# Patient Record
Sex: MALE | Race: WHITE | Employment: OTHER | URBAN - METROPOLITAN AREA
[De-identification: names, ages, dates, MRNs, and addresses within clinical notes are randomized per-mention and may not be internally consistent; named-entity substitution may affect disease eponyms.]

---

## 2017-02-16 RX ORDER — LOSARTAN POTASSIUM 50 MG/1
TABLET ORAL
Qty: 30 TABLET | Refills: 1 | Status: SHIPPED | OUTPATIENT
Start: 2017-02-16 | End: 2017-04-24

## 2017-02-16 RX ORDER — LOSARTAN POTASSIUM 50 MG/1
TABLET ORAL
Qty: 30 TABLET | Refills: 5 | Status: SHIPPED | OUTPATIENT
Start: 2017-02-16 | End: 2020-03-05

## 2017-04-24 ENCOUNTER — OFFICE VISIT (OUTPATIENT)
Dept: CARDIOLOGY CLINIC | Age: 77
End: 2017-04-24

## 2017-04-24 VITALS
DIASTOLIC BLOOD PRESSURE: 64 MMHG | RESPIRATION RATE: 28 BRPM | BODY MASS INDEX: 35.17 KG/M2 | HEART RATE: 68 BPM | SYSTOLIC BLOOD PRESSURE: 118 MMHG | HEIGHT: 71 IN | WEIGHT: 251.2 LBS

## 2017-04-24 DIAGNOSIS — I25.10 CORONARY ARTERY DISEASE INVOLVING NATIVE CORONARY ARTERY OF NATIVE HEART WITHOUT ANGINA PECTORIS: Primary | ICD-10-CM

## 2017-04-24 PROCEDURE — G8598 ASA/ANTIPLAT THER USED: HCPCS | Performed by: INTERNAL MEDICINE

## 2017-04-24 PROCEDURE — 3017F COLORECTAL CA SCREEN DOC REV: CPT | Performed by: INTERNAL MEDICINE

## 2017-04-24 PROCEDURE — G8427 DOCREV CUR MEDS BY ELIG CLIN: HCPCS | Performed by: INTERNAL MEDICINE

## 2017-04-24 PROCEDURE — 99213 OFFICE O/P EST LOW 20 MIN: CPT | Performed by: INTERNAL MEDICINE

## 2017-04-24 PROCEDURE — 1123F ACP DISCUSS/DSCN MKR DOCD: CPT | Performed by: INTERNAL MEDICINE

## 2017-04-24 PROCEDURE — 1036F TOBACCO NON-USER: CPT | Performed by: INTERNAL MEDICINE

## 2017-04-24 PROCEDURE — G8419 CALC BMI OUT NRM PARAM NOF/U: HCPCS | Performed by: INTERNAL MEDICINE

## 2017-04-24 PROCEDURE — 4040F PNEUMOC VAC/ADMIN/RCVD: CPT | Performed by: INTERNAL MEDICINE

## 2017-04-24 RX ORDER — FUROSEMIDE 40 MG/1
TABLET ORAL
Qty: 120 TABLET | Refills: 2 | Status: ON HOLD | OUTPATIENT
Start: 2017-04-24 | End: 2017-08-05 | Stop reason: CLARIF

## 2017-04-24 RX ORDER — UBIDECARENONE 100 MG
CAPSULE ORAL
Qty: 30 CAPSULE | Refills: 0 | Status: ON HOLD | OUTPATIENT
Start: 2017-04-24 | End: 2017-07-24 | Stop reason: CLARIF

## 2017-04-25 ENCOUNTER — OFFICE VISIT (OUTPATIENT)
Dept: SLEEP MEDICINE | Age: 77
End: 2017-04-25

## 2017-04-25 VITALS
DIASTOLIC BLOOD PRESSURE: 70 MMHG | OXYGEN SATURATION: 97 % | HEART RATE: 85 BPM | HEIGHT: 69 IN | BODY MASS INDEX: 37.18 KG/M2 | SYSTOLIC BLOOD PRESSURE: 110 MMHG | WEIGHT: 251 LBS

## 2017-04-25 DIAGNOSIS — E11.8 TYPE 2 DIABETES MELLITUS WITH COMPLICATION, WITH LONG-TERM CURRENT USE OF INSULIN (HCC): Chronic | ICD-10-CM

## 2017-04-25 DIAGNOSIS — I25.10 CORONARY ARTERY DISEASE INVOLVING NATIVE CORONARY ARTERY OF NATIVE HEART WITHOUT ANGINA PECTORIS: Chronic | ICD-10-CM

## 2017-04-25 DIAGNOSIS — E03.1 CONGENITAL HYPOTHYROIDISM WITHOUT GOITER: Chronic | ICD-10-CM

## 2017-04-25 DIAGNOSIS — I10 ESSENTIAL HYPERTENSION: Chronic | ICD-10-CM

## 2017-04-25 DIAGNOSIS — G47.33 OBSTRUCTIVE SLEEP APNEA (ADULT) (PEDIATRIC): Primary | Chronic | ICD-10-CM

## 2017-04-25 DIAGNOSIS — Z79.4 TYPE 2 DIABETES MELLITUS WITH COMPLICATION, WITH LONG-TERM CURRENT USE OF INSULIN (HCC): Chronic | ICD-10-CM

## 2017-04-25 PROCEDURE — 3046F HEMOGLOBIN A1C LEVEL >9.0%: CPT | Performed by: NURSE PRACTITIONER

## 2017-04-25 PROCEDURE — 99214 OFFICE O/P EST MOD 30 MIN: CPT | Performed by: NURSE PRACTITIONER

## 2017-04-25 PROCEDURE — G8419 CALC BMI OUT NRM PARAM NOF/U: HCPCS | Performed by: NURSE PRACTITIONER

## 2017-04-25 PROCEDURE — 1123F ACP DISCUSS/DSCN MKR DOCD: CPT | Performed by: NURSE PRACTITIONER

## 2017-04-25 PROCEDURE — 4040F PNEUMOC VAC/ADMIN/RCVD: CPT | Performed by: NURSE PRACTITIONER

## 2017-04-25 PROCEDURE — G8427 DOCREV CUR MEDS BY ELIG CLIN: HCPCS | Performed by: NURSE PRACTITIONER

## 2017-04-25 PROCEDURE — 3017F COLORECTAL CA SCREEN DOC REV: CPT | Performed by: NURSE PRACTITIONER

## 2017-04-25 PROCEDURE — 1036F TOBACCO NON-USER: CPT | Performed by: NURSE PRACTITIONER

## 2017-04-25 PROCEDURE — G8598 ASA/ANTIPLAT THER USED: HCPCS | Performed by: NURSE PRACTITIONER

## 2017-04-25 ASSESSMENT — ENCOUNTER SYMPTOMS
ABDOMINAL DISTENTION: 0
COUGH: 0
ABDOMINAL PAIN: 0
APNEA: 0
SHORTNESS OF BREATH: 0
SINUS PRESSURE: 0
RHINORRHEA: 0

## 2017-04-25 ASSESSMENT — SLEEP AND FATIGUE QUESTIONNAIRES
ESS TOTAL SCORE: 14
HOW LIKELY ARE YOU TO NOD OFF OR FALL ASLEEP WHILE SITTING AND TALKING TO SOMEONE: 0
HOW LIKELY ARE YOU TO NOD OFF OR FALL ASLEEP IN A CAR, WHILE STOPPED FOR A FEW MINUTES IN TRAFFIC: 0
HOW LIKELY ARE YOU TO NOD OFF OR FALL ASLEEP WHILE SITTING AND READING: 2
HOW LIKELY ARE YOU TO NOD OFF OR FALL ASLEEP WHEN YOU ARE A PASSENGER IN A CAR FOR AN HOUR WITHOUT A BREAK: 3
HOW LIKELY ARE YOU TO NOD OFF OR FALL ASLEEP WHILE LYING DOWN TO REST IN THE AFTERNOON WHEN CIRCUMSTANCES PERMIT: 3
HOW LIKELY ARE YOU TO NOD OFF OR FALL ASLEEP WHILE SITTING INACTIVE IN A PUBLIC PLACE: 2
HOW LIKELY ARE YOU TO NOD OFF OR FALL ASLEEP WHILE WATCHING TV: 3
HOW LIKELY ARE YOU TO NOD OFF OR FALL ASLEEP WHILE SITTING QUIETLY AFTER LUNCH WITHOUT ALCOHOL: 1

## 2017-07-25 PROBLEM — L08.9 DIABETIC INFECTION OF RIGHT FOOT (HCC): Status: ACTIVE | Noted: 2017-07-25

## 2017-07-25 PROBLEM — E11.42 DIABETIC POLYNEUROPATHY ASSOCIATED WITH TYPE 2 DIABETES MELLITUS (HCC): Status: ACTIVE | Noted: 2017-07-25

## 2017-07-25 PROBLEM — A48.0 GAS GANGRENE (HCC): Status: ACTIVE | Noted: 2017-07-25

## 2017-07-25 PROBLEM — E11.628 DIABETIC INFECTION OF RIGHT FOOT (HCC): Status: ACTIVE | Noted: 2017-07-25

## 2017-08-07 PROBLEM — G93.40 ENCEPHALOPATHY: Status: ACTIVE | Noted: 2017-08-07

## 2017-08-22 ENCOUNTER — HOSPITAL ENCOUNTER (OUTPATIENT)
Dept: WOUND CARE | Age: 77
Discharge: HOME | End: 2017-08-22
Attending: PODIATRIST | Admitting: PODIATRIST

## 2017-08-22 VITALS
HEART RATE: 70 BPM | SYSTOLIC BLOOD PRESSURE: 96 MMHG | DIASTOLIC BLOOD PRESSURE: 62 MMHG | TEMPERATURE: 97.2 F | RESPIRATION RATE: 18 BRPM

## 2017-08-22 LAB
GLUCOSE BLD-MCNC: 95 MG/DL (ref 70–99)
PERFORMED ON: NORMAL

## 2017-08-22 RX ORDER — LIDOCAINE HYDROCHLORIDE 40 MG/ML
SOLUTION TOPICAL ONCE
Status: COMPLETED | OUTPATIENT
Start: 2017-08-22 | End: 2017-08-22

## 2017-08-22 RX ADMIN — LIDOCAINE HYDROCHLORIDE: 40 SOLUTION TOPICAL at 14:16

## 2017-08-22 ASSESSMENT — PAIN DESCRIPTION - LOCATION: LOCATION: ABDOMEN

## 2017-08-22 ASSESSMENT — PAIN DESCRIPTION - PAIN TYPE: TYPE: ACUTE PAIN

## 2017-08-22 ASSESSMENT — PAIN DESCRIPTION - DESCRIPTORS: DESCRIPTORS: DISCOMFORT

## 2017-08-22 ASSESSMENT — PAIN SCALES - GENERAL: PAINLEVEL_OUTOF10: 3

## 2017-08-29 ENCOUNTER — HOSPITAL ENCOUNTER (OUTPATIENT)
Dept: WOUND CARE | Age: 77
Discharge: HOME | End: 2017-08-29
Attending: PODIATRIST | Admitting: PODIATRIST

## 2017-08-29 VITALS
SYSTOLIC BLOOD PRESSURE: 112 MMHG | TEMPERATURE: 97.7 F | DIASTOLIC BLOOD PRESSURE: 67 MMHG | HEART RATE: 71 BPM | RESPIRATION RATE: 18 BRPM

## 2017-08-29 LAB
GLUCOSE BLD-MCNC: 130 MG/DL (ref 70–99)
PERFORMED ON: ABNORMAL

## 2017-08-29 RX ORDER — LIDOCAINE HYDROCHLORIDE 40 MG/ML
2.5 SOLUTION TOPICAL ONCE
Status: COMPLETED | OUTPATIENT
Start: 2017-08-29 | End: 2017-08-29

## 2017-08-29 RX ADMIN — LIDOCAINE HYDROCHLORIDE 2.5 ML: 40 SOLUTION TOPICAL at 16:01

## 2017-09-01 ENCOUNTER — CARE COORDINATION (OUTPATIENT)
Dept: CASE MANAGEMENT | Age: 77
End: 2017-09-01

## 2017-09-05 ENCOUNTER — HOSPITAL ENCOUNTER (OUTPATIENT)
Dept: WOUND CARE | Age: 77
Discharge: HOME | End: 2017-09-05
Attending: PODIATRIST | Admitting: PODIATRIST

## 2017-09-05 VITALS
TEMPERATURE: 97.6 F | HEART RATE: 70 BPM | SYSTOLIC BLOOD PRESSURE: 134 MMHG | DIASTOLIC BLOOD PRESSURE: 72 MMHG | RESPIRATION RATE: 16 BRPM

## 2017-09-05 LAB
GLUCOSE BLD-MCNC: 128 MG/DL (ref 70–99)
PERFORMED ON: ABNORMAL

## 2017-09-05 RX ORDER — LIDOCAINE HYDROCHLORIDE 40 MG/ML
2.5 SOLUTION TOPICAL ONCE
Status: COMPLETED | OUTPATIENT
Start: 2017-09-05 | End: 2017-09-05

## 2017-09-05 RX ORDER — GUAIFENESIN AND DEXTROMETHORPHAN HYDROBROMIDE 100; 10 MG/5ML; MG/5ML
5 SOLUTION ORAL EVERY 4 HOURS PRN
COMMUNITY
End: 2018-01-06 | Stop reason: CLARIF

## 2017-09-05 RX ORDER — ATORVASTATIN CALCIUM 20 MG/1
20 TABLET, FILM COATED ORAL DAILY
COMMUNITY
End: 2017-09-26

## 2017-09-05 RX ADMIN — LIDOCAINE HYDROCHLORIDE 2.5 ML: 40 SOLUTION TOPICAL at 15:59

## 2017-09-12 ENCOUNTER — HOSPITAL ENCOUNTER (OUTPATIENT)
Dept: WOUND CARE | Age: 77
Discharge: HOME | End: 2017-09-12
Attending: PODIATRIST | Admitting: PODIATRIST

## 2017-09-12 VITALS
RESPIRATION RATE: 18 BRPM | HEART RATE: 65 BPM | TEMPERATURE: 96.6 F | DIASTOLIC BLOOD PRESSURE: 66 MMHG | SYSTOLIC BLOOD PRESSURE: 114 MMHG

## 2017-09-12 LAB
GLUCOSE BLD-MCNC: 123 MG/DL (ref 70–99)
PERFORMED ON: ABNORMAL

## 2017-09-12 RX ORDER — LIDOCAINE HYDROCHLORIDE 40 MG/ML
2.5 SOLUTION TOPICAL ONCE
Status: COMPLETED | OUTPATIENT
Start: 2017-09-12 | End: 2017-09-12

## 2017-09-19 ENCOUNTER — HOSPITAL ENCOUNTER (OUTPATIENT)
Dept: WOUND CARE | Age: 77
Discharge: HOME | End: 2017-09-19
Attending: PODIATRIST | Admitting: PODIATRIST

## 2017-09-19 VITALS
RESPIRATION RATE: 18 BRPM | DIASTOLIC BLOOD PRESSURE: 51 MMHG | TEMPERATURE: 97.2 F | HEART RATE: 69 BPM | SYSTOLIC BLOOD PRESSURE: 91 MMHG

## 2017-09-19 RX ORDER — CIPROFLOXACIN 500 MG/1
500 TABLET, FILM COATED ORAL 2 TIMES DAILY
Qty: 20 TABLET | Refills: 0 | Status: SHIPPED | OUTPATIENT
Start: 2017-09-19 | End: 2017-09-29

## 2017-09-19 RX ORDER — LIDOCAINE HYDROCHLORIDE 40 MG/ML
2.5 SOLUTION TOPICAL ONCE
Status: COMPLETED | OUTPATIENT
Start: 2017-09-19 | End: 2017-09-19

## 2017-09-19 RX ORDER — CLINDAMYCIN HYDROCHLORIDE 300 MG/1
300 CAPSULE ORAL 3 TIMES DAILY
Qty: 30 CAPSULE | Refills: 0 | Status: SHIPPED | OUTPATIENT
Start: 2017-09-19 | End: 2017-09-29

## 2017-09-19 RX ADMIN — LIDOCAINE HYDROCHLORIDE 2.5 ML: 40 SOLUTION TOPICAL at 16:07

## 2017-09-22 ENCOUNTER — CARE COORDINATION (OUTPATIENT)
Dept: CASE MANAGEMENT | Age: 77
End: 2017-09-22

## 2017-09-26 ENCOUNTER — HOSPITAL ENCOUNTER (OUTPATIENT)
Dept: WOUND CARE | Age: 77
Discharge: HOME | End: 2017-09-26
Attending: PODIATRIST | Admitting: PODIATRIST

## 2017-09-26 VITALS
DIASTOLIC BLOOD PRESSURE: 62 MMHG | TEMPERATURE: 97.6 F | RESPIRATION RATE: 18 BRPM | SYSTOLIC BLOOD PRESSURE: 108 MMHG | HEART RATE: 70 BPM

## 2017-09-26 RX ORDER — LIDOCAINE HYDROCHLORIDE 40 MG/ML
2.5 SOLUTION TOPICAL ONCE
Status: COMPLETED | OUTPATIENT
Start: 2017-09-26 | End: 2017-09-26

## 2017-09-26 RX ADMIN — LIDOCAINE HYDROCHLORIDE 2.5 ML: 40 SOLUTION TOPICAL at 17:00

## 2017-10-03 ENCOUNTER — HOSPITAL ENCOUNTER (OUTPATIENT)
Dept: WOUND CARE | Age: 77
Discharge: HOME | End: 2017-10-03
Attending: PODIATRIST | Admitting: PODIATRIST

## 2017-10-03 VITALS
HEART RATE: 73 BPM | TEMPERATURE: 97.5 F | DIASTOLIC BLOOD PRESSURE: 67 MMHG | RESPIRATION RATE: 18 BRPM | SYSTOLIC BLOOD PRESSURE: 106 MMHG

## 2017-10-03 NOTE — IP AVS SNAPSHOT
After Visit Summary  (Discharge Instructions)    Medication List for Home    Based on the information you provided to us as well as any changes during this visit, the following is your updated medication list.  Compare this with your prescription bottles at home. If you have any questions or concerns, contact your primary care physician's office. Daily Medication List (This medication list can be shared with any healthcare provider who is helping you manage your medications)      ASK your doctor about these medications if you have questions        Last Dose    Next Dose Due AM NOON PM NIGHT    aspirin 81 MG EC tablet   Take 81 mg by mouth daily. carbidopa-levodopa  MG per tablet   Commonly known as:  SINEMET   Take 1 tablet by mouth 3 times daily. cetirizine 5 MG tablet   Commonly known as:  ZYRTEC   Take 5 mg by mouth daily Prn                                         clopidogrel 75 MG tablet   Commonly known as:  PLAVIX   TAKE ONE TABLET BY MOUTH EVERY DAY                                         Dextromethorphan-guaiFENesin  MG/5ML Syrp   Take 5 mLs by mouth every 4 hours as needed for Cough                                         ergocalciferol 01179 units capsule   Commonly known as:  ERGOCALCIFEROL   Take 50,000 Units by mouth Twice a Week Every Tuesday and Thursday                                         FARXIGA 5 MG tablet   Generic drug:  dapagliflozin   Take 5 mg by mouth every morning                                         fenofibrate 145 MG tablet   Commonly known as:  TRICOR   TAKE ONE TABLET BY MOUTH DAILY                                         finasteride 5 MG tablet   Commonly known as:  PROSCAR   Take 5 mg by mouth daily                                         folic acid 1 MG tablet   Commonly known as:  FOLVITE   Take 1 mg by mouth daily. furosemide 40 MG tablet   Commonly known as:  LASIX   Take 1 tablet by mouth daily                                         glipiZIDE-metformin 2.5-500 MG per tablet   Commonly known as:  METAGLIP   Take 1 tablet by mouth 2 times daily (before meals)                                         insulin aspart 100 UNIT/ML injection vial   Commonly known as:  NOVOLOG   Inject 7 Units into the skin 3 times daily (before meals)                                         insulin detemir 100 UNIT/ML injection vial   Commonly known as:  LEVEMIR   Inject 26 Units into the skin nightly                                         levothyroxine 25 MCG tablet   Commonly known as:  SYNTHROID   Take 0.5 tablets by mouth Daily                                         losartan 50 MG tablet   Commonly known as:  COZAAR   TAKE ONE TABLET BY MOUTH EVERY DAY                                         metFORMIN 500 MG tablet   Commonly known as:  GLUCOPHAGE   Take 850 mg by mouth 2 times daily (with meals)                                         metoprolol tartrate 50 MG tablet   Commonly known as:  LOPRESSOR   Take 1 tablet by mouth 2 times daily                                         MILK OF MAGNESIA PO   Take 30 mLs by mouth daily as needed                                         nitroGLYCERIN 0.4 MG SL tablet   Commonly known as:  NITROSTAT   Place 0.4 mg under the tongue every 5 minutes as needed. nystatin 460897 UNIT/GM cream   Commonly known as:  MYCOSTATIN   . Apply Nystatin cream to the groin & buttocks twice a day.                                          potassium chloride 10 MEQ extended release tablet   Commonly known as:  KLOR-CON M   Take 10 mEq by mouth 3 times daily                                         QUEtiapine 25 MG tablet   Commonly known as:  SEROQUEL   Take 1 tablet by mouth nightly                                         rosuvastatin 20 MG tablet   Commonly known as:  CRESTOR Care Plan Once You Return Home    This section includes instructions you will need to follow once you leave the hospital.  Your care team will discuss these with you, so you and those caring for you know how to best care for your health needs at home. This section may also include educational information about certain health topics that may be of help to you. Discharge [address] [facility] Physician Orders and Discharge Instructions  Leigh Daigle [address] [facility], [address]  Telephone: [phone]    NAME:  Dick Morrison OF BIRTH:  1940  MEDICAL RECORD NUMBER:  [id number]  DATE:  10/3/2017    Wash hands with soap and water prior to and after every dressing change. Wound Cleansing:   · Do not scrub or use excessive force. · With each dressing change, rinse wounds with 0.9% Saline. (May use wound wash or soft contact solution. Both can be purchased at a local drug store). · If unable to obtain saline, may use a gentle soap and water. · Keep wounds dry in the shower unless otherwise instructed by the physician. Topical Treatments:  Do not apply lotions, creams, or ointments to wound bed unless directed. Apply moisturizing lotion to skin surrounding the wound prior to dressing change. Apply antifungal ointment to skin surrounding the wound prior to dressing change. Apply thin film of moisture barrier ointment to skin immediately around wound.    Other:      Dressings:           Wound Location: Right Dorsal & Lateral Foot      Apply Primary Dressing to wound:        Foam/Foam with Border   Mepitel/Non-adherent/Xeroform    Alginate with Silver     Alginate    Collagen  Collagen with Silver      Hydrocolloid   Hydrafera Blue moistened with saline    MediHoney Paste/Gel  Hydrogel       Santyl with Moisten saline gauze     Bactroban/Mupirocin  Polysporin   Other: moist laura has been given to me, the patient and/or responsible adult.            Patient Signature/Responsible Adult:____________________    Clinician Signature:_____________________    Date:_____________________    Time:_____________________

## 2017-10-03 NOTE — IP AVS SNAPSHOT
Commonly known as:  KLOR-CON M       QUEtiapine 25 MG tablet   Commonly known as:  SEROQUEL   Take 1 tablet by mouth nightly       rosuvastatin 20 MG tablet   Commonly known as:  CRESTOR   Take 1 tablet by mouth daily       SENNA LAXATIVE PO       ZANTAC 150 MG tablet   Generic drug:  ranitidine

## 2017-10-03 NOTE — PROGRESS NOTES
Comments)     arf    Celebrex [Celecoxib]      tremors    Cymbalta [Duloxetine Hcl]      Mental status change    Pcn [Penicillins]      rash    Vicodin [Hydrocodone-Acetaminophen]     Codeine Nausea And Vomiting       MEDICATIONS    Current Outpatient Prescriptions on File Prior to Encounter   Medication Sig Dispense Refill    Sennosides (SENNA LAXATIVE PO) Take 8.6-50 mg by mouth      metFORMIN (GLUCOPHAGE) 500 MG tablet Take 850 mg by mouth 2 times daily (with meals)       Dextromethorphan-guaiFENesin  MG/5ML SYRP Take 5 mLs by mouth every 4 hours as needed for Cough      QUEtiapine (SEROQUEL) 25 MG tablet Take 1 tablet by mouth nightly 60 tablet 0    metoprolol tartrate (LOPRESSOR) 50 MG tablet Take 1 tablet by mouth 2 times daily 60 tablet 1    furosemide (LASIX) 40 MG tablet Take 1 tablet by mouth daily 60 tablet 1    levothyroxine (SYNTHROID) 25 MCG tablet Take 0.5 tablets by mouth Daily 30 tablet 3    potassium chloride (KLOR-CON M) 10 MEQ extended release tablet Take 10 mEq by mouth 3 times daily      insulin aspart (NOVOLOG) 100 UNIT/ML injection vial Inject 7 Units into the skin 3 times daily (before meals) 1 vial 3    nystatin (MYCOSTATIN) 131961 UNIT/GM cream .Apply Nystatin cream to the groin & buttocks twice a day. 1 Tube 1    ranitidine (ZANTAC) 150 MG tablet Take 150 mg by mouth 2 times daily      losartan (COZAAR) 50 MG tablet TAKE ONE TABLET BY MOUTH EVERY DAY 30 tablet 5    fenofibrate (TRICOR) 145 MG tablet TAKE ONE TABLET BY MOUTH DAILY 30 tablet 2    clopidogrel (PLAVIX) 75 MG tablet TAKE ONE TABLET BY MOUTH EVERY DAY 90 tablet 4    rosuvastatin (CRESTOR) 20 MG tablet Take 1 tablet by mouth daily 90 tablet 4    finasteride (PROSCAR) 5 MG tablet Take 5 mg by mouth daily      FARXIGA 5 MG tablet Take 5 mg by mouth every morning       carbidopa-levodopa (SINEMET)  MG per tablet Take 1 tablet by mouth 3 times daily.       cetirizine (ZYRTEC) 5 MG tablet Take 5 mg by mouth daily Prn      aspirin 81 MG EC tablet Take 81 mg by mouth daily.  ergocalciferol (ERGOCALCIFEROL) 37817 UNITS capsule Take 50,000 Units by mouth Twice a Week Every Tuesday and Thursday      folic acid (FOLVITE) 1 MG tablet Take 1 mg by mouth daily.  Magnesium Hydroxide (MILK OF MAGNESIA PO) Take 30 mLs by mouth daily as needed      insulin detemir (LEVEMIR) 100 UNIT/ML injection vial Inject 26 Units into the skin nightly 1 vial 3    glipiZIDE-metformin (METAGLIP) 2.5-500 MG per tablet Take 1 tablet by mouth 2 times daily (before meals)      nitroGLYCERIN (NITROSTAT) 0.4 MG SL tablet Place 0.4 mg under the tongue every 5 minutes as needed. No current facility-administered medications on file prior to encounter. REVIEW OF SYSTEMS    unable to obtain as patient is nonverbal    Objective:      /67  Pulse 73  Temp 97.5 °F (36.4 °C) (Oral)   Resp 18    Wt Readings from Last 3 Encounters:   08/05/17 237 lb (107.5 kg)   07/28/17 274 lb 14.6 oz (124.7 kg)   04/25/17 251 lb (113.9 kg)       PHYSICAL EXAM    Incision along TMA well approximated and coapted. There are linear areas of ecchymosis on the medial and lateral aspect of the foot that is consistent with a dressing that was applied too tight. There is no germán necrosis noted at these areas. The area is persistently erythematous and slightly warm. There is a dorsal wound noted on the right that has fibrotic and nonviable tissue that extends down through and includes the deep fascia/muscular layer. After debridement the wound has a fibrous granular base. There is no surrounding erythema, edema, warmth or malodor noted. The wound does not probe or track to bone.        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) A41.9    Syncope R55    Hypothyroid E03.9    Obstructive sleep apnea G47.33    Gas gangrene ([facility]) A48.0    Diabetic infection of right foot ([facility]) E11.69, L08.9    Diabetic polyneuropathy associated with type 2 diabetes mellitus ([facility]) E11.42    Right foot infection L08.9    LULÚ (acute kidney injury) ([facility] N17.9    Acute diastolic CHF (congestive heart failure), NYHA class 2 ([facility]) I50.31    Encephalopathy G93.40        Procedure Note  Indications:  Based on my examination of this patient's wound(s)/ulcer(s) today, debridement is required to promote healing and evaluate the wound base. Performed by: Morgan Moya DPM    Consent obtained:  Yes    Time out taken:  Yes    Pain Control: Anesthetic  Anesthetic: 4% Topical Xylocaine       Debridement:Excisional Debridement    Using curette, #15 blade scalpel, scissors and forceps the wound(s)/ulcer(s) was/were sharply debrided down through and including the removal of epidermis, dermis, subcutaneous tissue and muscle/fascia.         Devitalized Tissue Debrided:  fibrin, slough and necrotic/eschar    Pre Debridement Measurements:  Are located in the [place]  Documentation Flow Sheet    Wound/Ulcer #: 1    Post Debridement Measurements:  Wound/Ulcer Descriptions are Pre Debridement except measurements:    Wound 08/22/17 Foot Dorsal;Right #1 diabetic (existing 4 weeks) (Active)   Wound Type Wound 10/3/2017  3:06 PM   Wound Diabetic Jo 2 9/26/2017  4:49 PM   Dressing/Treatment Other (Comment) 9/26/2017  4:49 PM   Wound Cleansed Rinsed/Irrigated with saline 10/3/2017  3:06 PM   Wound Length (cm) 2.5 cm 10/3/2017  3:06 PM   Wound Width (cm) 2.6 cm 10/3/2017  3:06 PM   Wound Depth (cm)  0.1 10/3/2017  3:06 PM   Calculated Wound Size (cm^2) (l*w) 5.98 cm^2 10/3/2017  3:06 PM   Change in Wound Size % (l*w) 53.28 10/3/2017  3:06 PM   Tunneling Position ___ O'Clock 0 10/3/2017  3:06 PM   Undermining Ends___ O'Clock 0 10/3/2017  3:06 PM   Wound Assessment Yellow;Red;Pink 10/3/2017  3:06 PM   Margins Defined edges 10/3/2017  3:06 PM   Mary Jane-wound Assessment Pink;Red 10/3/2017  3:06 PM   Non-staged Wound Description Full thickness 10/3/2017  3:06 PM   [place]%Wound Bed 40 10/3/2017  3:06 PM   Red%Wound Bed 20 10/3/2017  3:06 PM   Yellow%Wound Bed 40 10/3/2017  3:06 PM   Black%Wound Bed 5 9/26/2017  4:49 PM   Drainage Amount Moderate 10/3/2017  3:06 PM   Drainage Description Serosanguinous 10/3/2017  3:06 PM   Odor None 10/3/2017  3:06 PM   Op First Treatment Date 08/22/17 8/22/2017  1:39 PM   Number of days:42       Wound 09/19/17 Foot Right;Lateral;Proximal #3 - diabetic (Active)   Wound Type Wound 10/3/2017  3:06 PM   Wound Diabetic Jo 1 10/3/2017  3:06 PM   Dressing/Treatment Other (Comment) 10/3/2017  3:06 PM   Wound Cleansed Rinsed/Irrigated with saline 10/3/2017  3:06 PM   Wound Length (cm) 0.5 cm 10/3/2017  3:06 PM   Wound Width (cm) 0.5 cm 10/3/2017  3:06 PM   Wound Depth (cm)  0.2 10/3/2017  3:06 PM   Calculated Wound Size (cm^2) (l*w) 0.16 cm^2 10/3/2017  3:06 PM   Change in Wound Size % (l*w) 46.67 10/3/2017  3:06 PM   Tunneling Position ___ O'Clock 0 10/3/2017  3:06 PM   Undermining Ends___ O'Clock 0 10/3/2017  3:06 PM   Wound Assessment Slough; Yellow 10/3/2017  3:06 PM   Margins Defined edges 10/3/2017  3:06 PM   Mary Jane-wound Assessment Pink;Dry 10/3/2017  3:06 PM   Non-staged Wound Description Full thickness 10/3/2017  3:06 PM   Yellow%Wound Bed 100 10/3/2017  3:06 PM   Drainage Amount None 10/3/2017  3:06 PM   Drainage Description Yellow 9/26/2017  4:49 PM   Odor Mild 10/3/2017  3:06 PM   Number of days:14       Incision 07/24/17 Foot Right (Active)   Number of days:71     Percent of Wound/Ulcer Debrided: 100%    Total Surface Area Debrided: 6.75 sq cm     Diabetic/Pressure/Non Pressure Ulcers only:  Ulcer: Diabetic ulcer, muscle necrosis    Estimated Blood Loss:  Minimal    Hemostasis Achieved:  by pressure    Procedural Pain:  0  / 10     Post Procedural Pain:  0 / 10     Response to treatment:  Well tolerated by patient. Plan:     Orders written for local wound care daily with santyl. OK to continue weight bearing with PT. Patient will be preauthorized for a apligraf to accelerate healing in this high risk DM foot ulceration. We are still waiting to hear if he is approved by his insurance. Treatment Note please see attached Discharge Instructions    In my professional opinion this patient would benefit from HBO Therapy: No    Written patient dismissal instructions given to patient and signed by patient or POA. RTC 1 week         Discharge [facility] Physician Orders and Discharge Instructions  The Sebastián Daigle [address] [facility], [address]  Telephone: [phone]    NAME:  Saran Norton OF BIRTH:  1940  MEDICAL RECORD NUMBER:  [id number]  DATE:  10/3/2017    Wash hands with soap and water prior to and after every dressing change. Wound Cleansing:   · Do not scrub or use excessive force. · With each dressing change, rinse wounds with 0.9% Saline. (May use wound wash or soft contact solution. Both can be purchased at a local drug store). · If unable to obtain saline, may use a gentle soap and water. · Keep wounds dry in the shower unless otherwise instructed by the physician. Topical Treatments:  Do not apply lotions, creams, or ointments to wound bed unless directed. [] Apply moisturizing lotion to skin surrounding the wound prior to dressing change.  [] Apply antifungal ointment to skin surrounding the wound prior to dressing change.  [] Apply thin film of moisture barrier ointment to skin immediately around wound.   [] Other:      Dressings:           Wound Location: Right Dorsal & Lateral Foot     [x] Apply Primary Dressing to Management [] Diabetes Education [] Vascular Surgery  [] Endocrinology [] Nutrition Counseling  [] Lymphedema Therapy     Your nurse  is:  Isabel     Electronically signed by Shira Gaitan RN on 10/3/2017 at 3:40 PM     [facility] Information: Should you experience any significant changes in your wound(s) or have questions about your wound care, please contact the [address] [place] [address] at [phone] Monday and Wednesday 8:00 am - 2:00 pm and Tuesday, Thursday and Friday from 8:00 am - 4:30 pm.   If you need help with your wound outside these hours and cannot wait until we are again available, contact your PCP or go to the hospital emergency room. PLEASE NOTE: IF YOU ARE UNABLE TO OBTAIN WOUND SUPPLIES, CONTINUE TO USE THE SUPPLIES YOU HAVE AVAILABLE UNTIL YOU ARE ABLE TO REACH US. IT IS MOST IMPORTANT TO KEEP THE WOUND COVERED AT ALL TIMES. Discharge Nurse Signature:_______________________    Date: ___________ Time:  ____________    Physician orders by:     [x] Dr Pau Winston [] Dr Idania Martínez    [] Dr Author Runner     [] Dr Vikas Charlton   [] Dr Ede Ball  [] Dr Ronald Chacon       Physician Signature:__________________________________        The information contained in the After Visit Summary has been reviewed with me, the patient and/or responsible adult, by my health care provider(s). I had the opportunity to ask questions regarding this information. I have elected to receive;       Patient Signature:_______________________    Date:_________Time:________        [] Patient unable to sign Discharge Instructions given to ECF/Transportation/POA      []  After Visit Summary  []  Comprehensive Discharge Instruction          Electronically signed by Pau Winston DPM on 10/3/2017 at 4:52 PM

## 2017-10-03 NOTE — IP AVS SNAPSHOT
Patient Information     Patient Name Rosemary Ignacio 1940      Important Information for Heart Failure       If your condition worsens or if you have any concerns, call your doctor or seek emergency medical services (dial 9-1-1) as needed. If you have any of the following symptoms/conditions, call your doctor. Call your primary care physician to obtain results of outstanding lab tests, cultures, x-rays, or other tests. If you have a current diagnosis or history of any of the following, please review the information carefully. HEART FAILURE PATIENTS:   DISCHARGE WEIGHT:    Record daily weights. Notify your doctor if you have a weight gain 2 pounds in a day, or 3-5 pounds in 1 week. Notify your doctor for increased shortness of breath, or swelling in legs or feet. Follow a low sodium diet. Resume normal activity unless otherwise instructed by your doctor.

## 2017-10-05 ENCOUNTER — CARE COORDINATION (OUTPATIENT)
Dept: CASE MANAGEMENT | Age: 77
End: 2017-10-05

## 2017-10-05 NOTE — CARE COORDINATION
[facility] Update Call    10/5/2017    Patient: Cailin Naranjo Patient : 1940   MRN: <[id number]>  Reason for Admission: There are no discharge diagnoses documented for the most recent discharge. Discharge Date: 8/15/17 RARS: [facility] Risk Score: 24       Care Transitions Post Acute Facility Update    Care Transitions Interventions                  Post Acute Facility Update                    Contacted [facility] for update on patient. S/W DORA De Los Santos. He stated that patient is doing well, however he is currently experiencing episodes of orthostatic hypotension, Magali placed a call to the doctor and this is being addressed. Patient is a 1 person assist, at times only stand by needed with ADL's, ambulation, and transfers. Pt is actively participating in therapy, he uses a walker, grab bars, and w/c as needed, no anticipated discharge date as of yet, he will most likely need home care PT/OT when he goes back home. Post acute care transitions will continue to follow.

## 2017-10-10 ENCOUNTER — HOSPITAL ENCOUNTER (OUTPATIENT)
Dept: WOUND CARE | Age: 77
Discharge: HOME | End: 2017-10-10
Attending: PODIATRIST | Admitting: PODIATRIST

## 2017-10-10 VITALS
DIASTOLIC BLOOD PRESSURE: 62 MMHG | TEMPERATURE: 97.7 F | RESPIRATION RATE: 18 BRPM | SYSTOLIC BLOOD PRESSURE: 121 MMHG | HEART RATE: 70 BPM

## 2017-10-10 RX ORDER — LIDOCAINE HYDROCHLORIDE 40 MG/ML
2.5 SOLUTION TOPICAL ONCE
Status: DISCONTINUED | OUTPATIENT
Start: 2017-10-10 | End: 2017-10-11 | Stop reason: HOSPADM

## 2017-10-10 NOTE — PROGRESS NOTES
[facility] [address]   Progress Note and Procedure Note      Candelaria Lao  MEDICAL RECORD NUMBER:  [id number]  AGE: 69 y.o. GENDER: male  : 1940  EPISODE DATE:  10/10/2017    Subjective:     Chief Complaint   Patient presents with    Wound Check     f/u right foot         HISTORY of PRESENT ILLNESS HPI     Carol Morgan is a 69 y.o. male who presents today for wound/ulcer evaluation. History of Wound Context: right dorsal foot wound and right lateral foot wound s/p TMA. Patient has been NWB at an [facility]. 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     Dr. Bud May CAD (coronary artery disease)     [facility]est[facility]     COPD (chronic obstructive pulmonary disease) [facility]     Diabetes mellitus [facility]     Hypertension     MRSA (methicillin resistant staph aureus) culture positive 2017    foot    Obstructive sleep apnea 10/3/2014    Parkinson's disease ([facility] 1964       PAST SURGICAL HISTORY    Past Surgical History:   Procedure Laterality Date    CARDIAC CATHETERIZATION      CERVICAL FUSION  1981    FOOT SURGERY Right 2017    INCISION AND DRAINAGE RIGHT FOOT WITH REMOVAL NECROTIC BONE    FOOT SURGERY Right 2017    : TRANSMETATARSAL AMPUTATION RIGHT FOOT        FAMILY HISTORY    No family history on file.     SOCIAL HISTORY    Social History   Substance Use Topics    Smoking status: Never Smoker    Smokeless tobacco: Never Used    Alcohol use No       ALLERGIES    Allergies   Allergen Reactions    Aspirin      GI  Bleed possible related to aspirin  (but thought to be H Pylori)    Nsaids     Bactrim [Sulfamethoxazole-Trimethoprim] Other (See Comments)     arf    Celebrex [Celecoxib]      tremors    Cymbalta [Duloxetine Hcl]      Mental status change    Pcn [Penicillins]      rash    Vicodin [Hydrocodone-Acetaminophen]     Codeine Nausea And Vomiting       MEDICATIONS    Current Outpatient Prescriptions on File Prior to Encounter   Medication Sig Dispense Refill    metFORMIN (GLUCOPHAGE) 500 MG tablet Take 850 mg by mouth 2 times daily (with meals)       QUEtiapine (SEROQUEL) 25 MG tablet Take 1 tablet by mouth nightly 60 tablet 0    metoprolol tartrate (LOPRESSOR) 50 MG tablet Take 1 tablet by mouth 2 times daily (Patient taking differently: Take 25 mg by mouth 2 times daily ) 60 tablet 1    furosemide (LASIX) 40 MG tablet Take 1 tablet by mouth daily 60 tablet 1    levothyroxine (SYNTHROID) 25 MCG tablet Take 0.5 tablets by mouth Daily 30 tablet 3    potassium chloride (KLOR-CON M) 10 MEQ extended release tablet Take 10 mEq by mouth 3 times daily      insulin aspart (NOVOLOG) 100 UNIT/ML injection vial Inject 7 Units into the skin 3 times daily (before meals) 1 vial 3    ranitidine (ZANTAC) 150 MG tablet Take 150 mg by mouth 2 times daily      losartan (COZAAR) 50 MG tablet TAKE ONE TABLET BY MOUTH EVERY DAY (Patient taking differently: 25 mg TAKE ONE TABLET BY MOUTH EVERY DAY) 30 tablet 5    clopidogrel (PLAVIX) 75 MG tablet TAKE ONE TABLET BY MOUTH EVERY DAY 90 tablet 4    rosuvastatin (CRESTOR) 20 MG tablet Take 1 tablet by mouth daily 90 tablet 4    finasteride (PROSCAR) 5 MG tablet Take 5 mg by mouth daily      carbidopa-levodopa (SINEMET)  MG per tablet Take 1 tablet by mouth 3 times daily.  aspirin 81 MG EC tablet Take 81 mg by mouth daily.  ergocalciferol (ERGOCALCIFEROL) 41246 UNITS capsule Take 50,000 Units by mouth Twice a Week Every Tuesday and Thursday      folic acid (FOLVITE) 1 MG tablet Take 1 mg by mouth daily.         Sennosides (SENNA LAXATIVE PO) Take 8.6-50 mg by mouth      Magnesium to inhalation of fumes ([facility] J68.3    Pulmonary embolism ([facility] I26.99    Coronary artery disease involving native heart without angina pectoris I25.10    Presence of drug coated stent in anterior descending branch of left coronary artery Z95.5    Sepsis ([facility]) A41.9    Syncope R55    Hypothyroid E03.9    Obstructive sleep apnea G47.33    Gas gangrene ([facility]) A48.0    Diabetic infection of right foot ([facility]) E11.69, L08.9    Diabetic polyneuropathy associated with type 2 diabetes mellitus ([facility]) E11.42    Right foot infection L08.9    LULÚ (acute kidney injury) ([facility] [address] N17.9    Acute diastolic CHF (congestive heart failure), NYHA class 2 ([facility]) I50.31    Encephalopathy G93.40        Procedure Note  Indications:  Based on my examination of this patient's wound(s)/ulcer(s) today, debridement is required to promote healing and evaluate the wound base. Performed by: Tiny Leyva DPM    Consent obtained:  Yes    Time out taken:  Yes    Pain Control: Anesthetic  Anesthetic: 4% Topical Xylocaine       Debridement:Excisional Debridement    Using curette, #15 blade scalpel, scissors and forceps the wound(s)/ulcer(s) was/were sharply debrided down through and including the removal of epidermis, dermis, subcutaneous tissue and muscle/fascia.         Devitalized Tissue Debrided:  fibrin, slough and necrotic/eschar    Pre Debridement Measurements:  Are located in the [facility]  Documentation Flow Sheet    Wound/Ulcer #: 1    Post Debridement Measurements:  Wound/Ulcer Descriptions are Pre Debridement except measurements:    Wound 08/22/17 Foot Dorsal;Right #1 diabetic (existing 4 weeks) (Active)   Wound Type Wound 10/10/2017  3:03 PM   Wound Diabetic Jo 2 10/10/2017  3:03 PM   Dressing/Treatment Other (Comment) 10/10/2017  3:03 PM   Wound Cleansed Rinsed/Irrigated with saline 10/10/2017  3:03 PM   Wound Length (cm) 2.5 cm 10/10/2017  3:03 PM   Wound Width (cm) 2.5 cm 10/10/2017  3:03 PM   Wound Depth (cm)  0.1 10/10/2017 wound.  [] Other:      Dressings:           Wound Location:      [x] Apply Primary Dressing to wound:       [] Foam/Foam with Border  [] Mepitel/Non-adherent/Xeroform   [] Alginate with Silver    [] Alginate   [] Collagen [] Collagen with Silver     [] Hydrocolloid  [] Hydrafera Blue moistened with saline   [] MediHoney Paste/Gel [] Hydrogel      [] Santyl with Moisten saline gauze    [] Bactroban/Mupirocin [] Polysporin  [] Other:      Pack wound loosely with  [] Iodoform   [] Plain Packing  [] Other      [x] Cover and Secure with:     [] Gauze [] ABD [] Stretch bandage roll [] Kerlix   [] Coban [] Ace Wrap [] Cover Roll Tape    [] Other:    Avoid contact of tape with skin. [x] Change dressing: [] Daily    [] Every Other Day [] Three times per week   [] Once a week [] Do Not Change Dressing   [] Other:    Dressings:           Wound Location:     [x] Apply Primary Dressing to wound:       [] Foam/Foam with Border  [] Mepitel/Non-adherent/Xeroform   [] Alginate with Silver    [] Alginate   [] Collagen [] Collagen with Silver     [] Hydrocolloid  [] Hydrafera Blue moistened with saline   [] MediHoney Paste/Gel [] Hydrogel      [] Santyl with Moisten saline gauze   [] Bactroban/Mupirocin [] Polysporin  [] Other:      Pack wound loosely with  [] Iodoform   [] Plain Packing  [] Other      [x] Cover and Secure with:     [] Gauze [] ABD [] Stretch bandage roll [] Kerlix   [] Coban [] Ace Wrap [] Cover Roll Tape    [] Other:    Avoid contact of tape with skin.     [x] Change dressing:  [] Daily    [] Every Other Day [] Three times per week   [] Once a week [] Do Not Change Dressing   [] Other:      ·               Off-Loading:   [] Off-loading when: [] walking  [] in bed [] sitting    [] Total non-weight bearing:  [] Right Leg  [] Left Leg     [] Partial weight bearing:   [] Right Leg  [] Left Leg     [] Assistive Device: [] Walker [] Crutches  [] Wheelchair [] Roll About   [] Surgical shoe/Darco    [] Podus Boot(s)   [] elected to receive;       Patient Signature:_______________________    Date:_________Time:________        [] Patient unable to sign Discharge Instructions given to ECF/Transportation/POA      []  After Visit Summary  []  Comprehensive Discharge Instruction          Electronically signed by Vicenta Marquis DPM on 10/10/2017 at 3:55 PM

## 2017-10-23 ENCOUNTER — OFFICE VISIT (OUTPATIENT)
Dept: CARDIOLOGY CLINIC | Age: 77
End: 2017-10-23

## 2017-10-23 VITALS
SYSTOLIC BLOOD PRESSURE: 110 MMHG | DIASTOLIC BLOOD PRESSURE: 60 MMHG | HEART RATE: 62 BPM | WEIGHT: 248.8 LBS | BODY MASS INDEX: 37.83 KG/M2

## 2017-10-23 DIAGNOSIS — I25.10 CORONARY ARTERY DISEASE INVOLVING NATIVE CORONARY ARTERY OF NATIVE HEART WITHOUT ANGINA PECTORIS: Primary | ICD-10-CM

## 2017-10-23 DIAGNOSIS — I50.32 CHRONIC DIASTOLIC HF (HEART FAILURE), NYHA CLASS 2 (HCC): ICD-10-CM

## 2017-10-23 PROCEDURE — G8417 CALC BMI ABV UP PARAM F/U: HCPCS | Performed by: INTERNAL MEDICINE

## 2017-10-23 PROCEDURE — 1036F TOBACCO NON-USER: CPT | Performed by: INTERNAL MEDICINE

## 2017-10-23 PROCEDURE — G8428 CUR MEDS NOT DOCUMENT: HCPCS | Performed by: INTERNAL MEDICINE

## 2017-10-23 PROCEDURE — 1123F ACP DISCUSS/DSCN MKR DOCD: CPT | Performed by: INTERNAL MEDICINE

## 2017-10-23 PROCEDURE — G8484 FLU IMMUNIZE NO ADMIN: HCPCS | Performed by: INTERNAL MEDICINE

## 2017-10-23 PROCEDURE — 4040F PNEUMOC VAC/ADMIN/RCVD: CPT | Performed by: INTERNAL MEDICINE

## 2017-10-23 PROCEDURE — 99213 OFFICE O/P EST LOW 20 MIN: CPT | Performed by: INTERNAL MEDICINE

## 2017-10-23 PROCEDURE — G8598 ASA/ANTIPLAT THER USED: HCPCS | Performed by: INTERNAL MEDICINE

## 2017-10-23 NOTE — PROGRESS NOTES
QUEtiapine (SEROQUEL) 25 MG tablet Take 1 tablet by mouth nightly 60 tablet 0    metoprolol tartrate (LOPRESSOR) 50 MG tablet Take 1 tablet by mouth 2 times daily (Patient taking differently: Take 25 mg by mouth 2 times daily ) 60 tablet 1    furosemide (LASIX) 40 MG tablet Take 1 tablet by mouth daily 60 tablet 1    levothyroxine (SYNTHROID) 25 MCG tablet Take 0.5 tablets by mouth Daily 30 tablet 3    potassium chloride (KLOR-CON M) 10 MEQ extended release tablet Take 10 mEq by mouth 3 times daily      insulin aspart (NOVOLOG) 100 UNIT/ML injection vial Inject 7 Units into the skin 3 times daily (before meals) 1 vial 3    insulin detemir (LEVEMIR) 100 UNIT/ML injection vial Inject 26 Units into the skin nightly 1 vial 3    nystatin (MYCOSTATIN) 906103 UNIT/GM cream .Apply Nystatin cream to the groin & buttocks twice a day. 1 Tube 1    glipiZIDE-metformin (METAGLIP) 2.5-500 MG per tablet Take 1 tablet by mouth 2 times daily (before meals)      ranitidine (ZANTAC) 150 MG tablet Take 150 mg by mouth 2 times daily      losartan (COZAAR) 50 MG tablet TAKE ONE TABLET BY MOUTH EVERY DAY (Patient taking differently: 25 mg TAKE ONE TABLET BY MOUTH EVERY DAY) 30 tablet 5    fenofibrate (TRICOR) 145 MG tablet TAKE ONE TABLET BY MOUTH DAILY 30 tablet 2    clopidogrel (PLAVIX) 75 MG tablet TAKE ONE TABLET BY MOUTH EVERY DAY 90 tablet 4    rosuvastatin (CRESTOR) 20 MG tablet Take 1 tablet by mouth daily 90 tablet 4    finasteride (PROSCAR) 5 MG tablet Take 5 mg by mouth daily      FARXIGA 5 MG tablet Take 5 mg by mouth every morning       carbidopa-levodopa (SINEMET)  MG per tablet Take 1 tablet by mouth 3 times daily.  cetirizine (ZYRTEC) 5 MG tablet Take 5 mg by mouth daily Prn      aspirin 81 MG EC tablet Take 81 mg by mouth daily.       ergocalciferol (ERGOCALCIFEROL) 93911 UNITS capsule Take 50,000 Units by mouth Twice a Week Every Tuesday and Thursday      folic acid (FOLVITE) 1 MG tablet Take 1 mg by mouth daily.  nitroGLYCERIN (NITROSTAT) 0.4 MG SL tablet Place 0.4 mg under the tongue every 5 minutes as needed. No current facility-administered medications for this visit. Vitals  Weight: 248 lb 12.8 oz (112.9 kg)  Blood Pressure:  118/64   Pulse: 62       Review of Systems    Objective:   Physical Exam   Nursing note and vitals reviewed. Constitutional: He is oriented to person, place, and time. He appears well-developed and well-nourished. No distress. HENT:   Head: Normocephalic and atraumatic. Nose: Nose normal.   Mouth/Throat: Oropharynx is clear and moist. No oropharyngeal exudate. Eyes: Conjunctivae are normal. Pupils are equal, round, and reactive to light. Right eye exhibits no discharge. Left eye exhibits no discharge. No scleral icterus. Neck: Normal range of motion. Neck supple. No JVD present. No tracheal deviation present. No thyromegaly present. Cardiovascular: Normal rate, regular rhythm, normal heart sounds and intact distal pulses. Exam reveals no gallop and no friction rub. No murmur heard. Pulmonary/Chest: Effort normal. No respiratory distress. He has no wheezes. He has no rales. He exhibits no tenderness. Abdominal: Soft. Bowel sounds are normal. He exhibits no distension and no mass. No tenderness. He has no rebound and no guarding. Musculoskeletal: He exhibits trace edema. He exhibits no tenderness. Neurological: He is alert and oriented to person, place, and time. No cranial nerve deficit. Coordination normal.   Skin: Skin is warm and dry. No rash noted. He is not diaphoretic. No erythema.  edema and reddish discoloration of the left leg. Psychiatric: He has a normal mood and affect.  His behavior is normal. Judgment and thought content normal.       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

## 2017-10-24 ENCOUNTER — HOSPITAL ENCOUNTER (OUTPATIENT)
Dept: WOUND CARE | Age: 77
Discharge: HOME | End: 2017-10-24
Attending: PODIATRIST | Admitting: PODIATRIST

## 2017-10-24 ENCOUNTER — CARE COORDINATION (OUTPATIENT)
Dept: CASE MANAGEMENT | Age: 77
End: 2017-10-24

## 2017-10-24 DIAGNOSIS — L08.9 DIABETIC INFECTION OF RIGHT FOOT (HCC): Primary | ICD-10-CM

## 2017-10-24 DIAGNOSIS — I70.235 ATHSCL NATIVE ARTERIES OF RIGHT LEG W ULCER OTH PRT FOOT (HCC): ICD-10-CM

## 2017-10-24 DIAGNOSIS — E11.628 DIABETIC INFECTION OF RIGHT FOOT (HCC): Primary | ICD-10-CM

## 2017-10-24 RX ORDER — LIDOCAINE HYDROCHLORIDE 40 MG/ML
2.5 SOLUTION TOPICAL ONCE
Status: COMPLETED | OUTPATIENT
Start: 2017-10-24 | End: 2017-10-24

## 2017-10-24 RX ORDER — L. ACIDOPHILUS/L.BULGARICUS 100MM CELL
1 GRANULES IN PACKET (EA) ORAL 3 TIMES DAILY
COMMUNITY
End: 2017-12-21 | Stop reason: ALTCHOICE

## 2017-10-24 RX ADMIN — LIDOCAINE HYDROCHLORIDE 2.5 ML: 40 SOLUTION TOPICAL at 16:00

## 2017-10-25 NOTE — PROGRESS NOTES
tablet 1    nystatin (MYCOSTATIN) 057905 UNIT/GM cream .Apply Nystatin cream to the groin & buttocks twice a day. 1 Tube 1    glipiZIDE-metformin (METAGLIP) 2.5-500 MG per tablet Take 1 tablet by mouth 2 times daily (before meals)      ranitidine (ZANTAC) 150 MG tablet Take 150 mg by mouth 2 times daily      losartan (COZAAR) 50 MG tablet TAKE ONE TABLET BY MOUTH EVERY DAY (Patient taking differently: 25 mg TAKE ONE TABLET BY MOUTH EVERY DAY) 30 tablet 5    rosuvastatin (CRESTOR) 20 MG tablet Take 1 tablet by mouth daily 90 tablet 4    carbidopa-levodopa (SINEMET)  MG per tablet Take 1 tablet by mouth 3 times daily.  cetirizine (ZYRTEC) 5 MG tablet Take 5 mg by mouth daily Prn      nitroGLYCERIN (NITROSTAT) 0.4 MG SL tablet Place 0.4 mg under the tongue every 5 minutes as needed. No current facility-administered medications on file prior to encounter. REVIEW OF SYSTEMS    unable to obtain as patient is nonverbal    Objective: There were no vitals taken for this visit. Wt Readings from Last 3 Encounters:   10/23/17 248 lb 12.8 oz (112.9 kg)   08/05/17 237 lb (107.5 kg)   07/28/17 274 lb 14.6 oz (124.7 kg)       PHYSICAL EXAM    Incision along TMA well approximated and coapted. There are linear areas of ecchymosis on the medial and lateral aspect of the foot that is consistent with a dressing that was applied too tight. There is no germán necrosis noted at these areas. The area is persistently erythematous and slightly warm. There is a dorsal wound noted on the right that has fibrotic and nonviable tissue that extends down through and includes the deep fascia/muscular layer. After debridement the wound has a fibrous granular base. There is no surrounding erythema, edema, warmth or malodor noted. The wound does not probe or track to bone.        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 [address] J68.3    Pulmonary embolism ([facility]) I26.99    Coronary artery disease involving native heart without angina pectoris I25.10    Presence of drug coated stent in anterior descending branch of left coronary artery Z95.5    Sepsis ([facility]) A41.9    Syncope R55    Hypothyroid E03.9    Obstructive sleep apnea G47.33    Gas gangrene ([facility]) A48.0    Diabetic infection of right foot ([facility]) E11.69, L08.9    Diabetic polyneuropathy associated with type 2 diabetes mellitus ([facility]) E11.42    Right foot infection L08.9    LULÚ (acute kidney injury) ([facility] [address] N17.9    Acute diastolic CHF (congestive heart failure), NYHA class 2 ([facility]) I50.31    Encephalopathy G93.40        Procedure Note  Indications:  Based on my examination of this patient's wound(s)/ulcer(s) today, debridement is required to promote healing and evaluate the wound base. Performed by: Juan Archuleta DPM    Consent obtained:  Yes    Time out taken:  Yes    Pain Control: Anesthetic  Anesthetic: 4% Topical Xylocaine       Debridement:Excisional Debridement    Using curette, #15 blade scalpel, scissors and forceps the wound(s)/ulcer(s) was/were sharply debrided down through and including the removal of epidermis, dermis, subcutaneous tissue and muscle/fascia.         Devitalized Tissue Debrided:  fibrin, slough and necrotic/eschar    Pre Debridement Measurements:  Are located in the [facility]  Documentation Flow Sheet    Wound/Ulcer #: 1    Post Debridement Measurements:  Wound/Ulcer Descriptions are Pre Debridement except measurements:    Wound 08/22/17 Foot Dorsal;Right #1 diabetic (existing 4 weeks) (Active)   Wound Type Wound 10/24/2017  3:53 PM   Wound Diabetic Jo 2 10/24/2017  3:53 PM   Dressing/Treatment Other (Comment) 10/24/2017  3:53 PM   Wound Cleansed Rinsed/Irrigated with saline 10/24/2017  3:53 PM   Wound Length (cm) 2.5 cm 10/24/2017  3:53 PM   Wound Width (cm) 2.5 cm 10/24/2017  3:53 PM   Wound Depth (cm)  0.1 10/24/2017  3:53 PM   Calculated Wound Size (cm^2) (l*w) 5.04 cm^2 10/24/2017  3:53 PM   Change in Wound Size % (l*w) 60.62 10/24/2017  3:53 PM   Tunneling Position ___ O'Clock 0 10/24/2017  3:53 PM   Undermining Starts ___ O'Clock 0 10/24/2017  3:53 PM   Undermining Ends___ O'Clock 0 10/24/2017  3:53 PM   Wound Assessment Yellow;Pink 10/24/2017  3:53 PM   Margins Defined edges 10/24/2017  3:53 PM   Mary Jane-wound Assessment Pink 10/24/2017  3:53 PM   Non-staged Wound Description Full thickness 10/24/2017  3:53 PM   [place]%Wound Bed 60 10/24/2017  3:53 PM   Red%Wound Bed 30 10/10/2017  3:03 PM   Yellow%Wound Bed 36 10/24/2017  3:53 PM   Black%Wound Bed 5 9/26/2017  4:49 PM   Other%Wound Bed green 4% 10/24/2017  3:53 PM   Drainage Amount Small 10/24/2017  3:53 PM   Drainage Description Green;Serosanguinous 10/24/2017  3:53 PM   Odor None 10/24/2017  3:53 PM   Number of days: 63       Wound 09/19/17 Foot Right;Lateral;Proximal #3 - diabetic (Active)   Wound Type Wound 10/24/2017  3:53 PM   Wound Diabetic Jo 1 10/24/2017  3:53 PM   Dressing/Treatment Other (Comment) 10/24/2017  3:53 PM   Wound Cleansed Rinsed/Irrigated with saline 10/24/2017  3:53 PM   Wound Length (cm) 0.8 cm 10/24/2017  3:53 PM   Wound Width (cm) 0.8 cm 10/24/2017  3:53 PM   Wound Depth (cm)  0.1 10/24/2017  3:53 PM   Calculated Wound Size (cm^2) (l*w) 0.64 cm^2 10/24/2017  3:53 PM   Change in Wound Size % (l*w) -113.33 10/24/2017  3:53 PM   Tunneling Position ___ O'Clock 0 10/24/2017  3:53 PM   Undermining Starts ___ O'Clock 0 10/24/2017  3:53 PM   Undermining Ends___ O'Clock 0 10/24/2017  3:53 PM   Undermining Maxium Distance (cm) 0 10/24/2017  3:53 PM   Wound Assessment Slough; Yellow;Pink 10/24/2017  3:53 PM   Margins Defined edges 10/24/2017  3:53 PM   Mary Jane-wound Assessment Pink; White 10/24/2017  3:53 PM   Non-staged Wound Description Full thickness 10/24/2017  3:53 PM   Disautel%Wound Bed 10 10/24/2017  3:53 PM   Yellow%Wound Bed 90 10/24/2017  3:53 PM   Drainage Amount Scant 10/24/2017 Yellow;Pink 10/24/2017  3:53 PM   Margins Defined edges 10/24/2017  3:53 PM   Mary Jane-wound Assessment Pink 10/24/2017  3:53 PM   Non-staged Wound Description Full thickness 10/24/2017  3:53 PM   [place]%Wound Bed 60 10/24/2017  3:53 PM   Red%Wound Bed 30 10/10/2017  3:03 PM   Yellow%Wound Bed 36 10/24/2017  3:53 PM   Black%Wound Bed 5 9/26/2017  4:49 PM   Other%Wound Bed green 4% 10/24/2017  3:53 PM   Drainage Amount Small 10/24/2017  3:53 PM   Drainage Description Green;Serosanguinous 10/24/2017  3:53 PM   Odor None 10/24/2017  3:53 PM   Number of days: 63       Wound 09/19/17 Foot Right;Lateral;Proximal #3 - diabetic (Active)   Wound Type Wound 10/24/2017  3:53 PM   Wound Diabetic Jo 1 10/24/2017  3:53 PM   Dressing/Treatment Other (Comment) 10/24/2017  3:53 PM   Wound Cleansed Rinsed/Irrigated with saline 10/24/2017  3:53 PM   Wound Length (cm) 1 cm 10/24/2017  3:53 PM   Wound Width (cm) 1 cm 10/24/2017  3:53 PM   Wound Depth (cm)  0.1 10/24/2017  3:53 PM   Calculated Wound Size (cm^2) (l*w) 0.64 cm^2 10/24/2017  3:53 PM   Change in Wound Size % (l*w) -113.33 10/24/2017  3:53 PM   Tunneling Position ___ O'Clock 0 10/24/2017  3:53 PM   Undermining Starts ___ O'Clock 0 10/24/2017  3:53 PM   Undermining Ends___ O'Clock 0 10/24/2017  3:53 PM   Undermining [place] Distance (cm) 0 10/24/2017  3:53 PM   Wound Assessment Slough; Yellow;Pink 10/24/2017  3:53 PM   Margins Defined edges 10/24/2017  3:53 PM   Mary Jane-wound Assessment Pink; White 10/24/2017  3:53 PM   Non-staged Wound Description Full thickness 10/24/2017  3:53 PM   [place]%Wound Bed 10 10/24/2017  3:53 PM   Yellow%Wound Bed 90 10/24/2017  3:53 PM   Drainage Amount Scant 10/24/2017  3:53 PM   Drainage Description Yellow 10/24/2017  3:53 PM   Odor Mild 10/24/2017  3:53 PM   Number of days: 35       Percent of Wound/Ulcer Debrided: 100%    Total Surface Area Debrided:  1 sq cm    Diabetic/Pressure/Non Pressure Ulcers only:  Ulcer: Diabetic ulcer, fat layer Foam/Foam with Border  [] Mepitel/Non-adherent/Xeroform   [] Alginate with Silver    [] Alginate   [] Collagen [] Collagen with Silver     [] Hydrocolloid  [] Hydrafera Blue moistened with saline   [] MediHoney Paste/Gel [] Hydrogel      [] Santyl with Moisten saline gauze    [] Bactroban/Mupirocin [] Polysporin  [] Other:      Pack wound loosely with  [] Iodoform   [] Plain Packing  [] Other      [x] Cover and Secure with:     [] Gauze [] ABD [] Stretch bandage roll [] Kerlix   [] Coban [] Ace Wrap [] Cover Roll Tape    [] Other:    Avoid contact of tape with skin. [x] Change dressing: [] Daily    [] Every Other Day [] Three times per week   [] Once a week [] Do Not Change Dressing   [] Other:    ate                             Off-Loading:   [x] Off-loading when: [x] walking  [] in bed [] sitting    [] Total non-weight bearing:  [] Right Leg  [] Left Leg     [] Partial weight bearing:   [] Right Leg  [] Left Leg     [x] Assistive Device: [] Walker [] Crutches  [] Wheelchair [] Roll About   [x] Surgical shoe/Darco    [] Podus Boot(s)   [] Prevalon Boot(s)  [] [place] Early    [] Cast/CAM Boot [] Other:        Dietary:  Important dietary reminders:  1. Increase Protein intake (i.e. Lean meats, fish, eggs, legumes, and yogurt)  2. No added salt  3. If diabetic, follow a diabetic diet and check glucose prior to meals or as instructed by your physician.         Return Appointment:  [] Wound and dressing supply provider:   [] ECF or Home Healthcare:  [] Nurse visit:    [x] Return Appointment: With Dr Claude Nuñez  in [facility])    [] Ordered tests:       Consults: [] Weight Management [] Diabetes Education [] Vascular Surgery  [] Endocrinology [] Nutrition Counseling  [] Lymphedema Therapy     Your nurse  is:  Abdirahman Pugh [place] [address]     Electronically signed by Pérez Ryan RN on 10/24/2017 at 4:47 PM     [facility] Information: Should you experience any significant changes in your wound(s) or have questions about your wound care, please contact the [address] [place] [address] at [phone] Monday and Wednesday 8:00 am - 2:00 pm and Tuesday, Thursday and Friday from 8:00 am - 4:30 pm.   If you need help with your wound outside these hours and cannot wait until we are again available, contact your PCP or go to the hospital emergency room. PLEASE NOTE: IF YOU ARE UNABLE TO OBTAIN WOUND SUPPLIES, CONTINUE TO USE THE SUPPLIES YOU HAVE AVAILABLE UNTIL YOU ARE ABLE TO REACH US. IT IS MOST IMPORTANT TO KEEP THE WOUND COVERED AT ALL TIMES. Physician orders by:     [x] Dr Anton Maria [] Dr Nakia Irvin    [] Dr Cristo Arce     [] Dr Brenden Payne   [] Dr Lexx Spivey  [] Dr Jose Andrew       Physician Signature:__________________________________        The information contained in the After Visit Summary has been reviewed with me, the patient and/or responsible adult, by my health care provider(s). I had the opportunity to ask questions regarding this information.   I have elected to receive;      [] Patient unable to sign Discharge Instructions given to ECF/Transportation/POA      []  After Visit Summary  []  Comprehensive Discharge Instruction          Electronically signed by Anton Maria DPM on 10/25/2017 at 10:10 AM

## 2017-10-31 ENCOUNTER — HOSPITAL ENCOUNTER (OUTPATIENT)
Dept: WOUND CARE | Age: 77
Discharge: HOME | End: 2017-10-31
Attending: PODIATRIST | Admitting: PODIATRIST

## 2017-11-06 ENCOUNTER — CARE COORDINATION (OUTPATIENT)
Dept: CASE MANAGEMENT | Age: 77
End: 2017-11-06

## 2017-11-06 NOTE — CARE COORDINATION
Called skilled [facility] for a patient update. Nurse De Los Santos reports patient is doing fine. No acute nursing concerns. LVM for therapy to return the call, contact information provided. Post acute care transition coordinator will continue to follow.  JAIRO Muñoz RN  Care Transition Coordinator  [phone]

## 2017-11-07 ENCOUNTER — HOSPITAL ENCOUNTER (OUTPATIENT)
Dept: VASCULAR LAB | Age: 77
Discharge: HOME | End: 2017-11-07
Attending: PODIATRIST | Admitting: PODIATRIST

## 2017-11-07 ENCOUNTER — HOSPITAL ENCOUNTER (OUTPATIENT)
Dept: WOUND CARE | Age: 77
Discharge: HOME | End: 2017-11-07
Attending: PODIATRIST | Admitting: PODIATRIST

## 2017-11-07 VITALS
TEMPERATURE: 97.8 F | HEART RATE: 76 BPM | RESPIRATION RATE: 18 BRPM | SYSTOLIC BLOOD PRESSURE: 132 MMHG | DIASTOLIC BLOOD PRESSURE: 64 MMHG

## 2017-11-07 DIAGNOSIS — I70.235 ATHEROSCLEROSIS OF NATIVE ARTERIES OF RIGHT LEG WITH ULCERATION OF OTHER PART OF FOOT (HCC): ICD-10-CM

## 2017-11-07 DIAGNOSIS — L08.9 DIABETIC INFECTION OF RIGHT FOOT (HCC): ICD-10-CM

## 2017-11-07 DIAGNOSIS — E11.628 DIABETIC INFECTION OF RIGHT FOOT (HCC): ICD-10-CM

## 2017-11-07 DIAGNOSIS — A48.0 GAS GANGRENE (HCC): Primary | ICD-10-CM

## 2017-11-07 RX ORDER — LIDOCAINE HYDROCHLORIDE 40 MG/ML
2.5 SOLUTION TOPICAL ONCE
Status: COMPLETED | OUTPATIENT
Start: 2017-11-07 | End: 2017-11-07

## 2017-11-07 RX ADMIN — LIDOCAINE HYDROCHLORIDE 2.5 ML: 40 SOLUTION TOPICAL at 15:52

## 2017-11-10 NOTE — PROGRESS NOTES
possible related to aspirin  (but thought to be H Pylori)    Nsaids     Bactrim [Sulfamethoxazole-Trimethoprim] Other (See Comments)     arf    Celebrex [Celecoxib]      tremors    Cymbalta [Duloxetine Hcl]      Mental status change    Pcn [Penicillins]      rash    Vicodin [Hydrocodone-Acetaminophen]     Codeine Nausea And Vomiting       MEDICATIONS    Current Outpatient Prescriptions on File Prior to Encounter   Medication Sig Dispense Refill    metFORMIN (GLUCOPHAGE) 500 MG tablet Take 850 mg by mouth 2 times daily (with meals)       QUEtiapine (SEROQUEL) 25 MG tablet Take 1 tablet by mouth nightly 60 tablet 0    metoprolol tartrate (LOPRESSOR) 50 MG tablet Take 1 tablet by mouth 2 times daily (Patient taking differently: Take 25 mg by mouth 2 times daily ) 60 tablet 1    furosemide (LASIX) 40 MG tablet Take 1 tablet by mouth daily 60 tablet 1    levothyroxine (SYNTHROID) 25 MCG tablet Take 0.5 tablets by mouth Daily 30 tablet 3    potassium chloride (KLOR-CON M) 10 MEQ extended release tablet Take 10 mEq by mouth 3 times daily      insulin aspart (NOVOLOG) 100 UNIT/ML injection vial Inject 7 Units into the skin 3 times daily (before meals) 1 vial 3    insulin detemir (LEVEMIR) 100 UNIT/ML injection vial Inject 26 Units into the skin nightly 1 vial 3    ranitidine (ZANTAC) 150 MG tablet Take 150 mg by mouth 2 times daily      losartan (COZAAR) 50 MG tablet TAKE ONE TABLET BY MOUTH EVERY DAY (Patient taking differently: 25 mg TAKE ONE TABLET BY MOUTH EVERY DAY) 30 tablet 5    fenofibrate (TRICOR) 145 MG tablet TAKE ONE TABLET BY MOUTH DAILY 30 tablet 2    clopidogrel (PLAVIX) 75 MG tablet TAKE ONE TABLET BY MOUTH EVERY DAY 90 tablet 4    finasteride (PROSCAR) 5 MG tablet Take 5 mg by mouth daily      carbidopa-levodopa (SINEMET)  MG per tablet Take 1 tablet by mouth 3 times daily.       cetirizine (ZYRTEC) 5 MG tablet Take 5 mg by mouth daily Prn      aspirin 81 MG EC tablet Take 81 mg by mouth daily.  folic acid (FOLVITE) 1 MG tablet Take 1 mg by mouth daily.  acidophilus (LACTINEX/FLORANEX) Take 1 tablet by mouth 3 times daily X 28 days      Sennosides (SENNA LAXATIVE PO) Take 8.6-50 mg by mouth      Magnesium Hydroxide (MILK OF MAGNESIA PO) Take 30 mLs by mouth daily as needed      Dextromethorphan-guaiFENesin  MG/5ML SYRP Take 5 mLs by mouth every 4 hours as needed for Cough      nystatin (MYCOSTATIN) 783279 UNIT/GM cream .Apply Nystatin cream to the groin & buttocks twice a day. 1 Tube 1    glipiZIDE-metformin (METAGLIP) 2.5-500 MG per tablet Take 1 tablet by mouth 2 times daily (before meals)      rosuvastatin (CRESTOR) 20 MG tablet Take 1 tablet by mouth daily 90 tablet 4    FARXIGA 5 MG tablet Take 5 mg by mouth every morning       ergocalciferol (ERGOCALCIFEROL) 32886 UNITS capsule Take 50,000 Units by mouth Twice a Week Every Tuesday and Thursday      nitroGLYCERIN (NITROSTAT) 0.4 MG SL tablet Place 0.4 mg under the tongue every 5 minutes as needed. No current facility-administered medications on file prior to encounter. REVIEW OF SYSTEMS    unable to obtain as patient is nonverbal    Objective:      /64   Pulse 76   Temp 97.8 °F (36.6 °C) (Oral)   Resp 18     Wt Readings from Last 3 Encounters:   10/23/17 248 lb 12.8 oz (112.9 kg)   08/05/17 237 lb (107.5 kg)   07/28/17 274 lb 14.6 oz (124.7 kg)       PHYSICAL EXAM    Incision along TMA well approximated and coapted. There are linear areas of ecchymosis on the medial and lateral aspect of the foot that is consistent with a dressing that was applied too tight. There is no germán necrosis noted at these areas. The area is persistently erythematous and slightly warm. There is a dorsal wound noted on the right that has fibrotic and nonviable tissue that extends down through and includes the deep fascia/muscular layer. After debridement the wound has a fibrous granular base.   There is Sheet    Wound/Ulcer #: 1 and 4    Post Debridement Measurements:  Wound/Ulcer Descriptions are Pre Debridement except measurements:    Wound 08/22/17 Foot Dorsal;Right #1 diabetic (existing 4 weeks) (Active)   Wound Type Wound 11/7/2017  3:40 PM   Wound Diabetic Jo 2 11/7/2017  3:40 PM   Dressing/Treatment Other (Comment) 11/7/2017  3:40 PM   Wound Cleansed Rinsed/Irrigated with saline 11/7/2017  3:40 PM   Wound Length (cm) 2 cm 11/7/2017  3:40 PM   Wound Width (cm) 2 cm 11/7/2017  3:40 PM   Wound Depth (cm)  0.1 11/7/2017  3:40 PM   Calculated Wound Size (cm^2) (l*w) 3.8 cm^2 11/7/2017  3:40 PM   Change in Wound Size % (l*w) 70.31 11/7/2017  3:40 PM   Tunneling Position ___ O'Clock 0 10/24/2017  3:53 PM   Undermining Starts ___ O'Clock 0 10/24/2017  3:53 PM   Undermining Ends___ O'Clock 0 10/24/2017  3:53 PM   Wound Assessment Pink;Yellow 11/7/2017  3:40 PM   Margins Defined edges 11/7/2017  3:40 PM   Mary Jane-wound Assessment Pink 11/7/2017  3:40 PM   Non-staged Wound Description Full thickness 11/7/2017  3:40 PM   [place]%Wound Bed 40 11/7/2017  3:40 PM   Red%Wound Bed 30 10/10/2017  3:03 PM   Yellow%Wound Bed 60 11/7/2017  3:40 PM   Black%Wound Bed 5 9/26/2017  4:49 PM   Other%Wound Bed green 4% 10/24/2017  3:53 PM   Drainage Amount Small 11/7/2017  3:40 PM   Drainage Description Serosanguinous 11/7/2017  3:40 PM   Odor None 11/7/2017  3:40 PM   Op First Treatment Date 08/22/17 8/22/2017  1:39 PM   Number of days: 79       Wound 09/19/17 Foot Right;Lateral;Proximal #3 - diabetic (Active)   Wound Type Wound 11/7/2017  3:40 PM   Wound Diabetic Jo 1 11/7/2017  3:40 PM   Dressing/Treatment Other (Comment) 11/7/2017  3:40 PM   Wound Cleansed Rinsed/Irrigated with saline 11/7/2017  3:40 PM   Wound Length (cm) 0.7 cm 11/7/2017  3:40 PM   Wound Width (cm) 0.5 cm 11/7/2017  3:40 PM   Wound Depth (cm)  0.1 11/7/2017  3:40 PM   Calculated Wound Size (cm^2) (l*w) 0.35 cm^2 11/7/2017  3:40 PM   Change in Wound Size % (l*w) -16.67 11/7/2017  3:40 PM   Tunneling Position ___ O'Clock 0 10/24/2017  3:53 PM   Undermining Starts ___ O'Clock 0 10/24/2017  3:53 PM   Undermining Ends___ O'Clock 0 10/24/2017  3:53 PM   Undermining Maxium Distance (cm) 0 10/24/2017  3:53 PM   Wound Assessment Yellow 11/7/2017  3:40 PM   Margins Defined edges 11/7/2017  3:40 PM   Mary Jane-wound Assessment Pink 11/7/2017  3:40 PM   Non-staged Wound Description Full thickness 11/7/2017  3:40 PM   [place]%Wound Bed 10 11/7/2017  3:40 PM   Yellow%Wound Bed 90 11/7/2017  3:40 PM   Drainage Amount Scant 11/7/2017  3:40 PM   Drainage Description Yellow 11/7/2017  3:40 PM   Odor None 11/7/2017  3:40 PM   Number of days: 51       Wound 11/07/17 Venous ulcer Leg Right; Lower; Anterior #4 (Active)   Wound Type Wound 11/7/2017  3:40 PM   Dressing/Treatment Other (Comment) 11/7/2017  3:40 PM   Wound Cleansed Rinsed/Irrigated with saline 11/7/2017  3:40 PM   Wound Length (cm) 1 cm 11/7/2017  3:40 PM   Wound Width (cm) 1 cm 11/7/2017  3:40 PM   Wound Depth (cm)  0.1 11/7/2017  3:40 PM   Calculated Wound Size (cm^2) (l*w) 0.6 cm^2 11/7/2017  3:40 PM   Wound Assessment Red 11/7/2017  3:40 PM   Margins Defined edges 11/7/2017  3:40 PM   [place]-wound Assessment Pink 11/7/2017  3:40 PM   Non-staged Wound Description Full thickness 11/7/2017  3:40 PM   Red%Wound Bed 100 11/7/2017  3:40 PM   Drainage Amount Small 11/7/2017  3:40 PM   Drainage Description Serosanguinous 11/7/2017  3:40 PM   Odor None 11/7/2017  3:40 PM   Number of days: 2     Percent of Wound/Ulcer Debrided: 100%    Total Surface Area Debrided: 3 sq cm     Diabetic/Pressure/Non Pressure Ulcers only:  Ulcer: Diabetic ulcer, muscle necrosis    Estimated Blood Loss:  Minimal    Hemostasis Achieved:  by pressure    Procedural Pain:  0  / 10     Post Procedural Pain:  0 / 10     Response to treatment:  Well tolerated by patient. Plan:   E/M x 30 minutes of new problem of right shin wound. Orders written for local wound care. Monday and Wednesday 8:00 am - 2:00 pm and Tuesday, Thursday and Friday from 8:00 am - 4:30 pm.   If you need help with your wound outside these hours and cannot wait until we are again available, contact your PCP or go to the hospital emergency room. PLEASE NOTE: IF YOU ARE UNABLE TO OBTAIN WOUND SUPPLIES, CONTINUE TO USE THE SUPPLIES YOU HAVE AVAILABLE UNTIL YOU ARE ABLE TO REACH US. IT IS MOST IMPORTANT TO KEEP THE WOUND COVERED AT ALL TIMES. Physician orders by:     [] Dr Brenda Villalba [x] Dr Kevin Fay    [] Dr Tyson Velazquez     [] Dr Santos Costa   [] Dr Corrinne Pillar  [] Dr Floresita Pisano       Physician Signature:__________________________________        The information contained in the After Visit Summary has been reviewed with me, the patient and/or responsible adult, by my health care provider(s). I had the opportunity to ask questions regarding this information. I have elected to receive;      [] Patient unable to sign Discharge Instructions given to ECF/Transportation/POA      []  After Visit Summary  []  Comprehensive Discharge Instruction          Electronically signed by Brenda Villalba DPM on 11/10/2017 at [address] [place] [address]   Progress Note and Procedure Note      Nely Lao  MEDICAL RECORD NUMBER:  [id number]  AGE: 69 y.o. GENDER: male  : 1940  EPISODE DATE:  2017    Subjective:     Chief Complaint   Patient presents with    Wound Check     Right foot         HISTORY of PRESENT ILLNESS HPI     Thao Hdez is a 69 y.o. male who presents today for wound/ulcer evaluation. History of Wound Context: right dorsal foot wound and right lateral foot wound s/p TMA. Patient has been NWB at an ECF.   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 Gary Canales CAD (coronary artery disease)     [facility]est[facility]     COPD (chronic obstructive pulmonary disease) ([facility]     Diabetes mellitus ([facility]     Hypertension     MRSA (methicillin resistant staph aureus) culture positive 07/24/2017    foot    Obstructive sleep apnea 10/3/2014    Parkinson's disease ([facility] 1965       PAST SURGICAL HISTORY    Past Surgical History:   Procedure Laterality Date    CARDIAC CATHETERIZATION      CERVICAL FUSION  1981    FOOT SURGERY Right 07/24/2017    INCISION AND DRAINAGE RIGHT FOOT WITH REMOVAL NECROTIC BONE    FOOT SURGERY Right 07/27/2017    : TRANSMETATARSAL AMPUTATION RIGHT FOOT        FAMILY HISTORY    No family history on file.     SOCIAL HISTORY    Social History   Substance Use Topics    Smoking status: Never Smoker    Smokeless tobacco: Never Used    Alcohol use No       ALLERGIES    Allergies   Allergen Reactions    Aspirin      GI  Bleed possible related to aspirin  (but thought to be H Pylori)    Nsaids     Bactrim [Sulfamethoxazole-Trimethoprim] Other (See Comments)     arf    Celebrex [Celecoxib]      tremors    Cymbalta [Duloxetine Hcl]      Mental status change    Pcn [Penicillins]      rash    Vicodin [Hydrocodone-Acetaminophen]     Codeine Nausea And Vomiting       MEDICATIONS    Current Outpatient Prescriptions on File Prior to Encounter   Medication Sig Dispense Refill    metFORMIN (GLUCOPHAGE) 500 MG tablet Take 850 mg by mouth 2 times daily (with meals)       QUEtiapine (SEROQUEL) 25 MG tablet Take 1 tablet by mouth nightly 60 tablet 0    metoprolol tartrate (LOPRESSOR) 50 MG tablet Take 1 tablet by mouth 2 times daily (Patient taking differently: Take 25 mg by mouth 2 times daily ) 60 tablet 1    furosemide (LASIX) 40 MG tablet Take 1 tablet by mouth daily 60 tablet 1    levothyroxine (SYNTHROID) 25 MCG tablet Take 0.5 tablets by mouth Daily 30 tablet 3    potassium chloride (KLOR-CON M) 10 MEQ extended release tablet Take 10 mEq by mouth 3 times daily      insulin aspart (NOVOLOG) 100 UNIT/ML injection vial Inject 7 Units into the skin 3 times daily (before meals) 1 vial 3    insulin detemir (LEVEMIR) 100 UNIT/ML injection vial Inject 26 Units into the skin nightly 1 vial 3    ranitidine (ZANTAC) 150 MG tablet Take 150 mg by mouth 2 times daily      losartan (COZAAR) 50 MG tablet TAKE ONE TABLET BY MOUTH EVERY DAY (Patient taking differently: 25 mg TAKE ONE TABLET BY MOUTH EVERY DAY) 30 tablet 5    fenofibrate (TRICOR) 145 MG tablet TAKE ONE TABLET BY MOUTH DAILY 30 tablet 2    clopidogrel (PLAVIX) 75 MG tablet TAKE ONE TABLET BY MOUTH EVERY DAY 90 tablet 4    finasteride (PROSCAR) 5 MG tablet Take 5 mg by mouth daily      carbidopa-levodopa (SINEMET)  MG per tablet Take 1 tablet by mouth 3 times daily.  cetirizine (ZYRTEC) 5 MG tablet Take 5 mg by mouth daily Prn      aspirin 81 MG EC tablet Take 81 mg by mouth daily.  folic acid (FOLVITE) 1 MG tablet Take 1 mg by mouth daily.  acidophilus (LACTINEX/FLORANEX) Take 1 tablet by mouth 3 times daily X 28 days      Sennosides (SENNA LAXATIVE PO) Take 8.6-50 mg by mouth      Magnesium Hydroxide (MILK OF MAGNESIA PO) Take 30 mLs by mouth daily as needed      Dextromethorphan-guaiFENesin  MG/5ML SYRP Take 5 mLs by mouth every 4 hours as needed for Cough      nystatin (MYCOSTATIN) 499534 UNIT/GM cream .Apply Nystatin cream to the groin & buttocks twice a day.  1 Tube 1    glipiZIDE-metformin (METAGLIP) 2.5-500 MG per tablet Take 1 tablet by mouth 2 times daily (before meals)      rosuvastatin (CRESTOR) 20 MG tablet Take 1 tablet by mouth daily 90 tablet 4    FARXIGA 5 MG tablet Take 5 mg by mouth every morning       ergocalciferol (ERGOCALCIFEROL) 22771 UNITS capsule Take 50,000 Units by mouth Twice a Week Every Tuesday and Thursday      Note  Indications:  Based on my examination of this patient's wound(s)/ulcer(s) today, debridement is required to promote healing and evaluate the wound base. Performed by: Mark Zavala DPM    Consent obtained:  Yes    Time out taken:  Yes    Pain Control: Anesthetic  Anesthetic: 4% Topical Xylocaine       Debridement:Excisional Debridement    Using curette, #15 blade scalpel, scissors and forceps the wound(s)/ulcer(s) was/were sharply debrided down through and including the removal of epidermis, dermis, subcutaneous tissue and muscle/fascia.         Devitalized Tissue Debrided:  fibrin, slough and necrotic/eschar    Pre Debridement Measurements:  Are located in the Wound/Ulcer Documentation Flow Sheet    Wound/Ulcer #: 1    Post Debridement Measurements:  Wound/Ulcer Descriptions are Pre Debridement except measurements:    Wound 08/22/17 Foot Dorsal;Right #1 diabetic (existing 4 weeks) (Active)   Wound Type Wound 10/24/2017  3:53 PM   Wound Diabetic Jo 2 10/24/2017  3:53 PM   Dressing/Treatment Other (Comment) 10/24/2017  3:53 PM   Wound Cleansed Rinsed/Irrigated with saline 10/24/2017  3:53 PM   Wound Length (cm) 2.5 cm 10/24/2017  3:53 PM   Wound Width (cm) 2.5 cm 10/24/2017  3:53 PM   Wound Depth (cm)  0.1 10/24/2017  3:53 PM   Calculated Wound Size (cm^2) (l*w) 5.04 cm^2 10/24/2017  3:53 PM   Change in Wound Size % (l*w) 60.62 10/24/2017  3:53 PM   Tunneling Position ___ O'Clock 0 10/24/2017  3:53 PM   Undermining Starts ___ O'Clock 0 10/24/2017  3:53 PM   Undermining Ends___ O'Clock 0 10/24/2017  3:53 PM   Wound Assessment Yellow;Pink 10/24/2017  3:53 PM   Margins Defined edges 10/24/2017  3:53 PM   Mary Jane-wound Assessment Pink 10/24/2017  3:53 PM   Non-staged Wound Description Full thickness 10/24/2017  3:53 PM   [place]%Wound Bed 60 10/24/2017  3:53 PM   Red%Wound Bed 30 10/10/2017  3:03 PM   Yellow%Wound Bed 36 10/24/2017  3:53 PM   Black%Wound Bed 5 9/26/2017  4:49 PM   Other%Wound Bed green 4% 10/24/2017 3:53 PM   Drainage Amount Small 10/24/2017  3:53 PM   Drainage Description Green;Serosanguinous 10/24/2017  3:53 PM   Odor None 10/24/2017  3:53 PM   Number of days: 63       Wound 09/19/17 Foot Right;Lateral;Proximal #3 - diabetic (Active)   Wound Type Wound 10/24/2017  3:53 PM   Wound Diabetic Jo 1 10/24/2017  3:53 PM   Dressing/Treatment Other (Comment) 10/24/2017  3:53 PM   Wound Cleansed Rinsed/Irrigated with saline 10/24/2017  3:53 PM   Wound Length (cm) 0.8 cm 10/24/2017  3:53 PM   Wound Width (cm) 0.8 cm 10/24/2017  3:53 PM   Wound Depth (cm)  0.1 10/24/2017  3:53 PM   Calculated Wound Size (cm^2) (l*w) 0.64 cm^2 10/24/2017  3:53 PM   Change in Wound Size % (l*w) -113.33 10/24/2017  3:53 PM   Tunneling Position ___ O'Clock 0 10/24/2017  3:53 PM   Undermining Starts ___ O'Clock 0 10/24/2017  3:53 PM   Undermining Ends___ O'Clock 0 10/24/2017  3:53 PM   Undermining Maxium Distance (cm) 0 10/24/2017  3:53 PM   Wound Assessment Slough; Yellow;Pink 10/24/2017  3:53 PM   Margins Defined edges 10/24/2017  3:53 PM   Mary Jane-wound Assessment Pink; White 10/24/2017  3:53 PM   Non-staged Wound Description Full thickness 10/24/2017  3:53 PM   [place]%Wound Bed 10 10/24/2017  3:53 PM   Yellow%Wound Bed 90 10/24/2017  3:53 PM   Drainage Amount Scant 10/24/2017  3:53 PM   Drainage Description Yellow 10/24/2017  3:53 PM   Odor Mild 10/24/2017  3:53 PM   Number of days: 35     Percent of Wound/Ulcer Debrided: 100%    Total Surface Area Debrided: 5 sq cm     Diabetic/Pressure/Non Pressure Ulcers only:  Ulcer: Diabetic ulcer, muscle necrosis    Estimated Blood Loss:  Minimal    Hemostasis Achieved:  by pressure    Procedural Pain:  0  / 10     Post Procedural Pain:  0 / 10     Response to treatment:  Well tolerated by patient.          Excisional Debridement Procedure Note  Indications:  Based on my examination of this patient's wound(s)/ulcer(s) today, debridement is required to promote healing and evaluate the wound base.    Performed by: Pau Winston DPM    Consent obtained? Yes    Time out taken: Yes    Pain Control: Anesthetic: 4% Topical Xylocaine     Debridement:Excisional Debridement    Using curette, #15 blade scalpel, scissors and forceps the wound/ulcer was sharply debrided    down through and including the removal of  epidermis, dermis and subcutaneous tissue.         Devitalized Tissue Debrided:  fibrin and slough      Pre Debridement Measurements:  Are located in the Wound/Ulcer Documentation Flow Sheet   Wound/Ulcer #: 3     Post  Debridement Measurements:  Wound 08/22/17 Foot Dorsal;Right #1 diabetic (existing 4 weeks) (Active)   Wound Type Wound 10/24/2017  3:53 PM   Wound Diabetic Jo 2 10/24/2017  3:53 PM   Dressing/Treatment Other (Comment) 10/24/2017  3:53 PM   Wound Cleansed Rinsed/Irrigated with saline 10/24/2017  3:53 PM   Wound Length (cm) 2.1 cm 10/24/2017  3:53 PM   Wound Width (cm) 2.4 cm 10/24/2017  3:53 PM   Wound Depth (cm)  0.1 10/24/2017  3:53 PM   Calculated Wound Size (cm^2) (l*w) 5.04 cm^2 10/24/2017  3:53 PM   Change in Wound Size % (l*w) 60.62 10/24/2017  3:53 PM   Tunneling Position ___ O'Clock 0 10/24/2017  3:53 PM   Undermining Starts ___ O'Clock 0 10/24/2017  3:53 PM   Undermining Ends___ O'Clock 0 10/24/2017  3:53 PM   Wound Assessment Yellow;Pink 10/24/2017  3:53 PM   Margins Defined edges 10/24/2017  3:53 PM   Mary [place]-wound Assessment Pink 10/24/2017  3:53 PM   Non-staged Wound Description Full thickness 10/24/2017  3:53 PM   [place]%Wound Bed 60 10/24/2017  3:53 PM   Red%Wound Bed 30 10/10/2017  3:03 PM   Yellow%Wound Bed 36 10/24/2017  3:53 PM   Black%Wound Bed 5 9/26/2017  4:49 PM   Other%Wound Bed green 4% 10/24/2017  3:53 PM   Drainage Amount Small 10/24/2017  3:53 PM   Drainage Description Green;Serosanguinous 10/24/2017  3:53 PM   Odor None 10/24/2017  3:53 PM   Number of days: 63       Wound 09/19/17 Foot Right;Lateral;Proximal #3 - diabetic (Active)   Wound Type Wound 10/24/2017 3:53 PM   Wound Diabetic Jo 1 10/24/2017  3:53 PM   Dressing/Treatment Other (Comment) 10/24/2017  3:53 PM   Wound Cleansed Rinsed/Irrigated with saline 10/24/2017  3:53 PM   Wound Length (cm) 1 cm 10/24/2017  3:53 PM   Wound Width (cm) 1 cm 10/24/2017  3:53 PM   Wound Depth (cm)  0.1 10/24/2017  3:53 PM   Calculated Wound Size (cm^2) (l*w) 0.64 cm^2 10/24/2017  3:53 PM   Change in Wound Size % (l*w) -113.33 10/24/2017  3:53 PM   Tunneling Position ___ O'Clock 0 10/24/2017  3:53 PM   Undermining Starts ___ O'Clock 0 10/24/2017  3:53 PM   Undermining Ends___ O'Clock 0 10/24/2017  3:53 PM   Undermining Maxium Distance (cm) 0 10/24/2017  3:53 PM   Wound Assessment Slough; Yellow;Pink 10/24/2017  3:53 PM   Margins Defined edges 10/24/2017  3:53 PM   [place]-wound Assessment Pink; White 10/24/2017  3:53 PM   Non-staged Wound Description Full thickness 10/24/2017  3:53 PM   [place]%Wound Bed 10 10/24/2017  3:53 PM   Yellow%Wound Bed 90 10/24/2017  3:53 PM   Drainage Amount Scant 10/24/2017  3:53 PM   Drainage Description Yellow 10/24/2017  3:53 PM   Odor Mild 10/24/2017  3:53 PM   Number of days: 35       Percent of Wound/Ulcer Debrided: 100%    Total Surface Area Debrided:  1 sq cm    Diabetic/Pressure/Non Pressure Ulcers only:  Ulcer: Diabetic ulcer, fat layer exposed    Bleeding: Minimal    Hemostasis Achieved: by pressure    Procedural Pain: 0  / 10     Post Procedural Pain: 0 / 10     Response to treatment:  Well tolerated by patient. Plan:     Orders written for local wound care daily with [email or web address] OK to continue weight bearing with PT. Patient will be preauthorized for a apligraf to accelerate healing in this high risk DM foot ulceration. We are still waiting to hear if he is approved by his insurance.     Treatment Note please see attached Discharge Instructions    In my professional opinion this patient would benefit from HBO Therapy: No    Written patient dismissal instructions given to patient and signed week   [] Once a week [] Do Not Change Dressing   [] Other:                                            Off-Loading:   [] Off-loading when: [] walking  [] in bed [] sitting    [] Total non-weight bearing:  [] Right Leg  [] Left Leg     [] Partial weight bearing:   [] Right Leg  [] Left Leg     [x] Assistive Device: [] Walker [] Crutches  [] Wheelchair [] Roll About   [] Surgical shoe/Darco    [] Podus Boot(s)   [x] Prevalon Boot(s) when in bed[] CROW Boot    [] [employer] Systems [] Other:         Dietary:  Important dietary reminders:  1. Increase Protein intake (i.e. Lean meats, fish, eggs, legumes, and yogurt)  2. No added salt  3. If diabetic, follow a diabetic diet and check glucose prior to meals or as instructed by your physician. Return Appointment:  [] Wound and dressing supply provider:   [] ECF or Home Healthcare:  [] Nurse visit:    [x] Return Appointment: With Dr Mazin Kaplan  in  [address] [place] [address])    [] Ordered tests:       Consults: [] Weight Management [] Diabetes Education [] Vascular Surgery  [] Endocrinology [] Nutrition Counseling  [] Lymphedema Therapy     Your nurse  is:  Isabel     Electronically signed by Joni Martinez RN on 11/7/2017 at 3:50 PM     Lucinda Mohr 281: Should you experience any significant changes in your wound(s) or have questions about your wound care, please contact the [address] [place] [address] at [phone] Monday and Wednesday 8:00 am - 2:00 pm and Tuesday, Thursday and Friday from 8:00 am - 4:30 pm.   If you need help with your wound outside these hours and cannot wait until we are again available, contact your PCP or go to the hospital emergency room. PLEASE NOTE: IF YOU ARE UNABLE TO OBTAIN WOUND SUPPLIES, CONTINUE TO USE THE SUPPLIES YOU HAVE AVAILABLE UNTIL YOU ARE ABLE TO REACH US. IT IS MOST IMPORTANT TO KEEP THE WOUND COVERED AT ALL TIMES. Physician orders by:     [] Dr Virginia Jo [x] Dr Nati Greene    [] Dr Donn Suggs     [] Dr Viky Pabon.

## 2017-11-14 ENCOUNTER — HOSPITAL ENCOUNTER (OUTPATIENT)
Dept: WOUND CARE | Age: 77
Discharge: HOME | End: 2017-11-14
Attending: PODIATRIST | Admitting: PODIATRIST

## 2017-11-14 VITALS
TEMPERATURE: 97.8 F | SYSTOLIC BLOOD PRESSURE: 107 MMHG | DIASTOLIC BLOOD PRESSURE: 64 MMHG | HEART RATE: 72 BPM | RESPIRATION RATE: 18 BRPM

## 2017-11-14 RX ORDER — LIDOCAINE HYDROCHLORIDE 40 MG/ML
SOLUTION TOPICAL ONCE
Status: DISCONTINUED | OUTPATIENT
Start: 2017-11-14 | End: 2017-11-15 | Stop reason: HOSPADM

## 2017-11-14 NOTE — PROGRESS NOTES
 Bactrim [Sulfamethoxazole-Trimethoprim] Other (See Comments)     arf    Celebrex [Celecoxib]      tremors    Cymbalta [Duloxetine Hcl]      Mental status change    Pcn [Penicillins]      rash    Vicodin [Hydrocodone-Acetaminophen]     Codeine Nausea And Vomiting       MEDICATIONS    Current Outpatient Prescriptions on File Prior to Encounter   Medication Sig Dispense Refill    metFORMIN (GLUCOPHAGE) 500 MG tablet Take 850 mg by mouth 2 times daily (with meals)       QUEtiapine (SEROQUEL) 25 MG tablet Take 1 tablet by mouth nightly 60 tablet 0    metoprolol tartrate (LOPRESSOR) 50 MG tablet Take 1 tablet by mouth 2 times daily (Patient taking differently: Take 25 mg by mouth 2 times daily ) 60 tablet 1    furosemide (LASIX) 40 MG tablet Take 1 tablet by mouth daily 60 tablet 1    levothyroxine (SYNTHROID) 25 MCG tablet Take 0.5 tablets by mouth Daily 30 tablet 3    potassium chloride (KLOR-CON M) 10 MEQ extended release tablet Take 10 mEq by mouth 3 times daily      insulin aspart (NOVOLOG) 100 UNIT/ML injection vial Inject 7 Units into the skin 3 times daily (before meals) 1 vial 3    insulin detemir (LEVEMIR) 100 UNIT/ML injection vial Inject 26 Units into the skin nightly 1 vial 3    ranitidine (ZANTAC) 150 MG tablet Take 150 mg by mouth 2 times daily      losartan (COZAAR) 50 MG tablet TAKE ONE TABLET BY MOUTH EVERY DAY (Patient taking differently: 25 mg TAKE ONE TABLET BY MOUTH EVERY DAY) 30 tablet 5    fenofibrate (TRICOR) 145 MG tablet TAKE ONE TABLET BY MOUTH DAILY 30 tablet 2    clopidogrel (PLAVIX) 75 MG tablet TAKE ONE TABLET BY MOUTH EVERY DAY 90 tablet 4    rosuvastatin (CRESTOR) 20 MG tablet Take 1 tablet by mouth daily 90 tablet 4    finasteride (PROSCAR) 5 MG tablet Take 5 mg by mouth daily      carbidopa-levodopa (SINEMET)  MG per tablet Take 1 tablet by mouth 3 times daily.       cetirizine (ZYRTEC) 5 MG tablet Take 5 mg by mouth daily Prn      aspirin 81 MG EC tablet Take 81 mg by mouth daily.  ergocalciferol (ERGOCALCIFEROL) 64255 UNITS capsule Take 50,000 Units by mouth Twice a Week Every Tuesday and Thursday      folic acid (FOLVITE) 1 MG tablet Take 1 mg by mouth daily.  acidophilus (LACTINEX/FLORANEX) Take 1 tablet by mouth 3 times daily X 28 days      Sennosides (SENNA LAXATIVE PO) Take 8.6-50 mg by mouth      Magnesium Hydroxide (MILK OF MAGNESIA PO) Take 30 mLs by mouth daily as needed      Dextromethorphan-guaiFENesin  MG/5ML SYRP Take 5 mLs by mouth every 4 hours as needed for Cough      nystatin (MYCOSTATIN) 144395 UNIT/GM cream .Apply Nystatin cream to the groin & buttocks twice a day. 1 Tube 1    FARXIGA 5 MG tablet Take 5 mg by mouth every morning       nitroGLYCERIN (NITROSTAT) 0.4 MG SL tablet Place 0.4 mg under the tongue every 5 minutes as needed. No current facility-administered medications on file prior to encounter. REVIEW OF SYSTEMS    Pertinent items are noted in HPI.       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 ([address]    Pulmonary embolism ([address]    Coronary artery disease involving native heart without angina pectoris    Presence of drug coated stent in anterior descending branch of left coronary artery    Sepsis ([address]    Syncope    Hypothyroid    Obstructive sleep apnea    Gas gangrene ([address]    Diabetic infection of right foot ([address]    Diabetic polyneuropathy associated with type 2 diabetes mellitus ([address]    Right foot infection    LULÚ (acute kidney injury) ([address]  Acute diastolic CHF (congestive heart failure), NYHA class 2 (HCC)    Encephalopathy         Procedure Note  Indications:  Based on my examination of this patient's wound(s)/ulcer(s) today, debridement is required to promote healing and evaluate the extent healing. Performed by: Raquel Hollis DPM    Consent obtained: Yes    Time out taken:  Yes    Pain Control: Anesthetic  Anesthetic: 4% Topical Xylocaine       Debridement:Excisional Debridement    Using curette and forceps the wound(s)/ulcer(s) was/were sharply debrided down through and including the removal of subcutaneous tissue.     Devitalized Tissue Debrided:  fibrin, biofilm, slough, exudate and callus    Pre Debridement Measurements:  Are located in the Wound/Ulcer Documentation Flow Sheet    Wound/Ulcer #: 1, 3 and 5    Post Debridement Measurements:  Wound/Ulcer Descriptions are Pre Debridement except measurements:    Wound 08/22/17 Foot Dorsal;Right #1 diabetic (existing 4 weeks) (Active)   Wound Type Wound 11/14/2017  2:43 PM   Wound Diabetic Jo 2 11/14/2017  2:43 PM   Dressing/Treatment Other (Comment) 11/7/2017  3:40 PM   Wound Cleansed Rinsed/Irrigated with saline 11/14/2017  2:43 PM   Wound Length (cm) 2 cm 11/14/2017  2:43 PM   Wound Width (cm) 1.8 cm 11/14/2017  2:43 PM   Wound Depth (cm)  0.1 11/14/2017  2:43 PM   Calculated Wound Size (cm^2) (l*w) 3.6 cm^2 11/14/2017  2:43 PM   Change in Wound Size % (l*w) 71.88 11/14/2017  2:43 PM   Tunneling Position ___ O'Clock 0 10/24/2017  3:53 PM   Undermining Starts ___ O'Clock 0 10/24/2017  3:53 PM   Undermining Ends___ O'Clock 0 10/24/2017  3:53 PM   Wound Assessment Pink;Yellow 11/14/2017  2:43 PM   Margins Defined edges 11/14/2017  2:43 PM   Mary Jane-wound Assessment Pink 11/14/2017  2:43 PM   Non-staged Wound Description Full thickness 11/14/2017  2:43 PM   [place]%Wound Bed 50 11/14/2017  2:43 PM   Red%Wound Bed 30 10/10/2017  3:03 PM   Yellow%Wound Bed 50 11/14/2017  2:43 PM   Black%Wound Bed 5 9/26/2017  4:49 PM   Other%Wound Bed green 4% 10/24/2017  3:53 PM   Drainage Amount Scant 11/14/2017  2:43 PM   Drainage Description Serosanguinous 11/14/2017  2:43 PM   Odor None 11/14/2017  2:43 PM   Op First Treatment Date 08/22/17 8/22/2017  1:39 PM   Number of days: 84       Wound 09/19/17 Foot Right;Lateral;Proximal #3 - diabetic (Active)   Wound Type Wound 11/14/2017  2:43 PM   Wound Diabetic Jo 1 11/14/2017  2:43 PM   Dressing/Treatment Other (Comment) 11/7/2017  3:40 PM   Wound Cleansed Rinsed/Irrigated with saline 11/14/2017  2:43 PM   Wound Length (cm) 0.8 cm 11/14/2017  2:43 PM   Wound Width (cm) 0.4 cm 11/14/2017  2:43 PM   Wound Depth (cm)  0.1 11/14/2017  2:43 PM   Calculated Wound Size (cm^2) (l*w) 0.32 cm^2 11/14/2017  2:43 PM   Change in Wound Size % (l*w) -6.67 11/14/2017  2:43 PM   Tunneling Position ___ O'Clock 0 10/24/2017  3:53 PM   Undermining Starts ___ O'Clock 0 10/24/2017  3:53 PM   Undermining Ends___ O'Clock 0 10/24/2017  3:53 PM   Undermining Maxium Distance (cm) 0 10/24/2017  3:53 PM   Wound Assessment Brown;Pink;Yellow 11/14/2017  2:43 PM   Margins Defined edges 11/14/2017  2:43 PM   Mary Jane-wound Assessment Dark edges;Pink; Tan 11/14/2017  2:43 PM   Non-staged Wound Description Full thickness 11/14/2017  2:43 PM   [place]%Wound Bed 10 11/14/2017  2:43 PM   Yellow%Wound Bed 75 11/14/2017  2:43 PM   Other%Wound Bed 15 11/14/2017  2:43 PM   Drainage Amount Scant 11/14/2017  2:43 PM   Drainage Description Yellow 11/14/2017  2:43 PM   Odor None 11/14/2017  2:43 PM   Number of days: 56       Wound 11/07/17 Venous ulcer Leg Right; Lower; Anterior #5 (11/5/17) (Active)   Wound Type Wound 11/14/2017  2:43 PM   Dressing/Treatment Other (Comment) 11/7/2017  3:40 PM   Wound Cleansed Rinsed/Irrigated with saline 11/14/2017  2:43 PM   Wound Length (cm) 1 cm 11/14/2017  2:43 PM   Wound Width (cm) 0.7 cm 11/14/2017  2:43 PM   Wound Depth (cm)  0.1 11/14/2017  2:43 PM   Calculated Wound Size (cm^2) (l*w) 0.7 cm^2 11/14/2017  2:43 PM   Change in Wound Size % (l*w) -16.67 11/14/2017  2:43 PM   Wound Assessment Red; White 11/14/2017  2:43 PM   Margins Defined edges 11/14/2017  2:43 PM   Mary Jane-wound Assessment Brown;Pink 11/14/2017  2:43 PM   Non-staged Wound Description Full thickness 11/14/2017  2:43 PM   Red%Wound Bed 90 11/14/2017  2:43 PM   Other%Wound Bed 10 11/14/2017  2:43 PM   Drainage Amount Small 11/14/2017  2:43 PM   Drainage Description Yellow 11/14/2017  2:43 PM   Odor None 11/14/2017  2:43 PM   Number of days: 7       Percent of Wound Debrided: 100%    Total Surface Area Debrided:  4.62 sq cm     Diabetic/Pressure/Non Pressure Ulcers:  Ulcer: Diabetic ulcer, fat layer exposed        Bleeding:  Minimal    Hemostasis Achieved:  by pressure    Procedural Pain:  0  / 10     Post Procedural Pain:  0 / 10     Response to treatment:  Well tolerated by patient. Plan:   RIGHT lateral and dorsal foot, as well as RIGHT anterior shin wound debridement provided with Delisa and compression    Treatment Note please see attached Discharge Instructions    In my professional opinion this patient would benefit from HBO Therapy: Undecided       Patient is to return to wound care center in:  1 week(s)    Written patient dismissal instructions given to patient and signed by patient or POA. Discharge [facility] Physician Orders and Discharge Instructions  [facility] [address] [facility]  Telephone: [phone]    NAME:  Loren Scott OF BIRTH:  1940  MEDICAL RECORD NUMBER:  [id number]  DATE:  11/14/2017    Wash hands with soap and water prior to and after every dressing change. Wound Cleansing:   · Do not scrub or use excessive force. · With each dressing change, rinse wounds with 0.9% Saline. (May use wound wash or soft contact solution. Both can be purchased at a local drug store).    · If unable to contained in the After Visit Summary has been reviewed with me, the patient and/or responsible adult, by my health care provider(s). I had the opportunity to ask questions regarding this information.   I have elected to receive;      [] Patient unable to sign Discharge Instructions given to ECF/Transportation/POA      []  After Visit Summary  []  Comprehensive Discharge Instruction              Electronically signed by Jannelle Kocher, DPM on 11/14/2017 at 3:50 PM

## 2017-11-20 ENCOUNTER — CARE COORDINATION (OUTPATIENT)
Dept: CASE MANAGEMENT | Age: 77
End: 2017-11-20

## 2017-11-21 ENCOUNTER — CARE COORDINATION (OUTPATIENT)
Dept: CASE MANAGEMENT | Age: 77
End: 2017-11-21

## 2017-11-21 NOTE — CARE COORDINATION
[facility] Discharge Call    2017    Patient: Gagandeep Spears Patient : 1940   MRN: [id number]  Reason for Admission: AMS,Transmetarsal amp. Discharge Date: 8/15/17 RARS: [facility] Risk Score: 24       Discharge Facility: [facility] face-to-face services provided:  Communication with home health agencies or other community services the patient is currently using-[facility]      [facility] [address] [place] [address] Dr Transition    Do you have any ongoing symptoms?:  No   Do you have all of your prescriptions and are they filled?:  Yes   Do you have any questions related to your medications?:  No   Have you scheduled your follow up appointment?:  No (Comment: pt scheduled for f/u appt with [address] today )   Were you discharged with any Home Care or  [facility] or do you currently have any active services?:  Yes   Post Acute Services:  [address] [place] [address] [place] (Comment: [facility]. )         Do you have support at home?:  Partner/Spouse/SO   Do you feel like you have everything you need to keep you well at home?:  Yes   Patient DME:  Straight cane, Walker, Shower chair, Other   Other Patient DME:  tabitha rider   Patient Home Equipment:  Oxygen, CPAP   Oxygen Regimen:  PRN   [facility] Interventions     Care [facility] post SNF f/u call to pt home after discharge from [facility] yesterday. Spoke with spouse. She said pt was there 4 months and she wasn't sure he would ever make it back home, but is home and doing \"great\". Using walker to ambulate. Alert. Good appetite. Asked about home care and she said she thought it was [facility]. She said someone was supposed to call. CTN call to [facility] and spoke with intake. Pt is scheduled to be seen today. Noted pt has appt in [address] today at 1pm nearly at time of calls. Spouse wasn't aware of that and she said that would not be possible for today.  She wanted to call and reschedule and

## 2017-11-27 ENCOUNTER — CARE COORDINATION (OUTPATIENT)
Dept: CASE MANAGEMENT | Age: 77
End: 2017-11-27

## 2017-11-27 NOTE — CARE COORDINATION
[facility] Discharge Call    2017    Patient: Emmet Eisenmenger Patient : 1940   MRN: [id number]  Reason for Admission: There are no discharge diagnoses documented for the most recent discharge. Discharge Date: 8/15/17 RARS: [facility] Risk Score: 24       Discharge Facility: [facility]    Care [facility] Post Acute Facility Transition    Do you have any ongoing symptoms?:  No   Do you have all of your prescriptions and are they filled?:  Yes   Do you have any questions related to your medications?:  No   Have you scheduled your follow up appointment?:  Yes   How are you going to get to your appointment?:  Car - family or friend to transport   Were you discharged with any Home Care or  [facility] or do you currently have any active services?:  Yes   Post Acute Services:  Home Health (Comment: [facility]. )         Do you have support at home?:  Partner/Spouse/SO   Do you feel like you have everything you need to keep you well at home?:  Yes   Patient DME:  Straight cane, Walker, Shower chair, Other   Other Patient DME:  tabitha rider   Patient Home Equipment:  Oxygen, CPAP   Oxygen Regimen:  PRN   Care Trans[facility] Interventions     Care Transition f/u call s/p SNF discharge. Spoke with spouse last week on 1st f/u call and pt was doing well. She said pt is continuing to do well. Pt has f/u in wound center tomorrow for diabetic foot wound. [facility] was able to get in and are seeing pt. Future Appointments  Date Time Provider Skye Cervantes   2017 1:45 PM Jayleen Gonzalez DPM [facility] WND CARE None   2018 2:30 PM Machelle Jenkins  [place] [facility]   2018 3:40 PM Erin Guardado, CNP FF SLEEP  [facility] Transition Coordinator  [phone]  [email or web address]

## 2017-11-28 ENCOUNTER — HOSPITAL ENCOUNTER (OUTPATIENT)
Dept: WOUND CARE | Age: 77
Discharge: HOME | End: 2017-11-28
Attending: PODIATRIST | Admitting: PODIATRIST

## 2017-11-28 VITALS
SYSTOLIC BLOOD PRESSURE: 148 MMHG | HEART RATE: 76 BPM | DIASTOLIC BLOOD PRESSURE: 75 MMHG | TEMPERATURE: 97.5 F | RESPIRATION RATE: 18 BRPM

## 2017-11-28 DIAGNOSIS — R11.2 NAUSEA WITH VOMITING: ICD-10-CM

## 2017-11-28 RX ORDER — TAMSULOSIN HYDROCHLORIDE 0.4 MG/1
0.4 CAPSULE ORAL NIGHTLY
COMMUNITY
End: 2017-12-21 | Stop reason: ALTCHOICE

## 2017-11-28 RX ORDER — DONEPEZIL HYDROCHLORIDE 10 MG/1
10 TABLET, FILM COATED ORAL NIGHTLY
COMMUNITY

## 2017-11-28 RX ORDER — LIDOCAINE HYDROCHLORIDE 40 MG/ML
SOLUTION TOPICAL ONCE
Status: COMPLETED | OUTPATIENT
Start: 2017-11-28 | End: 2017-11-28

## 2017-11-28 RX ADMIN — LIDOCAINE HYDROCHLORIDE 2.5 ML: 40 SOLUTION TOPICAL at 14:13

## 2017-11-28 ASSESSMENT — PAIN SCALES - GENERAL: PAINLEVEL_OUTOF10: 0

## 2017-11-28 NOTE — PROGRESS NOTES
Salena Lacy [address]   Progress Note and Procedure Note      Roselee Leventhal Reckers  MEDICAL RECORD NUMBER:  [id number]  AGE: 69 y.o. GENDER: male  : 1940  EPISODE DATE:  2017    Subjective:     Chief Complaint   Patient presents with    Wound Check     f/u right lower leg/foot         HISTORY of PRESENT ILLNESS GARETH Moreno is a 69 y.o. male who presents today for wound/ulcer evaluation. History of Wound Context: right dorsal foot wound and right lateral foot wound s/p TMA. Patient has been NWB at an [facility]. Wound/Ulcer Pain Timing/Severity: none  Quality of pain: N/A  Severity:  0 / 10   Modifying Factors: None  Associated Signs/Symptoms: none    Ulcer Identification:  Ulcer Type: diabetic  Contributing Factors: diabetes and poor glucose control    Wound: N/A        PAST MEDICAL HISTORY        Diagnosis Date    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 ([facility]     Dr. Agustina Yin CAD (coronary artery disease)     [facility]congestive heart failure) [facility])     COPD (chronic obstructive pulmonary disease) [facility]     Diabetes mellitus ([facility]     Hypertension     MRSA (methicillin resistant staph aureus) culture positive 2017    foot    Obstructive sleep apnea 10/3/2014    Parkinson's disease ([facility] 1965       PAST SURGICAL HISTORY    Past Surgical History:   Procedure Laterality Date    CARDIAC CATHETERIZATION      CERVICAL FUSION  1981    FOOT SURGERY Right 2017    INCISION AND DRAINAGE RIGHT FOOT WITH REMOVAL NECROTIC BONE    FOOT SURGERY Right 2017    : TRANSMETATARSAL AMPUTATION RIGHT FOOT        FAMILY HISTORY    History reviewed. No pertinent family history.     SOCIAL HISTORY    Social History   Substance Use Topics    Smoking status: Never Smoker    Smokeless tobacco: Never Used    Alcohol use No       ALLERGIES    Allergies   Allergen Reactions    Aspirin      GI  Bleed possible related to aspirin  (but thought to be H Pylori)    Nsaids     Bactrim [Sulfamethoxazole-Trimethoprim] Other (See Comments)     arf    Celebrex [Celecoxib]      tremors    Cymbalta [Duloxetine Hcl]      Mental status change    Pcn [Penicillins]      rash    Vicodin [Hydrocodone-Acetaminophen]     Codeine Nausea And Vomiting       MEDICATIONS    Current Outpatient Prescriptions on File Prior to Encounter   Medication Sig Dispense Refill    metFORMIN (GLUCOPHAGE) 500 MG tablet Take 850 mg by mouth 2 times daily (with meals)       QUEtiapine (SEROQUEL) 25 MG tablet Take 1 tablet by mouth nightly 60 tablet 0    metoprolol tartrate (LOPRESSOR) 50 MG tablet Take 1 tablet by mouth 2 times daily (Patient taking differently: Take 25 mg by mouth 2 times daily ) 60 tablet 1    furosemide (LASIX) 40 MG tablet Take 1 tablet by mouth daily 60 tablet 1    levothyroxine (SYNTHROID) 25 MCG tablet Take 0.5 tablets by mouth Daily 30 tablet 3    potassium chloride (KLOR-CON M) 10 MEQ extended release tablet Take 10 mEq by mouth 3 times daily      insulin aspart (NOVOLOG) 100 UNIT/ML injection vial Inject 7 Units into the skin 3 times daily (before meals) 1 vial 3    insulin detemir (LEVEMIR) 100 UNIT/ML injection vial Inject 26 Units into the skin nightly 1 vial 3    ranitidine (ZANTAC) 150 MG tablet Take 150 mg by mouth 2 times daily      losartan (COZAAR) 50 MG tablet TAKE ONE TABLET BY MOUTH EVERY DAY (Patient taking differently: 25 mg TAKE ONE TABLET BY MOUTH EVERY DAY) 30 tablet 5    fenofibrate (TRICOR) 145 MG tablet TAKE ONE TABLET BY MOUTH DAILY 30 tablet 2    clopidogrel (PLAVIX) 75 MG tablet TAKE ONE TABLET BY MOUTH EVERY DAY 90 tablet 4    rosuvastatin (CRESTOR) 20 MG tablet Take 1 tablet by mouth daily 90 tablet 4    finasteride (PROSCAR) 5 MG tablet Take 5 mg by mouth daily      FARXIGA 5 MG tablet Take 5 mg by mouth every morning       carbidopa-levodopa (SINEMET)  MG per tablet Take 1 tablet by mouth 3 times daily.       aspirin 81 MG EC tablet Take 81 mg by mouth tissue. After debridement the wound has a granular base. There is no surrounding erythema, edema, warmth or malodor noted. The wound does not probe or track to bone. 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 ([facility] J68.3    [facility] I26.99    Coronary artery disease involving native heart without angina pectoris I25.10    Presence of drug coated stent in anterior descending branch of left coronary artery Z95.5    Sepsis ([facility] A41.9    Syncope R55    Hypothyroid E03.9    Obstructive sleep apnea G47.33    Gas gangrene [facility] A48.0    Diabetic infection of right foot (HCC) E11.69, L08.9    Diabetic polyneuropathy associated with type 2 diabetes mellitus ([facility] E11.42    Right foot infection L08.9    LULÚ (acute kidney injury) ([facility] N17.9    Acute diastolic CHF (congestive heart failure), NYHA class 2 ([facility]) I50.31    Encephalopathy G93.40        Procedure Note  Indications:  Based on my examination of this patient's wound(s)/ulcer(s) today, debridement is required to promote healing and evaluate the wound base. Performed by: Lisa Sarabia DPM    Consent obtained:  Yes    Time out taken:  Yes    Pain Control: Anesthetic  Anesthetic: 4% Lidocaine Liquid Topical       Debridement:Excisional Debridement    Using curette, #15 blade scalpel, scissors and forceps the wound(s)/ulcer(s) was/were sharply debrided down through and including the removal of epidermis, dermis, subcutaneous tissue and muscle/fascia.         Devitalized Tissue Debrided:  fibrin, slough and necrotic/eschar    Pre Debridement Measurements:  Are located in the [place]  Documentation Flow Sheet    Wound/Ulcer #: 1 and 5    Post Debridement Measurements:  Wound/Ulcer Descriptions are Pre Debridement except measurements:    Wound 08/22/17 Foot Dorsal;Right #1 diabetic (existing 4 weeks) (Active)   Wound Type Wound 11/28/2017  1:51 PM   Wound 11/28/2017  1:51 PM   [place]%Wound Bed 100 11/28/2017  1:51 PM   Red%Wound Bed 90 11/14/2017  2:43 PM   Other%Wound Bed 10 11/14/2017  2:43 PM   Number of days: 21     Percent of Wound/Ulcer Debrided: 100%    Total Surface Area Debrided: 5.25 sq cm     Diabetic/Pressure/Non Pressure Ulcers only:  Ulcer: Diabetic ulcer, muscle necrosis    Estimated Blood Loss:  Minimal    Hemostasis Achieved:  by pressure    Procedural Pain:  0  / 10     Post Procedural Pain:  0 / 10     Response to treatment:  Well tolerated by patient. Plan:   Orders written for local wound care. Vascular studies reviewed. CASPER WNL. Will proceed with apligraf. Orders written for local wound care daily with santyl. OK to continue weight bearing with PT. Patient will be preauthorized for a apligraf to accelerate healing in this high risk DM foot ulceration. We are still waiting to hear if he is approved by his insurance. Treatment Note please see attached Discharge Instructions    In my professional opinion this patient would benefit from HBO Therapy: No    Written patient dismissal instructions given to patient and signed by patient or POA. RTC 1 week         Discharge [address] [facility] Physician Orders and Discharge Instructions  [facility] [address] [facility], [address]  Telephone: [phone]    NAME:  Ama Nguyen OF BIRTH:  1940  MEDICAL RECORD NUMBER:  [id number]  DATE:  11/28/2017    Wash hands with soap and water prior to and after every dressing change. Wound Cleansing:   · Do not scrub or use excessive force. · With each dressing change, rinse wounds with 0.9% Saline. (May use wound wash or soft contact solution. Both can be purchased at a local drug store). · If unable to obtain saline, may use a gentle soap and water.   · Keep wounds dry in the shower unless otherwise instructed by the physician. Topical Treatments:  Do not apply lotions, creams, or ointments to wound bed unless directed. [x] Apply moisturizing lotion to skin surrounding the wound prior to dressing change.  [] Apply antifungal ointment to skin surrounding the wound prior to dressing change.  [] Apply thin film of moisture barrier ointment to skin immediately around wound. [] Other:      Dressings:           Wound Location: Right Lower Leg/Foot   [x] Apply Primary Dressing to wound:       [] Foam/Foam with Border  [] Mepitel/Non-adherent/Xeroform   [] Alginate with Silver    [] Alginate   [] Collagen [] Collagen with Silver     [] Hydrocolloid  [] Hydrafera Blue moistened with saline   [] MediHoney Paste/Gel [] Hydrogel      [] Santyl with Moisten saline gauze    [] Bactroban/Mupirocin [] Polysporin  [x] Other: moist laura   Pack wound loosely with  [] Iodoform   [] Plain Packing  [] Other      [x] Cover and Secure with:     [x] Gauze [] ABD [] Stretch bandage roll [] Kerlix   [] Coban [] Ace Wrap [] Cover Roll Tape    [] Other:    Avoid contact of tape with skin. [x] Change dressing: [] Daily    [] Every Other Day [x] Three times per week   [] Once a week [] Do Not Change Dressing   [] Other:                   Pressure Relief:  · When sitting, shift position or do seat lifts every 15 minutes. · Turn every 2 hours when in bed. Avoid position directing pressure on wound site. · Limit side lying to 30 degree tilt. Limit HOB elevation to 30 degrees. Edema Control:  Apply: [] Compression Stocking [x]Right Leg []Left Leg     [x] SpandaGrip [x]Right Leg  [x]Left Leg      []Low compression 5-10 mm/Hg      []Medium compression 10-20 mm/Hg     [x]High compression  20-30 mm/Hg    Apply every morning immediately when getting up. They should be applied to affected leg(s) from mid foot to knee making sure to cover the heel. Remove every night before going to bed.       [x] Elevate leg(s) above the level of the heart for 30 minutes 4-5 times a day and/or when sitting. [x] Avoid prolonged standing in one place. Off-Loading:   [] Off-loading when: [] walking  [] in bed [] sitting    [] Total non-weight bearing:  [] Right Leg  [] Left Leg     [] Partial weight bearing:   [] Right Leg  [] Left Leg     [x] Assistive Device: [] Walker [] Crutches  [] Wheelchair [] Roll About   [] Surgical shoe/Darco    [] Podus Boot(s)   [] Prevalon Boot(s)  [] Tha Juancarlos    [] Cast/CAM Boot [x] Other:Brace to right leg when walking       Dietary:  Important dietary reminders:  1. Increase Protein intake (i.e. Lean meats, fish, eggs, legumes, and yogurt)  2. No added salt  3. If diabetic, follow a diabetic diet and check glucose prior to meals or as instructed by your physician. Return Appointment:  [] Wound and dressing supply provider:   [] ECF or Home Healthcare:  [] Nurse visit:    [x] Return Appointment: With Dr Dami Robles  in 1Week(s)    [] Ordered tests:       Consults: [] Weight Management [] Diabetes Education [] Vascular Surgery  [] Endocrinology [] Nutrition Counseling  [] Lymphedema Therapy     Your nurse  is:  Isabel     Electronically signed by Jayne Ramirez RN on 11/28/2017 at 10:13 AM     [address] [facility] Information: Should you experience any significant changes in your wound(s) or have questions about your wound care, please contact the [address] [place] [address] at [phone] Monday and Wednesday 8:00 am - 2:00 pm and Tuesday, Thursday and Friday from 8:00 am - 4:30 pm.   If you need help with your wound outside these hours and cannot wait until we are again available, contact your PCP or go to the hospital emergency room. PLEASE NOTE: IF YOU ARE UNABLE TO OBTAIN WOUND SUPPLIES, CONTINUE TO USE THE SUPPLIES YOU HAVE AVAILABLE UNTIL YOU ARE ABLE TO REACH US. IT IS MOST IMPORTANT TO KEEP THE WOUND COVERED AT ALL TIMES.       Physician orders by:     [x] Dr Morgan Moya [] Dr Trae Damon    [] Dr Joi Quiñones Dorita     [] Dr Yeison Caro   [] Dr Keny Hernandez  [] Dr Lizandro Cedeno       Physician Signature:__________________________________        The information contained in the After Visit Summary has been reviewed with me, the patient and/or responsible adult, by my health care provider(s). I had the opportunity to ask questions regarding this information. I have elected to receive;      [] Patient unable to sign Discharge Instructions given to ECF/Transportation/POA      []  After Visit Summary  []  Comprehensive Discharge Instruction          Electronically signed by Ida Salas DPM on 2017 at [address]   Progress Note and Procedure Note      Nohemi Lao  MEDICAL RECORD NUMBER:  [id number]  AGE: 69 y.o. GENDER: male  : 1940  EPISODE DATE:  2017    Subjective:     Chief Complaint   Patient presents with    Wound Check     f/u right lower leg/foot         HISTORY of PRESENT ILLNESS HPI     Chester Gee is a 69 y.o. male who presents today for wound/ulcer evaluation. History of Wound Context: right dorsal foot wound and right lateral foot wound s/p TMA. Patient has been NWB at an [facility].   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 Brandi Nolasco CAD (coronary artery disease)     CHF (congestive heart failure) ([facility]     COPD (chronic obstructive pulmonary disease) ([facility]     Diabetes mellitus ([facility]     Hypertension     MRSA (methicillin resistant staph aureus) culture positive 2017    foot    Obstructive sleep apnea 10/3/2014    Parkinson's disease ([facility] 1965       PAST SURGICAL HISTORY    Past Surgical History:   Procedure Laterality Date EVERY DAY (Patient taking differently: 25 mg TAKE ONE TABLET BY MOUTH EVERY DAY) 30 tablet 5    fenofibrate (TRICOR) 145 MG tablet TAKE ONE TABLET BY MOUTH DAILY 30 tablet 2    clopidogrel (PLAVIX) 75 MG tablet TAKE ONE TABLET BY MOUTH EVERY DAY 90 tablet 4    rosuvastatin (CRESTOR) 20 MG tablet Take 1 tablet by mouth daily 90 tablet 4    finasteride (PROSCAR) 5 MG tablet Take 5 mg by mouth daily      FARXIGA 5 MG tablet Take 5 mg by mouth every morning       carbidopa-levodopa (SINEMET)  MG per tablet Take 1 tablet by mouth 3 times daily.  aspirin 81 MG EC tablet Take 81 mg by mouth daily.  ergocalciferol (ERGOCALCIFEROL) 78165 UNITS capsule Take 50,000 Units by mouth Twice a Week Every Tuesday and Thursday      acidophilus (LACTINEX/FLORANEX) Take 1 tablet by mouth 3 times daily X 28 days      Sennosides (SENNA LAXATIVE PO) Take 8.6-50 mg by mouth      Magnesium Hydroxide (MILK OF MAGNESIA PO) Take 30 mLs by mouth daily as needed      Dextromethorphan-guaiFENesin  MG/5ML SYRP Take 5 mLs by mouth every 4 hours as needed for Cough      nystatin (MYCOSTATIN) 901646 UNIT/GM cream .Apply Nystatin cream to the groin & buttocks twice a day. 1 Tube 1    cetirizine (ZYRTEC) 5 MG tablet Take 5 mg by mouth daily Prn      nitroGLYCERIN (NITROSTAT) 0.4 MG SL tablet Place 0.4 mg under the tongue every 5 minutes as needed. No current facility-administered medications on file prior to encounter. REVIEW OF SYSTEMS    unable to obtain as patient is nonverbal    Objective:      BP (!) 148/75   Pulse 76   Temp 97.5 °F (36.4 °C) (Oral)   Resp 18     Wt Readings from Last 3 Encounters:   10/23/17 248 lb 12.8 oz (112.9 kg)   08/05/17 237 lb (107.5 kg)   07/28/17 274 lb 14.6 oz (124.7 kg)       PHYSICAL EXAM    Incision along TMA well approximated and coapted.    There are linear areas of ecchymosis on the medial and lateral aspect of the foot that is consistent with a dressing that was applied too tight. There is no germán necrosis noted at these areas. The area is persistently erythematous and slightly warm. There is a dorsal wound noted on the right that has fibrotic and nonviable tissue that extends down through and includes the deep fascia/muscular layer. After debridement the wound has a fibrous granular base. There is no surrounding erythema, edema, warmth or malodor noted. The wound does not probe or track to bone. 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 ([facility] J68.3    Pulmonary embolism ([facility] I26.99    Coronary artery disease involving native heart without angina pectoris I25.10    Presence of drug coated stent in anterior descending branch of left coronary artery Z95.5    Sepsis ([facility] A41.9    Syncope R55    Hypothyroid E03.9    Obstructive sleep apnea G47.33    Gas gangrene ([facility] A48.0    Diabetic infection of right foot ([facility]) E11.69, L08.9    Diabetic polyneuropathy associated with type 2 diabetes mellitus ([facility] E11.42    Right foot infection L08.9    LULÚ (acute kidney injury) ([facility] N17.9    Acute diastolic CHF (congestive heart failure), NYHA class 2 ([facility]) I50.31    Encephalopathy G93.40        Procedure Note  Indications:  Based on my examination of this patient's wound(s)/ulcer(s) today, debridement is required to promote healing and evaluate the wound base. Performed by: Serena Bowie DPM    Consent obtained:  Yes    Time out taken:  Yes    Pain Control: Anesthetic  Anesthetic: 4% Lidocaine Liquid Topical       Debridement:Excisional Debridement    Using curette, #15 blade scalpel, scissors and forceps the wound(s)/ulcer(s) was/were sharply debrided down through and including the removal of epidermis, dermis, subcutaneous tissue and muscle/fascia.         Devitalized Tissue Debrided:  fibrin, slough and necrotic/eschar    Pre Debridement Measurements:  Are located in the Wound/Ulcer Documentation Flow Sheet    Wound/Ulcer #: 1    Post Debridement Measurements:  Wound/Ulcer Descriptions are Pre Debridement except measurements:    Wound 08/22/17 Foot Dorsal;Right #1 diabetic (existing 4 weeks) (Active)   Wound Type Wound 10/24/2017  3:53 PM   Wound Diabetic Jo 2 10/24/2017  3:53 PM   Dressing/Treatment Other (Comment) 10/24/2017  3:53 PM   Wound Cleansed Rinsed/Irrigated with saline 10/24/2017  3:53 PM   Wound Length (cm) 2.5 cm 10/24/2017  3:53 PM   Wound Width (cm) 2.5 cm 10/24/2017  3:53 PM   Wound Depth (cm)  0.1 10/24/2017  3:53 PM   Calculated Wound Size (cm^2) (l*w) 5.04 cm^2 10/24/2017  3:53 PM   Change in Wound Size % (l*w) 60.62 10/24/2017  3:53 PM   Tunneling Position ___ O'Clock 0 10/24/2017  3:53 PM   Undermining Starts ___ O'Clock 0 10/24/2017  3:53 PM   Undermining Ends___ O'Clock 0 10/24/2017  3:53 PM   Wound Assessment Yellow;Pink 10/24/2017  3:53 PM   Margins Defined edges 10/24/2017  3:53 PM   [place]-wound Assessment Pink 10/24/2017  3:53 PM   Non-staged Wound Description Full thickness 10/24/2017  3:53 PM   [place]%Wound Bed 60 10/24/2017  3:53 PM   Red%Wound Bed 30 10/10/2017  3:03 PM   Yellow%Wound Bed 36 10/24/2017  3:53 PM   Black%Wound Bed 5 9/26/2017  4:49 PM   Other%Wound Bed green 4% 10/24/2017  3:53 PM   Drainage Amount Small 10/24/2017  3:53 PM   Drainage Description Green;Serosanguinous 10/24/2017  3:53 PM   Odor None 10/24/2017  3:53 PM   Number of days: 63       Wound 09/19/17 Foot Right;Lateral;Proximal #3 - diabetic (Active)   Wound Type Wound 10/24/2017  3:53 PM   Wound Diabetic Jo 1 10/24/2017  3:53 PM   Dressing/Treatment Other (Comment) 10/24/2017  3:53 PM   Wound Cleansed Rinsed/Irrigated with saline 10/24/2017  3:53 PM   Wound Length (cm) 0.8 cm 10/24/2017  3:53 PM   Wound Width (cm) 0.8 cm 10/24/2017  3:53 PM   Wound Depth (cm)  0.1 10/24/2017  3:53 PM   Calculated Wound Size (cm^2) (l*w) 0.64 cm^2 10/24/2017  3:53 PM   Change in Wound Size Diabetic Jo 2 10/24/2017  3:53 PM   Dressing/Treatment Other (Comment) 10/24/2017  3:53 PM   Wound Cleansed Rinsed/Irrigated with saline 10/24/2017  3:53 PM   Wound Length (cm) 2.1 cm 10/24/2017  3:53 PM   Wound Width (cm) 2.4 cm 10/24/2017  3:53 PM   Wound Depth (cm)  0.1 10/24/2017  3:53 PM   Calculated Wound Size (cm^2) (l*w) 5.04 cm^2 10/24/2017  3:53 PM   Change in Wound Size % (l*w) 60.62 10/24/2017  3:53 PM   Tunneling Position ___ O'Clock 0 10/24/2017  3:53 PM   Undermining Starts ___ O'Clock 0 10/24/2017  3:53 PM   Undermining Ends___ O'Clock 0 10/24/2017  3:53 PM   Wound Assessment Yellow;Pink 10/24/2017  3:53 PM   Margins Defined edges 10/24/2017  3:53 PM   [place]-wound Assessment Pink 10/24/2017  3:53 PM   Non-staged Wound Description Full thickness 10/24/2017  3:53 PM   [place]%Wound Bed 60 10/24/2017  3:53 PM   Red%Wound Bed 30 10/10/2017  3:03 PM   Yellow%Wound Bed 36 10/24/2017  3:53 PM   Black%Wound Bed 5 9/26/2017  4:49 PM   Other%Wound Bed green 4% 10/24/2017  3:53 PM   Drainage Amount Small 10/24/2017  3:53 PM   Drainage Description Green;Serosanguinous 10/24/2017  3:53 PM   Odor None 10/24/2017  3:53 PM   Number of days: 63       Wound 09/19/17 Foot Right;Lateral;Proximal #3 - diabetic (Active)   Wound Type Wound 10/24/2017  3:53 PM   Wound Diabetic Jo 1 10/24/2017  3:53 PM   Dressing/Treatment Other (Comment) 10/24/2017  3:53 PM   Wound Cleansed Rinsed/Irrigated with saline 10/24/2017  3:53 PM   Wound Length (cm) 1 cm 10/24/2017  3:53 PM   Wound Width (cm) 1 cm 10/24/2017  3:53 PM   Wound Depth (cm)  0.1 10/24/2017  3:53 PM   Calculated Wound Size (cm^2) (l*w) 0.64 cm^2 10/24/2017  3:53 PM   Change in Wound Size % (l*w) -113.33 10/24/2017  3:53 PM   Tunneling Position ___ O'Clock 0 10/24/2017  3:53 PM   Undermining Starts ___ O'Clock 0 10/24/2017  3:53 PM   Undermining Ends___ O'Clock 0 10/24/2017  3:53 PM   Undermining Maxium Distance (cm) 0 10/24/2017  3:53 PM   Wound Assessment Slough; Yellow;Pink 10/24/2017  3:53 PM   Margins Defined edges 10/24/2017  3:53 PM   [place]-wound Assessment Pink; White 10/24/2017  3:53 PM   Non-staged Wound Description Full thickness 10/24/2017  3:53 PM   [place]%Wound Bed 10 10/24/2017  3:53 PM   Yellow%Wound Bed 90 10/24/2017  3:53 PM   Drainage Amount Scant 10/24/2017  3:53 PM   Drainage Description Yellow 10/24/2017  3:53 PM   Odor Mild 10/24/2017  3:53 PM   Number of days: 35       Percent of Wound/Ulcer Debrided: 100%    Total Surface Area Debrided:  1 sq cm    Diabetic/Pressure/Non Pressure Ulcers only:  Ulcer: Diabetic ulcer, fat layer exposed    Bleeding: Minimal    Hemostasis Achieved: by pressure    Procedural Pain: 0  / 10     Post Procedural Pain: 0 / 10     Response to treatment:  Well tolerated by patient. Plan:     Orders written for local wound care daily with santyl. OK to continue weight bearing with PT. Patient will be preauthorized for a apligraf to accelerate healing in this high risk DM foot ulceration. We are still waiting to hear if he is approved by his insurance. Treatment Note please see attached Discharge Instructions    In my professional opinion this patient would benefit from HBO Therapy: No    Written patient dismissal instructions given to patient and signed by patient or POA. RTC 1 week         Discharge [facility] Physician Orders and Discharge Instructions  [address] [facility] [address] [place] [address] [address]  Telephone: [phone]    NAME:  Perez Liter OF BIRTH:  1940  MEDICAL RECORD NUMBER:  [id number]  DATE:  11/28/2017    Wash hands with soap and water prior to and after every dressing change. Wound Cleansing:   · Do not scrub or use excessive force. · With each dressing change, rinse wounds with 0.9% Saline. (May use wound wash or soft contact solution. Both can be purchased at a local drug store).    · If

## 2017-12-05 ENCOUNTER — HOSPITAL ENCOUNTER (OUTPATIENT)
Dept: WOUND CARE | Age: 77
Discharge: HOME | End: 2017-12-05
Attending: PODIATRIST | Admitting: PODIATRIST

## 2017-12-05 VITALS
TEMPERATURE: 97.6 F | SYSTOLIC BLOOD PRESSURE: 148 MMHG | HEART RATE: 71 BPM | RESPIRATION RATE: 16 BRPM | DIASTOLIC BLOOD PRESSURE: 75 MMHG

## 2017-12-07 NOTE — PROGRESS NOTES
(See Comments)     arf    Celebrex [Celecoxib]      tremors    Cymbalta [Duloxetine Hcl]      Mental status change    Pcn [Penicillins]      rash    Vicodin [Hydrocodone-Acetaminophen]     Codeine Nausea And Vomiting       MEDICATIONS    Current Outpatient Prescriptions on File Prior to Encounter   Medication Sig Dispense Refill    donepezil (ARICEPT) 5 MG tablet Take 5 mg by mouth nightly      tamsulosin (FLOMAX) 0.4 MG capsule Take 0.4 mg by mouth nightly      acidophilus (LACTINEX/FLORANEX) Take 1 tablet by mouth 3 times daily X 28 days      Sennosides (SENNA LAXATIVE PO) Take 8.6-50 mg by mouth      metFORMIN (GLUCOPHAGE) 500 MG tablet Take 850 mg by mouth 2 times daily (with meals)       Magnesium Hydroxide (MILK OF MAGNESIA PO) Take 30 mLs by mouth daily as needed      Dextromethorphan-guaiFENesin  MG/5ML SYRP Take 5 mLs by mouth every 4 hours as needed for Cough      QUEtiapine (SEROQUEL) 25 MG tablet Take 1 tablet by mouth nightly 60 tablet 0    metoprolol tartrate (LOPRESSOR) 50 MG tablet Take 1 tablet by mouth 2 times daily (Patient taking differently: Take 25 mg by mouth 2 times daily ) 60 tablet 1    furosemide (LASIX) 40 MG tablet Take 1 tablet by mouth daily 60 tablet 1    levothyroxine (SYNTHROID) 25 MCG tablet Take 0.5 tablets by mouth Daily 30 tablet 3    potassium chloride (KLOR-CON M) 10 MEQ extended release tablet Take 10 mEq by mouth 3 times daily      insulin aspart (NOVOLOG) 100 UNIT/ML injection vial Inject 7 Units into the skin 3 times daily (before meals) 1 vial 3    insulin detemir (LEVEMIR) 100 UNIT/ML injection vial Inject 26 Units into the skin nightly 1 vial 3    nystatin (MYCOSTATIN) 444537 UNIT/GM cream .Apply Nystatin cream to the groin & buttocks twice a day.  1 Tube 1    ranitidine (ZANTAC) 150 MG tablet Take 150 mg by mouth 2 times daily      losartan (COZAAR) 50 MG tablet TAKE ONE TABLET BY MOUTH EVERY DAY (Patient taking differently: 25 mg TAKE ONE Wound has fibrotic and nonviable tissue that extends down through and includes the subcutaneous tissue. After debridement the wound has a granular base. There is no surrounding erythema, edema, warmth or malodor noted. The wound does not probe or track to bone. 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 ([facility] J68.3    Pulmonary embolism ([facility] I26.99    Coronary artery disease involving native heart without angina pectoris I25.10    Presence of drug coated stent in anterior descending branch of left coronary artery Z95.5    Sepsis ([facility] A41.9    Syncope R55    Hypothyroid E03.9    Obstructive sleep apnea G47.33    Gas gangrene ([facility] A48.0    Diabetic infection of right foot ([facility]) E11.69, L08.9    Diabetic polyneuropathy associated with type 2 diabetes mellitus ([facility] E11.42    Right foot infection L08.9    LULÚ (acute kidney injury) ([facility] N17.9    Acute diastolic CHF (congestive heart failure), NYHA class 2 ([facility]) I50.31    Encephalopathy G93.40        Procedure Note  Indications:  Based on my examination of this patient's wound(s)/ulcer(s) today, debridement is required to promote healing and evaluate the wound base. Performed by: Veronica Gonzalez DPM    Consent obtained:  Yes    Time out taken:  Yes    Pain Control: Anesthetic  Anesthetic: 4% Lidocaine Liquid Topical       Debridement:Excisional Debridement    Using curette, #15 blade scalpel, scissors and forceps the wound(s)/ulcer(s) was/were sharply debrided down through and including the removal of epidermis, dermis, subcutaneous tissue and muscle/fascia.         Devitalized Tissue Debrided:  fibrin, slough and necrotic/eschar    Pre Debridement Measurements:  Are located in the [place]  Documentation Flow Sheet    Wound/Ulcer #: 1 and 5    Post Debridement Measurements:  Wound/Ulcer Descriptions are Pre Debridement except measurements:    Wound 08/22/17 Foot 12/5/2017  2:50 PM   Non-staged Wound Description Full thickness 12/5/2017  2:50 PM   [place]%Wound Bed 50 12/5/2017  2:50 PM   Red%Wound Bed 90 11/14/2017  2:43 PM   Yellow%Wound Bed 50 12/5/2017  2:50 PM   Other%Wound Bed 10 11/14/2017  2:43 PM   Number of days: 29     Percent of Wound/Ulcer Debrided: 100%    Total Surface Area Debrided: 4 sq cm     Diabetic/Pressure/Non Pressure Ulcers only:  Ulcer: Diabetic ulcer, muscle necrosis    Estimated Blood Loss:  Minimal    Hemostasis Achieved:  by pressure    Procedural Pain:  0  / 10     Post Procedural Pain:  0 / 10     Response to treatment:  Well tolerated by patient. Plan:     Orders written for local wound care daily with santyl. OK to continue weight bearing with PT. Patient will be preauthorized for a apligraf to accelerate healing in this high risk DM foot ulceration. We are still waiting to hear if he is approved by his insurance. Treatment Note please see attached Discharge Instructions    In my professional opinion this patient would benefit from HBO Therapy: No    Written patient dismissal instructions given to patient and signed by patient or POA. RTC 1 week         Discharge [facility] Physician Orders and Discharge Instructions  [facility] [address] [facility], [facility] [place] [address]  Telephone: [phone]    NAME:  Ry Boyce OF BIRTH:  1940  MEDICAL RECORD NUMBER:  [id number]  DATE:  12/5/2017    Wash hands with soap and water prior to and after every dressing change. Wound Cleansing:   · Do not scrub or use excessive force. · With each dressing change, rinse wounds with 0.9% Saline. (May use wound wash or soft contact solution. Both can be purchased at a local drug store). · If unable to obtain saline, may use a gentle soap and water. · Keep wounds dry in the shower unless otherwise instructed by the physician.         Topical Treatments:  Do not apply lotions, creams, or ointments to wound bed unless directed. [x] Apply moisturizing lotion to skin surrounding the wound prior to dressing change.  [] Apply antifungal ointment to skin surrounding the wound prior to dressing change.  [] Apply thin film of moisture barrier ointment to skin immediately around wound. [] Other:      Dressings:           Wound Location: Right Lower Leg     [x] Apply Primary Dressing to wound:       [] Foam/Foam with Border  [] Mepitel/Non-adherent/Xeroform   [] Alginate with Silver    [] Alginate   [] Collagen [] Collagen with Silver     [] Hydrocolloid  [] Hydrafera Blue moistened with saline   [] MediHoney Paste/Gel [] Hydrogel      [] Santyl with Moisten saline gauze    [] Bactroban/Mupirocin [] Polysporin  [x] Other: Moist Delisa     Pack wound loosely with  [] Iodoform   [] Plain Packing  [] Other      [x] Cover and Secure with:     [x] Gauze [] ABD [] Stretch bandage roll [x] Kerlix   [] Coban [] Ace Wrap [] Cover Roll Tape    [] Other:    Avoid contact of tape with skin. [x] Change dressing: [] Daily    [] Every Other Day [] Three times per week   [] Once a week [] Do Not Change Dressing   [x] Other:Mon., FYWL., and Fri. Dressings:           Wound Location:     [x] Apply Primary Dressing to wound:       [] Foam/Foam with Border  [] Mepitel/Non-adherent/Xeroform   [] Alginate with Silver    [] Alginate   [] Collagen [] Collagen with Silver     [] Hydrocolloid  [] Hydrafera Blue moistened with saline   [] MediHoney Paste/Gel [] Hydrogel      [] Santyl with Moisten saline gauze   [] Bactroban/Mupirocin [] Polysporin  [] Other:      Pack wound loosely with  [] Iodoform   [] Plain Packing  [] Other      [x] Cover and Secure with:     [] Gauze [] ABD [] Stretch bandage roll [] Kerlix   [] Coban [] Ace Wrap [] Cover Roll Tape    [] Other:    Avoid contact of tape with skin.     [x] Change dressing:  [] Daily    [] Every Other Day [] Three times per week   [] Once a week [] Do Not Change Dressing   [] Other:               Edema Control:  Apply: [] Compression Stocking []Right Leg []Left Leg     [x] SpandaGrip [x]Right Leg  [x]Left Leg      []Low compression 5-10 mm/Hg      []Medium compression 10-20 mm/Hg     [x]High compression  20-30 mm/Hg    Apply every morning immediately when getting up. They should be applied to affected leg(s) from mid foot to knee making sure to cover the heel. Remove every night before going to bed. [x] Elevate leg(s) above the level of the heart for 30 minutes 4-5 times a day and/or when sitting. [x] Avoid prolonged standing in one place. Compression:  Type:  ·  Multilayer Compression Wrap Applied in Clinic []Right Leg []Left Leg  · Do not get leg(s) with wrap wet. ·  If wraps become too tight call the center or completely remove the wrap. · Elevate leg(s) above the level of the heart when sitting. · Avoid prolonged standing in one place. [] [address] [place] [address] remove wrap, cleanse affected leg(s) with soap and water, apply wound dressings as ordered above and reapply compression wraps on:     Off-Loading:   [] Off-loading when: [] walking  [] in bed [] sitting    [] Total non-weight bearing:  [] Right Leg  [] Left Leg     [] Partial weight bearing:   [] Right Leg  [] Left Leg     [] Assistive Device: [] Walker [] Crutches  [] Wheelchair [] Roll About   [] Surgical shoe/Darco    [] Podus Boot(s)   [] Prevalon Boot(s)  [] Daryle Dura    [] Cast/CAM Boot [] Other:         Dietary:  Important dietary reminders:  1. Increase Protein intake (i.e. Lean meats, fish, eggs, legumes, and yogurt)  2. No added salt  3. If diabetic, follow a diabetic diet and check glucose prior to meals or as instructed by your physician.         Return Appointment:  [] Wound and dressing supply provider:   [] ECF or Home Healthcare:  [] Nurse visit:    [x] Return Appointment: [place] [facility] in  [address] [place] [address])    [] Ordered tests: (PROSCAR) 5 MG tablet Take 5 mg by mouth daily      FARXIGA 5 MG tablet Take 5 mg by mouth every morning       carbidopa-levodopa (SINEMET)  MG per tablet Take 1 tablet by mouth 3 times daily.  cetirizine (ZYRTEC) 5 MG tablet Take 5 mg by mouth daily Prn      aspirin 81 MG EC tablet Take 81 mg by mouth daily.  ergocalciferol (ERGOCALCIFEROL) 65967 UNITS capsule Take 50,000 Units by mouth Twice a Week Every Tuesday and Thursday      nitroGLYCERIN (NITROSTAT) 0.4 MG SL tablet Place 0.4 mg under the tongue every 5 minutes as needed. No current facility-administered medications on file prior to encounter. REVIEW OF SYSTEMS    unable to obtain as patient is nonverbal    Objective:      BP (!) 148/75   Pulse 71   Temp 97.6 °F (36.4 °C) (Oral)   Resp 16     Wt Readings from Last 3 Encounters:   10/23/17 248 lb 12.8 oz (112.9 kg)   08/05/17 237 lb (107.5 kg)   07/28/17 274 lb 14.6 oz (124.7 kg)       PHYSICAL EXAM    Incision along TMA well approximated and coapted. There are linear areas of ecchymosis on the medial and lateral aspect of the foot that is consistent with a dressing that was applied too tight. There is no germán necrosis noted at these areas. The area is persistently erythematous and slightly warm. There is a dorsal wound noted on the right that has fibrotic and nonviable tissue that extends down through and includes the deep fascia/muscular layer. After debridement the wound has a fibrous granular base. There is no surrounding erythema, edema, warmth or malodor noted. The wound does not probe or track to bone.        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 ([address] J68.3    Pulmonary embolism ([address] I26.99    Coronary artery disease involving native heart without angina pectoris I25.10    Presence of drug coated stent in anterior descending branch of left coronary artery Z95.5    Undermining Ends___ O'Clock 0 10/24/2017  3:53 PM   Wound Assessment Yellow;Pink 10/24/2017  3:53 PM   Margins Defined edges 10/24/2017  3:53 PM   Mary Jane-wound Assessment Pink 10/24/2017  3:53 PM   Non-staged Wound Description Full thickness 10/24/2017  3:53 PM   [place]%Wound Bed 60 10/24/2017  3:53 PM   Red%Wound Bed 30 10/10/2017  3:03 PM   Yellow%Wound Bed 36 10/24/2017  3:53 PM   Black%Wound Bed 5 9/26/2017  4:49 PM   Other%Wound Bed green 4% 10/24/2017  3:53 PM   Drainage Amount Small 10/24/2017  3:53 PM   Drainage Description Green;Serosanguinous 10/24/2017  3:53 PM   Odor None 10/24/2017  3:53 PM   Number of days: 63       Wound 09/19/17 Foot Right;Lateral;Proximal #3 - diabetic (Active)   Wound Type Wound 10/24/2017  3:53 PM   Wound Diabetic Jo 1 10/24/2017  3:53 PM   Dressing/Treatment Other (Comment) 10/24/2017  3:53 PM   Wound Cleansed Rinsed/Irrigated with saline 10/24/2017  3:53 PM   Wound Length (cm) 0.8 cm 10/24/2017  3:53 PM   Wound Width (cm) 0.8 cm 10/24/2017  3:53 PM   Wound Depth (cm)  0.1 10/24/2017  3:53 PM   Calculated Wound Size (cm^2) (l*w) 0.64 cm^2 10/24/2017  3:53 PM   Change in Wound Size % (l*w) -113.33 10/24/2017  3:53 PM   Tunneling Position ___ O'Clock 0 10/24/2017  3:53 PM   Undermining Starts ___ O'Clock 0 10/24/2017  3:53 PM   Undermining Ends___ O'Clock 0 10/24/2017  3:53 PM   Undermining Maxium Distance (cm) 0 10/24/2017  3:53 PM   Wound Assessment Slough; Yellow;Pink 10/24/2017  3:53 PM   Margins Defined edges 10/24/2017  3:53 PM   Mary Jane-wound Assessment Pink; White 10/24/2017  3:53 PM   Non-staged Wound Description Full thickness 10/24/2017  3:53 PM   [place]%Wound Bed 10 10/24/2017  3:53 PM   Yellow%Wound Bed 90 10/24/2017  3:53 PM   Drainage Amount Scant 10/24/2017  3:53 PM   Drainage Description Yellow 10/24/2017  3:53 PM   Odor Mild 10/24/2017  3:53 PM   Number of days: 35     Percent of Wound/Ulcer Debrided: 100%    Total Surface Area Debrided: 5 sq cm Diabetic/Pressure/Non Pressure Ulcers only:  Ulcer: Diabetic ulcer, muscle necrosis    Estimated Blood Loss:  Minimal    Hemostasis Achieved:  by pressure    Procedural Pain:  0  / 10     Post Procedural Pain:  0 / 10     Response to treatment:  Well tolerated by patient. Excisional Debridement Procedure Note  Indications:  Based on my examination of this patient's wound(s)/ulcer(s) today, debridement is required to promote healing and evaluate the wound base. Performed by: Mery Lentz DPM    Consent obtained? Yes    Time out taken: Yes    Pain Control: Anesthetic: 4% Lidocaine Liquid Topical     Debridement:Excisional Debridement    Using curette, #15 blade scalpel, scissors and forceps the wound/ulcer was sharply debrided    down through and including the removal of  epidermis, dermis and subcutaneous tissue.         Devitalized Tissue Debrided:  fibrin and slough      Pre Debridement Measurements:  Are located in the [place]  Documentation Flow Sheet   Wound/Ulcer #: 3     Post  Debridement Measurements:  Wound 08/22/17 Foot Dorsal;Right #1 diabetic (existing 4 weeks) (Active)   Wound Type Wound 10/24/2017  3:53 PM   Wound Diabetic Jo 2 10/24/2017  3:53 PM   Dressing/Treatment Other (Comment) 10/24/2017  3:53 PM   Wound Cleansed Rinsed/Irrigated with saline 10/24/2017  3:53 PM   Wound Length (cm) 2.1 cm 10/24/2017  3:53 PM   Wound Width (cm) 2.4 cm 10/24/2017  3:53 PM   Wound Depth (cm)  0.1 10/24/2017  3:53 PM   Calculated Wound Size (cm^2) (l*w) 5.04 cm^2 10/24/2017  3:53 PM   Change in Wound Size % (l*w) 60.62 10/24/2017  3:53 PM   Tunneling Position ___ O'Clock 0 10/24/2017  3:53 PM   Undermining Starts ___ O'Clock 0 10/24/2017  3:53 PM   Undermining Ends___ O'Clock 0 10/24/2017  3:53 PM   Wound Assessment Yellow;Pink 10/24/2017  3:53 PM   Margins Defined edges 10/24/2017  3:53 PM   Mary Jane-wound Assessment Pink 10/24/2017  3:53 PM   Non-staged Wound Description Full thickness 10/24/2017 Orders written for local wound care daily with santyl. OK to continue weight bearing with PT. Patient will be preauthorized for a apligraf to accelerate healing in this high risk DM foot ulceration. We are still waiting to hear if he is approved by his insurance. Treatment Note please see attached Discharge Instructions    In my professional opinion this patient would benefit from HBO Therapy: No    Written patient dismissal instructions given to patient and signed by patient or POA. RTC 1 week         Discharge [address] [facility] Physician Orders and Discharge Instructions  The [facility] [address] [facility], [address]  Telephone: [phone]    NAME:  Annah [place] OF BIRTH:  1940  MEDICAL RECORD NUMBER:  [id number]  DATE:  12/5/2017    Wash hands with soap and water prior to and after every dressing change. Wound Cleansing:   · Do not scrub or use excessive force. · With each dressing change, rinse wounds with 0.9% Saline. (May use wound wash or soft contact solution. Both can be purchased at a local drug store). · If unable to obtain saline, may use a gentle soap and water. · Keep wounds dry in the shower unless otherwise instructed by the physician. Topical Treatments:  Do not apply lotions, creams, or ointments to wound bed unless directed. [x] Apply moisturizing lotion to skin surrounding the wound prior to dressing change.  [] Apply antifungal ointment to skin surrounding the wound prior to dressing change.  [] Apply thin film of moisture barrier ointment to skin immediately around wound.   [] Other:      Dressings:           Wound Location: Right Lower Leg     [x] Apply Primary Dressing to wound:       [] Foam/Foam with Border  [] Mepitel/Non-adherent/Xeroform   [] Alginate with Silver    [] Alginate   [] Collagen [] Collagen with Silver     [] Hydrocolloid  [] Hydrafera Blue moistened with saline   [] MediHoney Paste/Gel [] Hydrogel      [] Santyl with Moisten saline gauze    [] Bactroban/Mupirocin [] Polysporin  [x] Other: Moist Delisa     Pack wound loosely with  [] Iodoform   [] Plain Packing  [] Other      [x] Cover and Secure with:     [x] Gauze [] ABD [] Stretch bandage roll [x] Kerlix   [] Coban [] Ace Wrap [] Cover Roll Tape    [] Other:    Avoid contact of tape with skin. [x] Change dressing: [] Daily    [] Every Other Day [] Three times per week   [] Once a week [] Do Not Change Dressing   [x] Other:Mon., YGLH., and Fri. Dressings:           Wound Location:     [x] Apply Primary Dressing to wound:       [] Foam/Foam with Border  [] Mepitel/Non-adherent/Xeroform   [] Alginate with Silver    [] Alginate   [] Collagen [] Collagen with Silver     [] Hydrocolloid  [] Hydrafera Blue moistened with saline   [] MediHoney Paste/Gel [] Hydrogel      [] Santyl with Moisten saline gauze   [] Bactroban/Mupirocin [] Polysporin  [] Other:      Pack wound loosely with  [] Iodoform   [] Plain Packing  [] Other      [x] Cover and Secure with:     [] Gauze [] ABD [] Stretch bandage roll [] Kerlix   [] Coban [] Ace Wrap [] Cover Roll Tape    [] Other:    Avoid contact of tape with skin. [x] Change dressing:  [] Daily    [] Every Other Day [] Three times per week   [] Once a week [] Do Not Change Dressing   [] Other:               Edema Control:  Apply: [] Compression Stocking []Right Leg []Left Leg     [x] SpandaGrip [x]Right Leg  [x]Left Leg      []Low compression 5-10 mm/Hg      []Medium compression 10-20 mm/Hg     [x]High compression  20-30 mm/Hg    Apply every morning immediately when getting up. They should be applied to affected leg(s) from mid foot to knee making sure to cover the heel. Remove every night before going to bed. [x] Elevate leg(s) above the level of the heart for 30 minutes 4-5 times a day and/or when sitting.     [x] Avoid prolonged standing in one

## 2017-12-12 ENCOUNTER — HOSPITAL ENCOUNTER (OUTPATIENT)
Dept: WOUND CARE | Age: 77
Discharge: HOME | End: 2017-12-12
Attending: PODIATRIST | Admitting: PODIATRIST

## 2017-12-12 VITALS
HEART RATE: 71 BPM | RESPIRATION RATE: 16 BRPM | DIASTOLIC BLOOD PRESSURE: 72 MMHG | TEMPERATURE: 97.4 F | SYSTOLIC BLOOD PRESSURE: 129 MMHG

## 2017-12-12 RX ORDER — LIDOCAINE HYDROCHLORIDE 40 MG/ML
2.5 SOLUTION TOPICAL ONCE
Status: COMPLETED | OUTPATIENT
Start: 2017-12-12 | End: 2017-12-12

## 2017-12-12 RX ADMIN — LIDOCAINE HYDROCHLORIDE 2.5 ML: 40 SOLUTION TOPICAL at 14:44

## 2017-12-12 NOTE — PROGRESS NOTES
[facility] [address]   Progress Note and Procedure Note      Roselee Leventhal Reckers  MEDICAL RECORD NUMBER:  [id number]  AGE: 69 y.o. GENDER: male  : 1940  EPISODE DATE:  2017    Subjective:     Chief Complaint   Patient presents with    Wound Check     right leg and foot         HISTORY of PRESENT ILLNESS GARETH Moreno is a 69 y.o. male who presents today for wound/ulcer evaluation. History of Wound Context: right dorsal foot wound and and right lower leg wound. Wound/Ulcer Pain Timing/Severity: none  Quality of pain: N/A  Severity:  0 / 10   Modifying Factors: None  Associated Signs/Symptoms: none    Ulcer Identification:  Ulcer Type: diabetic  Contributing Factors: diabetes and poor glucose control    Wound: N/A        PAST MEDICAL HISTORY        Diagnosis Date    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 ([facility]     Dr. Agustina Yin CAD (coronary artery disease)     CH[facility]congest[facility] heart failure) [facility]     COPD (chronic obstructive pulmonary disease) ([facility]     Diabetes mellitus ([facility]     Hypertension     MRSA (methicillin resistant staph aureus) culture positive 2017    foot    Obstructive sleep apnea 10/3/2014    Parkinson's disease ([facility] 1965       PAST SURGICAL HISTORY    Past Surgical History:   Procedure Laterality Date    CARDIAC CATHETERIZATION      CERVICAL FUSION  1981    FOOT SURGERY Right 2017    INCISION AND DRAINAGE RIGHT FOOT WITH REMOVAL NECROTIC BONE    FOOT SURGERY Right 2017    : TRANSMETATARSAL AMPUTATION RIGHT FOOT        FAMILY HISTORY    History reviewed. No pertinent family history.     SOCIAL HISTORY    Social History   Substance Use Topics    Smoking status: Never Smoker    Smokeless tobacco: Never Used    Alcohol use No       ALLERGIES    Allergies   Allergen Reactions    Aspirin      GI  Bleed possible related to aspirin  (but thought to be H Pylori)    Nsaids     Bactrim [Sulfamethoxazole-Trimethoprim] Other (See Comments) 3 times daily.  aspirin 81 MG EC tablet Take 81 mg by mouth daily.  ergocalciferol (ERGOCALCIFEROL) 76646 UNITS capsule Take 50,000 Units by mouth Twice a Week Every Tuesday and Thursday      acidophilus (LACTINEX/FLORANEX) Take 1 tablet by mouth 3 times daily X 28 days      Magnesium Hydroxide (MILK OF MAGNESIA PO) Take 30 mLs by mouth daily as needed      Dextromethorphan-guaiFENesin  MG/5ML SYRP Take 5 mLs by mouth every 4 hours as needed for Cough      nystatin (MYCOSTATIN) 323910 UNIT/GM cream .Apply Nystatin cream to the groin & buttocks twice a day. 1 Tube 1    ranitidine (ZANTAC) 150 MG tablet Take 150 mg by mouth 2 times daily      cetirizine (ZYRTEC) 5 MG tablet Take 5 mg by mouth daily Prn      nitroGLYCERIN (NITROSTAT) 0.4 MG SL tablet Place 0.4 mg under the tongue every 5 minutes as needed. No current facility-administered medications on file prior to encounter. REVIEW OF SYSTEMS    unable to obtain as patient is nonverbal    Objective:      /72   Pulse 71   Temp 97.4 °F (36.3 °C) (Oral)   Resp 16     Wt Readings from Last 3 Encounters:   10/23/17 248 lb 12.8 oz (112.9 kg)   08/05/17 237 lb (107.5 kg)   07/28/17 274 lb 14.6 oz (124.7 kg)       PHYSICAL EXAM    Incision along TMA well approximated and coapted. There are linear areas of ecchymosis on the medial and lateral aspect of the foot that is consistent with a dressing that was applied too tight. There is no germán necrosis noted at these areas. The area is persistently erythematous and slightly warm. There is a dorsal wound noted on the right that has fibrotic and nonviable tissue that extends down through and includes the deep fascia/muscular layer. After debridement the wound has a fibrous granular base. There is no surrounding erythema, edema, warmth or malodor noted. The wound does not probe or track to bone. Wound noted on the anterior aspect of the right shin.   Wound has fibrotic and nonviable tissue that extends down through and includes the subcutaneous tissue. After debridement the wound has a granular base. There is no surrounding erythema, edema, warmth or malodor noted. The wound does not probe or track to bone. 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 E11.69, L08.9    Diabetic polyneuropathy associated with type 2 diabetes mellitus ([facility] E11.42    Right foot infection L08.9    LULÚ (acute kidney injury) ([facility] N17.9    Acute diastolic CHF (congestive heart failure), NYHA class 2 ([facility]) I50.31    Encephalopathy G93.40        Procedure Note  Indications:  Based on my examination of this patient's wound(s)/ulcer(s) today, debridement is required to promote healing and evaluate the wound base. Performed by: Virginia Jo DPM    Consent obtained:  Yes    Time out taken:  Yes    Pain Control: Anesthetic  Anesthetic: 4% Lidocaine Liquid Topical       Debridement:Excisional Debridement    Using curette, #15 blade scalpel, scissors and forceps the wound(s)/ulcer(s) was/were sharply debrided down through and including the removal of epidermis, dermis, subcutaneous tissue and muscle/fascia.         Devitalized Tissue Debrided:  fibrin, slough and necrotic/eschar    Pre Debridement Measurements:  Are located in the [place]  Documentation Flow Sheet    Wound/Ulcer #: 1 and 5    Post Debridement Measurements:  Wound/Ulcer Descriptions are Pre Debridement except measurements:    Wound 08/22/17 Foot Dorsal;Right #1 diabetic 2:44 PM   Non-staged Wound Description Full thickness 12/12/2017  2:44 PM   West%Wound Bed 90 12/12/2017  2:44 PM   Red%Wound Bed 90 11/14/2017  2:43 PM   Yellow%Wound Bed 10 12/12/2017  2:44 PM   Other%Wound Bed 10 11/14/2017  2:43 PM   Number of days: 35     Percent of Wound/Ulcer Debrided: 100%    Total Surface Area Debrided: 2 sq cm     Diabetic/Pressure/Non Pressure Ulcers only:  Ulcer: Diabetic ulcer, muscle necrosis    Estimated Blood Loss:  Minimal    Hemostasis Achieved:  by pressure    Procedural Pain:  0  / 10     Post Procedural Pain:  0 / 10     Response to treatment:  Well tolerated by patient. Plan:     Orders written for local wound care daily with santyl. OK to continue weight bearing with PT.    AFO rubbed a blister on the shin. Encouraged patient not to pull the strap too tight. Treatment Note please see attached Discharge Instructions    In my professional opinion this patient would benefit from HBO Therapy: No    Written patient dismissal instructions given to patient and signed by patient or POA. RTC 1 week         Discharge 200 [facility] Physician Orders and Discharge Instructions  [facility] [address] [facility], [address] [place] [address]  Telephone: [phone]    NAME:  Ry Boyce OF BIRTH:  1940  MEDICAL RECORD NUMBER:  [id number]  DATE:  12/12/2017    Wash hands with soap and water prior to and after every dressing change. Wound Cleansing:   · Do not scrub or use excessive force. · With each dressing change, rinse wounds with 0.9% Saline. (May use wound wash or soft contact solution. Both can be purchased at a local drug store). · If unable to obtain saline, may use a gentle soap and water. · Keep wounds dry in the shower unless otherwise instructed by the physician. Topical Treatments:  Do not apply lotions, creams, or ointments to wound bed unless directed.      [] Apply moisturizing lotion to skin surrounding the wound prior to dressing change.  [] Apply antifungal ointment to skin surrounding the wound prior to dressing change.  [] Apply thin film of moisture barrier ointment to skin immediately around wound. [] Other:       Dressings:           Wound Location: right lower leg wounds     [x] Apply Primary Dressing to wound:       [] Foam/Foam with Border  [] Mepitel/Non-adherent/Xeroform   [] Alginate with Silver    [] Alginate   [x] Collagen: moisten [] Collagen with Silver     [] Hydrocolloid  [] Hydrafera Blue moistened with saline   [] MediHoney Paste/Gel [] Hydrogel      [] Santyl with Moisten saline gauze   [] Bactroban/Mupirocin [] Polysporin  [] Other:      Pack wound loosely with  [] Iodoform   [] Plain Packing  [] Other      [x] Cover and Secure with:     [x] Gauze [] ABD [] Stretch bandage roll [x] Kerlix   [] Coban [] Ace Wrap [] Cover Roll Tape    [] Other:    Avoid contact of tape with skin. [x] Change dressing:  [] Daily    [] Every Other Day [x] Three times per week: Monday, Wednesday, Friday   [] Once a week [] Do Not Change Dressing   [] Other:         Edema Control:  Apply: [] Compression Stocking []Right Leg []Left Leg     [x] SpandaGrip [x]Right Leg  [x]Left Leg      []Low compression 5-10 mm/Hg      []Medium compression 10-20 mm/Hg     [x]High compression  20-30 mm/Hg    Apply every morning immediately when getting up. They should be applied to affected leg(s) from mid foot to knee making sure to cover the heel. Remove every night before going to bed. [x] Elevate leg(s) above the level of the heart for 30 minutes 4-5 times a day and/or when sitting. [x] Avoid prolonged standing in one place. Brace to right leg and diabetic shoes. Dietary:  Important dietary reminders:  1. Increase Protein intake (i.e. Lean meats, fish, eggs, legumes, and yogurt)  2. No added salt  3.  If diabetic, follow a diabetic diet and check glucose prior to with    Wound Check     right leg and foot         HISTORY of PRESENT ILLNESS HPI     Zuri Bland is a 69 y.o. male who presents today for wound/ulcer evaluation. History of Wound Context: right dorsal foot wound and right lateral foot wound s/p TMA. Patient has been [facility] at an [facility]. 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 Timoteo Lake CAD (coronary artery disease)     [facility]     COPD (chronic obstructive pulmonary disease) [facility]     Diabetes mellitus [facility]     Hypertension     MRSA (methicillin resistant staph aureus) culture positive 07/24/2017    foot    Obstructive sleep apnea 10/3/2014    Parkinson's disease ([facility] 1965       PAST SURGICAL HISTORY    Past Surgical History:   Procedure Laterality Date    CARDIAC CATHETERIZATION      CERVICAL FUSION  1981    FOOT SURGERY Right 07/24/2017    INCISION AND DRAINAGE RIGHT FOOT WITH REMOVAL NECROTIC BONE    FOOT SURGERY Right 07/27/2017    : TRANSMETATARSAL AMPUTATION RIGHT FOOT        FAMILY HISTORY    History reviewed. No pertinent family history.     SOCIAL HISTORY    Social History   Substance Use Topics    Smoking status: Never Smoker    Smokeless tobacco: Never Used    Alcohol use No       ALLERGIES    Allergies   Allergen Reactions    Aspirin      GI  Bleed possible related to aspirin  (but thought to be H Pylori)    Nsaids     Bactrim [Sulfamethoxazole-Trimethoprim] Other (See Comments)     arf    Celebrex [Celecoxib]      tremors    Cymbalta [Duloxetine Hcl]      Mental status change    Pcn [Penicillins]      rash    Vicodin [Hydrocodone-Acetaminophen]     Codeine Nausea And Vomiting acidophilus (LACTINEX/FLORANEX) Take 1 tablet by mouth 3 times daily X 28 days      Magnesium Hydroxide (MILK OF MAGNESIA PO) Take 30 mLs by mouth daily as needed      Dextromethorphan-guaiFENesin  MG/5ML SYRP Take 5 mLs by mouth every 4 hours as needed for Cough      nystatin (MYCOSTATIN) 116853 UNIT/GM cream .Apply Nystatin cream to the groin & buttocks twice a day. 1 Tube 1    ranitidine (ZANTAC) 150 MG tablet Take 150 mg by mouth 2 times daily      cetirizine (ZYRTEC) 5 MG tablet Take 5 mg by mouth daily Prn      nitroGLYCERIN (NITROSTAT) 0.4 MG SL tablet Place 0.4 mg under the tongue every 5 minutes as needed. No current facility-administered medications on file prior to encounter. REVIEW OF SYSTEMS    unable to obtain as patient is nonverbal    Objective:      /72   Pulse 71   Temp 97.4 °F (36.3 °C) (Oral)   Resp 16     Wt Readings from Last 3 Encounters:   10/23/17 248 lb 12.8 oz (112.9 kg)   08/05/17 237 lb (107.5 kg)   07/28/17 274 lb 14.6 oz (124.7 kg)       PHYSICAL EXAM    Incision along TMA well approximated and coapted. There are linear areas of ecchymosis on the medial and lateral aspect of the foot that is consistent with a dressing that was applied too tight. There is no germán necrosis noted at these areas. The area is persistently erythematous and slightly warm. There is a dorsal wound noted on the right that has fibrotic and nonviable tissue that extends down through and includes the deep fascia/muscular layer. After debridement the wound has a fibrous granular base. There is no surrounding erythema, edema, warmth or malodor noted. The wound does not probe or track to bone.        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 Plan:     Orders written for local wound care daily with santyl. OK to continue weight bearing with PT. Patient will be preauthorized for a apligraf to accelerate healing in this high risk DM foot ulceration. We are still waiting to hear if he is approved by his insurance. Treatment Note please see attached Discharge Instructions    In my professional opinion this patient would benefit from HBO Therapy: No    Written patient dismissal instructions given to patient and signed by patient or POA. RTC 1 week         Discharge 200 [facility] Physician Orders and Discharge Instructions  [facility] [address] [facility], [facility] [address] [facility]  Telephone: [phone]    NAME:  Jesse Britton OF BIRTH:  1940  MEDICAL RECORD NUMBER:  [id number]  DATE:  12/12/2017    Wash hands with soap and water prior to and after every dressing change. Wound Cleansing:   · Do not scrub or use excessive force. · With each dressing change, rinse wounds with 0.9% Saline. (May use wound wash or soft contact solution. Both can be purchased at a local drug store). · If unable to obtain saline, may use a gentle soap and water. · Keep wounds dry in the shower unless otherwise instructed by the physician. Topical Treatments:  Do not apply lotions, creams, or ointments to wound bed unless directed. [] Apply moisturizing lotion to skin surrounding the wound prior to dressing change.  [] Apply antifungal ointment to skin surrounding the wound prior to dressing change.  [] Apply thin film of moisture barrier ointment to skin immediately around wound.   [] Other:       Dressings:           Wound Location: right lower leg wounds     [x] Apply Primary Dressing to wound:       [] Foam/Foam with Border  [] Mepitel/Non-adherent/Xeroform   [] Alginate with Silver    [] Alginate   [x] Collagen: contact the [address] [place] [address] at [phone] Monday and Wednesday 8:00 am - 2:00 pm and Tuesday, Thursday and Friday from 8:00 am - 4:30 pm.   If you need help with your wound outside these hours and cannot wait until we are again available, contact your PCP or go to the hospital emergency room. PLEASE NOTE: IF YOU ARE UNABLE TO OBTAIN WOUND SUPPLIES, CONTINUE TO USE THE SUPPLIES YOU HAVE AVAILABLE UNTIL YOU ARE ABLE TO REACH US. IT IS MOST IMPORTANT TO KEEP THE WOUND COVERED AT ALL TIMES. Physician orders by:     [x] Dr Virgniia Jo [] Dr Nati Greene    [] Dr Donn Suggs     [] Dr Rod Diamond   [] Dr Roopa Patricio  [] Dr Francisca Albert       Physician Signature:__________________________________        The information contained in the After Visit Summary has been reviewed with me, the patient and/or responsible adult, by my health care provider(s). I had the opportunity to ask questions regarding this information.   I have elected to receive;      [] Patient unable to sign Discharge Instructions given to ECF/Transportation/POA      []  After Visit Summary  []  Comprehensive Discharge Instruction          Electronically signed by Virginia Jo DPM on 12/12/2017 at 3:54 PM

## 2017-12-21 ENCOUNTER — HOSPITAL ENCOUNTER (OUTPATIENT)
Dept: WOUND CARE | Age: 77
Discharge: HOME | End: 2017-12-21
Attending: PODIATRIST | Admitting: PODIATRIST

## 2017-12-21 VITALS
SYSTOLIC BLOOD PRESSURE: 138 MMHG | HEART RATE: 76 BPM | TEMPERATURE: 97.4 F | RESPIRATION RATE: 18 BRPM | DIASTOLIC BLOOD PRESSURE: 78 MMHG

## 2017-12-21 NOTE — PROGRESS NOTES
[address] L[address]   Progress Note and Procedure Note      Monique Lao  MEDICAL RECORD NUMBER:  [id number]  AGE: 69 y.o. GENDER: male  : 1940  EPISODE DATE:  2017    Subjective:     Chief Complaint   Patient presents with    Wound Check     right lower extremity         HISTORY of PRESENT ILLNESS HPI     Emmet Eisenmenger is a 69 y.o. male who presents today for wound/ulcer evaluation. History of Wound Context: right dorsal foot wound and right lateral foot wound s/p TMA. Patient has been NWB at an [facility]. 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 Liliane Mason CAD (coronary artery disease)     [facility]est[facility]) [facility]     COPD (chronic obstructive pulmonary disease) ([facility]     Diabetes mellitus ([facility]     Hypertension     MRSA (methicillin resistant staph aureus) culture positive 2017    foot    Obstructive sleep apnea 10/3/2014    Parkinson's disease ([facility] 1965       PAST SURGICAL HISTORY    Past Surgical History:   Procedure Laterality Date    CARDIAC CATHETERIZATION      CERVICAL FUSION  1981    FOOT SURGERY Right 2017    INCISION AND DRAINAGE RIGHT FOOT WITH REMOVAL NECROTIC BONE    FOOT SURGERY Right 2017    : TRANSMETATARSAL AMPUTATION RIGHT FOOT        FAMILY HISTORY    History reviewed. No pertinent family history.     SOCIAL HISTORY    Social History   Substance Use Topics    Smoking status: Never Smoker    Smokeless tobacco: Never Used    Alcohol use No       ALLERGIES    Allergies   Allergen Reactions    Aspirin      GI  Bleed possible related to aspirin  (but thought to be H Pylori)    Nsaids     Bactrim nystatin (MYCOSTATIN) 749539 UNIT/GM cream .Apply Nystatin cream to the groin & buttocks twice a day. 1 Tube 1    losartan (COZAAR) 50 MG tablet TAKE ONE TABLET BY MOUTH EVERY DAY (Patient taking differently: 25 mg TAKE ONE TABLET BY MOUTH EVERY DAY) 30 tablet 5    cetirizine (ZYRTEC) 5 MG tablet Take 5 mg by mouth daily Prn      nitroGLYCERIN (NITROSTAT) 0.4 MG SL tablet Place 0.4 mg under the tongue every 5 minutes as needed. No current facility-administered medications on file prior to encounter. REVIEW OF SYSTEMS    unable to obtain as patient is nonverbal    Objective:      /78   Pulse 76   Temp 97.4 °F (36.3 °C) (Oral)   Resp 18     Wt Readings from Last 3 Encounters:   10/23/17 248 lb 12.8 oz (112.9 kg)   08/05/17 237 lb (107.5 kg)   07/28/17 274 lb 14.6 oz (124.7 kg)       PHYSICAL EXAM    Incision along TMA well approximated and coapted. There are linear areas of ecchymosis on the medial and lateral aspect of the foot that is consistent with a dressing that was applied too tight. There is no germán necrosis noted at these areas. The area is persistently erythematous and slightly warm. There is a dorsal wound noted on the right that has fibrotic and nonviable tissue that extends down through and includes the deep fascia/muscular layer. After debridement the wound has a fibrous granular base. There is no surrounding erythema, edema, warmth or malodor noted. The wound does not probe or track to bone.        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 ([address] J68.3    Pulmonary embolism ([facility] I26.99    Coronary artery disease involving native heart without angina pectoris I25.10    Presence of drug coated stent in anterior descending branch of left coronary artery Z95.5    Sepsis ([address] A41.9    Syncope R51    Hypothyroid E03.9    Obstructive sleep apnea G47.33    Gas gangrene ([address] A48.0    Diabetic infection of right foot ([facility]) E11.69, L08.9    Diabetic polyneuropathy associated with type 2 diabetes mellitus ([facility] E11.42    Right foot infection L08.9    [facility]) [facility] N17.9    Acute diastolic CHF (congestive heart failure), NYHA class 2 ([facility]) I50.31    Encephalopathy G93.40        Procedure Note  Indications:  Based on my examination of this patient's wound(s)/ulcer(s) today, debridement is required to promote healing and evaluate the wound base. Performed by: Amy Martinez DPM    Consent obtained:  Yes    Time out taken:  Yes    Pain Control:         Debridement:Excisional Debridement    Using curette, #15 blade scalpel, scissors and forceps the wound(s)/ulcer(s) was/were sharply debrided down through and including the removal of epidermis, dermis, subcutaneous tissue and muscle/fascia.         Devitalized Tissue Debrided:  fibrin, slough and necrotic/eschar    Pre Debridement Measurements:  Are located in the [place]  Documentation Flow Sheet    Wound/Ulcer #: 1,5    Post Debridement Measurements:  Wound/Ulcer Descriptions are Pre Debridement except measurements:    Wound 08/22/17 Foot Dorsal;Right #1 diabetic (existing 4 weeks) (Active)   Wound Type Wound 12/21/2017  2:27 PM   Wound Diabetic Jo 2 11/28/2017  1:51 PM   Dressing/Treatment Other (Comment) 11/28/2017  1:51 PM   Wound Cleansed Rinsed/Irrigated with saline 12/21/2017  2:27 PM   Wound Length (cm) 1.3 cm 12/21/2017  2:27 PM   Wound Width (cm) 1 cm 12/21/2017  2:27 PM   Wound Depth (cm)  0.1 12/21/2017  2:27 PM   Calculated Wound Size (cm^2) (l*w) 1.3 cm^2 12/21/2017  2:27 PM   Change in Wound Size % (l*w) 89.84 12/21/2017  2:27 PM   Distance Tunneling (cm) 0 cm 12/21/2017  2:27 PM   Tunneling Position ___ O'Clock 0 12/12/2017  2:44 PM   Undermining Ends___ O'Clock 0 12/12/2017  2:44 PM   Undermining Maxium Distance (cm) 0 12/21/2017  2:27 PM   Wound Assessment Yellow;Pink 12/21/2017  2:27 PM   Drainage Amount Scant 12/21/2017  2:27 PM   Drainage Description Yellow 12/21/2017  2:27 PM   Odor None 12/21/2017  2:27 PM   Margins Defined edges 12/21/2017  2:27 PM   [place]-wound Assessment Pink 12/21/2017  2:27 PM   Non-staged Wound Description Full thickness 12/21/2017  2:27 PM   [place]%Wound Bed 20 12/21/2017  2:27 PM   Red%Wound Bed 30 10/10/2017  3:03 PM   Yellow%Wound Bed 80 12/21/2017  2:27 PM   Black%Wound Bed 5 9/26/2017  4:49 PM   Op First Treatment Date 08/22/17 8/22/2017  1:39 PM   Number of days: 121       Wound 11/07/17 Venous ulcer Leg Right; Lower; Anterior #5 (11/5/17) (Active)   Wound Type Wound 12/21/2017  2:27 PM   Dressing/Treatment Other (Comment) 11/28/2017  1:51 PM   Wound Cleansed Rinsed/Irrigated with saline 12/21/2017  2:27 PM   Wound Length (cm) 2.5 cm 12/21/2017  2:27 PM   Wound Width (cm) 2.5 cm 12/21/2017  2:27 PM   Wound Depth (cm)  0.1 12/21/2017  2:27 PM   Calculated Wound Size (cm^2) (l*w) 6.25 cm^2 12/21/2017  2:27 PM   Change in Wound Size % (l*w) -941.67 12/21/2017  2:27 PM   Distance Tunneling (cm) 0 cm 12/21/2017  2:27 PM   Undermining [place] Distance (cm) 0 12/21/2017  2:27 PM   Wound Assessment Slough;Red;Pink 12/21/2017  2:27 PM   Drainage Amount Small 12/21/2017  2:27 PM   Drainage Description Purulent;Serosanguinous 12/21/2017  2:27 PM   Odor None 12/21/2017  2:27 PM   Margins Undefined edges 12/21/2017  2:27 PM   [place]-wound Assessment Pink;Dry 12/21/2017  2:27 PM   Non-staged Wound Description Full thickness 12/21/2017  2:27 PM   [place]%Wound Bed 50 12/21/2017  2:27 PM   Red%Wound Bed 50 12/21/2017  2:27 PM   Yellow%Wound Bed 0 12/21/2017  2:27 PM   Other%Wound Bed 10 11/14/2017  2:43 PM   Number of days: 44   Percent of Wound/Ulcer Debrided: 100%    Total Surface Area Debrided:  7.55 sq cm    Diabetic/Pressure/Non Pressure Ulcers only:  Ulcer: Diabetic ulcer, fat layer exposed    Bleeding: Minimal    Hemostasis Achieved: by pressure    Procedural Pain: 0  / 10     Post Procedural Pain: 0 / 10     Response to treatment:  Well tolerated by patient. Plan:     Continue previous orders. FMLA papers completed for wife. OK to continue weight bearing with PT. Treatment Note please see attached Discharge Instructions    In my professional opinion this patient would benefit from HBO Therapy: No    Written patient dismissal instructions given to patient and signed by patient or POA. RTC 1 week         Discharge [facility] Physician Orders and Discharge Instructions  Leigh [address]le [address] [facility], [address]  Telephone: [phone]    NAME:  Nicki Rowley OF BIRTH:  1940  MEDICAL RECORD NUMBER:  [id number]  DATE:  12/21/2017    Wash hands with soap and water prior to and after every dressing change. Wound Cleansing:   · Do not scrub or use excessive force. · With each dressing change, rinse wounds with 0.9% Saline. (May use wound wash or soft contact solution. Both can be purchased at a local drug store). · If unable to obtain saline, may use a gentle soap and water. · Keep wounds dry in the shower unless otherwise instructed by the physician. Topical Treatments:  Do not apply lotions, creams, or ointments to wound bed unless directed. [x] Apply moisturizing lotion to skin surrounding the wound prior to dressing change.  [] Apply antifungal ointment to skin surrounding the wound prior to dressing change.  [] Apply thin film of moisture barrier ointment to skin immediately around wound.   [] Other:      Dressings:           Wound Location: Right lower leg and dorsal foot wound  [x] Apply Primary Dressing to wound:       [] Foam/Foam with Border(i.e Mepilex) [] Non-adherent (i.e.Mepitel)   [] Alginate with Silver (i.e. Silvercel)   [] Alginate   [x] Collagen (i.e. Puracol) [] Collagen with Silver(i.e. Delisa)     [] Hydrocolloid  [] Hydrafera Blue moistened with saline   [] questions about your wound care, please contact the [address] [place] [address] at [phone] Monday and Wednesday 8:00 am - 2:00 pm and Tuesday, Thursday and Friday from 8:00 am - 4:30 pm.   If you need help with your wound outside these hours and cannot wait until we are again available, contact your PCP or go to the hospital emergency room. PLEASE NOTE: IF YOU ARE UNABLE TO OBTAIN WOUND SUPPLIES, CONTINUE TO USE THE SUPPLIES YOU HAVE AVAILABLE UNTIL YOU ARE ABLE TO REACH US. IT IS MOST IMPORTANT TO KEEP THE WOUND COVERED AT ALL TIMES. Physician orders by:     [] Dr Grace Ordonez [] Dr Leopold Mosher    [] Dr Judy Mosley     [] Dr Artur Schmitt   [] Dr Kris Waddell  [x] Dr Javan Valdivia  [] Dr Lacey Gillespie       Physician Signature:__________________________________        The information contained in the After Visit Summary has been reviewed with me, the patient and/or responsible adult, by my health care provider(s). I had the opportunity to ask questions regarding this information.   I have elected to receive;      [] Patient unable to sign Discharge Instructions given to ECF/Transportation/POA            Electronically signed by Kerry Ibarra DPM on 12/21/2017 at 4:45 PM

## 2017-12-28 ENCOUNTER — HOSPITAL ENCOUNTER (OUTPATIENT)
Dept: WOUND CARE | Age: 77
Discharge: HOME | End: 2017-12-28
Attending: PODIATRIST | Admitting: PODIATRIST

## 2017-12-28 RX ORDER — LIDOCAINE HYDROCHLORIDE 40 MG/ML
2.5 SOLUTION TOPICAL ONCE
Status: COMPLETED | OUTPATIENT
Start: 2017-12-28 | End: 2017-12-28

## 2017-12-28 RX ADMIN — LIDOCAINE HYDROCHLORIDE 2.5 ML: 40 SOLUTION TOPICAL at 13:02

## 2017-12-28 NOTE — PROGRESS NOTES
Salena Lacy 38   Progress Note and Procedure Note      [facility]  MEDICAL RECORD NUMBER:  [id number]  AGE: 69 y.o. GENDER: male  : 1940  EPISODE DATE:  2017    Subjective:     Chief Complaint   Patient presents with    Wound Check     right lower leg         HISTORY of PRESENT ILLNESS HPI     Tayler Michelle is a 69 y.o. male who presents today for wound/ulcer evaluation. History of Wound Context: right dorsal foot wound and right lateral foot wound s/p TMA. Patient has been NWB at an [facility]. Wound/Ulcer Pain Timing/Severity: none  Quality of pain: N/A  Severity:  0 / 10   Modifying Factors: None  Associated Signs/Symptoms: none    Ulcer Identification:  Ulcer Type: diabetic  Contributing Factors: diabetes and poor glucose control    Wound: N/A        PAST MEDICAL HISTORY        Diagnosis Date    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 ([facility]     Dr. Stephan Ferguson CAD (coronary artery disease)     [facility]congestive [facility] failure) [facility]     COPD (chronic obstructive pulmonary disease) [facility]     Diabetes mellitus ([facility]     Hypertension     MRSA (methicillin resistant staph aureus) culture positive 2017    foot    Obstructive sleep apnea 10/3/2014    Parkinson's disease ([facility] 1965       PAST SURGICAL HISTORY    Past Surgical History:   Procedure Laterality Date    CARDIAC CATHETERIZATION      CERVICAL FUSION  1981    FOOT SURGERY Right 2017    INCISION AND DRAINAGE RIGHT FOOT WITH REMOVAL NECROTIC BONE    FOOT SURGERY Right 2017    : TRANSMETATARSAL AMPUTATION RIGHT FOOT        FAMILY HISTORY    History reviewed. No pertinent family history.     SOCIAL HISTORY    Social History   Substance Use Topics    Smoking status: Never Smoker    Smokeless tobacco: Never Used    Alcohol use No       ALLERGIES    Allergies   Allergen Reactions    Aspirin      GI  Bleed possible related to aspirin  (but thought to be H Pylori)    Nsaids     Bactrim [Sulfamethoxazole-Trimethoprim] Other (See Comments)     arf    Celebrex [Celecoxib]      tremors    Cymbalta [Duloxetine Hcl]      Mental status change    Pcn [Penicillins]      rash    Vicodin [Hydrocodone-Acetaminophen]     Codeine Nausea And Vomiting       MEDICATIONS    Current Outpatient Prescriptions on File Prior to Encounter   Medication Sig Dispense Refill    metoprolol tartrate (LOPRESSOR) 25 MG tablet Take 1 tablet by mouth 2 times daily 60 tablet 3    donepezil (ARICEPT) 5 MG tablet Take 5 mg by mouth nightly      Sennosides (SENNA LAXATIVE PO) Take 8.6-50 mg by mouth      metFORMIN (GLUCOPHAGE) 500 MG tablet Take 850 mg by mouth 2 times daily (with meals)       furosemide (LASIX) 40 MG tablet Take 1 tablet by mouth daily 60 tablet 1    levothyroxine (SYNTHROID) 25 MCG tablet Take 0.5 tablets by mouth Daily 30 tablet 3    potassium chloride (KLOR-CON M) 10 MEQ extended release tablet Take 10 mEq by mouth 3 times daily      insulin aspart (NOVOLOG) 100 UNIT/ML injection vial Inject 7 Units into the skin 3 times daily (before meals) 1 vial 3    insulin detemir (LEVEMIR) 100 UNIT/ML injection vial Inject 26 Units into the skin nightly 1 vial 3    losartan (COZAAR) 50 MG tablet TAKE ONE TABLET BY MOUTH EVERY DAY (Patient taking differently: 25 mg TAKE ONE TABLET BY MOUTH EVERY DAY) 30 tablet 5    clopidogrel (PLAVIX) 75 MG tablet TAKE ONE TABLET BY MOUTH EVERY DAY 90 tablet 4    rosuvastatin (CRESTOR) 20 MG tablet Take 1 tablet by mouth daily 90 tablet 4    finasteride (PROSCAR) 5 MG tablet Take 5 mg by mouth daily      FARXIGA 5 MG tablet Take 5 mg by mouth every morning       carbidopa-levodopa (SINEMET)  MG per tablet Take 1 tablet by mouth 3 times daily.  aspirin 81 MG EC tablet Take 81 mg by mouth daily.       ergocalciferol (ERGOCALCIFEROL) 61320 UNITS capsule Take 50,000 Units by mouth Twice a Week Every Tuesday and Thursday      Magnesium Hydroxide (MILK OF MAGNESIA PO) Take 30 mLs by mouth daily as needed      Dextromethorphan-guaiFENesin  MG/5ML SYRP Take 5 mLs by mouth every 4 hours as needed for Cough      nystatin (MYCOSTATIN) 604458 UNIT/GM cream .Apply Nystatin cream to the groin & buttocks twice a day. 1 Tube 1    cetirizine (ZYRTEC) 5 MG tablet Take 5 mg by mouth daily Prn      nitroGLYCERIN (NITROSTAT) 0.4 MG SL tablet Place 0.4 mg under the tongue every 5 minutes as needed. No current facility-administered medications on file prior to encounter. REVIEW OF SYSTEMS    unable to obtain as patient is nonverbal    Objective: There were no vitals taken for this visit. Wt Readings from Last 3 Encounters:   10/23/17 248 lb 12.8 oz (112.9 kg)   08/05/17 237 lb (107.5 kg)   07/28/17 274 lb 14.6 oz (124.7 kg)       PHYSICAL EXAM    Incision along TMA well approximated and coapted. There are linear areas of ecchymosis on the medial and lateral aspect of the foot that is consistent with a dressing that was applied too tight. There is no germán necrosis noted at these areas. The area is persistently erythematous and slightly warm. There is a dorsal wound noted on the right that has fibrotic and nonviable tissue that extends down through and includes the deep fascia/muscular layer. After debridement the wound has a fibrous granular base. There is no surrounding erythema, edema, warmth or malodor noted. The wound does not probe or track to bone.        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 ([address] J68.3    Pulmonary embolism ([address] I26.99    Coronary artery disease involving native heart without angina pectoris I25.10    Presence of drug coated stent in anterior descending branch of left coronary artery Z95.5    Sepsis ([address] A41.9    Syncope R51    Hypothyroid E03.9    Obstructive sleep apnea G47.33    Gas gangrene ([address] A48.0    Diabetic infection of right foot ([facility]) E11.69, L08.9    Diabetic polyneuropathy associated with type 2 diabetes mellitus ([facility] E11.42    Right foot infection L08.9    LULÚ (acute kidney injury) ([facility] N17.9    Acute diastolic CHF (congestive heart failure), NYHA class 2 ([facility]) I50.31    Encephalopathy G93.40        Procedure Note  Indications:  Based on my examination of this patient's wound(s)/ulcer(s) today, debridement is required to promote healing and evaluate the wound base. Performed by: Amee Cervantes DPM    Consent obtained:  Yes    Time out taken:  Yes    Pain Control: Anesthetic  Anesthetic: 4% Lidocaine Liquid Topical       Debridement:Excisional Debridement    Using curette, #15 blade scalpel, scissors and forceps the wound(s)/ulcer(s) was/were sharply debrided down through and including the removal of epidermis, dermis, subcutaneous tissue and muscle/fascia.         Devitalized Tissue Debrided:  fibrin, slough and necrotic/eschar    Pre Debridement Measurements:  Are located in the [place]  Documentation Flow Sheet    Wound/Ulcer #: 1,5    Post Debridement Measurements:  Wound/Ulcer Descriptions are Pre Debridement except measurements:    Wound 08/22/17 Foot Dorsal;Right #1 diabetic (existing 4 weeks) (Active)   Wound Type Wound 12/28/2017  1:02 PM   Wound Diabetic Jo 2 11/28/2017  1:51 PM   Dressing/Treatment Other (Comment) 11/28/2017  1:51 PM   Wound Cleansed Rinsed/Irrigated with saline 12/28/2017  1:02 PM   Wound Length (cm) 1 cm 12/28/2017  1:02 PM   Wound Width (cm) 1 cm 12/28/2017  1:02 PM   Wound Depth (cm)  0.1 12/28/2017  1:02 PM   Calculated Wound Size (cm^2) (l*w) 1 cm^2 12/28/2017  1:02 PM   Change in Wound Size % (l*w) 92.19 12/28/2017  1:02 PM   Distance Tunneling (cm) 0 cm 12/28/2017  1:02 PM   Tunneling Position ___ O'Clock 0 12/12/2017  2:44 PM   Undermining Ends___ O'Clock 0 12/12/2017  2:44 PM   Undermining Maxium Distance (cm) 0 12/28/2017  1:02 PM   Wound Assessment Yellow;Pink 12/28/2017 1:02 PM   Drainage Amount Scant 12/28/2017  1:02 PM   Drainage Description Yellow 12/28/2017  1:02 PM   Odor None 12/28/2017  1:02 PM   Margins Defined edges 12/28/2017  1:02 PM   Mary Jane-wound Assessment Pink 12/28/2017  1:02 PM   Non-staged Wound Description Full thickness 12/28/2017  1:02 PM   [place]%Wound Bed 80 12/28/2017  1:02 PM   Red%Wound Bed 30 10/10/2017  3:03 PM   Yellow%Wound Bed 20 12/28/2017  1:02 PM   Black%Wound Bed 5 9/26/2017  4:49 PM   Op First Treatment Date 08/22/17 8/22/2017  1:39 PM   Number of days: 128       Wound 11/07/17 Venous ulcer Leg Right; Lower; Anterior #5 (11/5/17) (Active)   Wound Type Wound 12/28/2017  1:02 PM   Dressing/Treatment Other (Comment) 11/28/2017  1:51 PM   Wound Cleansed Rinsed/Irrigated with saline 12/28/2017  1:02 PM   Wound Length (cm) 2.3 cm 12/28/2017  1:02 PM   Wound Width (cm) 1.2 cm 12/28/2017  1:02 PM   Wound Depth (cm)  0.1 12/28/2017  1:02 PM   Calculated Wound Size (cm^2) (l*w) 2.76 cm^2 12/28/2017  1:02 PM   Change in Wound Size % (l*w) -360 12/28/2017  1:02 PM   Distance Tunneling (cm) 0 cm 12/21/2017  2:27 PM   Undermining Maxium Distance (cm) 0 12/21/2017  2:27 PM   Wound Assessment Red;Pink 12/28/2017  1:02 PM   Drainage Amount Moderate 12/28/2017  1:02 PM   Drainage Description Serosanguinous; Yellow 12/28/2017  1:02 PM   Odor None 12/28/2017  1:02 PM   Margins Undefined edges 12/28/2017  1:02 PM   Mary Jane-wound Assessment Pink;Dry 12/28/2017  1:02 PM   Non-staged Wound Description Full thickness 12/28/2017  1:02 PM   [place]%Wound Bed 30 12/28/2017  1:02 PM   Red%Wound Bed 70 12/28/2017  1:02 PM   Yellow%Wound Bed 0 12/28/2017  1:02 PM   Other%Wound Bed 10 11/14/2017  2:43 PM   Number of days: 50     Percent of Wound/Ulcer Debrided: 100%    Total Surface Area Debrided:  3.76 sq cm    Diabetic/Pressure/Non Pressure Ulcers only:  Ulcer: Diabetic ulcer, fat layer exposed    Bleeding: Minimal    Hemostasis Achieved: by pressure    Procedural Pain: 0  / 10     Post Procedural Pain: 0 / 10     Response to treatment:  Well tolerated by patient. Plan:     Continue previous orders. See nurses orders. OK to continue weight bearing with PT. Treatment Note please see attached Discharge Instructions    In my professional opinion this patient would benefit from HBO Therapy: No    Written patient dismissal instructions given to patient and signed by patient or POA. RTC 1 week         Discharge [address] [facility] Physician Orders and Discharge Instructions  Leigh Daigle [address], [address]  Telephone: [phone]    NAME:  Sheree Code OF BIRTH:  1940  MEDICAL RECORD NUMBER:  [id number]  DATE:  12/28/2017    Wash hands with soap and water prior to and after every dressing change. Wound Cleansing:   · Do not scrub or use excessive force. · With each dressing change, rinse wounds with 0.9% Saline. (May use wound wash or soft contact solution. Both can be purchased at a local drug store). · If unable to obtain saline, may use a gentle soap and water. · Keep wounds dry in the shower unless otherwise instructed by the physician. Topical Treatments:  Do not apply lotions, creams, or ointments to wound bed unless directed. [x] Apply moisturizing lotion to skin surrounding the wound prior to dressing change.  [] Apply antifungal ointment to skin surrounding the wound prior to dressing change.  [] Apply thin film of moisture barrier ointment to skin immediately around wound.   [] Other:      Dressings:           Wound Location: Right lower leg     [x] Apply Primary Dressing to wound:       [] Foam/Foam with Border(i.e Mepilex) [] Non-adherent (i.e.Mepitel)   [] Alginate with Silver (i.e. Silvercel)   [] Alginate   [x] Collagen (i.e. Puracol) [] Collagen with Silver(i.e. Delisa)     [] Hydrocolloid  [] Hydrafera Blue moistened with saline   [] Hector Ozuna Paste/Gel [] Hydrogel      [] Santyl with Moisten saline gauze    [] Bactroban/Mupirocin [] Polysporin  [] Other:      Pack wound loosely with  [] Iodoform   [] Plain Packing  [] Other      [x] Cover and Secure with:     [x] Gauze [] ABD [] Stretch bandage roll [x] Kerlix   [] Coban [] Ace Wrap [] Cover Roll Tape    [] Other:    Avoid contact of tape with skin. [x] Change dressing: [] Daily    [] Every Other Day [x] Three times per week   [] Once a week [] Do Not Change Dressing   [] Other:      Edema Control:  Apply: [] Compression Stocking [x]Right Leg [x]Left Leg     [x] SpandaGrip []Right Leg  []Left Leg      []Low compression 5-10 mm/Hg      []Medium compression 10-20 mm/Hg     [x]High compression  20-30 mm/Hg    Apply every morning immediately when getting up. They should be applied to affected leg(s) from mid foot to knee making sure to cover the heel. Remove every night before going to bed. [x] Elevate leg(s) above the level of the heart for 30 minutes 4-5 times a day and/or when sitting. [x] Avoid prolonged standing in one place. Dietary:  Important dietary reminders:  1. Increase Protein intake (i.e. Lean meats, fish, eggs, legumes, and yogurt)  2. No added salt  3. If diabetic, follow a diabetic diet and check glucose prior to meals or as instructed by your physician.         Return Appointment:  [] Wound and dressing supply provider:   [] ECF or Home Healthcare:  [] Nurse visit:    [x] Return Appointment: With Dr. Reba Corey  On 1/2/2018    [] Ordered tests:       Consults: (see attached referral)    [] Weight Management [] Diabetes Education [] Vascular Surgery  [] Endocrinology  [] Nutrition Counseling  [] Lymphedema Therapy     Your nurse  is:  Clare Champion Dr.     Electronically signed by Yenifer Landin RN on 12/28/2017 at 7:51 AM     [address] [facility] Information: Should you experience any significant changes in your wound(s) or have questions about your wound care, please contact the [address] [place] [address] at [phone] Monday and Wednesday 8:00 am - 2:00 pm and Tuesday, Thursday and Friday from 8:00 am - 4:30 pm.   If you need help with your wound outside these hours and cannot wait until we are again available, contact your PCP or go to the hospital emergency room. PLEASE NOTE: IF YOU ARE UNABLE TO OBTAIN WOUND SUPPLIES, CONTINUE TO USE THE SUPPLIES YOU HAVE AVAILABLE UNTIL YOU ARE ABLE TO REACH US. IT IS MOST IMPORTANT TO KEEP THE WOUND COVERED AT ALL TIMES. Physician orders by:     [] Dr Lisa Sarabia [] Dr Sofya Bose    [] Dr Joo Britt     [] Dr Gerard Alfonso   [] Dr Chirag Cain  [x] Dr Dipti Fournier  [] Dr Grace Rojo       Physician Signature:__________________________________        The information contained in the After Visit Summary has been reviewed with me, the patient and/or responsible adult, by my health care provider(s). I had the opportunity to ask questions regarding this information.   I have elected to receive;      [] Patient unable to sign Discharge Instructions given to ECF/Transportation/POA            Electronically signed by Mahsa Acosta DPM on 12/28/2017 at 2:09 PM

## 2018-01-06 PROBLEM — L03.90 CELLULITIS: Status: ACTIVE | Noted: 2018-01-06

## 2018-01-11 ENCOUNTER — CARE COORDINATION (OUTPATIENT)
Dept: CASE MANAGEMENT | Age: 78
End: 2018-01-11

## 2018-01-16 ENCOUNTER — CARE COORDINATION (OUTPATIENT)
Dept: CASE MANAGEMENT | Age: 78
End: 2018-01-16

## 2018-01-16 NOTE — CARE COORDINATION
[facility] Transitions Follow Up Call    2018    Patient: Libia Rodriguez  Patient : 1940   MRN: [id number]  Reason for Admission: Cellulits R lower leg. Discharge Date: 1/10/18 RARS: Risk Score: 24       Spoke with: [address] Transitions Subsequent and Final Call    Subsequent and Final Calls  Do you have any ongoing symptoms?:  Yes  Patient-reported symptoms:  Weakness  Interventions for patient-reported symptoms:  Other  Have your medications changed?:  No  Do you have any questions related to your medications?:  No  Do you currently have any active services?:  Yes  Are you currently active with any services?:  Home Health  Identified Barriers: Other  Care Transitions Interventions  Other Interventions:        Care Transition f/u call to pt home. Spoke with spouse, Julian Lundy. She said pt seems to be weak and lethargic. She said his R leg and foot looks good. Said the nurse checks his vitals and that is okay. She said he eats and drinks well, except for lunch today, so she is monitoring. Asked if PT is also seeing pt and she said yes. Pt had appt scheduled in Wound center for today but spouse said she canceled it because it was too cold. Suggested she reschedule with wound center and to also schedule with Dr Maria Eugenia Lee since he is not feeling well. She agreed. Follow Up  Future Appointments  Date Time Provider Skye Cervantes   2018 2:30 PM Melania Bronson MD [facility]   2018 3:40 PM Tiffanie Freddi Prader, CNP  SLEEP MED [facility] Transition Coordinator  [phone]  [email or web address]

## 2018-01-23 ENCOUNTER — HOSPITAL ENCOUNTER (OUTPATIENT)
Dept: WOUND CARE | Age: 78
Discharge: HOME | End: 2018-01-23
Attending: PODIATRIST | Admitting: PODIATRIST

## 2018-01-23 VITALS
RESPIRATION RATE: 16 BRPM | TEMPERATURE: 97.7 F | HEART RATE: 74 BPM | DIASTOLIC BLOOD PRESSURE: 62 MMHG | SYSTOLIC BLOOD PRESSURE: 111 MMHG

## 2018-01-23 RX ORDER — LIDOCAINE HYDROCHLORIDE 40 MG/ML
2.5 SOLUTION TOPICAL ONCE
Status: DISCONTINUED | OUTPATIENT
Start: 2018-01-23 | End: 2018-01-24 | Stop reason: HOSPADM

## 2018-01-23 ASSESSMENT — PAIN DESCRIPTION - PAIN TYPE: TYPE: CHRONIC PAIN

## 2018-01-23 ASSESSMENT — PAIN DESCRIPTION - ONSET: ONSET: ON-GOING

## 2018-01-23 ASSESSMENT — PAIN DESCRIPTION - LOCATION: LOCATION: LEG

## 2018-01-23 ASSESSMENT — PAIN DESCRIPTION - ORIENTATION: ORIENTATION: RIGHT

## 2018-01-23 ASSESSMENT — PAIN SCALES - GENERAL: PAINLEVEL_OUTOF10: 3

## 2018-01-23 ASSESSMENT — PAIN DESCRIPTION - DESCRIPTORS: DESCRIPTORS: ACHING

## 2018-01-23 ASSESSMENT — PAIN DESCRIPTION - FREQUENCY: FREQUENCY: INTERMITTENT

## 2018-01-23 NOTE — PROGRESS NOTES
tablet Place 0.4 mg under the tongue every 5 minutes as needed. No current facility-administered medications on file prior to encounter. REVIEW OF SYSTEMS    unable to obtain as patient is nonverbal   Review of Systems: A 12 point review of symptoms is unremarkable with the exception of the chief complaint. Patient specifically denies nausea, fever, vomiting, chills, shortness of breath, chest pain, abdominal pain, constipation or difficulty urinating. Objective:      /62   Pulse 74   Temp 97.7 °F (36.5 °C) (Oral)   Resp 16     Wt Readings from Last 3 Encounters:   01/06/18 248 lb (112.5 kg)   10/23/17 248 lb 12.8 oz (112.9 kg)   08/05/17 237 lb (107.5 kg)       PHYSICAL EXAM    Incision along TMA well approximated and coapted. There is a superficial scab covering the previously noted right dorsal foot wound. After removal of the eschar the wound has epithelialized. There is no surrounding erythema, edema, warmth or malodor noted.       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 ([facility] J68.3    Pulmonary embolism ([facility] I26.99    Coronary artery disease involving native heart without angina pectoris I25.10    Presence of drug coated stent in anterior descending branch of left coronary artery Z95.5    Sepsis ([address] A41.9    Syncope R55    Hypothyroid E03.9    Obstructive sleep apnea G47.33    Gas gangrene ([address] A48.0    Diabetic infection of right foot ([address] E11.69, L08.9    Diabetic polyneuropathy associated with type 2 diabetes mellitus ([address] E11.42    Right foot infection L08.9    LULÚ (acute kidney injury) ([facility] N17.9    Acute diastolic CHF (congestive heart failure), NYHA class 2 ([facility]) I50.31    Encephalopathy G93.40    Cellulitis L03.90    Cellulitis of right lower extremity L03.115    History of MRSA infection Z86.14         Plan:   E/M x 30 minutes    OK to leave area open to air    May resume all normal activities as tolerated. OK to continue weight bearing with PT. Follow up with podiatry for routine DM foot care. Treatment Note please see attached Discharge Instructions    In my professional opinion this patient would benefit from HBO Therapy: No    Written patient dismissal instructions given to patient and signed by patient or POA. RTC prn    Discharge Instructions       [address] [address] [address] Telephone: 21  [phone]    NAME:  Therese Madrid OF BIRTH:  1940  MEDICAL RECORD NUMBER:  [id number]  DATE:  1/23/2018      Congratulations! You have completed your treatment program!    To prevent Venous Wounds from recurring again:  · Elevate your legs at least parallel to the ground while sitting. · Wear your prescription support stockings everyday and remove at night while you sleep. · Replace your stockings every 4-6 months so that your legs continue to get the support they need. · Inspect your legs and feet daily and report any increased swelling, unusual redness or new wounds to your physician. · Wash your legs and feet regularly with mild soap and water and moisturize daily. · Continue to use compression pumps if prescribed by your physician. · Avoid overeating. Extra weight adds pressure on the veins in your legs. · Limit salty foods as they promote swelling or fluid retention in your legs. Congratulations! You have completed the wound care program.  How can I prevent Diabetic wounds from occurring again? Foot Care:   · Never go barefoot, indoors or outdoors. (If you have neuropathy, you may not feel an injury to your feet if it occurs)  · Inspect your feet daily. Use a mirror and check for any wounds, redness, or skin cracks. Make sure to inspect between toes.  (If you have vision problems, ask a family member or friend to look at your feet for you)  · Wash your feet

## 2018-01-26 ENCOUNTER — CARE COORDINATION (OUTPATIENT)
Dept: CASE MANAGEMENT | Age: 78
End: 2018-01-26

## 2018-01-30 ENCOUNTER — CARE COORDINATION (OUTPATIENT)
Dept: CASE MANAGEMENT | Age: 78
End: 2018-01-30

## 2018-01-30 NOTE — CARE COORDINATION
[facility] Transitions Follow Up Call    2018    Patient: Christel Barragan  Patient : 1940   MRN: [id number]  Reason for Admission: Cellulitis R leg  Discharge Date: 18 RARS: Risk Score: 24       Spoke with: Christel Barragan spouse    Care Transitions Subsequent and Final Call    Subsequent and Final Calls  Do you have any ongoing symptoms?:  No  Have your medications changed?:  No  Do you have any questions related to your medications?:  No  Do you currently have any active services?:  Yes  Are you currently active with any services?:  Home Health  Do you have any needs or concerns that I can assist you with?:  No  Identified Barriers: Other  Care Transitions Interventions  Other Interventions:        Care Transition f/u call. Spouse reports pt is \"pretty good\". Pt was dismissed from wound center. She said his leg still looks good. No recurrent nose bleeds since last one day after ED for nose bleed last week. [facility] continues to see pt. Care Transition calls completed. Follow Up  Future Appointments  Date Time Provider Skye Cervantes   2018 2:30 PM MD Kindra Sue [facility]   2018 1:40 PM Darien Hill MD [facility]   2018 3:40 PM Erin Lizama, CNP FF SLEEP MED [facility] Transition Coordinator  [phone]  [email or web address]

## 2018-02-06 ENCOUNTER — OFFICE VISIT (OUTPATIENT)
Dept: ENT CLINIC | Age: 78
End: 2018-02-06

## 2018-02-06 VITALS
WEIGHT: 259 LBS | HEIGHT: 69 IN | BODY MASS INDEX: 38.36 KG/M2 | HEART RATE: 71 BPM | TEMPERATURE: 97.2 F | SYSTOLIC BLOOD PRESSURE: 136 MMHG | DIASTOLIC BLOOD PRESSURE: 65 MMHG

## 2018-02-06 DIAGNOSIS — R04.0 EPISTAXIS: Primary | ICD-10-CM

## 2018-02-06 DIAGNOSIS — Z79.01 LONG-TERM (CURRENT) USE OF ANTICOAGULANTS: ICD-10-CM

## 2018-02-06 PROCEDURE — G8484 FLU IMMUNIZE NO ADMIN: HCPCS | Performed by: OTOLARYNGOLOGY

## 2018-02-06 PROCEDURE — 1036F TOBACCO NON-USER: CPT | Performed by: OTOLARYNGOLOGY

## 2018-02-06 PROCEDURE — 99203 OFFICE O/P NEW LOW 30 MIN: CPT | Performed by: OTOLARYNGOLOGY

## 2018-02-06 PROCEDURE — 4040F PNEUMOC VAC/ADMIN/RCVD: CPT | Performed by: OTOLARYNGOLOGY

## 2018-02-06 PROCEDURE — 1111F DSCHRG MED/CURRENT MED MERGE: CPT | Performed by: OTOLARYNGOLOGY

## 2018-02-06 PROCEDURE — G8598 ASA/ANTIPLAT THER USED: HCPCS | Performed by: OTOLARYNGOLOGY

## 2018-02-06 PROCEDURE — 1123F ACP DISCUSS/DSCN MKR DOCD: CPT | Performed by: OTOLARYNGOLOGY

## 2018-02-06 PROCEDURE — 30901 CONTROL OF NOSEBLEED: CPT | Performed by: OTOLARYNGOLOGY

## 2018-02-06 PROCEDURE — G8417 CALC BMI ABV UP PARAM F/U: HCPCS | Performed by: OTOLARYNGOLOGY

## 2018-02-06 PROCEDURE — G8427 DOCREV CUR MEDS BY ELIG CLIN: HCPCS | Performed by: OTOLARYNGOLOGY

## 2018-02-06 NOTE — PROGRESS NOTES
CHIEF COMPLAINT: Nosebleeds    HISTORY OF PRESENT ILLNESS:  69 y.o. male who presents with several years of intermittent nose bleed. Right sided. Bleeds both anterior and posterior. No nasal obstruction. On plavix for ASCVD with stents.      PAST MEDICAL HISTORY:   History   Smoking Status    Never Smoker   Smokeless Tobacco    Never Used                                                    History   Alcohol Use No                                                    Current Outpatient Prescriptions:     dapagliflozin (FARXIGA) 5 MG tablet, Take 5 mg by mouth every morning, Disp: , Rfl:     furosemide (LASIX) 40 MG tablet, Take 40 mg by mouth 2 times daily, Disp: , Rfl:     levothyroxine (SYNTHROID) 25 MCG tablet, Take 75 mcg by mouth Daily , Disp: , Rfl:     insulin lispro (HUMALOG) 100 UNIT/ML injection vial, Inject into the skin 3 times daily (before meals), Disp: , Rfl:     metFORMIN (GLUCOPHAGE) 850 MG tablet, Take 850 mg by mouth 2 times daily (with meals), Disp: , Rfl:     oxymetazoline (AFRIN) 0.05 % nasal spray, 2 sprays by Nasal route 2 times daily, Disp: , Rfl:     metoprolol tartrate (LOPRESSOR) 25 MG tablet, Take 1 tablet by mouth 2 times daily, Disp: 60 tablet, Rfl: 3    donepezil (ARICEPT) 5 MG tablet, Take 5 mg by mouth nightly, Disp: , Rfl:     potassium chloride (KLOR-CON M) 10 MEQ extended release tablet, Take 10 mEq by mouth 3 times daily, Disp: , Rfl:     insulin detemir (LEVEMIR) 100 UNIT/ML injection vial, Inject 26 Units into the skin nightly, Disp: 1 vial, Rfl: 3    losartan (COZAAR) 50 MG tablet, TAKE ONE TABLET BY MOUTH EVERY DAY, Disp: 30 tablet, Rfl: 5    clopidogrel (PLAVIX) 75 MG tablet, TAKE ONE TABLET BY MOUTH EVERY DAY, Disp: 90 tablet, Rfl: 4    rosuvastatin (CRESTOR) 20 MG tablet, Take 1 tablet by mouth daily, Disp: 90 tablet, Rfl: 4    finasteride (PROSCAR) 5 MG tablet, Take 5 mg by mouth daily, Disp: , Rfl:     carbidopa-levodopa (SINEMET)  MG per tablet, Take 1 tablet by mouth 3 times daily. , Disp: , Rfl:     cetirizine (ZYRTEC) 5 MG tablet, Take 5 mg by mouth daily Prn, Disp: , Rfl:     aspirin 81 MG EC tablet, Take 81 mg by mouth daily. , Disp: , Rfl:     ergocalciferol (ERGOCALCIFEROL) 88488 UNITS capsule, Take 50,000 Units by mouth Twice a Week Every Tuesday and Thursday, Disp: , Rfl:     nitroGLYCERIN (NITROSTAT) 0.4 MG SL tablet, Place 0.4 mg under the tongue every 5 minutes as needed. , Disp: , Rfl:                                                  Past Medical History:   Diagnosis Date    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     Dr. Lizandro Lopez CAD (coronary artery disease)     CHF [facility]congestive heart failure) ([facility]     COPD (chronic obstructive pulmonary disease) ([facility]     Diabetes mellitus ([facility]     Hypertension     MRSA (methicillin resistant staph aureus) culture positive 07/24/2017    foot    Obstructive sleep apnea 10/3/2014    Parkinson's disease ([facility] 1965                                                    Past Surgical History:   Procedure Laterality Date   [address]    FOOT SURGERY Right 07/24/2017    INCISION AND DRAINAGE RIGHT FOOT WITH REMOVAL NECROTIC BONE    FOOT SURGERY Right 07/27/2017    : TRANSMETATARSAL AMPUTATION RIGHT FOOT          REVIEW OF SYSTEMS:  All pertinent positive and negative review of systems included in HPI. Otherwise, all systems are reviewed and negative. PHYSICAL EXAMINATION:   GENERAL: wdwn- no acute distress  COMMUNICATION :  Normal voice  MENTAL STATUS:  Normal  HEAD AND FACE:  Normal  EXTERNAL EARS AND NOSE:  Normal  FACIAL MUSCLES:  Normal  EXTRAOCULAR MUSCLES: Intact  FACE PALPATION:  Negative  OTOSCOPY:  Normal tympanic membranes and middle ear spaces  TUNING FORKS: Rinne ++ Dunham midline at 512 Hz  INTRANASAL:  Septum midline, turbinates normal, meati clear. Bleeding site right anterior inferior septum.   LIPS, TEETH, GINGIVA:  Normal mucosa  PHARYNX:

## 2018-02-19 ENCOUNTER — OFFICE VISIT (OUTPATIENT)
Dept: CARDIOLOGY CLINIC | Age: 78
End: 2018-02-19

## 2018-02-19 VITALS
DIASTOLIC BLOOD PRESSURE: 70 MMHG | HEART RATE: 66 BPM | BODY MASS INDEX: 38.69 KG/M2 | SYSTOLIC BLOOD PRESSURE: 132 MMHG | WEIGHT: 262 LBS

## 2018-02-19 DIAGNOSIS — I50.31 ACUTE DIASTOLIC CHF (CONGESTIVE HEART FAILURE), NYHA CLASS 2 (HCC): Primary | ICD-10-CM

## 2018-02-19 DIAGNOSIS — I25.10 CORONARY ARTERY DISEASE INVOLVING NATIVE HEART WITHOUT ANGINA PECTORIS, UNSPECIFIED VESSEL OR LESION TYPE: Chronic | ICD-10-CM

## 2018-02-19 PROCEDURE — G8598 ASA/ANTIPLAT THER USED: HCPCS | Performed by: INTERNAL MEDICINE

## 2018-02-19 PROCEDURE — 99214 OFFICE O/P EST MOD 30 MIN: CPT | Performed by: INTERNAL MEDICINE

## 2018-02-19 PROCEDURE — G8427 DOCREV CUR MEDS BY ELIG CLIN: HCPCS | Performed by: INTERNAL MEDICINE

## 2018-02-19 PROCEDURE — 93000 ELECTROCARDIOGRAM COMPLETE: CPT | Performed by: INTERNAL MEDICINE

## 2018-02-19 PROCEDURE — 4040F PNEUMOC VAC/ADMIN/RCVD: CPT | Performed by: INTERNAL MEDICINE

## 2018-02-19 PROCEDURE — 1036F TOBACCO NON-USER: CPT | Performed by: INTERNAL MEDICINE

## 2018-02-19 PROCEDURE — G8417 CALC BMI ABV UP PARAM F/U: HCPCS | Performed by: INTERNAL MEDICINE

## 2018-02-19 PROCEDURE — G8484 FLU IMMUNIZE NO ADMIN: HCPCS | Performed by: INTERNAL MEDICINE

## 2018-02-19 PROCEDURE — 1123F ACP DISCUSS/DSCN MKR DOCD: CPT | Performed by: INTERNAL MEDICINE

## 2018-02-19 NOTE — PROGRESS NOTES
Conjunctivae are normal. Pupils are equal, round, and reactive to light. Right eye exhibits no discharge. Left eye exhibits no discharge. No scleral icterus. Neck: Normal range of motion. Neck supple. No JVD present. No tracheal deviation present. No thyromegaly present. Cardiovascular: Normal rate, regular rhythm, normal heart sounds and intact distal pulses. Exam reveals no gallop and no friction rub. No murmur heard. Pulmonary/Chest: Effort normal. No respiratory distress. He has no wheezes. He has no rales. He exhibits no tenderness. Abdominal: Soft. Bowel sounds are normal. He exhibits no distension and no mass. No tenderness. He has no rebound and no guarding. Musculoskeletal: He exhibits trace edema. He exhibits no tenderness. Neurological: He is alert and oriented to person, place, and time. No cranial nerve deficit. Coordination normal.   Skin: Skin is warm and dry. No rash noted. He is not diaphoretic. No erythema.  edema and reddish discoloration of the left leg. Psychiatric: He has a normal mood and affect. His behavior is normal. Judgment and thought content normal.       Assessment:      Patient Active Problem List   Diagnosis    Chest pain    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 ([address]    Pulmonary embolism ([address]    Coronary artery disease involving native heart without angina pectoris    Presence of drug coated stent in anterior descending branch of left coronary artery    Sepsis ([address]    Syncope    Hypothyroid    Obstructive sleep apnea    Gas gangrene ([address]    Diabetic infection of right foot ([address]    Diabetic polyneuropathy associated with type 2 diabetes mellitus ([address]    Right foot infection    LULÚ (acute kidney injury) ([address]    Acute diastolic CHF (congestive heart failure), NYHA class 2 (HCC)    Encephalopathy    Cellulitis    Cellulitis of right lower extremity    History of MRSA infection                         Plan:      Continue current medications.   Stable cardiovascular status. Doing well with 2 xience SAMANTHA in 11- to LAD. Creat went from 2.0 to 1.4  on 8/15/17  labs. Doing well with CPAP treatment for SARIKA mostly but has had some nosebleeds  He continues on fenofibrate daily for high Triglycerides  In SR today on ECG and no ischemic chages. He has chronic nosebleeds so would avoid warfarin.

## 2018-02-21 ENCOUNTER — TELEPHONE (OUTPATIENT)
Dept: CARDIOLOGY CLINIC | Age: 78
End: 2018-02-21

## 2018-02-26 ENCOUNTER — OFFICE VISIT (OUTPATIENT)
Dept: ENT CLINIC | Age: 78
End: 2018-02-26

## 2018-02-26 VITALS
BODY MASS INDEX: 38.8 KG/M2 | HEIGHT: 69 IN | DIASTOLIC BLOOD PRESSURE: 49 MMHG | HEART RATE: 66 BPM | WEIGHT: 262 LBS | TEMPERATURE: 97.1 F | SYSTOLIC BLOOD PRESSURE: 101 MMHG

## 2018-02-26 DIAGNOSIS — Z79.01 LONG-TERM (CURRENT) USE OF ANTICOAGULANTS: ICD-10-CM

## 2018-02-26 DIAGNOSIS — R04.0 EPISTAXIS: Primary | ICD-10-CM

## 2018-02-26 PROCEDURE — G8417 CALC BMI ABV UP PARAM F/U: HCPCS | Performed by: OTOLARYNGOLOGY

## 2018-02-26 PROCEDURE — 4040F PNEUMOC VAC/ADMIN/RCVD: CPT | Performed by: OTOLARYNGOLOGY

## 2018-02-26 PROCEDURE — 30901 CONTROL OF NOSEBLEED: CPT | Performed by: OTOLARYNGOLOGY

## 2018-02-26 PROCEDURE — 1123F ACP DISCUSS/DSCN MKR DOCD: CPT | Performed by: OTOLARYNGOLOGY

## 2018-02-26 PROCEDURE — G8484 FLU IMMUNIZE NO ADMIN: HCPCS | Performed by: OTOLARYNGOLOGY

## 2018-02-26 PROCEDURE — 1036F TOBACCO NON-USER: CPT | Performed by: OTOLARYNGOLOGY

## 2018-02-26 PROCEDURE — G8427 DOCREV CUR MEDS BY ELIG CLIN: HCPCS | Performed by: OTOLARYNGOLOGY

## 2018-02-26 PROCEDURE — G8598 ASA/ANTIPLAT THER USED: HCPCS | Performed by: OTOLARYNGOLOGY

## 2018-02-26 NOTE — PROGRESS NOTES
FOLLOW UP VISIT:      INTERIM HISTORY: Left anterior epistaxis in anticoagulated patient. I cauterized his right septum 3 weeks ago. Did well until 5 days ago when he began to bleed from his right naris again. Anterior and posterior. Started spontaneously. Seen at ED and packed. Pack came out. No more bleeding. Cardiologist took him off plavix 5 days ago  For 1 month.        PAST MEDICAL HISTORY:   History   Smoking Status    Never Smoker   Smokeless Tobacco    Never Used                                                    History   Alcohol Use No                                                    Current Outpatient Prescriptions:     insulin glargine (LANTUS) 100 UNIT/ML injection vial, Inject 26 Units into the skin nightly, Disp: , Rfl:     cephALEXin (KEFLEX) 500 MG capsule, Take 1 capsule by mouth 2 times daily for 7 days, Disp: 14 capsule, Rfl: 0    dapagliflozin (FARXIGA) 5 MG tablet, Take 5 mg by mouth every morning, Disp: , Rfl:     furosemide (LASIX) 40 MG tablet, Take 40 mg by mouth 2 times daily, Disp: , Rfl:     levothyroxine (SYNTHROID) 25 MCG tablet, Take 75 mcg by mouth Daily , Disp: , Rfl:     insulin lispro (HUMALOG) 100 UNIT/ML injection vial, Inject into the skin 3 times daily (before meals), Disp: , Rfl:     metFORMIN (GLUCOPHAGE) 850 MG tablet, Take 850 mg by mouth 2 times daily (with meals), Disp: , Rfl:     oxymetazoline (AFRIN) 0.05 % nasal spray, 2 sprays by Nasal route 2 times daily, Disp: , Rfl:     metoprolol tartrate (LOPRESSOR) 25 MG tablet, Take 1 tablet by mouth 2 times daily, Disp: 60 tablet, Rfl: 3    donepezil (ARICEPT) 5 MG tablet, Take 5 mg by mouth nightly, Disp: , Rfl:     potassium chloride (KLOR-CON M) 10 MEQ extended release tablet, Take 10 mEq by mouth 3 times daily, Disp: , Rfl:     losartan (COZAAR) 50 MG tablet, TAKE ONE TABLET BY MOUTH EVERY DAY, Disp: 30 tablet, Rfl: 5    rosuvastatin (CRESTOR) 20 MG tablet, Take 1 tablet by mouth daily, Disp: 90

## 2018-03-09 PROBLEM — R04.0 EPISTAXIS: Status: ACTIVE | Noted: 2018-03-09

## 2018-03-12 ENCOUNTER — TELEPHONE (OUTPATIENT)
Dept: ENT CLINIC | Age: 78
End: 2018-03-12

## 2018-03-12 NOTE — TELEPHONE ENCOUNTER
Patient is still inpatient hospital and Dr. Tashi Rosenbaum was going to go to the hospital to see the patient and address wifes questions.   elle

## 2018-03-13 ENCOUNTER — CARE COORDINATION (OUTPATIENT)
Dept: CASE MANAGEMENT | Age: 78
End: 2018-03-13

## 2018-03-13 DIAGNOSIS — R04.0 EPISTAXIS: Primary | ICD-10-CM

## 2018-03-13 PROCEDURE — 1111F DSCHRG MED/CURRENT MED MERGE: CPT | Performed by: INTERNAL MEDICINE

## 2018-03-15 ENCOUNTER — TELEPHONE (OUTPATIENT)
Dept: ENT CLINIC | Age: 78
End: 2018-03-15

## 2018-03-15 ENCOUNTER — OFFICE VISIT (OUTPATIENT)
Dept: ENT CLINIC | Age: 78
End: 2018-03-15

## 2018-03-15 VITALS
HEART RATE: 67 BPM | BODY MASS INDEX: 43.1 KG/M2 | DIASTOLIC BLOOD PRESSURE: 60 MMHG | HEIGHT: 65 IN | SYSTOLIC BLOOD PRESSURE: 125 MMHG | TEMPERATURE: 97.5 F | WEIGHT: 258.7 LBS

## 2018-03-15 DIAGNOSIS — R04.0 EPISTAXIS: Primary | ICD-10-CM

## 2018-03-15 DIAGNOSIS — Z79.01 LONG-TERM (CURRENT) USE OF ANTICOAGULANTS: ICD-10-CM

## 2018-03-15 PROCEDURE — 1111F DSCHRG MED/CURRENT MED MERGE: CPT | Performed by: OTOLARYNGOLOGY

## 2018-03-15 PROCEDURE — 31231 NASAL ENDOSCOPY DX: CPT | Performed by: OTOLARYNGOLOGY

## 2018-03-15 PROCEDURE — 1036F TOBACCO NON-USER: CPT | Performed by: OTOLARYNGOLOGY

## 2018-03-15 PROCEDURE — G8484 FLU IMMUNIZE NO ADMIN: HCPCS | Performed by: OTOLARYNGOLOGY

## 2018-03-15 PROCEDURE — 4040F PNEUMOC VAC/ADMIN/RCVD: CPT | Performed by: OTOLARYNGOLOGY

## 2018-03-15 PROCEDURE — G8417 CALC BMI ABV UP PARAM F/U: HCPCS | Performed by: OTOLARYNGOLOGY

## 2018-03-15 PROCEDURE — G8427 DOCREV CUR MEDS BY ELIG CLIN: HCPCS | Performed by: OTOLARYNGOLOGY

## 2018-03-15 PROCEDURE — 1123F ACP DISCUSS/DSCN MKR DOCD: CPT | Performed by: OTOLARYNGOLOGY

## 2018-03-15 PROCEDURE — G8598 ASA/ANTIPLAT THER USED: HCPCS | Performed by: OTOLARYNGOLOGY

## 2018-03-15 NOTE — PROGRESS NOTES
Rfl:     nitroGLYCERIN (NITROSTAT) 0.4 MG SL tablet, Place 0.4 mg under the tongue every 5 minutes as needed. , Disp: , Rfl:                                                  Past Medical History:   Diagnosis Date    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 ([facility]     Dr. Dariel Kauffman CAD (coronary artery disease)     [facility]     Chronic kidney disease     [facility]) [facility]     Diabetes mellitus ([facility]     Hypertension     Hypothyroidism     Mixed hyperlipidemia     MRSA (methicillin resistant staph aureus) culture positive 07/24/2017    foot    Neuropathy ([facility]     Obstructive sleep apnea 10/3/2014    Parkinson's disease ([facility] 1965    Type 2 diabetes mellitus without complication ([facility]                                                     Past Surgical History:   Procedure Laterality Date    CARDIAC CATHETERIZATION      CERVICAL FUSION  1981    FOOT SURGERY Right 07/24/2017    INCISION AND DRAINAGE RIGHT FOOT WITH REMOVAL NECROTIC BONE    FOOT SURGERY Right 07/27/2017    : TRANSMETATARSAL AMPUTATION RIGHT FOOT          REVIEW OF SYSTEMS:  All pertinent positive and negative findings included in HPI. Otherwise, all other systems are reviewed and are negative    PHYSICAL EXAMINATION:   GENERAL: wdwn- no acute distress  COMMUNICATION :  Normal voice  MENTAL STATUS:  Normal  HEAD AND FACE:  Normal  EXTERNAL EARS AND NOSE:  Normal  FACIAL MUSCLES:  Normal  INTRANASAL:  Septum midline, turbinates normal, eschar right naris. As patient has had 3 episodes of epistaxis it is deemed appropriate to examine the right naris endoscopically. FIBEROPTIC NASAL ENDOSCOPY: Right naris decongested and anesthetized topically. 0° rigid endoscope passed into the right naris. Present eschar on the posterior aspect of the right middle turbinate bone abutting the septum. Eschar cleaned endoscopically.   IMPRESSION: Recurrent right epistaxis an anticoagulated

## 2018-03-20 ENCOUNTER — CARE COORDINATION (OUTPATIENT)
Dept: CASE MANAGEMENT | Age: 78
End: 2018-03-20

## 2018-04-24 ENCOUNTER — OFFICE VISIT (OUTPATIENT)
Dept: SLEEP MEDICINE | Age: 78
End: 2018-04-24

## 2018-04-24 VITALS
HEART RATE: 68 BPM | DIASTOLIC BLOOD PRESSURE: 72 MMHG | WEIGHT: 258 LBS | SYSTOLIC BLOOD PRESSURE: 130 MMHG | OXYGEN SATURATION: 97 % | BODY MASS INDEX: 43.36 KG/M2

## 2018-04-24 DIAGNOSIS — E11.8 TYPE 2 DIABETES MELLITUS WITH COMPLICATION, WITH LONG-TERM CURRENT USE OF INSULIN (HCC): Chronic | ICD-10-CM

## 2018-04-24 DIAGNOSIS — G47.33 OBSTRUCTIVE SLEEP APNEA SYNDROME: Primary | Chronic | ICD-10-CM

## 2018-04-24 DIAGNOSIS — I10 ESSENTIAL HYPERTENSION: Chronic | ICD-10-CM

## 2018-04-24 DIAGNOSIS — Z79.4 TYPE 2 DIABETES MELLITUS WITH COMPLICATION, WITH LONG-TERM CURRENT USE OF INSULIN (HCC): Chronic | ICD-10-CM

## 2018-04-24 DIAGNOSIS — I25.10 CORONARY ARTERY DISEASE INVOLVING NATIVE CORONARY ARTERY OF NATIVE HEART WITHOUT ANGINA PECTORIS: Chronic | ICD-10-CM

## 2018-04-24 DIAGNOSIS — E03.1 CONGENITAL HYPOTHYROIDISM WITHOUT GOITER: Chronic | ICD-10-CM

## 2018-04-24 PROCEDURE — 1123F ACP DISCUSS/DSCN MKR DOCD: CPT | Performed by: NURSE PRACTITIONER

## 2018-04-24 PROCEDURE — G8417 CALC BMI ABV UP PARAM F/U: HCPCS | Performed by: NURSE PRACTITIONER

## 2018-04-24 PROCEDURE — 1036F TOBACCO NON-USER: CPT | Performed by: NURSE PRACTITIONER

## 2018-04-24 PROCEDURE — 99214 OFFICE O/P EST MOD 30 MIN: CPT | Performed by: NURSE PRACTITIONER

## 2018-04-24 PROCEDURE — G8427 DOCREV CUR MEDS BY ELIG CLIN: HCPCS | Performed by: NURSE PRACTITIONER

## 2018-04-24 PROCEDURE — G8598 ASA/ANTIPLAT THER USED: HCPCS | Performed by: NURSE PRACTITIONER

## 2018-04-24 PROCEDURE — 4040F PNEUMOC VAC/ADMIN/RCVD: CPT | Performed by: NURSE PRACTITIONER

## 2018-04-24 RX ORDER — GLUCOSAMINE HCL 500 MG
1 TABLET ORAL
COMMUNITY
End: 2021-04-12

## 2018-04-24 ASSESSMENT — SLEEP AND FATIGUE QUESTIONNAIRES
HOW LIKELY ARE YOU TO NOD OFF OR FALL ASLEEP WHILE SITTING AND READING: 3
HOW LIKELY ARE YOU TO NOD OFF OR FALL ASLEEP WHILE LYING DOWN TO REST IN THE AFTERNOON WHEN CIRCUMSTANCES PERMIT: 3
ESS TOTAL SCORE: 21
HOW LIKELY ARE YOU TO NOD OFF OR FALL ASLEEP WHILE SITTING QUIETLY AFTER LUNCH WITHOUT ALCOHOL: 3
HOW LIKELY ARE YOU TO NOD OFF OR FALL ASLEEP WHEN YOU ARE A PASSENGER IN A CAR FOR AN HOUR WITHOUT A BREAK: 3
HOW LIKELY ARE YOU TO NOD OFF OR FALL ASLEEP WHILE WATCHING TV: 3
HOW LIKELY ARE YOU TO NOD OFF OR FALL ASLEEP WHILE SITTING AND TALKING TO SOMEONE: 3
HOW LIKELY ARE YOU TO NOD OFF OR FALL ASLEEP WHILE SITTING INACTIVE IN A PUBLIC PLACE: 3
HOW LIKELY ARE YOU TO NOD OFF OR FALL ASLEEP IN A CAR, WHILE STOPPED FOR A FEW MINUTES IN TRAFFIC: 0

## 2018-04-24 ASSESSMENT — ENCOUNTER SYMPTOMS
ABDOMINAL PAIN: 0
SHORTNESS OF BREATH: 0
COUGH: 0
ABDOMINAL DISTENTION: 0
SINUS PRESSURE: 1
RHINORRHEA: 0
APNEA: 0

## 2018-06-29 ENCOUNTER — TELEPHONE (OUTPATIENT)
Dept: SLEEP MEDICINE | Age: 78
End: 2018-06-29

## 2018-08-15 ENCOUNTER — TELEPHONE (OUTPATIENT)
Dept: CARDIOLOGY CLINIC | Age: 78
End: 2018-08-15

## 2018-08-16 NOTE — TELEPHONE ENCOUNTER
Called and spoke to pt wife. She states that pcp wants to stop metoprolol and start Entresto. She also states pt was having nose bleeds again and pcp stop Plavix. Pt wife states she is ok to get her question answered about the Entresto and metoprolol on 8/24/18 apt. Instructed her will notified ANAND about stopping Plavix and will contact her if he would like to do anything different. She verbalized understanding and satisfied.

## 2018-08-24 ENCOUNTER — OFFICE VISIT (OUTPATIENT)
Dept: CARDIOLOGY CLINIC | Age: 78
End: 2018-08-24

## 2018-08-24 VITALS
HEART RATE: 70 BPM | BODY MASS INDEX: 43.83 KG/M2 | DIASTOLIC BLOOD PRESSURE: 80 MMHG | WEIGHT: 260.8 LBS | SYSTOLIC BLOOD PRESSURE: 130 MMHG

## 2018-08-24 DIAGNOSIS — I50.31 ACUTE DIASTOLIC CHF (CONGESTIVE HEART FAILURE), NYHA CLASS 2 (HCC): Primary | ICD-10-CM

## 2018-08-24 DIAGNOSIS — I10 ESSENTIAL HYPERTENSION: ICD-10-CM

## 2018-08-24 DIAGNOSIS — I25.10 CORONARY ARTERY DISEASE INVOLVING NATIVE HEART WITHOUT ANGINA PECTORIS, UNSPECIFIED VESSEL OR LESION TYPE: ICD-10-CM

## 2018-08-24 PROCEDURE — 1123F ACP DISCUSS/DSCN MKR DOCD: CPT | Performed by: INTERNAL MEDICINE

## 2018-08-24 PROCEDURE — G8427 DOCREV CUR MEDS BY ELIG CLIN: HCPCS | Performed by: INTERNAL MEDICINE

## 2018-08-24 PROCEDURE — 1036F TOBACCO NON-USER: CPT | Performed by: INTERNAL MEDICINE

## 2018-08-24 PROCEDURE — G8417 CALC BMI ABV UP PARAM F/U: HCPCS | Performed by: INTERNAL MEDICINE

## 2018-08-24 PROCEDURE — 4040F PNEUMOC VAC/ADMIN/RCVD: CPT | Performed by: INTERNAL MEDICINE

## 2018-08-24 PROCEDURE — 99214 OFFICE O/P EST MOD 30 MIN: CPT | Performed by: INTERNAL MEDICINE

## 2018-08-24 PROCEDURE — 1101F PT FALLS ASSESS-DOCD LE1/YR: CPT | Performed by: INTERNAL MEDICINE

## 2018-08-24 PROCEDURE — G8598 ASA/ANTIPLAT THER USED: HCPCS | Performed by: INTERNAL MEDICINE

## 2018-09-28 ENCOUNTER — TELEPHONE (OUTPATIENT)
Dept: CARDIOLOGY CLINIC | Age: 78
End: 2018-09-28

## 2018-09-28 NOTE — TELEPHONE ENCOUNTER
According to chart, pt is no longer on Plavix. LVM with Kandis Gutierrez office to call and discuss.

## 2018-09-28 NOTE — TELEPHONE ENCOUNTER
Kolby called from Dr. Zuleyma Hu office. He reports having faxed a request for cardiac clearance on Monday 9/24 for this patient. Dr. Dickey Kawasaki will be extracting several teeth (not yet scheduled). They would like the patient to remain on his Plavix unless Dr. Beryle Hansen feels it is unsafe to do so. They will be using versed and fentanyl sedation. Mr. Rory Mtz recently saw Dr. Beryle Hansen on 8/24/18. Please call Kaleigh Pierce to let him know if the patient may proceed @ [phone]  Or fax a clearance to [phone].

## 2018-12-20 ENCOUNTER — OFFICE VISIT (OUTPATIENT)
Dept: CARDIOLOGY CLINIC | Age: 78
End: 2018-12-20
Payer: MEDICARE

## 2018-12-20 VITALS
SYSTOLIC BLOOD PRESSURE: 132 MMHG | OXYGEN SATURATION: 97 % | BODY MASS INDEX: 43.36 KG/M2 | WEIGHT: 258 LBS | DIASTOLIC BLOOD PRESSURE: 60 MMHG | HEART RATE: 93 BPM

## 2018-12-20 DIAGNOSIS — E78.5 DYSLIPIDEMIA: ICD-10-CM

## 2018-12-20 DIAGNOSIS — R00.2 PALPITATIONS: ICD-10-CM

## 2018-12-20 DIAGNOSIS — I10 ESSENTIAL HYPERTENSION: Primary | ICD-10-CM

## 2018-12-20 DIAGNOSIS — I25.10 CORONARY ARTERY DISEASE INVOLVING NATIVE CORONARY ARTERY OF NATIVE HEART WITHOUT ANGINA PECTORIS: ICD-10-CM

## 2018-12-20 DIAGNOSIS — R06.02 SHORTNESS OF BREATH: ICD-10-CM

## 2018-12-20 DIAGNOSIS — I50.22 CHRONIC SYSTOLIC HEART FAILURE (HCC): ICD-10-CM

## 2018-12-20 PROCEDURE — 1123F ACP DISCUSS/DSCN MKR DOCD: CPT | Performed by: INTERNAL MEDICINE

## 2018-12-20 PROCEDURE — G8484 FLU IMMUNIZE NO ADMIN: HCPCS | Performed by: INTERNAL MEDICINE

## 2018-12-20 PROCEDURE — 1101F PT FALLS ASSESS-DOCD LE1/YR: CPT | Performed by: INTERNAL MEDICINE

## 2018-12-20 PROCEDURE — G8417 CALC BMI ABV UP PARAM F/U: HCPCS | Performed by: INTERNAL MEDICINE

## 2018-12-20 PROCEDURE — G8598 ASA/ANTIPLAT THER USED: HCPCS | Performed by: INTERNAL MEDICINE

## 2018-12-20 PROCEDURE — 1036F TOBACCO NON-USER: CPT | Performed by: INTERNAL MEDICINE

## 2018-12-20 PROCEDURE — G8427 DOCREV CUR MEDS BY ELIG CLIN: HCPCS | Performed by: INTERNAL MEDICINE

## 2018-12-20 PROCEDURE — 99214 OFFICE O/P EST MOD 30 MIN: CPT | Performed by: INTERNAL MEDICINE

## 2018-12-20 PROCEDURE — 4040F PNEUMOC VAC/ADMIN/RCVD: CPT | Performed by: INTERNAL MEDICINE

## 2019-07-29 ENCOUNTER — OFFICE VISIT (OUTPATIENT)
Dept: CARDIOLOGY CLINIC | Age: 79
End: 2019-07-29
Payer: MEDICARE

## 2019-07-29 VITALS
WEIGHT: 262.6 LBS | HEART RATE: 69 BPM | BODY MASS INDEX: 44.13 KG/M2 | DIASTOLIC BLOOD PRESSURE: 72 MMHG | SYSTOLIC BLOOD PRESSURE: 138 MMHG

## 2019-07-29 DIAGNOSIS — I25.10 CORONARY ARTERY DISEASE INVOLVING NATIVE CORONARY ARTERY OF NATIVE HEART WITHOUT ANGINA PECTORIS: ICD-10-CM

## 2019-07-29 DIAGNOSIS — I10 ESSENTIAL HYPERTENSION: Primary | ICD-10-CM

## 2019-07-29 DIAGNOSIS — E78.2 MIXED HYPERLIPIDEMIA: ICD-10-CM

## 2019-07-29 PROCEDURE — 4040F PNEUMOC VAC/ADMIN/RCVD: CPT | Performed by: INTERNAL MEDICINE

## 2019-07-29 PROCEDURE — G8427 DOCREV CUR MEDS BY ELIG CLIN: HCPCS | Performed by: INTERNAL MEDICINE

## 2019-07-29 PROCEDURE — 99213 OFFICE O/P EST LOW 20 MIN: CPT | Performed by: INTERNAL MEDICINE

## 2019-07-29 PROCEDURE — 1036F TOBACCO NON-USER: CPT | Performed by: INTERNAL MEDICINE

## 2019-07-29 PROCEDURE — 93000 ELECTROCARDIOGRAM COMPLETE: CPT | Performed by: INTERNAL MEDICINE

## 2019-07-29 PROCEDURE — G8598 ASA/ANTIPLAT THER USED: HCPCS | Performed by: INTERNAL MEDICINE

## 2019-07-29 PROCEDURE — G8417 CALC BMI ABV UP PARAM F/U: HCPCS | Performed by: INTERNAL MEDICINE

## 2019-07-29 PROCEDURE — 1123F ACP DISCUSS/DSCN MKR DOCD: CPT | Performed by: INTERNAL MEDICINE

## 2019-07-29 NOTE — PROGRESS NOTES
Subjective:      Patient ID: Pierce Chavez is a 69 y.o. male. CC:  Fleeting chest pain. Sob HTN,     HPI:  He is still at home and stable. No excessive SOB. Exercises with home therapy and walks with a walker. Has no CP. ECG today WNL . No orthopnea. Doing well overall.        Allergies   Allergen Reactions    Aspirin      GI  Bleed possible related to aspirin  (but thought to be H Pylori)    Nsaids     Bactrim [Sulfamethoxazole-Trimethoprim] Other (See Comments)     arf    Celebrex [Celecoxib]      tremors    Cymbalta [Duloxetine Hcl]      Mental status change    Pcn [Penicillins]      rash    Vicodin [Hydrocodone-Acetaminophen]     Codeine Nausea And Vomiting        Social History     Socioeconomic History    Marital status:      Spouse name: Not on file    Number of children: Not on file    Years of education: Not on file    Highest education level: Not on file   Occupational History    Not on file   Social Needs    Financial resource strain: Not on file    Food insecurity:     Worry: Not on file     Inability: Not on file    Transportation needs:     Medical: Not on file     Non-medical: Not on file   Tobacco Use    Smoking status: Never Smoker    Smokeless tobacco: Never Used   Substance and Sexual Activity    Alcohol use: No    Drug use: No    Sexual activity: Not on file   Lifestyle    Physical activity:     Days per week: Not on file     Minutes per session: Not on file    Stress: Not on file   Relationships    Social connections:     Talks on phone: Not on file     Gets together: Not on file     Attends Mosque service: Not on file     Active member of club or organization: Not on file     Attends meetings of clubs or organizations: Not on file     Relationship status: Not on file    Intimate partner violence:     Fear of current or ex partner: Not on file     Emotionally abused: Not on file     Physically abused: Not on file     Forced sexual activity: Not on file   Other Topics Concern    Not on file   Social History Narrative    Not on file        Patient has a family history includes Cancer in his paternal aunt and sister; Stroke in his father and mother. Patient  has a past medical history of 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 ([facility] CAD (coronary artery disease), [facility]congestive heart failure) [facility] Chronic kidney disease, COPD (chronic obstructive pulmonary disease) ([facility] Diabetes mellitus ([facility] Hypertension, Hypothyroidism, Mixed hyperlipidemia, MRSA (methicillin resistant staph aureus) culture positive, Neuropathy, Obstructive sleep apnea, Parkinson's disease ([facility] and Type 2 diabetes mellitus without complication ([facility] Current Outpatient Medications   Medication Sig Dispense Refill    metoprolol tartrate (LOPRESSOR) 25 MG tablet TAKE ONE TABLET BY MOUTH TWICE A DAY 60 tablet 5    Cholecalciferol (VITAMIN D3) 3000 units TABS Take 1 tablet by mouth Twice a Week      dapagliflozin (FARXIGA) 5 MG tablet Take 5 mg by mouth every morning      furosemide (LASIX) 40 MG tablet Take 40 mg by mouth 2 times daily      levothyroxine (SYNTHROID) 75 MCG tablet Take 75 mcg by mouth Daily       insulin lispro (HUMALOG) 100 UNIT/ML injection vial Inject into the skin 3 times daily (before meals)      metFORMIN (GLUCOPHAGE) 850 MG tablet Take 850 mg by mouth 2 times daily (with meals)      donepezil (ARICEPT) 5 MG tablet Take 5 mg by mouth nightly      potassium chloride (KLOR-CON M) 10 MEQ extended release tablet Take 10 mEq by mouth 3 times daily      losartan (COZAAR) 50 MG tablet TAKE ONE TABLET BY MOUTH EVERY DAY 30 tablet 5    rosuvastatin (CRESTOR) 20 MG tablet Take 1 tablet by mouth daily 90 tablet 4    carbidopa-levodopa (SINEMET)  MG per tablet Take 1 tablet by mouth 3 times daily.  aspirin 81 MG EC tablet Take 81 mg by mouth daily.       ergocalciferol (ERGOCALCIFEROL) 72702 UNITS capsule Take 50,000 Units by mouth Twice a Week Every Tuesday and Thursday      nitroGLYCERIN (NITROSTAT) 0.4 MG SL tablet Place 0.4 mg under the tongue every 5 minutes as needed. No current facility-administered medications for this visit. Vitals  Weight: 262 lb 9.6 oz (119.1 kg)  Blood Pressure:  118/64   Pulse: 69       Review of Systems    Objective:   Physical Exam   Nursing note and vitals reviewed. Constitutional: He is oriented to person, place, and time. He appears well-developed and well-nourished. No distress. HENT:   Head: Normocephalic and atraumatic. Nose: Nose normal.   Mouth/Throat: Oropharynx is clear and moist. No oropharyngeal exudate. Eyes: Conjunctivae are normal. Pupils are equal, round, and reactive to light. Right eye exhibits no discharge. Left eye exhibits no discharge. No scleral icterus. Neck: Normal range of motion. Neck supple. No JVD present. No tracheal deviation present. No thyromegaly present. Cardiovascular: Normal rate, regular rhythm, normal heart sounds and intact distal pulses. Exam reveals no gallop and no friction rub. No murmur heard. Pulmonary/Chest: Effort normal. No respiratory distress. He has no wheezes. He has no rales. He exhibits no tenderness. Abdominal: Soft. Bowel sounds are normal. He exhibits no distension and no mass. No tenderness. He has no rebound and no guarding. Musculoskeletal: He exhibits trace edema. He exhibits no tenderness. Neurological: He is alert and oriented to person, place, and time. No cranial nerve deficit. Coordination normal.   Skin: Skin is warm and dry. No rash noted. He is not diaphoretic. No erythema.  edema and reddish discoloration of the left leg. Psychiatric: He has a normal mood and affect. His behavior is normal. Judgment and thought content normal.       Assessment:       Diagnosis Orders   1.  Essential hypertension  CBC Auto Differential    Comprehensive Metabolic Panel    Lipid Panel    EKG 12 lead         Plan:      Continue current

## 2019-08-19 NOTE — TELEPHONE ENCOUNTER
Requested Prescriptions     Pending Prescriptions Disp Refills    metoprolol tartrate (LOPRESSOR) 25 MG tablet [Pharmacy Med Name: METOPROLOL TARTRATE 25 MG TAB] 180 tablet 3     Sig: TAKE ONE TABLET BY MOUTH TWICE A DAY       Last Office Visit: 7/29/19    Next Office Visit: 2/3/20    Last Refill: 3/21/19    Last Labs: 3/12/18

## 2020-03-05 ENCOUNTER — TELEPHONE (OUTPATIENT)
Dept: CARDIOLOGY CLINIC | Age: 80
End: 2020-03-05

## 2020-03-05 ENCOUNTER — OFFICE VISIT (OUTPATIENT)
Dept: CARDIOLOGY CLINIC | Age: 80
End: 2020-03-05
Payer: MEDICARE

## 2020-03-05 VITALS
SYSTOLIC BLOOD PRESSURE: 134 MMHG | BODY MASS INDEX: 43.02 KG/M2 | WEIGHT: 256 LBS | HEART RATE: 72 BPM | DIASTOLIC BLOOD PRESSURE: 86 MMHG

## 2020-03-05 PROCEDURE — 1036F TOBACCO NON-USER: CPT | Performed by: INTERNAL MEDICINE

## 2020-03-05 PROCEDURE — 4040F PNEUMOC VAC/ADMIN/RCVD: CPT | Performed by: INTERNAL MEDICINE

## 2020-03-05 PROCEDURE — G8484 FLU IMMUNIZE NO ADMIN: HCPCS | Performed by: INTERNAL MEDICINE

## 2020-03-05 PROCEDURE — 1123F ACP DISCUSS/DSCN MKR DOCD: CPT | Performed by: INTERNAL MEDICINE

## 2020-03-05 PROCEDURE — G8427 DOCREV CUR MEDS BY ELIG CLIN: HCPCS | Performed by: INTERNAL MEDICINE

## 2020-03-05 PROCEDURE — 99214 OFFICE O/P EST MOD 30 MIN: CPT | Performed by: INTERNAL MEDICINE

## 2020-03-05 PROCEDURE — G8417 CALC BMI ABV UP PARAM F/U: HCPCS | Performed by: INTERNAL MEDICINE

## 2020-03-05 NOTE — PROGRESS NOTES
Forced sexual activity: Not on file   Other Topics Concern    Not on file   Social History Narrative    Not on file        Patient has a family history includes Cancer in his paternal aunt and sister; Stroke in his father and mother. Patient  has a past medical history of 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 ([facility] CAD (coronary artery disease), CHF (congestive heart failure) ([facility] Chronic kidney disease, COPD (chronic obstructive pulmonary disease) ([facility] Diabetes mellitus ([facility] Hypertension, Hypothyroidism, Mixed hyperlipidemia, MRSA (methicillin resistant staph aureus) culture positive, Neuropathy, Obstructive sleep apnea, Parkinson's disease ([facility] and Type 2 diabetes mellitus without complication ([facility] Current Outpatient Medications   Medication Sig Dispense Refill    metoprolol tartrate (LOPRESSOR) 25 MG tablet TAKE ONE TABLET BY MOUTH TWICE A  tablet 3    Cholecalciferol (VITAMIN D3) 3000 units TABS Take 1 tablet by mouth Twice a Week      dapagliflozin (FARXIGA) 5 MG tablet Take 5 mg by mouth every morning      furosemide (LASIX) 40 MG tablet Take 40 mg by mouth 2 times daily      levothyroxine (SYNTHROID) 75 MCG tablet Take 75 mcg by mouth Daily       insulin lispro (HUMALOG) 100 UNIT/ML injection vial Inject into the skin 3 times daily (before meals)      metFORMIN (GLUCOPHAGE) 850 MG tablet Take 850 mg by mouth 2 times daily (with meals)      donepezil (ARICEPT) 5 MG tablet Take 5 mg by mouth nightly      potassium chloride (KLOR-CON M) 10 MEQ extended release tablet Take 10 mEq by mouth 3 times daily      rosuvastatin (CRESTOR) 20 MG tablet Take 1 tablet by mouth daily 90 tablet 4    carbidopa-levodopa (SINEMET)  MG per tablet Take 1 tablet by mouth 3 times daily.  aspirin 81 MG EC tablet Take 81 mg by mouth daily.       ergocalciferol (ERGOCALCIFEROL) 20073 UNITS capsule Take 50,000 Units by mouth Twice a Week Every Tuesday and Thursday      nitroGLYCERIN (NITROSTAT) 0.4 MG SL tablet Place 0.4 mg under the tongue every 5 minutes as needed. No current facility-administered medications for this visit. Vitals  Weight: 256 lb (116.1 kg)  Blood Pressure:  118/64   Pulse: 72       Review of Systems    Objective:   Physical Exam   Nursing note and vitals reviewed. Constitutional: He is oriented to person, place, and time. He appears well-developed and well-nourished. No distress. HENT:   Head: Normocephalic and atraumatic. Nose: Nose normal.   Mouth/Throat: Oropharynx is clear and moist. No oropharyngeal exudate. Eyes: Conjunctivae are normal. Pupils are equal, round, and reactive to light. Right eye exhibits no discharge. Left eye exhibits no discharge. No scleral icterus. Neck: Normal range of motion. Neck supple. No JVD present. No tracheal deviation present. No thyromegaly present. Cardiovascular: Normal rate, regular rhythm, normal heart sounds and intact distal pulses. Exam reveals no gallop and no friction rub. No murmur heard. Pulmonary/Chest: Effort normal. No respiratory distress. He has no wheezes. He has no rales. He exhibits no tenderness. Abdominal: Soft. Bowel sounds are normal. He exhibits no distension and no mass. No tenderness. He has no rebound and no guarding. Musculoskeletal: He exhibits trace edema. He exhibits no tenderness. Neurological: He is alert and oriented to person, place, and time. No cranial nerve deficit. Coordination normal.   Skin: Skin is warm and dry. No rash noted. He is not diaphoretic. No erythema.  edema and reddish discoloration of the left leg. Psychiatric: He has a normal mood and affect. His behavior is normal. Judgment and thought content normal.       Assessment:       Diagnosis Orders   1. Mixed hyperlipidemia     2. Essential hypertension     3.  Coronary artery disease involving native coronary artery of native heart without angina pectoris           Plan:      Continue current medications. Stable cardiovascular status. Doing well with 2 xience SAMANTHA in 11- to LAD. Doing well with CPAP treatment for SARIKA mostly but still has had some nosebleeds  HLP:  He continues on fenofibrate daily for high Triglycerides and taking crestor. Will check labs and lipids. Edema:  Stable on lasix. Hypertension:  Seems to have lower BPs at home   Will stop losartan. He has chronic nosebleeds so would avoid warfarin. S/d HF chronic: We discussed entresto at great length and the paradigm HF trial and the patient's clinical scenario as well as LVEF estimates have not changes signficantly in 7 yrs and so we will defer use of entresto at this time.

## 2020-03-05 NOTE — TELEPHONE ENCOUNTER
Faxed Respiratory order faxed to company  Scanned confirmed receipt into [employer]  Will notify patient in office visit today

## 2020-06-19 NOTE — TELEPHONE ENCOUNTER
Requested Prescriptions     Pending Prescriptions Disp Refills    metoprolol tartrate (LOPRESSOR) 25 MG tablet [Pharmacy Med Name: METOPROLOL TARTRATE 25 MG TAB] 180 tablet 3     Sig: TAKE ONE TABLET BY MOUTH TWICE A DAY                  Last Office Visit: 7/29/2019     Next Office Visit: 9/10/20

## 2020-09-04 LAB
A/G RATIO: 1.5 (ref 1.1–2.2)
ALBUMIN SERPL-MCNC: 4.1 G/DL (ref 3.4–5)
ALP BLD-CCNC: 50 U/L (ref 40–129)
ALT SERPL-CCNC: 6 U/L (ref 10–40)
ANION GAP SERPL CALCULATED.3IONS-SCNC: 14 MMOL/L (ref 3–16)
AST SERPL-CCNC: 10 U/L (ref 15–37)
BILIRUB SERPL-MCNC: 0.4 MG/DL (ref 0–1)
BUN BLDV-MCNC: 24 MG/DL (ref 7–20)
CALCIUM SERPL-MCNC: 9.9 MG/DL (ref 8.3–10.6)
CHLORIDE BLD-SCNC: 100 MMOL/L (ref 99–110)
CO2: 25 MMOL/L (ref 21–32)
CREAT SERPL-MCNC: 1 MG/DL (ref 0.8–1.3)
GFR AFRICAN AMERICAN: >60
GFR NON-AFRICAN AMERICAN: >60
GLOBULIN: 2.8 G/DL
GLUCOSE BLD-MCNC: 77 MG/DL (ref 70–99)
HCT VFR BLD CALC: 38.8 % (ref 40.5–52.5)
HEMOGLOBIN: 12.4 G/DL (ref 13.5–17.5)
INR BLD: 1.04 (ref 0.86–1.14)
MCH RBC QN AUTO: 26.6 PG (ref 26–34)
MCHC RBC AUTO-ENTMCNC: 32.1 G/DL (ref 31–36)
MCV RBC AUTO: 82.9 FL (ref 80–100)
PDW BLD-RTO: 16.4 % (ref 12.4–15.4)
PLATELET # BLD: 214 K/UL (ref 135–450)
PMV BLD AUTO: 8.6 FL (ref 5–10.5)
POTASSIUM SERPL-SCNC: 4.9 MMOL/L (ref 3.5–5.1)
PROTHROMBIN TIME: 12.1 SEC (ref 10–13.2)
RBC # BLD: 4.68 M/UL (ref 4.2–5.9)
SODIUM BLD-SCNC: 139 MMOL/L (ref 136–145)
TOTAL PROTEIN: 6.9 G/DL (ref 6.4–8.2)
WBC # BLD: 6.1 K/UL (ref 4–11)

## 2020-10-05 ENCOUNTER — HOSPITAL ENCOUNTER (OUTPATIENT)
Dept: GENERAL RADIOLOGY | Age: 80
Discharge: HOME | End: 2020-10-05
Payer: MEDICARE

## 2020-10-05 PROCEDURE — 74220 X-RAY XM ESOPHAGUS 1CNTRST: CPT

## 2020-10-28 ENCOUNTER — OFFICE VISIT (OUTPATIENT)
Dept: PRIMARY CARE CLINIC | Age: 80
End: 2020-10-28
Payer: MEDICARE

## 2020-10-28 LAB — SARS-COV-2, PCR: NOT DETECTED

## 2020-10-28 PROCEDURE — 99211 OFF/OP EST MAY X REQ PHY/QHP: CPT | Performed by: NURSE PRACTITIONER

## 2020-10-29 NOTE — RESULT ENCOUNTER NOTE

## 2020-11-03 ENCOUNTER — HOSPITAL ENCOUNTER (OUTPATIENT)
Age: 80
Setting detail: OUTPATIENT SURGERY
Discharge: HOME | End: 2020-11-03
Attending: INTERNAL MEDICINE | Admitting: INTERNAL MEDICINE
Payer: MEDICARE

## 2020-11-03 VITALS
SYSTOLIC BLOOD PRESSURE: 178 MMHG | BODY MASS INDEX: 37.77 KG/M2 | RESPIRATION RATE: 14 BRPM | OXYGEN SATURATION: 95 % | TEMPERATURE: 97.9 F | HEIGHT: 69 IN | DIASTOLIC BLOOD PRESSURE: 86 MMHG | HEART RATE: 68 BPM | WEIGHT: 255 LBS

## 2020-11-03 PROCEDURE — 6370000000 HC RX 637 (ALT 250 FOR IP): Performed by: INTERNAL MEDICINE

## 2020-11-03 RX ORDER — LIDOCAINE HYDROCHLORIDE 20 MG/ML
JELLY TOPICAL
Status: COMPLETED | OUTPATIENT
Start: 2020-11-03 | End: 2020-11-03

## 2020-11-03 ASSESSMENT — PAIN - FUNCTIONAL ASSESSMENT: PAIN_FUNCTIONAL_ASSESSMENT: 0-10

## 2020-11-03 NOTE — PROGRESS NOTES
Procedure teaching given to pt. Pt verbalized understanding. Lidocaine inserted into pt. Nares. Vital signs not monitored during procedure due to no sedation given. Manometry probe inserted  Into left nares, pt unable to tolerate probe insertion- pt continuous coughing and gaging, states inability to breath with probe inserted. Procedure aborted after 4 attempts of insertion  By request of patient.  D/C by w/c to wife

## 2020-11-05 ENCOUNTER — OFFICE VISIT (OUTPATIENT)
Dept: CARDIOLOGY CLINIC | Age: 80
End: 2020-11-05
Payer: MEDICARE

## 2020-11-05 VITALS
DIASTOLIC BLOOD PRESSURE: 76 MMHG | WEIGHT: 245.6 LBS | TEMPERATURE: 97.9 F | HEIGHT: 69 IN | SYSTOLIC BLOOD PRESSURE: 136 MMHG | HEART RATE: 73 BPM | BODY MASS INDEX: 36.38 KG/M2

## 2020-11-05 PROCEDURE — G8484 FLU IMMUNIZE NO ADMIN: HCPCS | Performed by: INTERNAL MEDICINE

## 2020-11-05 PROCEDURE — 99214 OFFICE O/P EST MOD 30 MIN: CPT | Performed by: INTERNAL MEDICINE

## 2020-11-05 PROCEDURE — 1123F ACP DISCUSS/DSCN MKR DOCD: CPT | Performed by: INTERNAL MEDICINE

## 2020-11-05 PROCEDURE — G8427 DOCREV CUR MEDS BY ELIG CLIN: HCPCS | Performed by: INTERNAL MEDICINE

## 2020-11-05 PROCEDURE — 1036F TOBACCO NON-USER: CPT | Performed by: INTERNAL MEDICINE

## 2020-11-05 PROCEDURE — 4040F PNEUMOC VAC/ADMIN/RCVD: CPT | Performed by: INTERNAL MEDICINE

## 2020-11-05 PROCEDURE — G8417 CALC BMI ABV UP PARAM F/U: HCPCS | Performed by: INTERNAL MEDICINE

## 2020-11-05 NOTE — PROGRESS NOTES
Subjective:      Patient ID: Poppy Gallardo is a 79 y.o. male. CC:  Fleeting chest pain. Sob HTN,     HPI:  He is still at home and stable. No excessive SOB. Exercises with home therapy and walks with a walker. Has no CP. ECG today WNL . No orthopnea. Doing well overall. Has had some lower bp s lately. Been throwing up with meals but has no chest pain.         Allergies   Allergen Reactions    Aspirin      GI  Bleed possible related to aspirin  (but thought to be H Pylori)    Nsaids     Bactrim [Sulfamethoxazole-Trimethoprim] Other (See Comments)     arf    Celebrex [Celecoxib]      tremors    Cymbalta [Duloxetine Hcl]      Mental status change    Pcn [Penicillins]      rash    Vicodin [Hydrocodone-Acetaminophen]     Codeine Nausea And Vomiting        Social History     Socioeconomic History    Marital status:      Spouse name: Not on file    Number of children: Not on file    Years of education: Not on file    Highest education level: Not on file   Occupational History    Not on file   Social Needs    Financial resource strain: Not on file    Food insecurity     Worry: Not on file     Inability: Not on file    Transportation needs     Medical: Not on file     Non-medical: Not on file   Tobacco Use    Smoking status: Never Smoker    Smokeless tobacco: Never Used   Substance and Sexual Activity    Alcohol use: No    Drug use: No    Sexual activity: Not on file   Lifestyle    Physical activity     Days per week: Not on file     Minutes per session: Not on file    Stress: Not on file   Relationships    Social connections     Talks on phone: Not on file     Gets together: Not on file     Attends [employer] service: Not on file     Active member of club or organization: Not on file     Attends meetings of clubs or organizations: Not on file     Relationship status: Not on file    Intimate partner violence     Fear of current or ex partner: Not on file     Emotionally abused: Not on file     Physically abused: Not on file     Forced sexual activity: Not on file   Other Topics Concern    Not on file   Social History Narrative    Not on file        Patient has a family history includes Cancer in his paternal aunt and sister; Stroke in his father and mother. Patient  has a past medical history of 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 ([facility] CAD (coronary artery disease), CHF (congestive heart failure) ([facility] Chronic kidney disease, COPD (chronic obstructive pulmonary disease) ([facility] Diabetes mellitus ([facility] Hypertension, Hypothyroidism, Mixed hyperlipidemia, MRSA (methicillin resistant staph aureus) culture positive, Neuropathy, Obstructive sleep apnea, Parkinson's disease ([facility] and Type 2 diabetes mellitus without complication ([facility] Current Outpatient Medications   Medication Sig Dispense Refill    metoprolol tartrate (LOPRESSOR) 25 MG tablet TAKE ONE TABLET BY MOUTH TWICE A  tablet 3    Cholecalciferol (VITAMIN D3) 3000 units TABS Take 1 tablet by mouth Twice a Week      furosemide (LASIX) 40 MG tablet Take 40 mg by mouth 2 times daily      levothyroxine (SYNTHROID) 75 MCG tablet Take 75 mcg by mouth Daily       insulin lispro (HUMALOG) 100 UNIT/ML injection vial Inject into the skin 3 times daily (before meals)      metFORMIN (GLUCOPHAGE) 850 MG tablet Take 850 mg by mouth 2 times daily (with meals)      donepezil (ARICEPT) 5 MG tablet Take 5 mg by mouth nightly      potassium chloride (KLOR-CON M) 10 MEQ extended release tablet Take 10 mEq by mouth 3 times daily      rosuvastatin (CRESTOR) 20 MG tablet Take 1 tablet by mouth daily 90 tablet 4    carbidopa-levodopa (SINEMET)  MG per tablet Take 1 tablet by mouth 3 times daily.  ergocalciferol (ERGOCALCIFEROL) 34000 UNITS capsule Take 50,000 Units by mouth Twice a Week Every Tuesday and Thursday      nitroGLYCERIN (NITROSTAT) 0.4 MG SL tablet Place 0.4 mg under the tongue every 5 minutes as needed.         dapagliflozin (FARXIGA) 5 MG tablet Take 5 mg by mouth every morning       No current facility-administered medications for this visit. Vitals  Weight: 245 lb 9.6 oz (111.4 kg)  Blood Pressure:  118/64   Pulse: 73       Exam unchanged from 3/5/20. Objective:   Physical Exam   Nursing note and vitals reviewed. Constitutional: He is oriented to person, place, and time. He appears well-developed and well-nourished. No distress. HENT:   Head: Normocephalic and atraumatic. Nose: Nose normal.   Mouth/Throat: Oropharynx is clear and moist. No oropharyngeal exudate. Eyes: Conjunctivae are normal. Pupils are equal, round, and reactive to light. Right eye exhibits no discharge. Left eye exhibits no discharge. No scleral icterus. Neck: Normal range of motion. Neck supple. No JVD present. No tracheal deviation present. No thyromegaly present. Cardiovascular: Normal rate, regular rhythm, normal heart sounds and intact distal pulses. Exam reveals no gallop and no friction rub. No murmur heard. Pulmonary/Chest: Effort normal. No respiratory distress. He has no wheezes. He has no rales. He exhibits no tenderness. Abdominal: Soft. Bowel sounds are normal. He exhibits no distension and no mass. No tenderness. He has no rebound and no guarding. Musculoskeletal: He exhibits trace edema. He exhibits no tenderness. Neurological: He is alert and oriented to person, place, and time. No cranial nerve deficit. Coordination normal.   Skin: Skin is warm and dry. No rash noted. He is not diaphoretic. No erythema.  edema and reddish discoloration of the left leg. Psychiatric: He has a normal mood and affect. His behavior is normal. Judgment and thought content normal.       Assessment:       Diagnosis Orders   1. Coronary artery disease involving native coronary artery of native heart without angina pectoris     2. Essential hypertension     3.  Mixed hyperlipidemia           Plan:      Continue current medications. Stable cardiovascular status. Doing well with 2 xience SAMANTHA in 11- to LAD. Doing well with CPAP treatment for SARIKA mostly but still has had some nosebleeds  HLP:  He continues on fenofibrate daily for high Triglycerides and taking crestor. Will check labs and lipids. Edema:  Stable on lasix. Hypertension:  Seems to have lower BPs at home   Will stop losartan. He has chronic nosebleeds so would avoid warfarin. S/d HF chronic: stable.

## 2020-11-19 ENCOUNTER — OFFICE VISIT (OUTPATIENT)
Dept: PRIMARY CARE CLINIC | Age: 80
End: 2020-11-19
Payer: MEDICARE

## 2020-11-19 PROCEDURE — 99211 OFF/OP EST MAY X REQ PHY/QHP: CPT | Performed by: NURSE PRACTITIONER

## 2020-11-20 LAB — SARS-COV-2: NOT DETECTED

## 2020-11-23 RX ORDER — INSULIN ASPART 100 [IU]/ML
25-30 INJECTION, SUSPENSION SUBCUTANEOUS EVERY MORNING
COMMUNITY

## 2020-11-23 NOTE — PROGRESS NOTES
Preoperative Screening for Elective Surgery/Invasive Procedures While COVID-19 present in the community     Have you tested positive or have been told to self-isolate for COVID-19 like symptoms within the past 28 days? n   Do you currently have any of the following symptoms? n  o Fever >100.0 F or 99.9 F in immunocompromised patients?n  o New onset cough, shortness of breath or difficulty breathing?n  o New onset sore throat, myalgia (muscle aches and pains), headache, loss of taste/smell or diarrhea?n   Have you had a potential exposure to COVID-19 within the past 14 days by:  o Close contact with a confirmed case?n  o Close contact with a healthcare worker,  or essential infrastructure worker (grocery store, [employer], gas station, public utilities or transportation)? YES  o Do you reside in a congregate setting such as; skilled nursing facility, adult home, correctional facility, homeless shelter or other institutional setting?n  o Have you had recent travel to a known COVID-19 hotspot? Pt resides in [facility] if the patient has a positive screen by answering yes to one or more of the above questions. Patients who test positive or screen positive prior to surgery or on the day of surgery should be evaluated in conjunction with the surgeon/proceduralist/anesthesiologist to determine the urgency of the procedure.

## 2020-11-23 NOTE — PROGRESS NOTES
C-Difficile admission screening and protocol:     * Admitted with diarrhea? n     *Prior history of C-Diff. In last 3 months?n     *Antibiotic use in the past 6-8 weeks?n     *Prior hospitalization or nursing home in the last month? n       [address] time_______900_____        Surgery time____________    Take the following medications with a sip of water:    Do not eat or drink anything after 12:00 midnight prior to your surgery. This includes water chewing gum, mints and ice chips. You may brush your teeth and gargle the morning of your surgery, but do not swallow the water     Please see your family doctor/pediatrician for a history and physical and/or concerning medications. Bring any test results/reports from your physicians office. If you are under the care of a heart doctor or specialist doctor, please be aware that you may be asked to them for clearance    You may be asked to stop blood thinners such as Coumadin, Plavix, Fragmin, Lovenox, etc., or any anti-inflammatories such as:  Aspirin, Ibuprofen, Advil, Naproxen prior to your surgery. We also ask that you stop any OTC medications such as fish oil, vitamin E, glucosamine, garlic, Multivitamins, COQ 10, etc.    We ask that you do not smoke 24 hours prior to surgery  We ask that you do not  drink any alcoholic beverages 24 hours prior to surgery     You must make arrangements for a responsible adult to take you home after your surgery. For your safety you will not be allowed to leave alone or drive yourself home. Your surgery will be cancelled if you do not have a ride home. Also for your safety, it is strongly suggested that someone stay with you the first 24 hours after your surgery. A parent or legal guardian must accompany a child scheduled for surgery and plan to stay at the hospital until the child is discharged. Please do not bring other children with you.     For your comfort, please wear simple loose fitting clothing to the hospital.  Please do not bring valuables. Do not wear any make-up or nail polish on your fingers or toes      For your safety, please do not wear any jewelry or body piercing's on the day of surgery. All jewelry must be removed. If you have dentures, they will be removed before going to operating room. For your convenience, we will provide you with a container. If you wear contact lenses or glasses, they will be removed, please bring a case for them. If you have a living will and a durable power of  for healthcare, please bring in a copy. As part of our patient safety program to minimize surgical site infections, we ask you to do the following:    · Please notify your surgeon if you develop any illness between         now and the  day of your surgery. · This includes a cough, cold, fever, sore throat, nausea,         or vomiting, and diarrhea, etc.  ·  Please notify your surgeon if you experience dizziness, shortness         of breath or blurred vision between now and the time of your surgery. Do not shave your operative site 96 hours prior to surgery. For face and neck surgery, men may use an electric razor 48 hours   prior to surgery. You may shower the night before surgery or the morning of   your surgery with an antibacterial soap. You will need to bring a photo ID and insurance card    [facility] has an onsite pharmacy, would you like to utilize our pharmacy     If you will be staying overnight and use a C-pap machine, please bring   your C-pap to hospital     Our goal is to provide you with excellent care, therefore, visitors will be limited to two(2) in the room at a time so that we may focus on providing this care for you. Please contact pre-admission testing if you have any further questions.                  [facility] phone number:  [phone] PAT fax number:  [phone]  Please note these are generalized instructions for all surgical cases, you may be provided with more specific instructions according to your surgery.

## 2020-11-24 ENCOUNTER — ANESTHESIA EVENT (OUTPATIENT)
Dept: ENDOSCOPY | Age: 80
End: 2020-11-24
Payer: MEDICARE

## 2020-11-25 ENCOUNTER — ANESTHESIA (OUTPATIENT)
Dept: ENDOSCOPY | Age: 80
End: 2020-11-25
Payer: MEDICARE

## 2020-11-25 ENCOUNTER — HOSPITAL ENCOUNTER (OUTPATIENT)
Age: 80
Setting detail: OUTPATIENT SURGERY
Discharge: HOME | End: 2020-11-25
Attending: INTERNAL MEDICINE | Admitting: INTERNAL MEDICINE
Payer: MEDICARE

## 2020-11-25 VITALS
OXYGEN SATURATION: 100 % | DIASTOLIC BLOOD PRESSURE: 61 MMHG | RESPIRATION RATE: 1 BRPM | SYSTOLIC BLOOD PRESSURE: 110 MMHG

## 2020-11-25 VITALS
WEIGHT: 248 LBS | OXYGEN SATURATION: 97 % | TEMPERATURE: 97 F | RESPIRATION RATE: 16 BRPM | BODY MASS INDEX: 36.73 KG/M2 | DIASTOLIC BLOOD PRESSURE: 84 MMHG | HEIGHT: 69 IN | HEART RATE: 74 BPM | SYSTOLIC BLOOD PRESSURE: 160 MMHG

## 2020-11-25 LAB
GLUCOSE BLD-MCNC: 107 MG/DL (ref 70–99)
GLUCOSE BLD-MCNC: 112 MG/DL (ref 70–99)
GLUCOSE BLD-MCNC: 77 MG/DL (ref 70–99)
PERFORMED ON: ABNORMAL
PERFORMED ON: ABNORMAL
PERFORMED ON: NORMAL

## 2020-11-25 PROCEDURE — 7100000001 HC PACU RECOVERY - ADDTL 15 MIN: Performed by: INTERNAL MEDICINE

## 2020-11-25 PROCEDURE — 3700000000 HC ANESTHESIA ATTENDED CARE: Performed by: INTERNAL MEDICINE

## 2020-11-25 PROCEDURE — 2580000003 HC RX 258: Performed by: ANESTHESIOLOGY

## 2020-11-25 PROCEDURE — 6360000002 HC RX W HCPCS: Performed by: INTERNAL MEDICINE

## 2020-11-25 PROCEDURE — 2500000003 HC RX 250 WO HCPCS: Performed by: NURSE ANESTHETIST, CERTIFIED REGISTERED

## 2020-11-25 PROCEDURE — 7100000011 HC PHASE II RECOVERY - ADDTL 15 MIN: Performed by: INTERNAL MEDICINE

## 2020-11-25 PROCEDURE — 7100000010 HC PHASE II RECOVERY - FIRST 15 MIN: Performed by: INTERNAL MEDICINE

## 2020-11-25 PROCEDURE — 3700000001 HC ADD 15 MINUTES (ANESTHESIA): Performed by: INTERNAL MEDICINE

## 2020-11-25 PROCEDURE — 2709999900 HC NON-CHARGEABLE SUPPLY: Performed by: INTERNAL MEDICINE

## 2020-11-25 PROCEDURE — 7100000000 HC PACU RECOVERY - FIRST 15 MIN: Performed by: INTERNAL MEDICINE

## 2020-11-25 PROCEDURE — 6360000002 HC RX W HCPCS: Performed by: NURSE ANESTHETIST, CERTIFIED REGISTERED

## 2020-11-25 PROCEDURE — 3609013800 HC EGD SUBMUCOSAL/BOTOX INJECTION: Performed by: INTERNAL MEDICINE

## 2020-11-25 RX ORDER — DEXTROSE MONOHYDRATE 25 G/50ML
12.5 INJECTION, SOLUTION INTRAVENOUS ONCE
Status: COMPLETED | OUTPATIENT
Start: 2020-11-25 | End: 2020-11-25

## 2020-11-25 RX ORDER — SODIUM CHLORIDE 9 MG/ML
INJECTION, SOLUTION INTRAVENOUS CONTINUOUS
Status: DISCONTINUED | OUTPATIENT
Start: 2020-11-25 | End: 2020-11-25 | Stop reason: HOSPADM

## 2020-11-25 RX ORDER — PROPOFOL 10 MG/ML
INJECTION, EMULSION INTRAVENOUS PRN
Status: DISCONTINUED | OUTPATIENT
Start: 2020-11-25 | End: 2020-11-25 | Stop reason: SDUPTHER

## 2020-11-25 RX ORDER — SODIUM CHLORIDE 0.9 % (FLUSH) 0.9 %
10 SYRINGE (ML) INJECTION EVERY 12 HOURS SCHEDULED
Status: DISCONTINUED | OUTPATIENT
Start: 2020-11-25 | End: 2020-11-25 | Stop reason: HOSPADM

## 2020-11-25 RX ORDER — DEXTROSE MONOHYDRATE 25 G/50ML
INJECTION, SOLUTION INTRAVENOUS
Status: DISCONTINUED
Start: 2020-11-25 | End: 2020-11-25 | Stop reason: HOSPADM

## 2020-11-25 RX ORDER — ONDANSETRON 2 MG/ML
4 INJECTION INTRAMUSCULAR; INTRAVENOUS
Status: DISCONTINUED | OUTPATIENT
Start: 2020-11-25 | End: 2020-11-25 | Stop reason: HOSPADM

## 2020-11-25 RX ORDER — LIDOCAINE HYDROCHLORIDE 20 MG/ML
INJECTION, SOLUTION EPIDURAL; INFILTRATION; INTRACAUDAL; PERINEURAL PRN
Status: DISCONTINUED | OUTPATIENT
Start: 2020-11-25 | End: 2020-11-25 | Stop reason: SDUPTHER

## 2020-11-25 RX ORDER — SODIUM CHLORIDE 0.9 % (FLUSH) 0.9 %
10 SYRINGE (ML) INJECTION PRN
Status: DISCONTINUED | OUTPATIENT
Start: 2020-11-25 | End: 2020-11-25 | Stop reason: HOSPADM

## 2020-11-25 RX ORDER — LABETALOL HYDROCHLORIDE 5 MG/ML
5 INJECTION, SOLUTION INTRAVENOUS EVERY 10 MIN PRN
Status: DISCONTINUED | OUTPATIENT
Start: 2020-11-25 | End: 2020-11-25 | Stop reason: HOSPADM

## 2020-11-25 RX ORDER — PROMETHAZINE HYDROCHLORIDE 25 MG/ML
6.25 INJECTION, SOLUTION INTRAMUSCULAR; INTRAVENOUS
Status: DISCONTINUED | OUTPATIENT
Start: 2020-11-25 | End: 2020-11-25 | Stop reason: HOSPADM

## 2020-11-25 RX ADMIN — LIDOCAINE HYDROCHLORIDE 80 MG: 20 INJECTION, SOLUTION EPIDURAL; INFILTRATION; INTRACAUDAL; PERINEURAL at 10:31

## 2020-11-25 RX ADMIN — DEXTROSE MONOHYDRATE 12.5 G: 25 INJECTION, SOLUTION INTRAVENOUS at 09:46

## 2020-11-25 RX ADMIN — PROPOFOL 30 MG: 10 INJECTION, EMULSION INTRAVENOUS at 10:39

## 2020-11-25 RX ADMIN — PROPOFOL 30 MG: 10 INJECTION, EMULSION INTRAVENOUS at 10:36

## 2020-11-25 RX ADMIN — SODIUM CHLORIDE: 9 INJECTION, SOLUTION INTRAVENOUS at 09:37

## 2020-11-25 RX ADMIN — PROPOFOL 70 MG: 10 INJECTION, EMULSION INTRAVENOUS at 10:31

## 2020-11-25 RX ADMIN — PROPOFOL 30 MG: 10 INJECTION, EMULSION INTRAVENOUS at 10:34

## 2020-11-25 ASSESSMENT — PULMONARY FUNCTION TESTS
PIF_VALUE: 1
PIF_VALUE: 0
PIF_VALUE: 1
PIF_VALUE: 1

## 2020-11-25 ASSESSMENT — PAIN SCALES - GENERAL
PAINLEVEL_OUTOF10: 0

## 2020-11-25 ASSESSMENT — PAIN - FUNCTIONAL ASSESSMENT: PAIN_FUNCTIONAL_ASSESSMENT: 0-10

## 2020-11-25 NOTE — ANESTHESIA PRE PROCEDURE
[facility] Department of Anesthesiology  Pre-Anesthesia Evaluation/Consultation       Name:  Zelalem Cortes  : 1940  Age:  79 y.o.                                            MRN:  [id number]  Date: 2020           Surgeon: Surgeon(s):  Janie Sarabia.,     Procedure: Procedure(s):  ESOPHAGOGASTRODUODENOSCOPY WITH BOTOX INJECTION     Allergies   Allergen Reactions    Aspirin      GI  Bleed possible related to aspirin  (but thought to be H Pylori)    Nsaids     Bactrim [Sulfamethoxazole-Trimethoprim] Other (See Comments)     arf    Celebrex [Celecoxib]      tremors    Cymbalta [Duloxetine Hcl]      Mental status change    Pcn [Penicillins]      rash    Codeine Nausea And Vomiting    Vicodin [Hydrocodone-Acetaminophen] Nausea And Vomiting     Patient Active Problem List   Diagnosis    Chest pain    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 ([address]    Pulmonary embolism ([address]    Coronary artery disease involving native heart without angina pectoris    Presence of drug coated stent in anterior descending branch of left coronary artery    Sepsis ([address]    Syncope    Hypothyroid    Obstructive sleep apnea    Gas gangrene ([address]    Diabetic infection of right foot ([address]    Diabetic polyneuropathy associated with type 2 diabetes mellitus ([address]    Right foot infection    LULÚ (acute kidney injury) ([address]    Acute diastolic CHF (congestive heart failure), NYHA class 2 (HCC)    Encephalopathy    Cellulitis    Cellulitis of right lower extremity    History of MRSA infection    Epistaxis    Community acquired pneumonia of left lower lobe of lung    Posterior epistaxis    Essential hypertension    Mixed hyperlipidemia     Past Medical History:   Diagnosis Date    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 ([address]     Dr. Zaki Mcneill CAD (coronary artery disease)     CHF (congestive heart failure) (HCC)     Chronic kidney disease     upon hospitalization due to bactrim allergy    COPD (chronic obstructive pulmonary disease) ([facility]     Diabetes mellitus ([facility]     Hypertension     Hypothyroidism     Mixed hyperlipidemia     MRSA (methicillin resistant staph aureus) culture positive 07/24/2017    foot    Neuropathy     Obstructive sleep apnea 10/03/2014    does not use cpap machine    Oxygen dependent     has o2 at home if needed    Parkinson's disease ([facility] 1965    Type 2 diabetes mellitus without complication ([facility]      Past Surgical History:   Procedure Laterality Date    CARDIAC CATHETERIZATION      CERVICAL FUSION  1981    COLON SURGERY      1980's    COLONOSCOPY      FOOT SURGERY Right 07/24/2017    INCISION AND DRAINAGE RIGHT FOOT WITH REMOVAL NECROTIC BONE    FOOT SURGERY Right 07/27/2017    : TRANSMETATARSAL AMPUTATION RIGHT FOOT     HERNIA REPAIR      SHOULDER SURGERY      right     Social History     Tobacco Use    Smoking status: Never Smoker    Smokeless tobacco: Never Used   Substance Use Topics    Alcohol use: No    Drug use: No     Medications  No current facility-administered medications on file prior to encounter.       Current Outpatient Medications on File Prior to Encounter   Medication Sig Dispense Refill    insulin regular (HUMULIN R;NOVOLIN R) 100 UNIT/ML injection Inject into the skin 3 times daily (before meals) Per sliding scale      insulin aspart protamine-insulin aspart (NOVOLOG MIX 70/30) (70-30) 100 UNIT/ML injection Inject into the skin daily Per sliding scale      carbidopa-levodopa (SINEMET)  MG per tablet Take 1 tablet by mouth 2 times daily      metoprolol tartrate (LOPRESSOR) 25 MG tablet TAKE ONE TABLET BY MOUTH TWICE A  tablet 3    Cholecalciferol (VITAMIN D3) 3000 units TABS Take 1 tablet by mouth Twice a Week      furosemide (LASIX) 40 MG tablet Take 40 mg by mouth daily       levothyroxine (SYNTHROID) 75 MCG tablet Take 75 mcg by mouth Daily       metFORMIN (GLUCOPHAGE) 850 MG tablet Take 850 mg by mouth 2 times daily (with calculated from the following:    Height as of this encounter: 5' 9\" (1.753 m). Weight as of this encounter: 248 lb (112.5 kg). CBC   Lab Results   Component Value Date    WBC 6.1 09/04/2020    RBC 4.68 09/04/2020    HGB 12.4 09/04/2020    HCT 38.8 09/04/2020    MCV 82.9 09/04/2020    RDW 16.4 09/04/2020     09/04/2020     CMP    Lab Results   Component Value Date     09/04/2020    K 4.9 09/04/2020    K 4.5 03/10/2018     09/04/2020    CO2 25 09/04/2020    BUN 24 09/04/2020    CREATININE 1.0 09/04/2020    GFRAA >60 09/04/2020    GFRAA >60 06/12/2013    AGRATIO 1.5 09/04/2020    LABGLOM >60 09/04/2020    GLUCOSE 77 09/04/2020    PROT 6.9 09/04/2020    PROT 7.7 03/29/2012    CALCIUM 9.9 09/04/2020    BILITOT 0.4 09/04/2020    ALKPHOS 50 09/04/2020    AST 10 09/04/2020    ALT 6 09/04/2020     BMP    Lab Results   Component Value Date     09/04/2020    K 4.9 09/04/2020    K 4.5 03/10/2018     09/04/2020    CO2 25 09/04/2020    BUN 24 09/04/2020    CREATININE 1.0 09/04/2020    CALCIUM 9.9 09/04/2020    GFRAA >60 09/04/2020    GFRAA >60 06/12/2013    LABGLOM >60 09/04/2020    GLUCOSE 77 09/04/2020     POCGlucose  No results for input(s): GLUCOSE in the last 72 hours.    Coags    Lab Results   Component Value Date    PROTIME 12.1 09/04/2020    INR 1.04 09/04/2020    APTT 27.8 47/08/7702     HCG (If Applicable) No results found for: PREGTESTUR, PREGSERUM, HCG, HCGQUANT   ABGs   Lab Results   Component Value Date    PHART 7.44 08/08/2017    PO2ART 77.0 08/08/2017    HWM1JZF 36 08/08/2017    PUO3BXV 24 08/08/2017    BEART 0.1 08/08/2017    E6CECCDJ 95 08/08/2017      Type & Screen (If Applicable)  No results found for: LABABO, LABRH                         BMI: Wt Readings from Last 3 Encounters:       NPO Status:   Date of last liquid consumption: 11/24/20   Time of last liquid consumption: 2300   Date of last solid food consumption: 11/24/20      Time of last solid consumption: 2300 Anesthesia Evaluation  Patient summary reviewed no history of anesthetic complications:   Airway: Mallampati: III  TM distance: >3 FB   Neck ROM: full   Dental:    (+) upper dentures      Pulmonary:normal exam    (+) COPD:  sleep apnea:  asthma:                            Cardiovascular:  Exercise tolerance: poor (<4 METS),   (+) hypertension:, CAD:, CABG/stent (Stent 2011):, CHF (EF 40):,       ECG reviewed  Rhythm: regular  Rate: normal  Echocardiogram reviewed         Beta Blocker:  Dose within 24 Hrs         Neuro/Psych:   (+) neuromuscular disease: Parkinson's disease,             GI/Hepatic/Renal:   (+) GERD:,           Endo/Other:    (+) DiabetesType II DM, using insulin, hypothyroidism::., .                 Abdominal:   (+) obese,         Vascular:   + PE (Hx). Anesthesia Plan      MAC     ASA 3       Induction: intravenous. Anesthetic plan and risks discussed with patient and spouse. Plan discussed with CRNA. This pre-anesthesia assessment may be used as a history and physical.    DOS STAFF ADDENDUM:    Pt seen and examined, chart reviewed (including anesthesia, drug and allergy history). No interval changes to history and physical examination. Anesthetic plan, risks, benefits, alternatives, and personnel involved discussed with patient. Questions and concerns addressed. Patient(family) verbalized an understanding and agrees to proceed.       Abraham Moreau MD  November 25, 2020  9:32 AM

## 2020-11-25 NOTE — H&P
Pre-operative History and Physical    Patient: Dylon Obando  : 1940  Acct#:     Intended Procedure:  EGD with botox    HISTORY OF PRESENT ILLNESS:  The patient is a 79 y.o. male  who presents for/due to suspected achalasia (seen on esophagram but intolerant of manometry). Past Medical History:        Diagnosis Date    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 ([address]     Dr. Bassem May CAD (coronary artery disease)     CH[facility]congestive heart failure) [facility])     Chronic kidney disease     upon hospitalization due to bactrim allergy    COPD (chronic obstructive pulmonary disease) ([address]     Diabetes mellitus ([address]     Hypertension     Hypothyroidism     Mixed hyperlipidemia     MRSA (methicillin resistant staph aureus) culture positive 2017    foot    Neuropathy     Obstructive sleep apnea 10/03/2014    does not use cpap machine    Oxygen dependent     has o2 at home if needed    Parkinson's disease ([address] 1965    Type 2 diabetes mellitus without complication ([facility]      Past Surgical History:        Procedure Laterality Date    CARDIAC CATHETERIZATION      CERVICAL FUSION      COLON SURGERY          COLONOSCOPY      FOOT SURGERY Right 2017    INCISION AND DRAINAGE RIGHT FOOT WITH REMOVAL NECROTIC BONE    FOOT SURGERY Right 2017    : TRANSMETATARSAL AMPUTATION RIGHT FOOT     HERNIA REPAIR      SHOULDER SURGERY      right     Medications Prior to Admission:   No current facility-administered medications on file prior to encounter.       Current Outpatient Medications on File Prior to Encounter   Medication Sig Dispense Refill    Carbetapentane-Guaifenesin (CARBA-XP PO) Take by mouth      insulin regular (HUMULIN R;NOVOLIN R) 100 UNIT/ML injection Inject into the skin 3 times daily (before meals) Per sliding scale      insulin aspart protamine-insulin aspart (NOVOLOG MIX 70/30) (70-30) 100 UNIT/ML injection Inject into the skin daily Per sliding scale      carbidopa-levodopa (SINEMET)  MG per tablet Take 1 tablet by mouth 2 times daily      metoprolol tartrate (LOPRESSOR) 25 MG tablet TAKE ONE TABLET BY MOUTH TWICE A  tablet 3    Cholecalciferol (VITAMIN D3) 3000 units TABS Take 1 tablet by mouth Twice a Week      furosemide (LASIX) 40 MG tablet Take 40 mg by mouth daily       levothyroxine (SYNTHROID) 75 MCG tablet Take 75 mcg by mouth Daily       metFORMIN (GLUCOPHAGE) 850 MG tablet Take 850 mg by mouth 2 times daily (with meals)      donepezil (ARICEPT) 5 MG tablet Take 5 mg by mouth nightly      potassium chloride (KLOR-CON M) 10 MEQ extended release tablet Take 10 mEq by mouth daily       rosuvastatin (CRESTOR) 20 MG tablet Take 1 tablet by mouth daily 90 tablet 4    ergocalciferol (ERGOCALCIFEROL) 74291 UNITS capsule Take 50,000 Units by mouth Twice a Week Every Tuesday and Thursday      nitroGLYCERIN (NITROSTAT) 0.4 MG SL tablet Place 0.4 mg under the tongue every 5 minutes as needed. Allergies:  Aspirin; Nsaids; Bactrim [sulfamethoxazole-trimethoprim]; Celebrex [celecoxib]; Cymbalta [duloxetine hcl]; Pcn [penicillins]; Codeine; and Vicodin [hydrocodone-acetaminophen]    Social History:   TOBACCO:   reports that he has never smoked. He has never used smokeless tobacco.  ETOH:   reports no history of alcohol use. DRUGS:   reports no history of drug use. PHYSICAL EXAM:      Vital Signs: Ht 5' 9\" (1.753 m)   Wt 248 lb (112.5 kg)   BMI 36.62 kg/m²    Airway: No stridor or wheezing noted. Good air movement  Pulmonary: without wheezes.   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 I have explained the risk, benefits, and alternatives to the procedure; the patient understands and agrees to proceed. The patient was counseled at length about the risks of jenny Covid-19 during their perioperative period and any recovery window from their procedure. The patient was made aware that jenny Covid-19  may worsen their prognosis for recovering from their procedure  and lend to a higher morbidity and/or mortality risk. All material risks, benefits, and reasonable alternatives including postponing the procedure were discussed. The patient does wish to proceed with the procedure at this time.       Holly Armijo, DO  11/25/2020

## 2020-11-25 NOTE — PROGRESS NOTES
To pacu from endo. Pt wakes briefly. Denies pain. Abd rounded and soft. IV infusing. Monitor in sinus rhythm.

## 2020-11-25 NOTE — OP NOTE
Endoscopy Note    Patient: Davey Salter  : 1940  Acct#:     Procedure: Esophagogastroduodenoscopy with submucosal injection of Botox                         Date:  2020     Surgeon:   Holly Armijo DO    Referring Physician:  Iram Magana MD    Indications: This is a 79y.o. year old male who presents today with dysphagia and suspected achalasia. Patient could not tolerate esophageal manometry. Postoperative Diagnosis: 1) The patient's proximal and mid esophagus were dilated and there appeared to be a spastic segment at the gastroesophageal junction at 43 cm from the incisors. A submucosal injection of Botox was performed. 25 unit aliquots were injected in 4 quadrants, for a total of 100units injected to the lower esophageal sphincter. 2) There was no mass noted in the gastric cardia. No significant hiatal hernia was noted. 3)  The remainder of the stomach was normal. 4) There was a normal appearing duodenal bulb and second portion of the duodenum. Anesthesia:  The patient was administered TIVA per anesthesiology team.  Please see their operative records for full details of medications administered. Consent:  The patient or their legal guardian has signed an informed consent, and is aware of the potential risks, benefits, alternatives, and potential complications of this procedure. These include, but are not limited to hemorrhage, bleeding, post procedural pain, perforation, phlebitis, aspiration, hypotension, hypoxia, cardiovascular events such as arryhthmia, and possibly death. Description of Procedure: The patient was then taken to the endoscopy suite, placed in the left lateral decubitus position and the above IV sedation was administrered. The Olympus video endoscope was placed through the patient's oropharynx without difficulty to the extent of the 2nd portion of the duodenum.   Both forward and retroflexed views of the stomach were obtained. Findings:    1) The patient's proximal and mid esophagus were dilated and there appeared to be a spastic segment at the gastroesophageal junction at 43 cm from the incisors. A submucosal injection of Botox was performed. 25 unit aliquots were injected in 4 quadrants, for a total of 100units injected to the lower esophageal sphincter. 2) There was no mass noted in the gastric cardia. No significant hiatal hernia was noted. 3)  The remainder of the stomach was normal. 4) There was a normal appearing duodenal bulb and second portion of the duodenum. The scope was then withdrawn back into the stomach, it was decompressed, and the scope was completely withdrawn. The patient tolerated the procedure well and was taken to the post anesthesia care unit in good condition. Estimated blood loss: <5ml    * No specimens in log *        Impression:   1) See post procedure diagnoses    Recommendations:   1) Advance diet as tolerated  2) Clinical follow up with me in 2-3 weeks  3) The patient had a a submucosal injection of botulinum toxin performed today. The patient is instructed to call with any post procedural pain, vomiting of blood, dark black stools, shortness of breath, fever, chills, or other abnormal symptoms.           Candice Shelby DO  [address] [facility]

## 2020-11-25 NOTE — ANESTHESIA POSTPROCEDURE EVALUATION
[facility] Department of Anesthesiology  Post-Anesthesia Note       Name:  Chapin Patterson                                  Age:  79 y.o. MRN:  [id number]     Last Vitals & Oxygen Saturation: BP (!) 160/84   Pulse 74   Temp 97 °F (36.1 °C) (Temporal)   Resp 16   Ht 5' 9\" (1.753 m)   Wt 248 lb (112.5 kg)   SpO2 97%   BMI 36.62 kg/m²   Patient Vitals for the past 4 hrs:   BP Temp Temp src Pulse Resp SpO2 Height Weight   11/25/20 1245 (!) 160/84 -- -- 74 16 97 % -- --   11/25/20 1215 (!) 148/73 97 °F (36.1 °C) Temporal 68 16 99 % -- --   11/25/20 1145 (!) 154/82 -- -- 65 24 98 % -- --   11/25/20 1130 (!) 151/78 -- -- 68 24 98 % -- --   11/25/20 1122 -- 97.8 °F (36.6 °C) Temporal -- -- -- -- --   11/25/20 1115 (!) 145/79 -- -- 69 24 95 % -- --   11/25/20 1100 130/79 -- -- 68 26 96 % -- --   11/25/20 1056 116/68 -- -- 73 30 93 % -- --   11/25/20 1050 117/68 97.7 °F (36.5 °C) Temporal 78 29 96 % -- --   11/25/20 0923 (!) 179/71 97.8 °F (36.6 °C) Temporal 70 17 97 % 5' 9\" (1.753 m) 248 lb (112.5 kg)       Level of consciousness:  Awake, alert    Respiratory: Respirations easy, no distress. Stable. Cardiovascular: Hemodynamically stable. Hydration: Adequate. PONV: Adequately managed. Post-op pain: Adequately controlled. Post-op assessment: Tolerated anesthetic well without complication. Complications:  None.     Tanya Gonzalez MD  November 25, 2020   1:06 PM

## 2021-04-12 ENCOUNTER — APPOINTMENT (OUTPATIENT)
Dept: GENERAL RADIOLOGY | Age: 81
DRG: 291 | End: 2021-04-12
Payer: MEDICARE

## 2021-04-12 ENCOUNTER — HOSPITAL ENCOUNTER (INPATIENT)
Age: 81
LOS: 5 days | Discharge: HOME | DRG: 291 | End: 2021-04-17
Attending: EMERGENCY MEDICINE | Admitting: INTERNAL MEDICINE
Payer: MEDICARE

## 2021-04-12 DIAGNOSIS — R06.00 DYSPNEA, UNSPECIFIED TYPE: Primary | ICD-10-CM

## 2021-04-12 PROBLEM — I50.43 ACUTE ON CHRONIC COMBINED SYSTOLIC AND DIASTOLIC CONGESTIVE HEART FAILURE (HCC): Status: ACTIVE | Noted: 2021-04-12

## 2021-04-12 LAB
A/G RATIO: 0.9 (ref 1.1–2.2)
ALBUMIN SERPL-MCNC: 3.7 G/DL (ref 3.4–5)
ALP BLD-CCNC: 71 U/L (ref 40–129)
ALT SERPL-CCNC: 7 U/L (ref 10–40)
ANION GAP SERPL CALCULATED.3IONS-SCNC: 13 MMOL/L (ref 3–16)
AST SERPL-CCNC: 8 U/L (ref 15–37)
BASE EXCESS VENOUS: 3.2 MMOL/L (ref -2–3)
BASOPHILS ABSOLUTE: 0 K/UL (ref 0–0.2)
BASOPHILS RELATIVE PERCENT: 0.1 %
BILIRUB SERPL-MCNC: 0.6 MG/DL (ref 0–1)
BUN BLDV-MCNC: 26 MG/DL (ref 7–20)
CALCIUM SERPL-MCNC: 9.3 MG/DL (ref 8.3–10.6)
CARBOXYHEMOGLOBIN: 1.1 % (ref 0–1.5)
CHLORIDE BLD-SCNC: 98 MMOL/L (ref 99–110)
CO2: 23 MMOL/L (ref 21–32)
CREAT SERPL-MCNC: 1.3 MG/DL (ref 0.8–1.3)
EKG ATRIAL RATE: 93 BPM
EKG DIAGNOSIS: NORMAL
EKG Q-T INTERVAL: 364 MS
EKG QRS DURATION: 94 MS
EKG QTC CALCULATION (BAZETT): 462 MS
EKG R AXIS: -19 DEGREES
EKG T AXIS: 124 DEGREES
EKG VENTRICULAR RATE: 97 BPM
EOSINOPHILS ABSOLUTE: 0 K/UL (ref 0–0.6)
EOSINOPHILS RELATIVE PERCENT: 0.1 %
GFR AFRICAN AMERICAN: >60
GFR NON-AFRICAN AMERICAN: 53
GLOBULIN: 4 G/DL
GLUCOSE BLD-MCNC: 143 MG/DL (ref 70–99)
HCO3 VENOUS: 28.6 MMOL/L (ref 24–28)
HCT VFR BLD CALC: 36.2 % (ref 40.5–52.5)
HEMOGLOBIN, VEN, REDUCED: 84.3 %
HEMOGLOBIN: 11.5 G/DL (ref 13.5–17.5)
LYMPHOCYTES ABSOLUTE: 0.3 K/UL (ref 1–5.1)
LYMPHOCYTES RELATIVE PERCENT: 3.9 %
MCH RBC QN AUTO: 24.6 PG (ref 26–34)
MCHC RBC AUTO-ENTMCNC: 31.7 G/DL (ref 31–36)
MCV RBC AUTO: 77.6 FL (ref 80–100)
METHEMOGLOBIN VENOUS: 0.3 % (ref 0–1.5)
MONOCYTES ABSOLUTE: 0.6 K/UL (ref 0–1.3)
MONOCYTES RELATIVE PERCENT: 7.5 %
NEUTROPHILS ABSOLUTE: 7.6 K/UL (ref 1.7–7.7)
NEUTROPHILS RELATIVE PERCENT: 88.4 %
O2 SAT, VEN: 15 %
PCO2, VEN: 46 MMHG (ref 41–51)
PDW BLD-RTO: 19.1 % (ref 12.4–15.4)
PH VENOUS: 7.4 (ref 7.35–7.45)
PLATELET # BLD: 223 K/UL (ref 135–450)
PMV BLD AUTO: 7.9 FL (ref 5–10.5)
PO2, VEN: <30 MMHG (ref 25–40)
POTASSIUM REFLEX MAGNESIUM: 4.2 MMOL/L (ref 3.5–5.1)
PRO-BNP: 7454 PG/ML (ref 0–449)
RBC # BLD: 4.66 M/UL (ref 4.2–5.9)
SODIUM BLD-SCNC: 134 MMOL/L (ref 136–145)
TCO2 CALC VENOUS: 30 MMOL/L
TOTAL PROTEIN: 7.7 G/DL (ref 6.4–8.2)
TROPONIN: <0.01 NG/ML
WBC # BLD: 8.6 K/UL (ref 4–11)

## 2021-04-12 PROCEDURE — U0005 INFEC AGEN DETEC AMPLI PROBE: HCPCS

## 2021-04-12 PROCEDURE — 93005 ELECTROCARDIOGRAM TRACING: CPT | Performed by: STUDENT IN AN ORGANIZED HEALTH CARE EDUCATION/TRAINING PROGRAM

## 2021-04-12 PROCEDURE — 71046 X-RAY EXAM CHEST 2 VIEWS: CPT

## 2021-04-12 PROCEDURE — 83880 ASSAY OF NATRIURETIC PEPTIDE: CPT

## 2021-04-12 PROCEDURE — 80053 COMPREHEN METABOLIC PANEL: CPT

## 2021-04-12 PROCEDURE — U0003 INFECTIOUS AGENT DETECTION BY NUCLEIC ACID (DNA OR RNA); SEVERE ACUTE RESPIRATORY SYNDROME CORONAVIRUS 2 (SARS-COV-2) (CORONAVIRUS DISEASE [COVID-19]), AMPLIFIED PROBE TECHNIQUE, MAKING USE OF HIGH THROUGHPUT TECHNOLOGIES AS DESCRIBED BY CMS-2020-01-R: HCPCS

## 2021-04-12 PROCEDURE — 93005 ELECTROCARDIOGRAM TRACING: CPT | Performed by: EMERGENCY MEDICINE

## 2021-04-12 PROCEDURE — 84484 ASSAY OF TROPONIN QUANT: CPT

## 2021-04-12 PROCEDURE — 85025 COMPLETE CBC W/AUTO DIFF WBC: CPT

## 2021-04-12 PROCEDURE — 99284 EMERGENCY DEPT VISIT MOD MDM: CPT

## 2021-04-12 PROCEDURE — 82803 BLOOD GASES ANY COMBINATION: CPT

## 2021-04-12 PROCEDURE — 1200000000 HC SEMI PRIVATE

## 2021-04-12 PROCEDURE — 6360000002 HC RX W HCPCS: Performed by: EMERGENCY MEDICINE

## 2021-04-12 RX ORDER — FUROSEMIDE 10 MG/ML
60 INJECTION INTRAMUSCULAR; INTRAVENOUS ONCE
Status: COMPLETED | OUTPATIENT
Start: 2021-04-12 | End: 2021-04-12

## 2021-04-12 RX ORDER — LOSARTAN POTASSIUM 50 MG/1
50 TABLET ORAL DAILY
COMMUNITY

## 2021-04-12 RX ORDER — FINASTERIDE 5 MG/1
5 TABLET, FILM COATED ORAL NIGHTLY
COMMUNITY

## 2021-04-12 RX ADMIN — FUROSEMIDE 60 MG: 10 INJECTION, SOLUTION INTRAMUSCULAR; INTRAVENOUS at 20:08

## 2021-04-12 ASSESSMENT — ENCOUNTER SYMPTOMS
CONSTIPATION: 0
ABDOMINAL DISTENTION: 0
ABDOMINAL PAIN: 0
APNEA: 0
SHORTNESS OF BREATH: 1
WHEEZING: 0
COLOR CHANGE: 0
CHEST TIGHTNESS: 0
BLOOD IN STOOL: 0
ANAL BLEEDING: 0
BACK PAIN: 0
SORE THROAT: 0

## 2021-04-12 NOTE — ED NOTES
Labs drawn at triage. IV access, unsuccessful. However, blood obtained.       Tessa Perry RN  04/12/21 4724

## 2021-04-12 NOTE — ED NOTES
PT'S WIFE UPDATED. WOULD LIKE A CALL BACK WHEN PT GET ADMITTING ROOM ASSIGNED.       Christel Celis RN  04/12/21 5315

## 2021-04-12 NOTE — PROGRESS NOTES
List of Home Medications the patient is currently taking is complete. Home Medication list in EPIC updated to reflect changes noted below. Source of medications in list is conversation with patient's wife. Please note:   Per patient's wife he has not had any medications or food today.  Patient takes Novolog 70/30 - 30 units QD and Novolin R 16-20 units with meals per sliding scale. The following medications were added to admission medication list:  Finasteride 5 mg QHS  Losartan 50 mg QD    The following medications were removed from admission medication list:  Carbetapentane-guaifenesin  Cholecalciferol 3000 units  Ergocalciferol 50,000 units    The following medication instructions/doses were adjusted to reflect how patient is taking:  Donepezil 10 mg QHS - changed from 5 mg  Novolog 70/30, 30 units QD - added dose, removed sliding scale instructions. Novolin R 16-20 units with meals per sliding scale - added dose    Please call with questions.   Neris Ibanez - PharmD Candidate [id number] [address]  4/12/2021 7:19 PM      Updated medication list 4/12/2021  Medication Sig    finasteride (PROSCAR) 5 MG tablet Take 5 mg by mouth nightly    losartan (COZAAR) 50 MG tablet Take 50 mg by mouth daily    insulin regular (HUMULIN R;NOVOLIN R) 100 UNIT/ML injection Inject into the skin 3 times daily (before meals) Per sliding scale    insulin aspart protamine-insulin aspart (NOVOLOG MIX 70/30) (70-30) 100 UNIT/ML injection Inject 30 Units into the skin daily     carbidopa-levodopa (SINEMET)  MG per tablet Take 1 tablet by mouth 2 times daily    metoprolol tartrate (LOPRESSOR) 25 MG tablet TAKE ONE TABLET BY MOUTH TWICE A DAY    furosemide (LASIX) 40 MG tablet Take 40 mg by mouth daily     levothyroxine (SYNTHROID) 75 MCG tablet Take 75 mcg by mouth Daily     metFORMIN (GLUCOPHAGE) 850 MG tablet Take 850 mg by mouth 2 times daily (with meals)    donepezil (ARICEPT) 10 MG tablet Take 10 mg by mouth nightly     potassium chloride (KLOR-CON M) 10 MEQ extended release tablet Take 10 mEq by mouth daily     rosuvastatin (CRESTOR) 20 MG tablet Take 1 tablet by mouth daily    nitroGLYCERIN (NITROSTAT) 0.4 MG SL tablet Place 0.4 mg under the tongue every 5 minutes as needed.

## 2021-04-12 NOTE — ED PROVIDER NOTES
[facility] RESIDENT NOTE       Date of evaluation: 4/12/2021    Chief Complaint     Shortness of Breath      History of Present Illness     Tabatha Tellez is a 79 y.o. male past medical history of heart failure with reduced ejection fraction 40 to 84%, grade 2 diastolic dysfunction, sleep apnea, MRSA cellulitis who presented to the ED with dyspnea. The patient reports that yesterday evening he started getting short of breath at rest.  The shortness of breath was not associated with any chest pain or lightheadedness or dizziness. He also reports that he feels short of breath when lying flat. He states that he has not been vaccinated yet and he lives with for 5 people at his home including his wife and his son who have not got the vaccine either. He does not report any loss of smell or loss of taste, no abdominal pain, no constipation, no diarrhea. Review of Systems     Review of Systems   Constitutional: Negative for activity change, appetite change and chills. HENT: Negative for sneezing and sore throat. Respiratory: Positive for shortness of breath. Negative for apnea, chest tightness and wheezing. Cardiovascular: Negative for chest pain, palpitations and leg swelling. Gastrointestinal: Negative for abdominal distention, abdominal pain, anal bleeding, blood in stool and constipation. Genitourinary: Negative for difficulty urinating, dysuria and flank pain. Musculoskeletal: Negative for arthralgias, back pain and gait problem. Skin: Negative for color change. Neurological: Negative for dizziness, seizures, facial asymmetry, light-headedness and headaches. Psychiatric/Behavioral: Negative for agitation and decreased concentration.           Past Medical, Surgical, Family, and Social History     He has a past medical history of 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 ([facility] CAD (coronary artery disease), CHF (congestive heart failure) ([facility] Chronic kidney disease, COPD (chronic obstructive pulmonary disease) ([facility] Diabetes mellitus ([facility] Hypertension, Hypothyroidism, Mixed hyperlipidemia, MRSA (methicillin resistant staph aureus) culture positive, Neuropathy, Obstructive sleep apnea, Oxygen dependent, Parkinson's disease ([facility] and Type 2 diabetes mellitus without complication ([facility] He has a past surgical history that includes cervical fusion (1981); Foot surgery (Right, 07/24/2017); Foot surgery (Right, 07/27/2017); Cardiac catheterization; Colonoscopy; Colon surgery; shoulder surgery; hernia repair; and Upper gastrointestinal endoscopy (N/A, 11/25/2020). His family history includes Cancer in his paternal aunt and sister; Stroke in his father and mother. He reports that he has never smoked. He has never used smokeless tobacco. He reports that he does not drink alcohol or use drugs. Medications     Previous Medications    CARBIDOPA-LEVODOPA (SINEMET)  MG PER TABLET    Take 1 tablet by mouth 2 times daily    DONEPEZIL (ARICEPT) 10 MG TABLET    Take 10 mg by mouth nightly     FINASTERIDE (PROSCAR) 5 MG TABLET    Take 5 mg by mouth nightly    FUROSEMIDE (LASIX) 40 MG TABLET    Take 40 mg by mouth daily     INSULIN ASPART PROTAMINE-INSULIN ASPART (NOVOLOG MIX 70/30) (70-30) 100 UNIT/ML INJECTION    Inject 30 Units into the skin daily     INSULIN REGULAR (HUMULIN R;NOVOLIN R) 100 UNIT/ML INJECTION    Inject into the skin 3 times daily (before meals) Per sliding scale    LEVOTHYROXINE (SYNTHROID) 75 MCG TABLET    Take 75 mcg by mouth Daily     LOSARTAN (COZAAR) 50 MG TABLET    Take 50 mg by mouth daily    METFORMIN (GLUCOPHAGE) 850 MG TABLET    Take 850 mg by mouth 2 times daily (with meals)    METOPROLOL TARTRATE (LOPRESSOR) 25 MG TABLET    TAKE ONE TABLET BY MOUTH TWICE A DAY    NITROGLYCERIN (NITROSTAT) 0.4 MG SL TABLET    Place 0.4 mg under the tongue every 5 minutes as needed.       POTASSIUM CHLORIDE (KLOR-CON M) 10 MEQ EXTENDED RELEASE TABLET    Take 10 mEq by mouth daily     ROSUVASTATIN (CRESTOR) 20 MG TABLET    Take 1 tablet by mouth daily       Allergies     He is allergic to aspirin; nsaids; bactrim [sulfamethoxazole-trimethoprim]; celebrex [celecoxib]; cymbalta [duloxetine hcl]; pcn [penicillins]; codeine; and vicodin [hydrocodone-acetaminophen]. Physical Exam     INITIAL VITALS: BP: (!) 151/65, Temp: 98.7 °F (37.1 °C), Pulse: 109, Resp: (!) 31, SpO2: 94 %   Physical Exam  Constitutional:       General: He is not in acute distress. Appearance: He is obese. He is not toxic-appearing or diaphoretic. HENT:      Head: Normocephalic and atraumatic. Cardiovascular:      Rate and Rhythm: Normal rate and regular rhythm. Heart sounds: No murmur. No gallop. Pulmonary:      Effort: Pulmonary effort is normal. Tachypnea present. No respiratory distress. Breath sounds: No stridor. No decreased breath sounds. Chest:      Chest wall: No deformity. Abdominal:      General: Bowel sounds are normal.      Palpations: Abdomen is soft. There is no hepatomegaly. Musculoskeletal:      Left lower leg: He exhibits no tenderness. Skin:     General: Skin is warm. Capillary Refill: Capillary refill takes less than 2 seconds. Neurological:      Mental Status: He is alert and oriented to person, place, and time. Cranial Nerves: No cranial nerve deficit. Psychiatric:         Behavior: Behavior normal.            Diagnostic Results     EKG   Irregular rhythm, no definable P waves, left axis deviation, T wave inversions, PVCs    RADIOLOGY:  XR CHEST (2 VW)   Final Result      Patchy basal predominant opacities which are nonspecific. Stable mild cardiomegaly.                 LABS:   Results for orders placed or performed during the hospital encounter of 04/12/21   CBC auto differential   Result Value Ref Range    WBC 8.6 4.0 - 11.0 K/uL    RBC 4.66 4.20 - 5.90 M/uL    Hemoglobin 11.5 (L) 13.5 - 17.5 g/dL    Hematocrit 36.2 462 ms    R Axis -19 degrees    T Axis 124 degrees    Diagnosis       EKG performed in ER and to be interpreted by ER physician. Confirmed by MD, ER ([id number]),  Olu Alejo ([id number]) on 4/12/2021 6:48:49 PM       ED BEDSIDE ULTRASOUND:  Exam limited by obesity however left atrium appeared irregular in its contractions, left ventricle showed reduced ejection fraction, right ventricle did not appear dilated. RECENT VITALS:  BP: 126/81, Temp: 98.7 °F (37.1 °C),Pulse: 122, Resp: 25, SpO2: 100 %     Procedures     None    ED Course     Nursing Notes, Past Medical Hx, Past Surgical Hx, Social Hx, Allergies, and FamilyHx were reviewed. The patient was giventhe following medications:  Orders Placed This Encounter   Medications    furosemide (LASIX) injection 60 mg       CONSULTS:  KIMBERLY Mccormack 89 / ASSESSMENT / [place] Hatchet is a 79 y.o. male who presented with dyspnea. In the ED his vitals were stable. However he continued to complain of shortness of breath and orthopnea. His EKG was also concerning as it was very different from prior EKGs. At this point my concern is for abnormal heart rhythm leading to shortness of breath. I will admit the patient for dyspnea without hypoxia and cardiac work-up. We will also the possibility of patient having Covid since he did not get the vaccine and he lives with several individuals at his home. This patient was also evaluated by the attending physician. All care plans were discussed and agreed upon. Clinical Impression     1. Dyspnea, unspecified type        Disposition     PATIENT REFERRED TO:  No follow-up provider specified.     DISCHARGE MEDICATIONS:  New Prescriptions    No medications on file       DISPOSITION Admitted 04/12/2021 07:48:16 PM      Richi Blood MD  Resident  04/12/21 9029

## 2021-04-12 NOTE — ED PROVIDER NOTES
ED Attending Attestation Note     Date of evaluation: 4/12/2021    This patient was seen by the resident. I have seen and examined the patient, agree with the workup, evaluation, management and diagnosis. The care plan has been discussed. I have reviewed the ECG and concur with the resident's interpretation. My assessment reveals a chronically ill-appearing 50-year-old male patient who presents with shortness of breath that has been getting worse for past few days. EKG is very odd and I believe his underlying A. fib with frequent PVCs. Clinically appears volume up with lower extremity edema. Is nonspecific however he does have coarse breath sounds bilaterally. Does have a history of heart failure on Lasix at baseline and follows with cards.      Joyce Padilla MD  04/12/21 1900

## 2021-04-13 ENCOUNTER — APPOINTMENT (OUTPATIENT)
Dept: GENERAL RADIOLOGY | Age: 81
DRG: 291 | End: 2021-04-13
Payer: MEDICARE

## 2021-04-13 LAB
ANION GAP SERPL CALCULATED.3IONS-SCNC: 12 MMOL/L (ref 3–16)
BASOPHILS ABSOLUTE: 0 K/UL (ref 0–0.2)
BASOPHILS RELATIVE PERCENT: 0.4 %
BUN BLDV-MCNC: 25 MG/DL (ref 7–20)
C-REACTIVE PROTEIN: 83.3 MG/L (ref 0–5.1)
CALCIUM SERPL-MCNC: 9 MG/DL (ref 8.3–10.6)
CHLORIDE BLD-SCNC: 99 MMOL/L (ref 99–110)
CO2: 24 MMOL/L (ref 21–32)
CREAT SERPL-MCNC: 1.3 MG/DL (ref 0.8–1.3)
EOSINOPHILS ABSOLUTE: 0 K/UL (ref 0–0.6)
EOSINOPHILS RELATIVE PERCENT: 0.2 %
GFR AFRICAN AMERICAN: >60
GFR NON-AFRICAN AMERICAN: 53
GLUCOSE BLD-MCNC: 132 MG/DL (ref 70–99)
GLUCOSE BLD-MCNC: 167 MG/DL (ref 70–99)
GLUCOSE BLD-MCNC: 172 MG/DL (ref 70–99)
GLUCOSE BLD-MCNC: 202 MG/DL (ref 70–99)
GLUCOSE BLD-MCNC: 203 MG/DL (ref 70–99)
HCT VFR BLD CALC: 34.2 % (ref 40.5–52.5)
HEMOGLOBIN: 10.9 G/DL (ref 13.5–17.5)
LACTIC ACID: 1.1 MMOL/L (ref 0.4–2)
LYMPHOCYTES ABSOLUTE: 0.5 K/UL (ref 1–5.1)
LYMPHOCYTES RELATIVE PERCENT: 6.8 %
MAGNESIUM: 1.8 MG/DL (ref 1.8–2.4)
MCH RBC QN AUTO: 24.3 PG (ref 26–34)
MCHC RBC AUTO-ENTMCNC: 32 G/DL (ref 31–36)
MCV RBC AUTO: 76.2 FL (ref 80–100)
MONOCYTES ABSOLUTE: 0.7 K/UL (ref 0–1.3)
MONOCYTES RELATIVE PERCENT: 8.7 %
NEUTROPHILS ABSOLUTE: 6.7 K/UL (ref 1.7–7.7)
NEUTROPHILS RELATIVE PERCENT: 83.9 %
PDW BLD-RTO: 18.9 % (ref 12.4–15.4)
PERFORMED ON: ABNORMAL
PLATELET # BLD: 225 K/UL (ref 135–450)
PMV BLD AUTO: 8.1 FL (ref 5–10.5)
POTASSIUM SERPL-SCNC: 3.8 MMOL/L (ref 3.5–5.1)
PREALBUMIN: 12.7 MG/DL (ref 20–40)
PRO-BNP: 9921 PG/ML (ref 0–449)
PROCALCITONIN: 0.15 NG/ML (ref 0–0.15)
RBC # BLD: 4.49 M/UL (ref 4.2–5.9)
SARS-COV-2, PCR: NOT DETECTED
SEDIMENTATION RATE, ERYTHROCYTE: 53 MM/HR (ref 0–20)
SODIUM BLD-SCNC: 135 MMOL/L (ref 136–145)
WBC # BLD: 8 K/UL (ref 4–11)

## 2021-04-13 PROCEDURE — 85025 COMPLETE CBC W/AUTO DIFF WBC: CPT

## 2021-04-13 PROCEDURE — 83605 ASSAY OF LACTIC ACID: CPT

## 2021-04-13 PROCEDURE — 85652 RBC SED RATE AUTOMATED: CPT

## 2021-04-13 PROCEDURE — 6360000002 HC RX W HCPCS: Performed by: INTERNAL MEDICINE

## 2021-04-13 PROCEDURE — 2580000003 HC RX 258: Performed by: INTERNAL MEDICINE

## 2021-04-13 PROCEDURE — 84134 ASSAY OF PREALBUMIN: CPT

## 2021-04-13 PROCEDURE — 80048 BASIC METABOLIC PNL TOTAL CA: CPT

## 2021-04-13 PROCEDURE — 1200000000 HC SEMI PRIVATE

## 2021-04-13 PROCEDURE — 6370000000 HC RX 637 (ALT 250 FOR IP): Performed by: INTERNAL MEDICINE

## 2021-04-13 PROCEDURE — 86140 C-REACTIVE PROTEIN: CPT

## 2021-04-13 PROCEDURE — 99223 1ST HOSP IP/OBS HIGH 75: CPT | Performed by: INTERNAL MEDICINE

## 2021-04-13 PROCEDURE — 87040 BLOOD CULTURE FOR BACTERIA: CPT

## 2021-04-13 PROCEDURE — 73590 X-RAY EXAM OF LOWER LEG: CPT

## 2021-04-13 PROCEDURE — 2500000003 HC RX 250 WO HCPCS: Performed by: INTERNAL MEDICINE

## 2021-04-13 PROCEDURE — 84145 PROCALCITONIN (PCT): CPT

## 2021-04-13 PROCEDURE — 83880 ASSAY OF NATRIURETIC PEPTIDE: CPT

## 2021-04-13 PROCEDURE — 83735 ASSAY OF MAGNESIUM: CPT

## 2021-04-13 RX ORDER — ACETAMINOPHEN 325 MG/1
650 TABLET ORAL EVERY 6 HOURS PRN
Status: DISCONTINUED | OUTPATIENT
Start: 2021-04-13 | End: 2021-04-17 | Stop reason: HOSPADM

## 2021-04-13 RX ORDER — LEVOTHYROXINE SODIUM 0.07 MG/1
75 TABLET ORAL DAILY
Status: DISCONTINUED | OUTPATIENT
Start: 2021-04-13 | End: 2021-04-17 | Stop reason: HOSPADM

## 2021-04-13 RX ORDER — INSULIN LISPRO 100 [IU]/ML
0-6 INJECTION, SOLUTION INTRAVENOUS; SUBCUTANEOUS NIGHTLY
Status: DISCONTINUED | OUTPATIENT
Start: 2021-04-13 | End: 2021-04-17 | Stop reason: HOSPADM

## 2021-04-13 RX ORDER — POTASSIUM CHLORIDE 750 MG/1
10 TABLET, EXTENDED RELEASE ORAL DAILY
Status: DISCONTINUED | OUTPATIENT
Start: 2021-04-13 | End: 2021-04-17 | Stop reason: HOSPADM

## 2021-04-13 RX ORDER — NICOTINE POLACRILEX 4 MG
15 LOZENGE BUCCAL PRN
Status: DISCONTINUED | OUTPATIENT
Start: 2021-04-13 | End: 2021-04-17 | Stop reason: HOSPADM

## 2021-04-13 RX ORDER — FUROSEMIDE 10 MG/ML
40 INJECTION INTRAMUSCULAR; INTRAVENOUS 2 TIMES DAILY
Status: DISCONTINUED | OUTPATIENT
Start: 2021-04-13 | End: 2021-04-15

## 2021-04-13 RX ORDER — PROMETHAZINE HYDROCHLORIDE 25 MG/1
12.5 TABLET ORAL EVERY 6 HOURS PRN
Status: DISCONTINUED | OUTPATIENT
Start: 2021-04-13 | End: 2021-04-17 | Stop reason: HOSPADM

## 2021-04-13 RX ORDER — DONEPEZIL HYDROCHLORIDE 10 MG/1
10 TABLET, FILM COATED ORAL NIGHTLY
Status: DISCONTINUED | OUTPATIENT
Start: 2021-04-13 | End: 2021-04-17 | Stop reason: HOSPADM

## 2021-04-13 RX ORDER — FUROSEMIDE 40 MG/1
40 TABLET ORAL DAILY
Status: DISCONTINUED | OUTPATIENT
Start: 2021-04-13 | End: 2021-04-13

## 2021-04-13 RX ORDER — ACETAMINOPHEN 650 MG/1
650 SUPPOSITORY RECTAL EVERY 6 HOURS PRN
Status: DISCONTINUED | OUTPATIENT
Start: 2021-04-13 | End: 2021-04-17 | Stop reason: HOSPADM

## 2021-04-13 RX ORDER — LOSARTAN POTASSIUM 50 MG/1
50 TABLET ORAL DAILY
Status: DISCONTINUED | OUTPATIENT
Start: 2021-04-13 | End: 2021-04-17 | Stop reason: HOSPADM

## 2021-04-13 RX ORDER — DEXTROSE MONOHYDRATE 50 MG/ML
100 INJECTION, SOLUTION INTRAVENOUS PRN
Status: DISCONTINUED | OUTPATIENT
Start: 2021-04-13 | End: 2021-04-17 | Stop reason: HOSPADM

## 2021-04-13 RX ORDER — ROSUVASTATIN CALCIUM 20 MG/1
20 TABLET, COATED ORAL NIGHTLY
Status: DISCONTINUED | OUTPATIENT
Start: 2021-04-13 | End: 2021-04-17 | Stop reason: HOSPADM

## 2021-04-13 RX ORDER — SODIUM CHLORIDE 9 MG/ML
25 INJECTION, SOLUTION INTRAVENOUS PRN
Status: DISCONTINUED | OUTPATIENT
Start: 2021-04-13 | End: 2021-04-17 | Stop reason: HOSPADM

## 2021-04-13 RX ORDER — SODIUM CHLORIDE 0.9 % (FLUSH) 0.9 %
5-40 SYRINGE (ML) INJECTION PRN
Status: DISCONTINUED | OUTPATIENT
Start: 2021-04-13 | End: 2021-04-17 | Stop reason: HOSPADM

## 2021-04-13 RX ORDER — INSULIN LISPRO 100 [IU]/ML
0-12 INJECTION, SOLUTION INTRAVENOUS; SUBCUTANEOUS
Status: DISCONTINUED | OUTPATIENT
Start: 2021-04-13 | End: 2021-04-17 | Stop reason: HOSPADM

## 2021-04-13 RX ORDER — FINASTERIDE 5 MG/1
5 TABLET, FILM COATED ORAL NIGHTLY
Status: DISCONTINUED | OUTPATIENT
Start: 2021-04-13 | End: 2021-04-17 | Stop reason: HOSPADM

## 2021-04-13 RX ORDER — DEXTROSE MONOHYDRATE 25 G/50ML
12.5 INJECTION, SOLUTION INTRAVENOUS PRN
Status: DISCONTINUED | OUTPATIENT
Start: 2021-04-13 | End: 2021-04-17 | Stop reason: HOSPADM

## 2021-04-13 RX ORDER — ONDANSETRON 2 MG/ML
4 INJECTION INTRAMUSCULAR; INTRAVENOUS EVERY 6 HOURS PRN
Status: DISCONTINUED | OUTPATIENT
Start: 2021-04-13 | End: 2021-04-17 | Stop reason: HOSPADM

## 2021-04-13 RX ORDER — SODIUM CHLORIDE 0.9 % (FLUSH) 0.9 %
5-40 SYRINGE (ML) INJECTION EVERY 12 HOURS SCHEDULED
Status: DISCONTINUED | OUTPATIENT
Start: 2021-04-13 | End: 2021-04-17 | Stop reason: HOSPADM

## 2021-04-13 RX ORDER — POLYETHYLENE GLYCOL 3350 17 G/17G
17 POWDER, FOR SOLUTION ORAL DAILY PRN
Status: DISCONTINUED | OUTPATIENT
Start: 2021-04-13 | End: 2021-04-17 | Stop reason: HOSPADM

## 2021-04-13 RX ADMIN — LEVOTHYROXINE SODIUM 75 MCG: 0.07 TABLET ORAL at 05:21

## 2021-04-13 RX ADMIN — ENOXAPARIN SODIUM 40 MG: 40 INJECTION SUBCUTANEOUS at 09:03

## 2021-04-13 RX ADMIN — INSULIN LISPRO 2 UNITS: 100 INJECTION, SOLUTION INTRAVENOUS; SUBCUTANEOUS at 20:53

## 2021-04-13 RX ADMIN — CARBIDOPA AND LEVODOPA 1 TABLET: 25; 100 TABLET ORAL at 09:03

## 2021-04-13 RX ADMIN — LOSARTAN POTASSIUM 50 MG: 50 TABLET, FILM COATED ORAL at 09:03

## 2021-04-13 RX ADMIN — ROSUVASTATIN CALCIUM 20 MG: 20 TABLET, COATED ORAL at 20:54

## 2021-04-13 RX ADMIN — POTASSIUM CHLORIDE 10 MEQ: 750 TABLET, EXTENDED RELEASE ORAL at 09:03

## 2021-04-13 RX ADMIN — FUROSEMIDE 40 MG: 10 INJECTION, SOLUTION INTRAMUSCULAR; INTRAVENOUS at 13:16

## 2021-04-13 RX ADMIN — MICONAZOLE NITRATE: 20 POWDER TOPICAL at 20:59

## 2021-04-13 RX ADMIN — Medication 10 ML: at 13:16

## 2021-04-13 RX ADMIN — CARBIDOPA AND LEVODOPA 1 TABLET: 25; 100 TABLET ORAL at 20:54

## 2021-04-13 RX ADMIN — INSULIN GLARGINE 17 UNITS: 100 INJECTION, SOLUTION SUBCUTANEOUS at 20:53

## 2021-04-13 RX ADMIN — INSULIN LISPRO 2 UNITS: 100 INJECTION, SOLUTION INTRAVENOUS; SUBCUTANEOUS at 12:57

## 2021-04-13 RX ADMIN — INSULIN LISPRO 4 UNITS: 100 INJECTION, SOLUTION INTRAVENOUS; SUBCUTANEOUS at 18:24

## 2021-04-13 RX ADMIN — FINASTERIDE 5 MG: 5 TABLET, FILM COATED ORAL at 20:54

## 2021-04-13 RX ADMIN — METOPROLOL TARTRATE 25 MG: 25 TABLET, FILM COATED ORAL at 20:54

## 2021-04-13 RX ADMIN — FUROSEMIDE 40 MG: 10 INJECTION, SOLUTION INTRAMUSCULAR; INTRAVENOUS at 18:24

## 2021-04-13 RX ADMIN — DONEPEZIL HYDROCHLORIDE 10 MG: 10 TABLET, FILM COATED ORAL at 20:54

## 2021-04-13 RX ADMIN — FUROSEMIDE 40 MG: 40 TABLET ORAL at 09:03

## 2021-04-13 RX ADMIN — Medication 10 ML: at 20:59

## 2021-04-13 RX ADMIN — ACETAMINOPHEN 650 MG: 325 TABLET ORAL at 09:14

## 2021-04-13 ASSESSMENT — PAIN SCALES - GENERAL
PAINLEVEL_OUTOF10: 0

## 2021-04-13 NOTE — PROGRESS NOTES
4 Eyes Admission Assessment     I agree as the admission nurse that 2 RN's have performed a thorough Head to Toe Skin Assessment on the patient. ALL assessment sites listed below have been assessed on admission. Areas assessed by both nurses:   [x]   Head, Face, and Ears   [x]   Shoulders, Back, and Chest  Redness pannus/groin  [x]   Arms, Elbows, and Hands   [x]   Coccyx, Sacrum, and Ischum  Blanchable redness bilat buttocks  [x]   Legs, Feet, and Heels  Venous ulcers, wrapped, order to woound care placed, missing all toes on r foot        Does the Patient have Skin Breakdown?   Yes a wound was noted on the Admission Assessment and an LDA was Initiated documentation include the Mary Jane-wound, Wound Assessment, Measurements, Dressing Treatment, Drainage, and Color\",         Jean Claude Prevention initiated:  Yes   Wound Care Orders initiated:  Yes      [id number]  nurse consulted for Pressure Injury (Stage 3,4, Unstageable, DTI, NWPT, and Complex wounds):  No      Nurse 1 eSignature: Electronically signed by Andrea Kimble RN on 4/13/21 at 1:32 AM EDT    **SHARE this note so that the co-signing nurse is able to place an eSignature**    Nurse 2 eSignature: Electronically signed by Carroll Neumann RN on 4/13/21 at 2:31 AM EDT

## 2021-04-13 NOTE — PLAN OF CARE
Problem: Breathing Pattern - Ineffective:  Goal: Ability to achieve and maintain a regular respiratory rate will improve  Description: Ability to achieve and maintain a regular respiratory rate will improve  Outcome: Ongoing  Note: Pt denies SOB since arriving to floor     Problem: Cardiac:  Goal: Cardiovascular alteration will improve  Description: Cardiovascular alteration will improve  Outcome: Ongoing  Note: EKG changes since last admission     Problem: Isolation Precautions - Risk of Spread of Infection  Goal: Prevent transmission of infection  Outcome: Ongoing   Contact and droplet plus isolation in place

## 2021-04-13 NOTE — ED NOTES
While pt was on the phone speaking with his wife and having a conversation pts SpO2 dropped to 75% on RA, after RN discontinued conversation pt recovered to 96% on RA with in 1 minute.       Horacio Christian RN  04/12/21 2045

## 2021-04-13 NOTE — CARE COORDINATION
Case Management Note    Patient unavailable at this time for assessment      CM attempted to meet with patient for completion of initial face to face assessment at this time. Consult noted for discharge planning. Patient currently in isolation to rule out COVID. CM has reviewed chart and noted patient from home with spouse. CM tried reaching pt by phone multiple times, with busy signal.  CM will reach out again tomorrow. CM will follow up when patient available and will complete initial assessment and assist with needs for discharge.     Maritza Renae RN  [facility]  Case Management Department  Ph: [phone]  Fax: [phone]

## 2021-04-13 NOTE — H&P
Hospital Medicine History & Physical      PCP: Loulou Campa MD    Date of Admission: 4/12/2021    Date of Service: Pt seen/examined on 04/12/21 and Admitted to Inpatient with expected LOS greater than two midnights due to medical therapy. Chief Complaint:  Shortness of breath       History Of Present Illness:  Sarah Beth Mccauley is 79 y.o. male who presented with complaint of SOB. Symptom onset was acute for a time period of 2 days. The severity is described as severe. The course of his symptoms over time is worsening. The symptoms improved with rest and worsened with exertion. The patient's symptom is associated with fatiue. Sarah Beth Mccauley is 79 y.o. male with history of HTN, DM II, HLP, SARIKA and CHF - EF 40%   He presents to the ER yesterday with complaint of SOB for the past 2 days.    He also reports that he feels short of breath when lying flat and with exertion   In the ED BNP is elevated but CXR in inconclusive  He is admitted for evaluation of CHF exacerbation       Past Medical History:          Diagnosis Date    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 ([address]     Dr. Dyllan Vallecillo CAD (coronary artery disease)     CHF (congestive heart failure) ([address]     Chronic kidney disease     upon hospitalization due to bactrim allergy    COPD (chronic obstructive pulmonary disease) ([address]     Diabetes mellitus ([address]     Hypertension     Hypothyroidism     Mixed hyperlipidemia     MRSA (methicillin resistant staph aureus) culture positive 07/24/2017    foot    Neuropathy     Obstructive sleep apnea 10/03/2014    does not use cpap machine    Oxygen dependent     has o2 at home if needed    Parkinson's disease ([address] 1965    Type 2 diabetes mellitus without complication ([address]        Past Surgical History:          Procedure Laterality Date    CARDIAC CATHETERIZATION      CERVICAL FUSION  1981    COLON SURGERY      1980's    COLONOSCOPY      FOOT SURGERY Right 07/24/2017    INCISION AND DRAINAGE RIGHT FOOT WITH REMOVAL NECROTIC BONE    FOOT SURGERY Right 07/27/2017    : TRANSMETATARSAL AMPUTATION RIGHT FOOT     HERNIA REPAIR      SHOULDER SURGERY      right    UPPER GASTROINTESTINAL ENDOSCOPY N/A 11/25/2020    ESOPHAGOGASTRODUODENOSCOPY WITH BOTOX INJECTION performed by Eber Jenkins DO at [facility] ENDOSCOPY       Medications Prior to Admission:      Prior to Admission medications    Medication Sig Start Date End Date Taking? Authorizing Provider   finasteride (PROSCAR) 5 MG tablet Take 5 mg by mouth nightly   Yes Historical Provider, MD   losartan (COZAAR) 50 MG tablet Take 50 mg by mouth daily   Yes Historical Provider, MD   insulin regular (HUMULIN R;NOVOLIN R) 100 UNIT/ML injection Inject into the skin 3 times daily (before meals) Per sliding scale   Yes Historical Provider, MD   insulin aspart protamine-insulin aspart (NOVOLOG MIX 70/30) (70-30) 100 UNIT/ML injection Inject 30 Units into the skin daily    Yes Historical Provider, MD   carbidopa-levodopa (SINEMET)  MG per tablet Take 1 tablet by mouth 2 times daily   Yes Historical Provider, MD   metoprolol tartrate (LOPRESSOR) 25 MG tablet TAKE ONE TABLET BY MOUTH TWICE A DAY 6/19/20  Yes Renny Fajardo MD   furosemide (LASIX) 40 MG tablet Take 40 mg by mouth daily    Yes Historical Provider, MD   levothyroxine (SYNTHROID) 75 MCG tablet Take 75 mcg by mouth Daily    Yes Historical Provider, MD   metFORMIN (GLUCOPHAGE) 850 MG tablet Take 850 mg by mouth 2 times daily (with meals)   Yes Historical Provider, MD   donepezil (ARICEPT) 10 MG tablet Take 10 mg by mouth nightly    Yes Historical Provider, MD   potassium chloride (KLOR-CON M) 10 MEQ extended release tablet Take 10 mEq by mouth daily    Yes Historical Provider, MD   rosuvastatin (CRESTOR) 20 MG tablet Take 1 tablet by mouth daily 10/25/16  Yes Renny Fajardo MD   nitroGLYCERIN (NITROSTAT) 0.4 MG SL tablet Place 0.4 mg under the tongue every 5 minutes as needed. Historical Provider, MD       Allergies:  Aspirin, Nsaids, Bactrim [sulfamethoxazole-trimethoprim], Celebrex [celecoxib], Cymbalta [duloxetine hcl], Pcn [penicillins], Codeine, and Vicodin [hydrocodone-acetaminophen]    Social History:      The patient currently lives at home    TOBACCO:   reports that he has never smoked. He has never used smokeless tobacco.  ETOH:   reports no history of alcohol use. E-Cigarettes/Vaping Use     Questions Responses    E-Cigarette/Vaping Use Never User    Start Date     Passive Exposure     Quit Date     Counseling Given     Comments             Family History:      Reviewed in detail and negative for DM, CAD, Cancer, CVA. Positive as follows:        Problem Relation Age of Onset    Stroke Mother     Stroke Father     Cancer Sister     Cancer Paternal Aunt        REVIEW OF SYSTEMS:   Pertinent positives as noted in the HPI. All other systems reviewed and negative. PHYSICAL EXAM PERFORMED:    BP (!) 111/55   Pulse 101   Temp 98.7 °F (37.1 °C) (Oral)   Resp 20   SpO2 95%     General appearance:  No apparent distress, appears stated age and cooperative. HEENT:  Normal cephalic, atraumatic without obvious deformity. Pupils equal, round, and reactive to light. Extra ocular muscles intact. Conjunctivae/corneas clear. Neck: Supple, with full range of motion. No jugular venous distention. Trachea midline. Respiratory:  Normal respiratory effort. Clear to auscultation, bilaterally without Rales/Wheezes/Rhonchi. Cardiovascular:  Regular rate and rhythm with normal S1/S2 without murmurs, rubs or gallops. Abdomen: Soft, non-tender, non-distended with normal bowel sounds. Musculoskeletal:  No clubbing, cyanosis or edema bilaterally. Full range of motion without deformity. Skin: Skin color, texture, turgor normal.  No rashes or lesions. Neurologic:  Neurovascularly intact without any focal sensory/motor deficits.  Cranial nerves: II-XII intact, grossly non-focal.  Psychiatric:  Alert and oriented, thought content appropriate, normal insight  Capillary Refill: Brisk,< 3 seconds   Peripheral Pulses: +2 palpable, equal bilaterally       Labs:     Recent Labs     04/12/21  1652   WBC 8.6   HGB 11.5*   HCT 36.2*        Recent Labs     04/12/21  1652   *   K 4.2   CL 98*   CO2 23   BUN 26*   CREATININE 1.3   CALCIUM 9.3     Recent Labs     04/12/21  1652   AST 8*   ALT 7*   BILITOT 0.6   ALKPHOS 71     No results for input(s): INR in the last 72 hours. Recent Labs     04/12/21  1652   TROPONINI <0.01       Urinalysis:      Lab Results   Component Value Date    NITRU Negative 01/06/2018    WBCUA 3-5 01/06/2018    BACTERIA 1+ 01/06/2018    RBCUA 3-5 01/06/2018    BLOODU TRACE-INTACT 01/06/2018    SPECGRAV 1.020 01/06/2018    GLUCOSEU 500 01/06/2018    GLUCOSEU 500 01/27/2012       Radiology:     CXR: I have reviewed the CXR with the following interpretation: pulmonary infiltrates, cardiomegaly   EKG:  I have reviewed the EKG with the following interpretation: PVC     XR CHEST (2 VW)   Final Result      Patchy basal predominant opacities which are nonspecific. Stable mild cardiomegaly. ASSESSMENT:    1. Acute on chronic combined systolic and diastolic congestive heart failure   2. Essential hypertension  3. Hypothyroid  4. Obstructive sleep apnea  5. Diabetes mellitus type 2  6. Parkinson's disease         PLAN:    1. Admit to telemetry unit   2. On oral lasix for now pending cardiology evaluation   3. Daily weight, in/out and daily BMP- ordered   4. Continue home meds   5. Titrate insulin to keep glucose < 180     DVT Prophylaxis: Lovenox   Diet: Regular diet   Code Status: Full code    PT/OT Eval Status: ordered     [facility]        Guerrero Reilly MD    Thank you Tonya Ramirez MD for the opportunity to be involved in this patient's care.  If you have any questions or concerns please feel free to contact me at [phone].

## 2021-04-13 NOTE — PLAN OF CARE
Problem: Falls - Risk of:  Goal: Will remain free from falls  Description: Will remain free from falls  Outcome: Ongoing     No falls this shift, bed in lowest position, bed alarm on, non skid socks on, call light within reach, hourly checks, safety maintained, will continue to monitor. Problem: Breathing Pattern - Ineffective:  Goal: Ability to achieve and maintain a regular respiratory rate will improve  Description: Ability to achieve and maintain a regular respiratory rate will improve  4/13/2021 0225 by Jolynn Denton RN  Outcome: Ongoing  Pt denies SOB. Spo2 >90% on Room Air. Fine crackles in bases. Will continue to monitor.

## 2021-04-13 NOTE — ED NOTES
Pt offered a boxed lunch at this time, states he's too tired to eat. Will continue to monitor.      Breezy Longo RN  04/12/21 [id number]

## 2021-04-13 NOTE — CONSULTS
Cardiology Consultation                                                                    Pt Name: Sherra Mohs  Age: 79 y.o. Sex: male  : 1940  Location: [facility]/4320-    Referring Physician: Federico Correia MD  Primary cardiologist: Dr Gary Sheriff      Reason for Consult:     Reason for Consultation/Chief Complaint: fatigue, dyspnea    HPI:      Sherra Mohs is a 79 y.o. male with a past medical history of HTN, HLP, DM2, CAD s/p stents, HFrEF due to HOSP DEL MAESTRO, SARIKA. [facility] 2011 (Dr Gary Sheriff): PCI to prox-mid LAD with 2 x SAMANTHA, OM3 75% not intervened. LVEF 40%. Nuc  normal.     Echo 2017: mild LVH, EF 31%, diastolic II, LAE, mild valve disease, normal RV. Patient presented to the emergency room on  with progressively worse exertional dyspnea and fatigue over the last couple of days. He also admits to some subjective fevers. He was admitted for possible acute heart failure, rule out Covid infection. On admission he was afebrile, however spiked a temperature of 100.6 earlier today. He remains hemodynamically stable with normal oxygen saturation on room air. Creatinine 1.3, proBNP 7400, troponin negative, Covid pending, hemoglobin 11, chest x-ray with patchy infiltrates. ECG 2021: Sinus rhythm 97 bpm, with frequent PVCs versus aberrant PACs, nonspecific changes. Histories     Past Medical History:   has a past medical history of 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 ([address] CAD (coronary artery disease), CHF (congestive heart failure) ([address] Chronic kidney disease, COPD (chronic obstructive pulmonary disease) ([address] Diabetes mellitus ([address] Hypertension, Hypothyroidism, Mixed hyperlipidemia, MRSA (methicillin resistant staph aureus) culture positive, Neuropathy, Obstructive sleep apnea, Oxygen dependent, Parkinson's disease ([address] and Type 2 diabetes mellitus without complication ([address] Surgical History:   has a past surgical history that includes cervical fusion ();  Foot surgery (Right, 07/24/2017); Foot surgery (Right, 07/27/2017); Cardiac catheterization; Colonoscopy; Colon surgery; shoulder surgery; hernia repair; and Upper gastrointestinal endoscopy (N/A, 11/25/2020). Social History:   reports that he has never smoked. He has never used smokeless tobacco. He reports that he does not drink alcohol or use drugs. Family History:  No evidence for sudden cardiac death or premature CAD      Medications:       Home Medications  Were reviewed and are listed in nursing record. and/or listed below  Prior to Admission medications    Medication Sig Start Date End Date Taking?  Authorizing Provider   finasteride (PROSCAR) 5 MG tablet Take 5 mg by mouth nightly   Yes Historical Provider, MD   losartan (COZAAR) 50 MG tablet Take 50 mg by mouth daily   Yes Historical Provider, MD   insulin regular (HUMULIN R;NOVOLIN R) 100 UNIT/ML injection Inject into the skin 3 times daily (before meals) Per sliding scale   Yes Historical Provider, MD   insulin aspart protamine-insulin aspart (NOVOLOG MIX 70/30) (70-30) 100 UNIT/ML injection Inject 30 Units into the skin daily    Yes Historical Provider, MD   carbidopa-levodopa (SINEMET)  MG per tablet Take 1 tablet by mouth 2 times daily   Yes Historical Provider, MD   metoprolol tartrate (LOPRESSOR) 25 MG tablet TAKE ONE TABLET BY MOUTH TWICE A DAY 6/19/20  Yes Noris Sellers MD   furosemide (LASIX) 40 MG tablet Take 40 mg by mouth daily    Yes Historical Provider, MD   levothyroxine (SYNTHROID) 75 MCG tablet Take 75 mcg by mouth Daily    Yes Historical Provider, MD   metFORMIN (GLUCOPHAGE) 850 MG tablet Take 850 mg by mouth 2 times daily (with meals)   Yes Historical Provider, MD   donepezil (ARICEPT) 10 MG tablet Take 10 mg by mouth nightly    Yes Historical Provider, MD   potassium chloride (KLOR-CON M) 10 MEQ extended release tablet Take 10 mEq by mouth daily    Yes Historical Provider, MD   rosuvastatin (CRESTOR) 20 MG tablet Take 1 tablet by mouth daily 10/25/16  Yes Praveen Guerrero MD   nitroGLYCERIN (NITROSTAT) 0.4 MG SL tablet Place 0.4 mg under the tongue every 5 minutes as needed. Historical Provider, MD          Inpatient Medications:   sodium chloride flush  5-40 mL Intravenous 2 times per day    enoxaparin  40 mg Subcutaneous Daily    rosuvastatin  20 mg Oral Nightly    losartan  50 mg Oral Daily    carbidopa-levodopa  1 tablet Oral BID    metoprolol tartrate  25 mg Oral BID    potassium chloride  10 mEq Oral Daily    finasteride  5 mg Oral Nightly    donepezil  10 mg Oral Nightly    levothyroxine  75 mcg Oral Daily    insulin lispro  0-12 Units Subcutaneous TID WC    insulin lispro  0-6 Units Subcutaneous Nightly    insulin glargine  0.15 Units/kg Subcutaneous Nightly    furosemide  40 mg Intravenous BID       IV drips:   sodium chloride      dextrose         PRN:  sodium chloride flush, sodium chloride, promethazine **OR** ondansetron, polyethylene glycol, acetaminophen **OR** acetaminophen, glucose, dextrose, glucagon (rDNA), dextrose    Allergy:     Aspirin, Nsaids, Bactrim [sulfamethoxazole-trimethoprim], Celebrex [celecoxib], Cymbalta [duloxetine hcl], Pcn [penicillins], Codeine, and Vicodin [hydrocodone-acetaminophen]       Review of Systems:     All 12 point review of symptoms completed. Pertinent positives identified in the HPI, all other review of symptoms negative as below. CONSTITUTIONAL: + fatigue  SKIN: No rash or pruritis. EYES: No visual changes or diplopia. No scleral icterus. ENT: No Headaches, hearing loss or vertigo. No mouth sores or sore throat. CARDIOVASCULAR: No chest pain/chest pressure/chest discomfort. No palpitations. No edema. RESPIRATORY: + dyspnea. No cough or wheezing, no sputum production. GASTROINTESTINAL: No N/V/D. No abdominal pain, appetite loss, blood in stools. GENITOURINARY: No dysuria, trouble voiding, or hematuria.   MUSCULOSKELETAL:  No gait disturbance, weakness or joint complaints. NEUROLOGICAL: No headache, diplopia, change in muscle strength, numbness or tingling. No change in gait, balance, coordination, mood, affect, memory, mentation, behavior. ENDOCRINE: No excessive thirst, fluid intake, or urination. No tremor. HEMATOLOGIC: No abnormal bruising or bleeding. ALLERGY: No nasal congestion or hives. Physical Examination:     Vitals:    04/13/21 0030 04/13/21 0119 04/13/21 0506 04/13/21 0904   BP: (!) 131/56 135/63 (!) 153/75 120/71   Pulse: 100 58 67 75   Resp: 18 16 16 16   Temp:  99.5 °F (37.5 °C) 99.9 °F (37.7 °C) 100.6 °F (38.1 °C)   TempSrc:  Oral Oral Oral   SpO2: 96% 97% 94% 93%   Weight:  244 lb 11.4 oz (111 kg)     Height:  6' (1.829 m)         Wt Readings from Last 3 Encounters:   04/13/21 244 lb 11.4 oz (111 kg)   11/25/20 248 lb (112.5 kg)   11/05/20 245 lb 9.6 oz (111.4 kg)         General Appearance:  Alert, cooperative, no distress, appears stated age Appropriate weight   Head:  Normocephalic, without obvious abnormality, atraumatic   Eyes:  PERRL, conjunctiva/corneas clear EOM intact  Ears normal   Throat no lesions       Nose: Nares normal, no drainage or sinus tenderness   Throat: Lips, mucosa, and tongue normal   Neck: Supple, symmetrical, trachea midline, no adenopathy, thyroid: not enlarged, symmetric, no tenderness/mass/nodules, no carotid bruit. Lungs:   Respirations unlabored, mild inspiratory ronchi. Chest Wall:  No tenderness or deformity   Heart:  Regular rhythm, rate is controlled, S1, S2 normal, there is no murmur, there is no rub or gallop, cannot assess jvd, 1+ bilateral lower extremity edema   Abdomen:   Soft, non-tender, bowel sounds active all four quadrants,  no masses, no organomegaly       Extremities: Extremities normal, atraumatic, no cyanosis.     Pulses: 2+ and symmetric   Skin: Skin color, texture, turgor normal, no rashes or lesions   Pysch: Normal mood and affect   Neurologic: Normal gross motor and sensory exam.  Cranial nerves intact        Labs:     Recent Labs     04/12/21  1652   *   K 4.2   BUN 26*   CREATININE 1.3   CL 98*   CO2 23   GLUCOSE 143*   CALCIUM 9.3     Recent Labs     04/12/21  1652 04/13/21  0524   WBC 8.6 8.0   HGB 11.5* 10.9*   HCT 36.2* 34.2*    225   MCV 77.6* 76.2*     No results for input(s): CHOLTOT, TRIG, HDL in the last 72 hours. Invalid input(s): LIPIDCOMM, CHOLHDL, VLDCHOL, LDL  No results for input(s): PTT, INR in the last 72 hours. Invalid input(s): PT  Recent Labs     04/12/21  1652   TROPONINI <0.01     No results for input(s): BNP in the last 72 hours. No results for input(s): TSH in the last 72 hours. No results for input(s): CHOL, HDL, LDLCALC, TRIG in the last 72 hours.]    Lab Results   Component Value Date    CKTOTAL 66 08/05/2017    CKMB 0.6 01/27/2012    TROPONINI <0.01 04/12/2021         Imaging:     Telemetry:  NSR    Assessment / Plan:     1. Dyspnea:  Patient symptoms could be for acute on chronic HFrEF, cannot exclude pulmonary infection including Covid. -Patient received Lasix 60 mg IV x1 at the emergency room and was placed on his home dose of Lasix 40 mg p.o. daily. We will change it to Lasix 40 mg IV twice daily.  -Awaiting results of Covid testing  -Check a procalcitonin and CRP  - Strict I's and O's every shift and standing weights if possible, low-salt diet and daily BMP with reflex to Mg, wean supplemental oxygen to off for sats greater than 94%. 2.  HFrEF:  It is due to ischemic cardiomyopathy. He may be volume overloaded at this time.    -Per #1, IV Lasix  -Continue with Lopressor 25 twice daily, losartan 50 daily    3. CAD status post stents: There is no evidence of ACS with this admission.    -Continue with metoprolol, Crestor  -Consider baby aspirin if not contraindicated (GIB). 4.  Essential hypertension:  Patient's blood pressure is well controlled with current medications.           I have personally reviewed the

## 2021-04-13 NOTE — PROGRESS NOTES
breathing, clear to auscultation bilaterally  Cardiovascular: Regular rate and rhythm, no murmurs, gallops, or rubs  Abdomen: soft, non-tender, non-distended  Musculoskeletal: Warm, well perfused, no cyanosis or edema  Skin: BL LE erythema/warmth, R TMA present  Psychiatric: A&Ox4, good insight and judgment    Labs:   Recent Labs     04/12/21  1652 04/13/21  0524   WBC 8.6 8.0   HGB 11.5* 10.9*   HCT 36.2* 34.2*    225     Recent Labs     04/12/21  1652   *   K 4.2   CL 98*   CO2 23   BUN 26*   CREATININE 1.3   CALCIUM 9.3     Recent Labs     04/12/21  1652   AST 8*   ALT 7*   BILITOT 0.6   ALKPHOS 71     No results for input(s): INR in the last 72 hours. Recent Labs     04/12/21  1652   TROPONINI <0.01       Urinalysis:      Lab Results   Component Value Date    NITRU Negative 01/06/2018    WBCUA 3-5 01/06/2018    BACTERIA 1+ 01/06/2018    RBCUA 3-5 01/06/2018    BLOODU TRACE-INTACT 01/06/2018    SPECGRAV 1.020 01/06/2018    GLUCOSEU 500 01/06/2018    GLUCOSEU 500 01/27/2012       Radiology:  XR CHEST (2 VW)   Final Result      Patchy basal predominant opacities which are nonspecific. Stable mild cardiomegaly.                 Assessment/Plan:    Active Hospital Problems    Diagnosis Date Noted    Essential hypertension [I10]      Priority: High    Acute on chronic combined systolic and diastolic congestive heart failure ([facility] [I50.43] 04/12/2021    Diabetic polyneuropathy associated with type 2 diabetes mellitus ([facility] [address] [E11.42] 07/25/2017    Obstructive sleep apnea [G47.33] 10/03/2014    Hypothyroid [E03.9] 07/24/2014       Plan:    # Acute on chronic combined heart failure  -tele  -lasix  -cardiology eval pending  -strict I&O, daily weight  -follow electrolytes  -continue asa, statin, ARB, BB    # T2DM  -carb control diet  -lantus w/ mealtime and correctional insulin    # SARIKA  # Hypothyroid  -continue synthroid    DVT Prophylaxis: Lovenox  Diet: DIET GENERAL;  Code Status: Full Code    PT/OT

## 2021-04-13 NOTE — CONSULTS
 FOOT SURGERY Right 07/24/2017    INCISION AND DRAINAGE RIGHT FOOT WITH REMOVAL NECROTIC BONE    FOOT SURGERY Right 07/27/2017    : TRANSMETATARSAL AMPUTATION RIGHT FOOT     HERNIA REPAIR      SHOULDER SURGERY      right    UPPER GASTROINTESTINAL ENDOSCOPY N/A 11/25/2020    ESOPHAGOGASTRODUODENOSCOPY WITH BOTOX INJECTION performed by Rani Austin DO at [facility] ENDOSCOPY     Current Medications:    Current Facility-Administered Medications: sodium chloride flush 0.9 % injection 5-40 mL, 5-40 mL, Intravenous, 2 times per day  sodium chloride flush 0.9 % injection 5-40 mL, 5-40 mL, Intravenous, PRN  0.9 % sodium chloride infusion, 25 mL, Intravenous, PRN  enoxaparin (LOVENOX) injection 40 mg, 40 mg, Subcutaneous, Daily  promethazine (PHENERGAN) tablet 12.5 mg, 12.5 mg, Oral, Q6H PRN **OR** ondansetron (ZOFRAN) injection 4 mg, 4 mg, Intravenous, Q6H PRN  polyethylene glycol (GLYCOLAX) packet 17 g, 17 g, Oral, Daily PRN  acetaminophen (TYLENOL) tablet 650 mg, 650 mg, Oral, Q6H PRN **OR** acetaminophen (TYLENOL) suppository 650 mg, 650 mg, Rectal, Q6H PRN  rosuvastatin (CRESTOR) tablet 20 mg, 20 mg, Oral, Nightly  losartan (COZAAR) tablet 50 mg, 50 mg, Oral, Daily  carbidopa-levodopa (SINEMET)  MG per tablet 1 tablet, 1 tablet, Oral, BID  metoprolol tartrate (LOPRESSOR) tablet 25 mg, 25 mg, Oral, BID  potassium chloride (KLOR-CON M) extended release tablet 10 mEq, 10 mEq, Oral, Daily  finasteride (PROSCAR) tablet 5 mg, 5 mg, Oral, Nightly  donepezil (ARICEPT) tablet 10 mg, 10 mg, Oral, Nightly  levothyroxine (SYNTHROID) tablet 75 mcg, 75 mcg, Oral, Daily  glucose (GLUTOSE) 40 % oral gel 15 g, 15 g, Oral, PRN  dextrose 50 % IV solution, 12.5 g, Intravenous, PRN  glucagon (rDNA) injection 1 mg, 1 mg, Intramuscular, PRN  dextrose 5 % solution, 100 mL/hr, Intravenous, PRN  insulin lispro (1 Unit Dial) 0-12 Units, 0-12 Units, Subcutaneous, TID WC  insulin lispro (1 Unit Dial) 0-6 Units, 0-6 Units, hemosiderin deposits noted to b/l LE, consistent with chronic venous stasis dermatitis. Erythema mildly resolves with elevation of b/l LE. Multiple superficial, partial-thickness ulcerations noted to b/l LE without acute signs of infection. No purulent drainage, malodor, fluctuance, crepitus noted to b/l wounds. Webspaces 1-4 b/l are clean, dry, and intact. Images from 4/13. RLE:          LLE:            MUSCULOSKELETAL: Muscle strength is 5/5 for all pedal groups tested. No pain with palpation of the foot or ankle b/l. Ankle joint ROM is decreased in dorsiflexion with the knee extended. History of TMA of RLE. IMPRESSION/RECOMMENDATIONS:    1.  Multiple superficial, partial-thickness venous stasis ulcerations; B/L LE  2. Chronic venous stasis dermatitis; B/L LE  3. Venous stasis insufficiency; B/L LE  4. Diabetes mellitus with peripheral neuropathy  5. History of TMA; RLE      -Patient examined and evaluated at bedside   -Febrile at 100.6 °F, otherwise VSS, no leukocytosis noted (WBC 8.0)  -ESR, CRP ordered  -No wound culture obtained at this time 2/2 no acute drainage  -Blood culture pending  -Dressing applied to bilateral lower extremity consisting of Opticell AG, Adaptic, gauze, Kerlix, Ace bandage with gentle compression  -Prevalon boots ordered to room  -Instructed patient to elevate bilateral lower extremities when lying/sitting for extended periods of time to aid in edema control.  -Patient be weightbearing as tolerated to bilateral lower extremities. -Bilateral lower extremities without acute signs of infection, likely chronic venous stasis dermatitis. No indication for antibiotics or podiatry standpoint this time. Dispo: Multiple partial-thickness venous stasis ulcerations; b/l LE; no acute signs of infection at this time, likely chronic venous stasis dermatitis, venous ulcerations. We will continue to provide local wound care while patient is in house.     - The patient will be staffed with KWAME Verdugo DPM  Podiatric Resident, PGY-1  Pager #: [phone] or Perfect Serve     Patient was seen and evaluated at bedside. Agree with residents assessment and treatment plan.   Michael Clinton DPM

## 2021-04-13 NOTE — PROGRESS NOTES
Spoke with wife, updated her on patient status, and plan. Also, called wife from in pt's room so she could speak with her .

## 2021-04-14 ENCOUNTER — APPOINTMENT (OUTPATIENT)
Dept: VASCULAR LAB | Age: 81
DRG: 291 | End: 2021-04-14
Payer: MEDICARE

## 2021-04-14 LAB
EKG ATRIAL RATE: 107 BPM
EKG DIAGNOSIS: NORMAL
EKG Q-T INTERVAL: 340 MS
EKG QRS DURATION: 90 MS
EKG QTC CALCULATION (BAZETT): 457 MS
EKG R AXIS: -23 DEGREES
EKG T AXIS: 102 DEGREES
EKG VENTRICULAR RATE: 109 BPM
GLUCOSE BLD-MCNC: 148 MG/DL (ref 70–99)
GLUCOSE BLD-MCNC: 186 MG/DL (ref 70–99)
GLUCOSE BLD-MCNC: 211 MG/DL (ref 70–99)
GLUCOSE BLD-MCNC: 217 MG/DL (ref 70–99)
IRON SATURATION: 8 % (ref 20–50)
IRON: 23 UG/DL (ref 59–158)
PERFORMED ON: ABNORMAL
TOTAL IRON BINDING CAPACITY: 285 UG/DL (ref 260–445)

## 2021-04-14 PROCEDURE — 99233 SBSQ HOSP IP/OBS HIGH 50: CPT | Performed by: INTERNAL MEDICINE

## 2021-04-14 PROCEDURE — 83540 ASSAY OF IRON: CPT

## 2021-04-14 PROCEDURE — 36415 COLL VENOUS BLD VENIPUNCTURE: CPT

## 2021-04-14 PROCEDURE — 6360000002 HC RX W HCPCS: Performed by: INTERNAL MEDICINE

## 2021-04-14 PROCEDURE — 2580000003 HC RX 258: Performed by: INTERNAL MEDICINE

## 2021-04-14 PROCEDURE — 93970 EXTREMITY STUDY: CPT

## 2021-04-14 PROCEDURE — 83550 IRON BINDING TEST: CPT

## 2021-04-14 PROCEDURE — 1200000000 HC SEMI PRIVATE

## 2021-04-14 PROCEDURE — 2500000003 HC RX 250 WO HCPCS: Performed by: INTERNAL MEDICINE

## 2021-04-14 PROCEDURE — 6370000000 HC RX 637 (ALT 250 FOR IP): Performed by: INTERNAL MEDICINE

## 2021-04-14 RX ADMIN — MICONAZOLE NITRATE: 20 POWDER TOPICAL at 08:36

## 2021-04-14 RX ADMIN — METOPROLOL TARTRATE 25 MG: 25 TABLET, FILM COATED ORAL at 20:05

## 2021-04-14 RX ADMIN — ENOXAPARIN SODIUM 40 MG: 40 INJECTION SUBCUTANEOUS at 08:36

## 2021-04-14 RX ADMIN — MICONAZOLE NITRATE: 20 POWDER TOPICAL at 20:06

## 2021-04-14 RX ADMIN — FUROSEMIDE 40 MG: 10 INJECTION, SOLUTION INTRAMUSCULAR; INTRAVENOUS at 08:36

## 2021-04-14 RX ADMIN — FINASTERIDE 5 MG: 5 TABLET, FILM COATED ORAL at 20:05

## 2021-04-14 RX ADMIN — ROSUVASTATIN CALCIUM 20 MG: 20 TABLET, COATED ORAL at 20:05

## 2021-04-14 RX ADMIN — INSULIN LISPRO 2 UNITS: 100 INJECTION, SOLUTION INTRAVENOUS; SUBCUTANEOUS at 20:10

## 2021-04-14 RX ADMIN — POTASSIUM CHLORIDE 10 MEQ: 750 TABLET, EXTENDED RELEASE ORAL at 08:36

## 2021-04-14 RX ADMIN — INSULIN LISPRO 1 UNITS: 100 INJECTION, SOLUTION INTRAVENOUS; SUBCUTANEOUS at 08:37

## 2021-04-14 RX ADMIN — INSULIN GLARGINE 17 UNITS: 100 INJECTION, SOLUTION SUBCUTANEOUS at 20:10

## 2021-04-14 RX ADMIN — DONEPEZIL HYDROCHLORIDE 10 MG: 10 TABLET, FILM COATED ORAL at 20:05

## 2021-04-14 RX ADMIN — INSULIN LISPRO 2 UNITS: 100 INJECTION, SOLUTION INTRAVENOUS; SUBCUTANEOUS at 16:18

## 2021-04-14 RX ADMIN — FUROSEMIDE 40 MG: 10 INJECTION, SOLUTION INTRAMUSCULAR; INTRAVENOUS at 17:44

## 2021-04-14 RX ADMIN — CARBIDOPA AND LEVODOPA 1 TABLET: 25; 100 TABLET ORAL at 08:36

## 2021-04-14 RX ADMIN — METOPROLOL TARTRATE 25 MG: 25 TABLET, FILM COATED ORAL at 08:36

## 2021-04-14 RX ADMIN — Medication 10 ML: at 08:36

## 2021-04-14 RX ADMIN — Medication 10 ML: at 20:05

## 2021-04-14 RX ADMIN — LEVOTHYROXINE SODIUM 75 MCG: 0.07 TABLET ORAL at 06:13

## 2021-04-14 RX ADMIN — CARBIDOPA AND LEVODOPA 1 TABLET: 25; 100 TABLET ORAL at 20:05

## 2021-04-14 RX ADMIN — LOSARTAN POTASSIUM 50 MG: 50 TABLET, FILM COATED ORAL at 08:35

## 2021-04-14 RX ADMIN — INSULIN LISPRO 4 UNITS: 100 INJECTION, SOLUTION INTRAVENOUS; SUBCUTANEOUS at 11:26

## 2021-04-14 ASSESSMENT — PAIN SCALES - GENERAL
PAINLEVEL_OUTOF10: 0

## 2021-04-14 NOTE — PROGRESS NOTES
Physician Progress Note      PATIENT:               Boubacar Wilson  CSN #:                  [id number]  :                       1940  ADMIT DATE:       2021 4:54 PM  [address] [place] DATE:  RESPONDING  PROVIDER #:        Zoltan Santana MD        QUERY TEXT:    Stage of Chronic Kidney Disease: Please provide further specificity, if known. Clinical indicators include: bnp, chronic kidney disease, bun, creatinine,   probnp  Options provided:  -- Chronic kidney disease stage 1  -- Chronic kidney disease stage 2  -- Chronic kidney disease stage 3  -- Chronic kidney disease stage 3a  -- Chronic kidney disease stage 3b  -- Chronic kidney disease stage 4  -- Chronic kidney disease stage 5  -- Chronic kidney disease stage 5, requiring dialysis  -- End stage renal disease  -- Other - I will add my own diagnosis  -- Disagree - Not applicable / Not valid  -- Disagree - Clinically Unable to determine / Unknown        PROVIDER RESPONSE TEXT:    The patient has chronic kidney disease stage 2.       Electronically signed by:  Zoltan Santana MD 2021 11:01 AM

## 2021-04-14 NOTE — PROGRESS NOTES
Hospitalist Progress Note      PCP: Aleja Nolan MD    Date of Admission: 4/12/2021    Chief Complaint:     Chief Complaint   Patient presents with    Shortness of Breath       Subjective:  Patient seen and examined at the bedside.   No acute events overnight  Appreciate podiatry eval of LE wounds, no signs of acute infection  BL LE doppler limited due to wounds/thick skin, but no evidence of DVT    PFHS: reviewed as documented 4/12/2021, no changes    Medications:  Reviewed    Infusion Medications    sodium chloride      dextrose       Scheduled Medications    sodium chloride flush  5-40 mL Intravenous 2 times per day    enoxaparin  40 mg Subcutaneous Daily    rosuvastatin  20 mg Oral Nightly    losartan  50 mg Oral Daily    carbidopa-levodopa  1 tablet Oral BID    metoprolol tartrate  25 mg Oral BID    potassium chloride  10 mEq Oral Daily    finasteride  5 mg Oral Nightly    donepezil  10 mg Oral Nightly    levothyroxine  75 mcg Oral Daily    insulin lispro  0-12 Units Subcutaneous TID WC    insulin lispro  0-6 Units Subcutaneous Nightly    insulin glargine  0.15 Units/kg Subcutaneous Nightly    furosemide  40 mg Intravenous BID    miconazole   Topical BID     PRN Meds: sodium chloride flush, sodium chloride, promethazine **OR** ondansetron, polyethylene glycol, acetaminophen **OR** acetaminophen, glucose, dextrose, glucagon (rDNA), dextrose      Intake/Output Summary (Last 24 hours) at 4/14/2021 1254  Last data filed at 4/14/2021 0527  Gross per 24 hour   Intake 360 ml   Output 200 ml   Net 160 ml       Physical Exam    /67   Pulse 68   Temp 97.9 °F (36.6 °C) (Oral)   Resp 16   Ht 6' (1.829 m)   Wt 247 lb 9.2 oz (112.3 kg)   SpO2 95%   BMI 33.58 kg/m²     General appearance:  No acute distress, appears stated age  Eyes: Pupils equal, round, reactive to light, conjunctiva/corneas clear  Ears/Nose/Mouth/Throat: No external lesions or scars, hearing intact to voice  Neck: indicated. XR TIBIA FIBULA RIGHT (2 VIEWS)   Final Result      No osseous abnormality. If osteomyelitis is of clinical concern, recommend MRI                  Right tibia and fibula 4 views      HISTORY: Same      Extensive vascular calcification. Scattered phleboliths also present. Advanced degenerative osteoarthritis of the knee with bone-on-bone configuration at the medial femorotibial joint space compartment. Tibia and fibula intact. No fracture or dislocation. No bone erosion or periosteal elevation. No soft tissue gas. Transmetatarsal amputation with diffuse soft tissue swelling of the amputation stump. IMPRESSION:      Transmetatarsal amputation. No signs of osteomyelitis of the tibiotalar fibula. Recommend MRI for further evaluation as clinically indicated. XR CHEST (2 VW)   Final Result      Patchy basal predominant opacities which are nonspecific. Stable mild cardiomegaly.                 Assessment/Plan:    Active Hospital Problems    Diagnosis Date Noted    Essential hypertension [I10]      Priority: High    Acute on chronic combined systolic and diastolic congestive heart failure ([facility] [I50.43] 04/12/2021    Diabetic polyneuropathy associated with type 2 diabetes mellitus ([facility] [E11.42] 07/25/2017    Obstructive sleep apnea [G47.33] 10/03/2014    Hypothyroid [E03.9] 07/24/2014       Plan:    # Acute on chronic combined heart failure  -tele  -lasix  -cardiology eval pending  -strict I&O, daily weight  -follow electrolytes  -continue asa, statin, ARB, BB    # Lower extremity wounds  -appreciate podiatry eval, no signs of acute infection  -BL LE doppler negative for DVT  -podiatry will follow while in-house    # T2DM  -carb control diet  -lantus w/ mealtime and correctional insulin     # SARIKA  # Hypothyroid  -continue synthroid    DVT Prophylaxis: Lovenox  Diet: DIET GENERAL; Low Sodium (2 GM)  Dietary Nutrition Supplements: Low Calorie High Protein Supplement  Code Status: Full Code    PT/OT Eval Status: Ongoing    Dispo: [place] pending clinical improvement    Laura Metcalf MD

## 2021-04-14 NOTE — PROGRESS NOTES
Cardiology Consult Service  Daily Progress Note        Admit Date:  4/12/2021  Primary cardiologist: Dr Alysha Hernandez    Reason for Consultation/Chief Complaint: fatigue, dyspnea    Subjective:      Nereyda Isaac is a 79 y.o. male with a past medical history of HTN, HLP, DM2, CAD s/p stents, HFrEF due to HOSP [facility], SARIKA.      C 11/2011 (Dr Alysha Hernandez): PCI to prox-mid LAD with 2 x SAMANTHA, OM3 75% not intervened. LVEF 40%.      Nuc 2014 normal.      Echo 08/2017: mild LVH, EF 40%, diastolic II, LAE, mild valve disease, normal RV.      Patient presented to the emergency room on 4/12 with progressively worse exertional dyspnea and fatigue over the last couple of days. He also admits to some subjective fevers. He was admitted for possible acute heart failure, rule out Covid infection. On admission he was afebrile, however spiked a temperature of 100.6 earlier today. He remains hemodynamically stable with normal oxygen saturation on room air. Creatinine 1.3, proBNP 7400, troponin negative, Covid pending, hemoglobin 11, chest x-ray with patchy infiltrates.     ECG 4/12/2021: Sinus rhythm 97 bpm, with frequent PVCs versus aberrant PACs, nonspecific changes. COVID negative. Interval history:  I's and O's are inaccurate. There were no overnight events.     Objective:     Medications:   sodium chloride flush  5-40 mL Intravenous 2 times per day    enoxaparin  40 mg Subcutaneous Daily    rosuvastatin  20 mg Oral Nightly    losartan  50 mg Oral Daily    carbidopa-levodopa  1 tablet Oral BID    metoprolol tartrate  25 mg Oral BID    potassium chloride  10 mEq Oral Daily    finasteride  5 mg Oral Nightly    donepezil  10 mg Oral Nightly    levothyroxine  75 mcg Oral Daily    insulin lispro  0-12 Units Subcutaneous TID WC    insulin lispro  0-6 Units Subcutaneous Nightly    insulin glargine  0.15 Units/kg Subcutaneous Nightly    furosemide  40 mg Intravenous BID    miconazole   Topical BID       IV drips:   sodium chloride      dextrose         PRN:  sodium chloride flush, sodium chloride, promethazine **OR** ondansetron, polyethylene glycol, acetaminophen **OR** acetaminophen, glucose, dextrose, glucagon (rDNA), dextrose    Vitals:    04/14/21 0515 04/14/21 0745 04/14/21 1152 04/14/21 1612   BP: 120/76 123/69 123/67 134/80   Pulse: 59 96 68 74   Resp: 16 17 16 17   Temp: 97.7 °F (36.5 °C) 97.6 °F (36.4 °C) 97.9 °F (36.6 °C) 98 °F (36.7 °C)   TempSrc: Oral Oral Oral Oral   SpO2: 94% 95% 95% 96%   Weight: 247 lb 9.2 oz (112.3 kg)      Height:           Intake/Output Summary (Last 24 hours) at 4/14/2021 1628  Last data filed at 4/14/2021 1423  Gross per 24 hour   Intake 597 ml   Output 350 ml   Net 247 ml     I/O last 3 completed shifts: In: 597 [P.O.:597]  Out: 350 [Urine:350]  Wt Readings from Last 3 Encounters:   04/14/21 247 lb 9.2 oz (112.3 kg)   11/25/20 248 lb (112.5 kg)   11/05/20 245 lb 9.6 oz (111.4 kg)       Admit Wt: Weight: 244 lb 11.4 oz (111 kg)   Todays Wt: Weight: 247 lb 9.2 oz (112.3 kg)    TELEMETRY: Sinus     Physical Exam:         General Appearance:  Alert, cooperative, no distress, appears stated age Appropriate weight   Head:  Normocephalic, without obvious abnormality, atraumatic   Eyes:  PERRL, conjunctiva/corneas clear EOM intact  Ears normal   Throat no lesions       Nose: Nares normal, no drainage or sinus tenderness   Throat: Lips, mucosa, and tongue normal   Neck: Supple, symmetrical, trachea midline, no adenopathy, thyroid: not enlarged, symmetric, no tenderness/mass/nodules, no carotid bruit. Lungs:   Normal respiratory rate, lungs with mild ronchi bilaterally.     Chest Wall:  No tenderness or deformity   Heart:  Regular rhythm, rate is controlled, S1, S2 normal, there is no murmur, there is no rub or gallop, cannot assess jvd, 1+ bilateral lower extremity edema   Abdomen:   Soft, non-tender, bowel sounds active all four quadrants,  no masses, no organomegaly       Extremities: Extremities normal, atraumatic, no cyanosis. Pulses: 2+ and symmetric   Skin: Skin color, texture, turgor normal, no rashes or lesions   Pysch: Normal mood and affect   Neurologic: Normal gross motor and sensory exam.  Cranial nerves intact       Labs:   Recent Labs     04/12/21 1652 04/13/21  1215   * 135*   K 4.2 3.8   BUN 26* 25*   CREATININE 1.3 1.3   CL 98* 99   CO2 23 24   GLUCOSE 143* 172*   CALCIUM 9.3 9.0   MG  --  1.80     Recent Labs     04/12/21  1652 04/13/21  0524   WBC 8.6 8.0   HGB 11.5* 10.9*   HCT 36.2* 34.2*    225   MCV 77.6* 76.2*     No results for input(s): CHOLTOT, TRIG, HDL in the last 72 hours. Invalid input(s): LIPIDCOMM, CHOLHDL, VLDCHOL, LDL  No results for input(s): PTT, INR in the last 72 hours. Invalid input(s): PT  Recent Labs     04/12/21 1652   TROPONINI <0.01     No results for input(s): BNP in the last 72 hours. No results for input(s): NTPROBNP in the last 72 hours. No results for input(s): TSH in the last 72 hours. Imaging:       Assessment & Plan:     1. Acute on chronic HFrEF:  Patient presented volume overloaded and hemodynamically stable.     -Cw Lasix 40 mg IV twice daily.  - Strict I's and O's every shift and standing weights if possible, low-salt diet and daily BMP with reflex to Mg, wean supplemental oxygen to off for sats greater than 94%. -Continue with Lopressor 25 twice daily, losartan 50 daily     2. CAD status post stents: There is no evidence of ACS with this admission.     -Continue with metoprolol, Crestor  -Consider baby aspirin if not contraindicated (GIB).     3.  Essential hypertension:  Patient's blood pressure is well controlled with current medications. I have spent 30 minutes of face to face time with the patient with more than 50% spent counseling and coordinating care.      I have personally reviewed the reports and images of labs, radiological studies, cardiac studies including ECG's and telemetry, current and old medical records. The note was completed using EMR and Dragon dictation system. Every effort was made to ensure accuracy; however, inadvertent computerized transcription errors may be present. All questions and concerns were addressed to the patient/family. Alternatives to my treatment were discussed. Thank you for allowing to us to participate in the [facility] or [address] [place] [address]. Please call our service with questions.     Marie Marcano MD, [facility], [address] [facility]  The [address] [address]  Ph: [phone]  Fax: [phone]

## 2021-04-14 NOTE — CONSULTS
NUTRITION ASSESSMENT  Admission Date: 4/12/2021     Type and Reason for Visit: Initial, Consult    NUTRITION RECOMMENDATIONS:   1. Continue low sodium diet  2. Continue low calorie/high protein supplement TID    NUTRITION ASSESSMENT:  RD consulted for HF education. Noted that pt also has wounds and nutrition screen indicated pt has been eating poorly d/t decreased appetite. Last two intakes noted per EMR are %. Pt with wife at bedside. Pt reports that the last few days he has had decreased appetite/po intakes d/t feeling unwell. RD reviewed HF education and increased protein needs for wound healing. Pts wife reports she does all of the cooking at home and already cooks with no added salt. RD provided verbal education to pt and wife and provided an handout on guidelines for heart failure. MALNUTRITION ASSESSMENT  Context of Malnutrition: Acute Illness(on chronic)   Malnutrition Status: At risk for malnutrition (Comment)(low appetite, increased protein needs for wound healing)  Findings of the 6 clinical characteristics of malnutrition (Minimum of 2 out of 6 clinical characteristics is required to make the diagnosis of moderate or severe Protein Calorie Malnutrition based on AND/ASPEN Guidelines):  Energy Intake: Less than/equal to 75% of estimated energy requirements    Energy Intake Time: Greater than or equal to 5 days    Weight Loss %: No significant loss   Weight loss Time: No significant      Due to current CDC guidelines recommending 6-ft distancing for social isolation for COVID19 prevention, physical aspects of the malnutrition assessment were withheld at this time.      NUTRITION DIAGNOSIS   Problem: Problem #1: Increased nutrient needs   Etiology: Acute or chronic injury or trauma  Signs & Symptoms: Presence of wounds     NUTRITION INTERVENTION  Food and/or Nutrient Delivery: Continue Current Diet, Continue Oral Nutrition Supplement   Nutrition Education/Counseling: Education completed Coordination of Nutrition Care: Continue to monitor while inpatient       NUTRITION MONITORING & EVALUATION:  Evaluation:Goals set   Goals:Goals: Pt will consume >75% of all meals and supplements  Monitoring: Meal Intake , Skin Integrity  or Wound Healing      OBJECTIVE DATA: Significant to nutrition assessment  · Nutrition-Focused Physical Findings: +1 non-pitting edema  · Wounds Pressure Ulcer      Past Medical History:   Diagnosis Date    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 Opal Hobbs CAD (coronary artery disease)     CH[facility]congestive heart failure) ([facility])     systolic    Chronic kidney disease     upon hospitalization due to bactrim allergy    COPD [facility]ronic obstructive pulmonary disease) [facility]     Hypertension     Hypothyroidism     Mixed hyperlipidemia     MRSA (methicillin resistant staph aureus) culture positive 07/24/2017    foot    Neuropathy     Obstructive sleep apnea 10/03/2014    does not use cpap machine    Parkinson's disease ([facility] 1964    Type 2 diabetes mellitus without complication ([facility])         ANTHROPOMETRICS  Current Height: 6' (182.9 cm)  Current Weight: 247 lb 9.2 oz (112.3 kg)    Admission weight: 244 lb 11.4 oz (111 kg)  Ideal Bodyweight 178 lbs    Usual Bodyweight 245 per EMR   Weight Changes none/not significant      BMI BMI (Calculated): 33.6    Wt Readings from Last 50 Encounters:   04/14/21 247 lb 9.2 oz (112.3 kg)   11/25/20 248 lb (112.5 kg)   11/05/20 245 lb 9.6 oz (111.4 kg)   11/03/20 255 lb (115.7 kg)   03/05/20 256 lb (116.1 kg)   07/29/19 262 lb 9.6 oz (119.1 kg)       COMPARATIVE STANDARDS  Estimated Total Kcals/Day : 15-18 Current Bodyweight (112.3 kg) 3123-3356 kcal/day    Estimated Total Protein (g/day) : 1.2-1.5 Ideal Bodyweight  (81 kg)  g/day  Estimated Daily Total Fluid (ml/day): 1 mL/kcal per day     Food / Nutrition-Related History  Pre-Admission / Home Diet:  Pre-Admission/Home Diet: Carb Control   Home Supplements / Herbals:  Equate nutrition shakes (1 every couple days)  Food Restrictions / Cultural Requests:    none noted    Current Nutrition Therapies   DIET GENERAL; Low Sodium (2 GM)  Dietary Nutrition Supplements: Low Calorie High Protein Supplement     PO Intake: 26-50% and %  PO Supplement: Low Calorie/High Protein    PO Supplement Intake: %  IVF: none     NUTRITION RISK LEVEL: Risk Level:  Moderate     Dominic Cook RD, IZZY  [place]:  [phone]  Office:  [phone]

## 2021-04-14 NOTE — PROGRESS NOTES
VSS throughout the night. NSR on tele with occasional PVCs. Pt states that his breathing feels better this morning. SpO2 has remained > 90% on RA. Dressings to BLE remain clean, dry, and intact. No drainage noted. No acute events overnight.

## 2021-04-14 NOTE — PROGRESS NOTES
Podiatric Surgery Daily Progress Note  Vaibhav Lao      Subjective :   Patient seen and examined this am at the bedside. Patient denies any acute overnight events. Patient denies N/V/F/C/SOB. Patient denies calf pain, thigh pain, chest pain. Review of Systems: A 12 point review of symptoms is unremarkable with the exception of the chief complaint. Patient specifically denies nausea, fever, vomiting, chills, shortness of breath, chest pain, abdominal pain, constipation or difficulty urinating. Objective     /69   Pulse 96   Temp 97.6 °F (36.4 °C) (Oral)   Resp 17   Ht 6' (1.829 m)   Wt 247 lb 9.2 oz (112.3 kg)   SpO2 95%   BMI 33.58 kg/m²     I/O:    Intake/Output Summary (Last 24 hours) at 4/14/2021 0749  Last data filed at 4/14/2021 0527  Gross per 24 hour   Intake 660 ml   Output 350 ml   Net 310 ml              Wt Readings from Last 3 Encounters:   04/14/21 247 lb 9.2 oz (112.3 kg)   11/25/20 248 lb (112.5 kg)   11/05/20 245 lb 9.6 oz (111.4 kg)       LABS:    Recent Labs     04/12/21  1652 04/13/21  0524   WBC 8.6 8.0   HGB 11.5* 10.9*   HCT 36.2* 34.2*    225        Recent Labs     04/13/21  1215   *   K 3.8   CL 99   CO2 24   BUN 25*   CREATININE 1.3        Recent Labs     04/12/21  1652   PROT 7.7           LOWER EXTREMITY EXAMINATION    Dressing to b/l LE intact. No strikethrough noted to the external dressing. Mild serosanguinous drainage noted to the internal layers of the dressing. VASCULAR: DP and PT pulses nonpalpable 0/42/2 edema. Upon hand-held Doppler examination DP and PT pulses were noted to have biphasic signals b/l. CFT is brisk to the digits of the foot b/l. Skin temperature is warm to warm from proximal to distal with no focal calor noted. +1 pitting edema noted to b/l LE. No pain with calf compression b/l.     NEUROLOGIC: Gross and epicritic sensation is diminished b/l.  Protective sensation is diminished at all pedal sites b/l.     DERMATOLOGIC: Chronic dermatological changes noted to b/l LE. Circumferential erythema with hemosiderin deposits noted to b/l LE, consistent with chronic venous stasis dermatitis. Erythema mildly resolves with elevation of b/l LE. Relaxed skin tension lines noted 2/2 Ace compressive therapy. Multiple superficial, partial-thickness ulcerations noted to b/l LE without acute signs of infection. No purulent drainage, malodor, fluctuance, crepitus noted to b/l wounds. Webspaces 1-4 b/l are clean, dry, and intact. MUSCULOSKELETAL: Muscle strength is 5/5 for all pedal groups tested. No pain with palpation of the foot or ankle b/l. Ankle joint ROM is decreased in dorsiflexion with the knee extended. History of TMA of RLE. IMAGING:  Narrative   Left tibia and fibula 4 views       HISTORY: Soft tissue wounds       Extensive vascular calcification posteriorly. Osseous structures intact. No fracture or dislocation. No bone erosion or periosteal reaction. No soft tissue gas.           Impression       No osseous abnormality. If osteomyelitis is of clinical concern, recommend MRI                       Right tibia and fibula 4 views       HISTORY: Same       Extensive vascular calcification. Scattered phleboliths also present. Advanced degenerative osteoarthritis of the knee with bone-on-bone configuration at the medial femorotibial joint space compartment.       Tibia and fibula intact. No fracture or dislocation. No bone erosion or periosteal elevation. No soft tissue gas. Transmetatarsal amputation with diffuse soft tissue swelling of the amputation stump.       IMPRESSION:       Transmetatarsal amputation. No signs of osteomyelitis of the tibiotalar fibula. Recommend MRI for further evaluation as clinically indicated. ASSESSMENT/PLAN  1. Multiple superficial, partial-thickness venous stasis ulcerations; B/L LE  2. Chronic venous stasis dermatitis; B/L LE  3. Venous stasis insufficiency; B/L LE  4.   Diabetes mellitus

## 2021-04-14 NOTE — ACP (ADVANCE CARE PLANNING)
Advance Care Planning     Advance Care Planning Activator (Inpatient)  Conversation Note      Date of ACP Conversation: 4/13/21  Conversation Conducted with: {Discussion Participants:28596::\"Patient with Decision Making Capacity\"}    ACP Activator: A [facility] Decision Maker:     Current Designated Health Care Decision Maker:      Primary: Mahogany Friedman, spouse  Phone: [phone]    Armando Mar, grandchild  [phone]    Jayne Calixto, son  Phone: [phone]      Care Preferences    Ventilation: \"If you were in your present state of health and suddenly became very ill and were unable to breathe on your own, what would your preference be about the use of a ventilator (breathing machine) if it were available to you? \"      Would the patient desire the use of ventilator (breathing machine)?: {Yes/No-Ex:134180}   No    \"If your health worsens and it becomes clear that your chance of recovery is unlikely, what would your preference be about the use of a ventilator (breathing machine) if it were available to you? \"     Would the patient desire the use of ventilator (breathing machine)?: {YES/NO:49621}   No      Resuscitation  \"CPR works best to restart the heart when there is a sudden event, like a heart attack, in someone who is otherwise healthy. Unfortunately, CPR does not typically restart the heart for people who have serious health conditions or who are very sick. \"    \"In the event your heart stopped as a result of an underlying serious health condition, would you want attempts to be made to restart your heart (answer \"yes\" for attempt to resuscitate) or would you prefer a natural death (answer \"no\" for do not attempt to resuscitate)? \" {Yes/No-Ex:669307}   No       [x] Yes   [] No   Educated Patient / Decision Maker regarding differences between Advance Directives and portable DNR orders.     Length of ACP Conversation in minutes:      Conversation Outcomes:  [x] ACP discussion completed  [] Existing advance directive reviewed with patient; no changes to patient's previously recorded wishes  [] New Advance Directive completed  [] Portable Do Not Rescitate prepared for Provider review and signature  [] POLST/POST/MOLST/MOST prepared for Provider review and signature      Follow-up plan:    [] Schedule follow-up conversation to continue planning  [] Referred individual to Provider for additional questions/concerns   [] Advised patient/agent/surrogate to review completed ACP document and update if needed with changes in condition, patient preferences or care setting    [x] This note routed to one or more involved healthcare providers

## 2021-04-14 NOTE — PROGRESS NOTES
Pt received to room 6301 from the PCU. Pt A&O x4 upon arrival with intermittent confusion/forgetfullness. Tele monitor applied, rate and rhythm verified with monitor reader. VSS. Pt oriented to room, staff, and call system. Educated on fall protocol and hourly rounding. Assessment as documented. Bed locked in low position. Pt informed to utilize call light with any needs. Pt verbalized understanding. Call light within reach. Hourly rounding in place. No further needs at this time. Will continue to monitor.

## 2021-04-14 NOTE — CARE COORDINATION
Case Management Assessment           Initial Evaluation                Date / Time of Evaluation: 4/14/2021 5:03 PM                 Assessment Completed by: Nataliia Paez    Patient Name: Yusef Tong     YOB: 1940  Diagnosis: Acute on chronic combined systolic and diastolic congestive heart failure [facility]) [I50.43]     Date / Time: 4/12/2021  4:54 PM    Patient Admission Status: Inpatient    If patient is discharged prior to next notation, then this note serves as note for discharge by case management. Current PCP: Vasiliy Bhat MD  Clinic Patient: No    Chart Reviewed: Yes  Patient/ Family Interviewed: Yes    Initial assessment completed at bedside with: wife and patient    Hospitalization in the last 30 days: No    Emergency Contacts:  Extended Emergency Contact Information  Primary Emergency Contact: Tashia Lao  [facility] [address] [place] [address] Phone: [phone]  Mobile Phone: [phone]  Relation: Spouse  Secondary Emergency Contact: Shilo [address] [place] [address], [facility] Phone: [phone]  Mobile Phone: [phone]  Relation: Õie 16:   Code Status: [address]: No  Financial  Payor: Viola Lee / Plan: MEDICARE PART A AND B / Product Type: *No Product type* /     Pre-cert required for SNF: No    Pharmacy    [address] [place] [address] Drive [address] [address] [phone] Claudia Coughlin [phone]  [phone]  Phone: [phone] Fax: [phone]      Potential assistance Purchasing Medications: Potential Assistance Purchasing Medications: No  Does Patient want to participate in local refill/ meds to beds program?: Yes    Meds To Beds General Rules:  1. Can ONLY be done Monday- Friday between 8:30am-5pm  2. Prescription(s) must be in pharmacy by 3pm to be filled same day  3. Copy of patient's insurance/ prescription drug card and patient face sheet Zoe Maloney and his family were provided with a choice of provider and agrees with the discharge plan Yes    Freedom of choice list was provided with basic dialogue that supports the patient's individualized plan of care/goals and shares the quality data associated with the providers.  Yes    Care Transition patient: No    Dread Harvey RN  [facility]  Case Management Department  Ph: [phone]

## 2021-04-14 NOTE — DISCHARGE INSTR - COC
Directives:   [address] [facility] Documentation       Date/Time Healthcare Directive Type of Healthcare Directive Copy in [address] Agent's Name Healthcare Agent's Phone Number    04/13/21 1104  Unknown, patient unable to respond due to medical condition -- -- -- -- --            Admitting Physician:  Charbel Sanchez MD  PCP: Vasiliy Bhat MD    Discharging Nurse: [facility] Unit/Room#: 6747/2329-83  Discharging Unit Phone Number: ***    Emergency Contact:   Extended Emergency Contact Information  Primary Emergency Contact: Tashia Lao  [facility], [address] [place] [address] Phone: [phone]  Mobile Phone: [phone]  Relation: Spouse  Secondary Emergency Contact: Harshaluma Stefani1 [facility] Phone: [phone]  Mobile Phone: [phone]  Relation: Grandchild    Past Surgical History:  Past Surgical History:   Procedure Laterality Date    [facility]      1980's    COLONOSCOPY      FOOT SURGERY Right 07/24/2017    INCISION AND DRAINAGE RIGHT FOOT WITH REMOVAL NECROTIC BONE    FOOT SURGERY Right 07/27/2017    : TRANSMETATARSAL AMPUTATION RIGHT FOOT     HERNIA REPAIR      SHOULDER SURGERY      right    UPPER GASTROINTESTINAL ENDOSCOPY N/A 11/25/2020    ESOPHAGOGASTRODUODENOSCOPY WITH BOTOX INJECTION performed by Willie Milton DO at [facility] ENDOSCOPY       Immunization History:   Immunization History   Administered Date(s) Administered    Influenza Vaccine, unspecified formulation 11/09/2017       Active Problems:  Patient Active Problem List   Diagnosis Code    Chest pain R07.9    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 ([address] J68.3    Pulmonary embolism ([address] I26.99    Coronary artery disease involving native heart without angina pectoris I25.10    Presence of drug coated stent in anterior descending branch of left coronary artery Z95.5    Sepsis ([address] A41.9    Syncope R55    Hypothyroid E03.9    Obstructive sleep apnea G47.33    Gas gangrene (HCC) A48.0    Diabetic infection of right foot (HCC) E11.628, L08.9    Diabetic polyneuropathy associated with type 2 diabetes mellitus (HCC) E11.42    Right foot infection L08.9    LULÚ (acute kidney injury) ([facility] H35.0    Acute diastolic CHF (congestive heart failure), NYHA class 2 ([facility]) I50.31    Encephalopathy G93.40    Cellulitis L03.90    Cellulitis of right lower extremity L03.115    History of MRSA infection Z86.14    Epistaxis R04.0    Community acquired pneumonia of left lower lobe of lung J18.9    Posterior epistaxis R04.0    Essential hypertension I10    Mixed hyperlipidemia E78.2    Acute on chronic combined systolic and diastolic congestive heart failure (HCC) I50.43       Isolation/Infection:   Isolation            No Isolation          Patient Infection Status       Infection Onset Added Last Indicated Last Indicated By Review Planned Expiration Resolved Resolved By    None active    Resolved    COVID-19 Rule Out 04/12/21 04/12/21 04/12/21 COVID-19 (Ordered)   04/13/21 Rule-Out Test Resulted    MRSA  07/28/17 07/28/17 Fabian Mohr, RN   04/14/21 Kamar Stevenson, RN    Over 1 year ago            Nurse Assessment:  Last Vital Signs: /69   Pulse 96   Temp 97.6 °F (36.4 °C) (Oral)   Resp 17   Ht 6' (1.829 m)   Wt 247 lb 9.2 oz (112.3 kg)   SpO2 95%   BMI 33.58 kg/m²     Last documented pain score (0-10 scale): Pain Level: 0  Last Weight:   Wt Readings from Last 1 Encounters:   04/14/21 247 lb 9.2 oz (112.3 kg)     Mental Status:  {IP PT MENTAL STATUS:20030:::0}    IV Access:  { JULIET IV ACCESS:886573655:::0}    Nursing Mobility/ADLs:  Walking   {CHP DME ADLs:207340534:::0}  Transfer  {CHP DME ADLs:563525791:::0}  Bathing  {CHP DME ADLs:184181391:::0}  Dressing  {CHP DME ADLs:882654242:::0}  Toileting  {CHP DME ADLs:092072415:::0}  Feeding  {CHP DME ADLs:122497043:::0}  Med Admin  {CHP DME Bearing Status/Restrictions: [address] Weight Bearin:::0}  Other Medical Equipment (for information only, NOT a DME order):  {EQUIPMENT:686293082}  Other Treatments: ***    Patient's personal belongings (please select all that are sent with patient):  {CHP DME Belongings:477620586:::0}    RN SIGNATURE:  {Esignature:572894057:::0}    CASE MANAGEMENT/SOCIAL WORK SECTION    Inpatient Status Date: 21    Readmission Risk Assessment Score:  Readmission Risk              Risk of Unplanned Readmission:        14           Discharging to [facility]  [address], [address] [facility]   Phone: [phone]  Fax: [phone]    / signature: Electronically signed by Lissy Agarwal RN on 21 at 10:06 AM EDT    PHYSICIAN SECTION    Prognosis: Good    Condition at Discharge: Stable    Rehab Potential (if transferring to Rehab): Good    Recommended Labs or Other Treatments After Discharge:     Home care    Podiatry Wound Care Discharge Instructions  Please perform every day dressing changes to bilateral lower extremity as follows  -Apply adaptic to the wounds on the bilateral lower leg  -Next apply gauze to the top of the bilateral foot, ankle, and leg  -Next loosely wrap the bilateral lower extremity with Kerlix starting from just in front of the toes and ending just below the knee  -Next gently wrap the bilateral foot with 4 inch Ace bandage starting from just in front of the toes and ending just above the ankle  -Next gently wrap the bilateral leg with 6 inch Ace bandage starting from just above the ankle and ending just below the right knee    Patient is full weightbearing to Bilateral lower extremity  Please follow-up with Dr. Verónica Chang DPM in [facility] within 1 week of discharge.      Physician Certification: I certify the above information and transfer of Maksim Peters  is necessary for the continuing treatment of the diagnosis listed and that he requires Home Care for greater 30 days.      Update Admission H&P: No change in H&P    PHYSICIAN SIGNATURE:  Electronically signed by Kim Fay MD on 4/16/21 at 5:01 PM EDT

## 2021-04-14 NOTE — PLAN OF CARE
Nutrition Problem #1: Increased nutrient needs  Intervention: Food and/or Nutrient Delivery: Continue Current Diet, Continue Oral Nutrition Supplement  Nutritional Goals: Pt will consume >75% of all meals and supplements

## 2021-04-15 LAB
ANION GAP SERPL CALCULATED.3IONS-SCNC: 11 MMOL/L (ref 3–16)
BUN BLDV-MCNC: 34 MG/DL (ref 7–20)
CALCIUM SERPL-MCNC: 9.3 MG/DL (ref 8.3–10.6)
CHLORIDE BLD-SCNC: 100 MMOL/L (ref 99–110)
CO2: 27 MMOL/L (ref 21–32)
CREAT SERPL-MCNC: 1.1 MG/DL (ref 0.8–1.3)
GFR AFRICAN AMERICAN: >60
GFR NON-AFRICAN AMERICAN: >60
GLUCOSE BLD-MCNC: 156 MG/DL (ref 70–99)
GLUCOSE BLD-MCNC: 166 MG/DL (ref 70–99)
GLUCOSE BLD-MCNC: 210 MG/DL (ref 70–99)
GLUCOSE BLD-MCNC: 280 MG/DL (ref 70–99)
GLUCOSE BLD-MCNC: 322 MG/DL (ref 70–99)
MAGNESIUM: 1.9 MG/DL (ref 1.8–2.4)
PERFORMED ON: ABNORMAL
POTASSIUM SERPL-SCNC: 3.9 MMOL/L (ref 3.5–5.1)
PRO-BNP: 3807 PG/ML (ref 0–449)
SODIUM BLD-SCNC: 138 MMOL/L (ref 136–145)

## 2021-04-15 PROCEDURE — 80048 BASIC METABOLIC PNL TOTAL CA: CPT

## 2021-04-15 PROCEDURE — 6360000002 HC RX W HCPCS: Performed by: INTERNAL MEDICINE

## 2021-04-15 PROCEDURE — 1200000000 HC SEMI PRIVATE

## 2021-04-15 PROCEDURE — 6370000000 HC RX 637 (ALT 250 FOR IP): Performed by: INTERNAL MEDICINE

## 2021-04-15 PROCEDURE — 2580000003 HC RX 258: Performed by: INTERNAL MEDICINE

## 2021-04-15 PROCEDURE — 83880 ASSAY OF NATRIURETIC PEPTIDE: CPT

## 2021-04-15 PROCEDURE — 36415 COLL VENOUS BLD VENIPUNCTURE: CPT

## 2021-04-15 PROCEDURE — 83735 ASSAY OF MAGNESIUM: CPT

## 2021-04-15 RX ORDER — FUROSEMIDE 10 MG/ML
40 INJECTION INTRAMUSCULAR; INTRAVENOUS 3 TIMES DAILY
Status: DISCONTINUED | OUTPATIENT
Start: 2021-04-15 | End: 2021-04-16

## 2021-04-15 RX ORDER — SPIRONOLACTONE 25 MG/1
25 TABLET ORAL DAILY
Status: DISCONTINUED | OUTPATIENT
Start: 2021-04-15 | End: 2021-04-16

## 2021-04-15 RX ADMIN — FINASTERIDE 5 MG: 5 TABLET, FILM COATED ORAL at 21:31

## 2021-04-15 RX ADMIN — INSULIN LISPRO 4 UNITS: 100 INJECTION, SOLUTION INTRAVENOUS; SUBCUTANEOUS at 21:32

## 2021-04-15 RX ADMIN — CARBIDOPA AND LEVODOPA 1 TABLET: 25; 100 TABLET ORAL at 21:31

## 2021-04-15 RX ADMIN — LOSARTAN POTASSIUM 50 MG: 50 TABLET, FILM COATED ORAL at 07:45

## 2021-04-15 RX ADMIN — METOPROLOL TARTRATE 25 MG: 25 TABLET, FILM COATED ORAL at 21:31

## 2021-04-15 RX ADMIN — CARBIDOPA AND LEVODOPA 1 TABLET: 25; 100 TABLET ORAL at 07:45

## 2021-04-15 RX ADMIN — INSULIN LISPRO 6 UNITS: 100 INJECTION, SOLUTION INTRAVENOUS; SUBCUTANEOUS at 18:09

## 2021-04-15 RX ADMIN — ENOXAPARIN SODIUM 40 MG: 40 INJECTION SUBCUTANEOUS at 07:45

## 2021-04-15 RX ADMIN — POTASSIUM CHLORIDE 10 MEQ: 750 TABLET, EXTENDED RELEASE ORAL at 07:45

## 2021-04-15 RX ADMIN — Medication 10 ML: at 07:46

## 2021-04-15 RX ADMIN — Medication 10 ML: at 21:32

## 2021-04-15 RX ADMIN — INSULIN LISPRO 4 UNITS: 100 INJECTION, SOLUTION INTRAVENOUS; SUBCUTANEOUS at 12:30

## 2021-04-15 RX ADMIN — MICONAZOLE NITRATE: 20 POWDER TOPICAL at 21:31

## 2021-04-15 RX ADMIN — FUROSEMIDE 40 MG: 10 INJECTION, SOLUTION INTRAMUSCULAR; INTRAVENOUS at 21:31

## 2021-04-15 RX ADMIN — ROSUVASTATIN CALCIUM 20 MG: 20 TABLET, COATED ORAL at 21:31

## 2021-04-15 RX ADMIN — FUROSEMIDE 40 MG: 10 INJECTION, SOLUTION INTRAMUSCULAR; INTRAVENOUS at 07:45

## 2021-04-15 RX ADMIN — MICONAZOLE NITRATE: 20 POWDER TOPICAL at 07:46

## 2021-04-15 RX ADMIN — DONEPEZIL HYDROCHLORIDE 10 MG: 10 TABLET, FILM COATED ORAL at 21:31

## 2021-04-15 RX ADMIN — INSULIN LISPRO 2 UNITS: 100 INJECTION, SOLUTION INTRAVENOUS; SUBCUTANEOUS at 08:34

## 2021-04-15 RX ADMIN — FUROSEMIDE 40 MG: 10 INJECTION, SOLUTION INTRAMUSCULAR; INTRAVENOUS at 14:46

## 2021-04-15 RX ADMIN — SPIRONOLACTONE 25 MG: 25 TABLET, FILM COATED ORAL at 14:46

## 2021-04-15 RX ADMIN — INSULIN GLARGINE 17 UNITS: 100 INJECTION, SOLUTION SUBCUTANEOUS at 21:32

## 2021-04-15 RX ADMIN — LEVOTHYROXINE SODIUM 75 MCG: 0.07 TABLET ORAL at 06:38

## 2021-04-15 ASSESSMENT — PAIN SCALES - GENERAL
PAINLEVEL_OUTOF10: 0
PAINLEVEL_OUTOF10: 0

## 2021-04-15 NOTE — PLAN OF CARE
Problem: Breathing Pattern - Ineffective:  Goal: Ability to achieve and maintain a regular respiratory rate will improve  Description: Ability to achieve and maintain a regular respiratory rate will improve  4/15/2021 0200 by Kenya Linder RN  Outcome: Ongoing  Note: Respirations easy at rest. SpO2 94-96% on room air. No signs of respiratory distress. Pt states that SOB is improving. Will continue to monitor. Problem: Skin Integrity:  Goal: Will show no infection signs and symptoms  Description: Will show no infection signs and symptoms  Outcome: Ongoing  Note: Pt afebrile. WBC within normal range. COVID negative. No new s/s of infection noted. Will continue to monitor. Problem: Skin Integrity:  Goal: Absence of new skin breakdown  Description: Absence of new skin breakdown  4/15/2021 0200 by Kenya Linder RN  Outcome: Ongoing  Note: Pt has redness to abdominal folds and groin. Interdry and zinc used as needed. Condom cath placed for moisture prevention. Pt assisted with turning/repositioning every 2 hours and as needed. Pt also has redness to bottom - Mepilex in place for protective barrier. No new signs of skin breakdown noted. Will continue to monitor. Problem: Falls - Risk of:  Goal: Will remain free from falls  Description: Will remain free from falls  Outcome: Ongoing  Note: Fall precautions in place. Bed locked in lowest position with side rails up x3. Bed exit alarm in use. Nonskid footwear in use. Pt educated on call light system and instructed to use call light prior to getting up. Pt calls out appropriately for needs. Call light and personal belongings within reach. Will continue to monitor.

## 2021-04-15 NOTE — CARE COORDINATION
Patient plans to go home with wife who is present 24/7. Patient lives in one floor ranch. Has used [employer] [facility] and wants to continue with them. CM will continue to follow patient until discharge.   Electronically signed by Hardik Alcantara RN on 4/15/2021 at 11:33 AM

## 2021-04-15 NOTE — PROGRESS NOTES
thyromegaly  Respiratory:  Non-labored breathing, clear to auscultation bilaterally  Cardiovascular: Regular rate and rhythm, no murmurs, gallops, or rubs  Abdomen: soft, non-tender, non-distended  Musculoskeletal: BL LE w/ ACE wrap, some edema present  Skin: normal color, no wounds noted  Psychiatric: A&Ox4, good insight and judgment    Labs:   Recent Labs     04/12/21  1652 04/13/21  0524   WBC 8.6 8.0   HGB 11.5* 10.9*   HCT 36.2* 34.2*    225     Recent Labs     04/12/21  1652 04/13/21  1215 04/15/21  0600   * 135* 138   K 4.2 3.8 3.9   CL 98* 99 100   CO2 23 24 27   BUN 26* 25* 34*   CREATININE 1.3 1.3 1.1   CALCIUM 9.3 9.0 9.3     Recent Labs     04/12/21  1652   AST 8*   ALT 7*   BILITOT 0.6   ALKPHOS 71     No results for input(s): INR in the last 72 hours. Recent Labs     04/12/21  1652   TROPONINI <0.01       Urinalysis:      Lab Results   Component Value Date    NITRU Negative 01/06/2018    WBCUA 3-5 01/06/2018    BACTERIA 1+ 01/06/2018    RBCUA 3-5 01/06/2018    BLOODU TRACE-INTACT 01/06/2018    SPECGRAV 1.020 01/06/2018    GLUCOSEU 500 01/06/2018    GLUCOSEU 500 01/27/2012       Radiology:  VL Extremity Venous Bilateral   Final Result      XR TIBIA FIBULA LEFT (2 VIEWS)   Final Result      No osseous abnormality. If osteomyelitis is of clinical concern, recommend MRI                  Right tibia and fibula 4 views      HISTORY: Same      Extensive vascular calcification. Scattered phleboliths also present. Advanced degenerative osteoarthritis of the knee with bone-on-bone configuration at the medial femorotibial joint space compartment. Tibia and fibula intact. No fracture or dislocation. No bone erosion or periosteal elevation. No soft tissue gas. Transmetatarsal amputation with diffuse soft tissue swelling of the amputation stump. IMPRESSION:      Transmetatarsal amputation. No signs of osteomyelitis of the tibiotalar fibula.  Recommend MRI for further evaluation as clinically indicated. XR TIBIA FIBULA RIGHT (2 VIEWS)   Final Result      No osseous abnormality. If osteomyelitis is of clinical concern, recommend MRI                  Right tibia and fibula 4 views      HISTORY: Same      Extensive vascular calcification. Scattered phleboliths also present. Advanced degenerative osteoarthritis of the knee with bone-on-bone configuration at the medial femorotibial joint space compartment. Tibia and fibula intact. No fracture or dislocation. No bone erosion or periosteal elevation. No soft tissue gas. Transmetatarsal amputation with diffuse soft tissue swelling of the amputation stump. IMPRESSION:      Transmetatarsal amputation. No signs of osteomyelitis of the tibiotalar fibula. Recommend MRI for further evaluation as clinically indicated. XR CHEST (2 VW)   Final Result      Patchy basal predominant opacities which are nonspecific. Stable mild cardiomegaly.                 Assessment/Plan:    Active Hospital Problems    Diagnosis Date Noted    Essential hypertension [I10]      Priority: High    Acute on chronic combined systolic and diastolic congestive heart failure ([facility] [I50.43] 04/12/2021    Diabetic polyneuropathy associated with type 2 diabetes mellitus ([facility] [E11.42] 07/25/2017    Obstructive sleep apnea [G47.33] 10/03/2014    Hypothyroid [E03.9] 07/24/2014       Plan:    # Acute on chronic combined heart failure  -tele  -lasix  -cardiology eval pending  -strict I&O, daily weight  -follow electrolytes  -continue asa, statin, ARB, BB     # Lower extremity wounds  -appreciate podiatry eval, no signs of acute infection  -BL LE doppler negative for DVT  -podiatry will follow while in-house     # T2DM  -carb control diet  -lantus w/ mealtime and correctional insulin     # SARIKA  # Hypothyroid  -continue synthroid    DVT Prophylaxis: Lovenox  Diet: Dietary Nutrition Supplements: Low Calorie High Protein Supplement  DIET GENERAL; Carb Control: 3 carb choices (45 gms)/meal; Low Sodium (2 GM)  Code Status: Full Code    PT/OT Eval Status: Ongoing    Dispo: Darrian Minus pending clinical improvement    Nabil Smith MD

## 2021-04-15 NOTE — PROGRESS NOTES
Physician Progress Note      PATIENT:               Lawson Cardenas  CSN #:                  [id number]  :                       1940  ADMIT DATE:       2021 4:54 PM  [address] [place] DATE:  RESPONDING  PROVIDER #:        Flora Wyatt MD          QUERY TEXT:    Pt admitted with AoC systolic CHF. Pt noted to also have ischemic   cardiomyopathy and hypertension. If possible, please document in progress   notes and discharge summary the etiology of CHF, if able to be determined. The medical record reflects the following:  Risk Factors: 78 yo w/ history if [address], hypertension, CAD  Clinical Indicators: Per Cards PN :  HFrEF due to [address]. Acute on chronic   HFrEF: Patient presented volume overloaded. CAD status post stents. Essential   hypertension. Treatment: IV Lasix 40mg BID, strict I&O's, Lopressor 25mg BID, Losartan 50mg   Daily  Options provided:  -- CHF due to Hypertensive Heart Disease  -- CHF due to Hypertensive Heart Disease and CAD  -- CHF not due to Hypertension but due to CAD  -- CHF due to Hypertensive Heart Disease and ICMP  -- CHF not due to Hypertension but due to ICMP  -- Other - I will add my own diagnosis  -- Disagree - Not applicable / Not valid  -- Disagree - Clinically unable to determine / Unknown  -- Refer to Clinical Documentation Reviewer    PROVIDER RESPONSE TEXT:    This patient has CHF due to hypertensive heart disease.     Query created by: Johnnie Soriano on 4/15/2021 7:09 AM      Electronically signed by:  Flora Wyatt MD 4/15/2021 8:14 AM

## 2021-04-15 NOTE — PROGRESS NOTES
Patient alert and oriented x 4, VSS as charted, NSR on telemetry. Patient has not ambulated this shift, was unable to work with PT due to leg swelling. Patient denied pain throughout shift. Patient currently in bed with bed alarm on, wheels locked and in lowest position. Plan for discharge pending fluid removal and clinical improvement.

## 2021-04-15 NOTE — PROGRESS NOTES
Podiatric Surgery Daily Progress Note  Hollie Archer Recluma      Subjective :   Patient seen and examined this am at the bedside. Patient denies any acute overnight events. Patient denies N/V/F/C/SOB. Patient denies calf pain, thigh pain, chest pain. Review of Systems: A 12 point review of symptoms is unremarkable with the exception of the chief complaint. Patient specifically denies nausea, fever, vomiting, chills, shortness of breath, chest pain, abdominal pain, constipation or difficulty urinating. Objective     /78   Pulse 58   Temp 97.5 °F (36.4 °C) (Oral)   Resp 18   Ht 6' (1.829 m)   Wt 231 lb 14.8 oz (105.2 kg)   SpO2 95%   BMI 31.45 kg/m²     I/O:    Intake/Output Summary (Last 24 hours) at 4/15/2021 1013  Last data filed at 4/15/2021 1007  Gross per 24 hour   Intake 867 ml   Output 2000 ml   Net -1133 ml              Wt Readings from Last 3 Encounters:   04/15/21 231 lb 14.8 oz (105.2 kg)   11/25/20 248 lb (112.5 kg)   11/05/20 245 lb 9.6 oz (111.4 kg)       LABS:    Recent Labs     04/12/21  1652 04/13/21  0524   WBC 8.6 8.0   HGB 11.5* 10.9*   HCT 36.2* 34.2*    225        Recent Labs     04/15/21  0600      K 3.9      CO2 27   BUN 34*   CREATININE 1.1        Recent Labs     04/12/21  1652   PROT 7.7           LOWER EXTREMITY EXAMINATION    Dressing to b/l LE intact. No strikethrough noted to the external dressing. Mild serosanguinous drainage noted to the internal layers of the dressing. VASCULAR: DP and PT pulses nonpalpable 0/4 2/2 edema. Upon hand-held Doppler examination DP and PT pulses were noted to have biphasic signals b/l. CFT is brisk to the digits of the foot b/l. Skin temperature is warm to warm from proximal to distal with no focal calor noted. +1 pitting edema noted to b/l LE, improvement noted since admission. No pain with calf compression b/l.     NEUROLOGIC: Gross and epicritic sensation is diminished b/l.  Protective sensation is diminished at all pedal sites b/l.     DERMATOLOGIC: Chronic dermatological changes noted to b/l LE. Circumferential erythema with hemosiderin deposits noted to b/l LE, consistent with chronic venous stasis dermatitis. Erythema mildly resolves with elevation of b/l LE. Relaxed skin tension lines noted 2/2 Ace compressive therapy. Multiple superficial, partial-thickness ulcerations noted to b/l LE without acute signs of infection. No purulent drainage, malodor, fluctuance, crepitus noted to b/l wounds. Webspaces 1-4 b/l are clean, dry, and intact. Images from 4/15. MUSCULOSKELETAL: Muscle strength is 5/5 for all pedal groups tested. No pain with palpation of the foot or ankle b/l. Ankle joint ROM is decreased in dorsiflexion with the knee extended. History of TMA of RLE. IMAGING:  Narrative   Left tibia and fibula 4 views       HISTORY: Soft tissue wounds       Extensive vascular calcification posteriorly. Osseous structures intact. No fracture or dislocation. No bone erosion or periosteal reaction. No soft tissue gas.           Impression       No osseous abnormality. If osteomyelitis is of clinical concern, recommend MRI                       Right tibia and fibula 4 views       HISTORY: Same       Extensive vascular calcification. Scattered phleboliths also present. Advanced degenerative osteoarthritis of the knee with bone-on-bone configuration at the medial femorotibial joint space compartment.       Tibia and fibula intact. No fracture or dislocation. No bone erosion or periosteal elevation. No soft tissue gas. Transmetatarsal amputation with diffuse soft tissue swelling of the amputation stump.       IMPRESSION:       Transmetatarsal amputation. No signs of osteomyelitis of the tibiotalar fibula. Recommend MRI for further evaluation as clinically indicated.         Type of Study:        Veins:Lower Extremities DVT Study, VASC EXTREMITY VENOUS DUPLEX BILATERAL.        Vascular Sonographer Report       Indications for Study:Venous ulcer.       Additional Indications:Inpatient presents with 2 days shortness of breath and   bilateral foot to calf wounds that had blisters 2 days ago.       Venous Duplex Scan: B-mode imaging of the deep and superficial veins, with   compression maneuvers, including color and Doppler spectral waveform analysis.       Impressions   Right Impression   Open wounds on the lower calf and thick skin not penetrated by ultrasound   limits visibility bilaterally. Bandages stayed on feet due to open wounds. Within the limits of this study no evidence of deep or superficial venous   thrombosis in the right lower extremity. A cystic structure is noted in the popliteal fossa consistent with a Baker's   cyst measuring 3.6x0.7cm. Left Impression   Within the limits of this study no evidence of deep or superficial venous   thrombosis in the left lower extremity.       Conclusions        Summary        Within the limits of this study no evidence of deep or superficial venous    thrombosis in the bilateral lower extremity.    Right Byrne's cyst.        Signature        ------------------------------------------------------------------    Electronically signed by Ivette Zepeda MD (Interpreting    [id number]) on 04/14/2021 at 03:47 PM    ------------------------------------------------------------------           ASSESSMENT/PLAN  1. Multiple superficial, partial-thickness venous stasis ulcerations; B/L LE  2. Chronic venous stasis dermatitis; B/L LE  3. Venous stasis insufficiency; B/L LE  4. Diabetes mellitus with peripheral neuropathy  5.   History of TMA; RLE    -Patient examined and evaluated at bedside   -VSS, no new AM CBC  -ESR 53, CRP 83.3  -No wound culture obtained at this time 2/2 no acute drainage  -Blood culture NGTD  -Prealbumin 12.7, nutritional dietary supplements ordered to optimize wound healing potential  -Imaging reviewed, impression noted above  -Venous

## 2021-04-15 NOTE — PROGRESS NOTES
EMR was reviewed. I's and O's inaccurate, weight decreasing, patient remains off supplemental oxygen. Creatinine is improving at 1.1.    -Will increase Lasix to 40 mg IV every 8 hours  -We will start spironolactone 25 daily  -Continue with Lopressor 25 twice daily and losartan 50 daily. I would like to thank you for providing me the opportunity to participate in the care of your patient. If you have any questions, please do not hesitate to contact me.      Lary Choi MD, [facility] [place], [address] [address] [address] [address]  Ph: [phone]  Fax: [phone]

## 2021-04-16 LAB
ANION GAP SERPL CALCULATED.3IONS-SCNC: 10 MMOL/L (ref 3–16)
BUN BLDV-MCNC: 41 MG/DL (ref 7–20)
CALCIUM SERPL-MCNC: 9.5 MG/DL (ref 8.3–10.6)
CHLORIDE BLD-SCNC: 97 MMOL/L (ref 99–110)
CO2: 29 MMOL/L (ref 21–32)
CREAT SERPL-MCNC: 1.2 MG/DL (ref 0.8–1.3)
GFR AFRICAN AMERICAN: >60
GFR NON-AFRICAN AMERICAN: 58
GLUCOSE BLD-MCNC: 170 MG/DL (ref 70–99)
GLUCOSE BLD-MCNC: 192 MG/DL (ref 70–99)
GLUCOSE BLD-MCNC: 200 MG/DL (ref 70–99)
GLUCOSE BLD-MCNC: 245 MG/DL (ref 70–99)
GLUCOSE BLD-MCNC: 279 MG/DL (ref 70–99)
MAGNESIUM: 2 MG/DL (ref 1.8–2.4)
PERFORMED ON: ABNORMAL
POTASSIUM SERPL-SCNC: 4.1 MMOL/L (ref 3.5–5.1)
SODIUM BLD-SCNC: 136 MMOL/L (ref 136–145)

## 2021-04-16 PROCEDURE — 97530 THERAPEUTIC ACTIVITIES: CPT

## 2021-04-16 PROCEDURE — 36415 COLL VENOUS BLD VENIPUNCTURE: CPT

## 2021-04-16 PROCEDURE — 97535 SELF CARE MNGMENT TRAINING: CPT

## 2021-04-16 PROCEDURE — 6360000002 HC RX W HCPCS: Performed by: INTERNAL MEDICINE

## 2021-04-16 PROCEDURE — 6370000000 HC RX 637 (ALT 250 FOR IP): Performed by: INTERNAL MEDICINE

## 2021-04-16 PROCEDURE — 97161 PT EVAL LOW COMPLEX 20 MIN: CPT

## 2021-04-16 PROCEDURE — 97116 GAIT TRAINING THERAPY: CPT

## 2021-04-16 PROCEDURE — 1200000000 HC SEMI PRIVATE

## 2021-04-16 PROCEDURE — 97166 OT EVAL MOD COMPLEX 45 MIN: CPT

## 2021-04-16 PROCEDURE — 80048 BASIC METABOLIC PNL TOTAL CA: CPT

## 2021-04-16 PROCEDURE — 99233 SBSQ HOSP IP/OBS HIGH 50: CPT | Performed by: INTERNAL MEDICINE

## 2021-04-16 PROCEDURE — 2580000003 HC RX 258: Performed by: INTERNAL MEDICINE

## 2021-04-16 PROCEDURE — 83735 ASSAY OF MAGNESIUM: CPT

## 2021-04-16 RX ORDER — SPIRONOLACTONE 25 MG/1
12.5 TABLET ORAL DAILY
Status: DISCONTINUED | OUTPATIENT
Start: 2021-04-17 | End: 2021-04-17 | Stop reason: HOSPADM

## 2021-04-16 RX ORDER — FUROSEMIDE 40 MG/1
40 TABLET ORAL 2 TIMES DAILY
Qty: 60 TABLET | Refills: 3 | Status: ON HOLD | OUTPATIENT
Start: 2021-04-16 | End: 2022-10-25 | Stop reason: HOSPADM

## 2021-04-16 RX ORDER — SPIRONOLACTONE 25 MG/1
12.5 TABLET ORAL DAILY
Qty: 30 TABLET | Refills: 3 | Status: SHIPPED | OUTPATIENT
Start: 2021-04-17

## 2021-04-16 RX ORDER — FUROSEMIDE 40 MG/1
40 TABLET ORAL 2 TIMES DAILY
Status: DISCONTINUED | OUTPATIENT
Start: 2021-04-16 | End: 2021-04-17 | Stop reason: HOSPADM

## 2021-04-16 RX ADMIN — INSULIN LISPRO 3 UNITS: 100 INJECTION, SOLUTION INTRAVENOUS; SUBCUTANEOUS at 20:31

## 2021-04-16 RX ADMIN — FINASTERIDE 5 MG: 5 TABLET, FILM COATED ORAL at 20:29

## 2021-04-16 RX ADMIN — METOPROLOL TARTRATE 25 MG: 25 TABLET, FILM COATED ORAL at 20:29

## 2021-04-16 RX ADMIN — Medication 10 ML: at 09:00

## 2021-04-16 RX ADMIN — SPIRONOLACTONE 25 MG: 25 TABLET, FILM COATED ORAL at 08:59

## 2021-04-16 RX ADMIN — METOPROLOL TARTRATE 25 MG: 25 TABLET, FILM COATED ORAL at 08:59

## 2021-04-16 RX ADMIN — CARBIDOPA AND LEVODOPA 1 TABLET: 25; 100 TABLET ORAL at 20:29

## 2021-04-16 RX ADMIN — INSULIN LISPRO 4 UNITS: 100 INJECTION, SOLUTION INTRAVENOUS; SUBCUTANEOUS at 11:31

## 2021-04-16 RX ADMIN — LOSARTAN POTASSIUM 50 MG: 50 TABLET, FILM COATED ORAL at 08:59

## 2021-04-16 RX ADMIN — ENOXAPARIN SODIUM 40 MG: 40 INJECTION SUBCUTANEOUS at 08:59

## 2021-04-16 RX ADMIN — DONEPEZIL HYDROCHLORIDE 10 MG: 10 TABLET, FILM COATED ORAL at 20:29

## 2021-04-16 RX ADMIN — INSULIN LISPRO 2 UNITS: 100 INJECTION, SOLUTION INTRAVENOUS; SUBCUTANEOUS at 17:49

## 2021-04-16 RX ADMIN — MICONAZOLE NITRATE: 20 POWDER TOPICAL at 20:29

## 2021-04-16 RX ADMIN — INSULIN LISPRO 4 UNITS: 100 INJECTION, SOLUTION INTRAVENOUS; SUBCUTANEOUS at 09:01

## 2021-04-16 RX ADMIN — FUROSEMIDE 40 MG: 10 INJECTION, SOLUTION INTRAMUSCULAR; INTRAVENOUS at 09:00

## 2021-04-16 RX ADMIN — ROSUVASTATIN CALCIUM 20 MG: 20 TABLET, COATED ORAL at 20:29

## 2021-04-16 RX ADMIN — Medication 10 ML: at 20:29

## 2021-04-16 RX ADMIN — FUROSEMIDE 40 MG: 40 TABLET ORAL at 17:49

## 2021-04-16 RX ADMIN — POTASSIUM CHLORIDE 10 MEQ: 750 TABLET, EXTENDED RELEASE ORAL at 08:59

## 2021-04-16 RX ADMIN — MICONAZOLE NITRATE: 20 POWDER TOPICAL at 09:00

## 2021-04-16 RX ADMIN — CARBIDOPA AND LEVODOPA 1 TABLET: 25; 100 TABLET ORAL at 08:59

## 2021-04-16 RX ADMIN — LEVOTHYROXINE SODIUM 75 MCG: 0.07 TABLET ORAL at 05:20

## 2021-04-16 ASSESSMENT — PAIN SCALES - GENERAL
PAINLEVEL_OUTOF10: 0

## 2021-04-16 NOTE — PLAN OF CARE
Problem: Falls - Risk of:  Goal: Will remain free from falls  Description: Will remain free from falls  Outcome: Ongoing     Problem: Falls - Risk of:  Goal: Absence of physical injury  Description: Absence of physical injury  Outcome: Ongoing     Problem: Skin Integrity:  Goal: Will show no infection signs and symptoms  Description: Will show no infection signs and symptoms  Outcome: Ongoing     Problem: Skin Integrity:  Goal: Absence of new skin breakdown  Description: Absence of new skin breakdown  Outcome: Ongoing     Problem: Cardiac:  Goal: Cardiovascular alteration will improve  Description: Cardiovascular alteration will improve  Outcome: Ongoing     Problem: Nutrition  Goal: Optimal nutrition therapy  Description: Nutrition Problem #1: Increased nutrient needs  Intervention: Food and/or Nutrient Delivery: Continue Current Diet, Continue Oral Nutrition Supplement  Nutritional Goals: Pt will consume >75% of all meals and supplements      Outcome: Ongoing     Problem: Breathing Pattern - Ineffective:  Goal: Ability to achieve and maintain a regular respiratory rate will improve  Description: Ability to achieve and maintain a regular respiratory rate will improve  Outcome: Completed

## 2021-04-16 NOTE — PROGRESS NOTES
Hospitalist Progress Note      PCP: Earnstine Dakins, MD    Date of Admission: 4/12/2021    Chief Complaint:     Chief Complaint   Patient presents with    Shortness of Breath       Subjective:  Patient seen and examined at the bedside.   No acute events overnight  Continues to diurese well, cardiology adjusting medications  Appreciate ongoing podiatry care  Sugars high, increase lantus to 20u  Will have PT/OT eval, possible dc today if he does well w/ therapy    PFHS: reviewed as documented 4/12/2021, no changes    Medications:  Reviewed    Infusion Medications    sodium chloride      dextrose       Scheduled Medications    furosemide  40 mg Intravenous TID    spironolactone  25 mg Oral Daily    sodium chloride flush  5-40 mL Intravenous 2 times per day    enoxaparin  40 mg Subcutaneous Daily    rosuvastatin  20 mg Oral Nightly    losartan  50 mg Oral Daily    carbidopa-levodopa  1 tablet Oral BID    metoprolol tartrate  25 mg Oral BID    potassium chloride  10 mEq Oral Daily    finasteride  5 mg Oral Nightly    donepezil  10 mg Oral Nightly    levothyroxine  75 mcg Oral Daily    insulin lispro  0-12 Units Subcutaneous TID WC    insulin lispro  0-6 Units Subcutaneous Nightly    insulin glargine  0.15 Units/kg Subcutaneous Nightly    miconazole   Topical BID     PRN Meds: sodium chloride flush, sodium chloride, promethazine **OR** ondansetron, polyethylene glycol, acetaminophen **OR** acetaminophen, glucose, dextrose, glucagon (rDNA), dextrose      Intake/Output Summary (Last 24 hours) at 4/16/2021 0942  Last data filed at 4/16/2021 0843  Gross per 24 hour   Intake 800 ml   Output 1850 ml   Net -1050 ml       Physical Exam    BP (!) 146/80   Pulse 64   Temp 97.8 °F (36.6 °C) (Oral)   Resp 16   Ht 6' (1.829 m)   Wt 249 lb 1.9 oz (113 kg)   SpO2 94%   BMI 33.79 kg/m²     General appearance:  No acute distress, appears stated age  Eyes: Pupils equal, round, reactive to light, conjunctiva/corneas clear  Ears/Nose/Mouth/Throat: No external lesions or scars, hearing intact to voice  Neck: Trachea midline, no masses noted, no thyromegaly  Respiratory:  Non-labored breathing, clear to auscultation bilaterally  Cardiovascular: Regular rate and rhythm, no murmurs, gallops, or rubs  Abdomen: soft, non-tender, non-distended  Musculoskeletal: 1+ BL LOULOU, R TMA present, BL LE wrapped in ACE wrap  Skin: normal color, no wounds noted  Psychiatric: A&Ox4, good insight and judgment    Labs:   No results for input(s): WBC, HGB, HCT, PLT in the last 72 hours. Recent Labs     04/13/21  1215 04/15/21  0600 04/16/21  0606   * 138 136   K 3.8 3.9 4.1   CL 99 100 97*   CO2 24 27 29   BUN 25* 34* 41*   CREATININE 1.3 1.1 1.2   CALCIUM 9.0 9.3 9.5     No results for input(s): AST, ALT, BILIDIR, BILITOT, ALKPHOS in the last 72 hours. No results for input(s): INR in the last 72 hours. No results for input(s): Zoie Ends in the last 72 hours. Urinalysis:      Lab Results   Component Value Date    NITRU Negative 01/06/2018    WBCUA 3-5 01/06/2018    BACTERIA 1+ 01/06/2018    RBCUA 3-5 01/06/2018    BLOODU TRACE-INTACT 01/06/2018    SPECGRAV 1.020 01/06/2018    GLUCOSEU 500 01/06/2018    GLUCOSEU 500 01/27/2012       Radiology:  VL Extremity Venous Bilateral   Final Result      XR TIBIA FIBULA LEFT (2 VIEWS)   Final Result      No osseous abnormality. If osteomyelitis is of clinical concern, recommend MRI                  Right tibia and fibula 4 views      HISTORY: Same      Extensive vascular calcification. Scattered phleboliths also present. Advanced degenerative osteoarthritis of the knee with bone-on-bone configuration at the medial femorotibial joint space compartment. Tibia and fibula intact. No fracture or dislocation. No bone erosion or periosteal elevation. No soft tissue gas. Transmetatarsal amputation with diffuse soft tissue swelling of the amputation stump.       IMPRESSION: Prophylaxis: Lovenox  Diet: Dietary Nutrition Supplements: Low Calorie High Protein Supplement  DIET GENERAL; Carb Control: 3 carb choices (45 gms)/meal; Low Sodium (2 GM)  Code Status: Full Code    PT/OT Eval Status: Ongoing    Dispo: Inpt, dispo pending clinical improvement, possibly today pending therapy eval    Nestor Cosby MD

## 2021-04-16 NOTE — PROGRESS NOTES
Units Subcutaneous Nightly    miconazole   Topical BID       IV drips:   sodium chloride      dextrose         PRN:  sodium chloride flush, sodium chloride, promethazine **OR** ondansetron, polyethylene glycol, acetaminophen **OR** acetaminophen, glucose, dextrose, glucagon (rDNA), dextrose    Vitals:    04/16/21 0358 04/16/21 0409 04/16/21 0843 04/16/21 1055   BP: 123/71  (!) 146/80 117/79   Pulse: 57  64 62   Resp: 18  16 18   Temp: 97.6 °F (36.4 °C)  97.8 °F (36.6 °C) 97.4 °F (36.3 °C)   TempSrc: Oral  Oral Oral   SpO2: 93%  94% 91%   Weight:  249 lb 1.9 oz (113 kg)     Height:           Intake/Output Summary (Last 24 hours) at 4/16/2021 1249  Last data filed at 4/16/2021 0843  Gross per 24 hour   Intake 560 ml   Output 1850 ml   Net -1290 ml     I/O last 3 completed shifts: In: 480 [P.O.:480]  Out: 1850 [Urine:1850]  Wt Readings from Last 3 Encounters:   04/16/21 249 lb 1.9 oz (113 kg)   11/25/20 248 lb (112.5 kg)   11/05/20 245 lb 9.6 oz (111.4 kg)       Admit Wt: Weight: 244 lb 11.4 oz (111 kg)   Todays Wt: Weight: 249 lb 1.9 oz (113 kg)    TELEMETRY: Sinus     Physical Exam:         General Appearance:  Alert, cooperative, no distress, appears stated age Appropriate weight   Head:  Normocephalic, without obvious abnormality, atraumatic   Eyes:  PERRL, conjunctiva/corneas clear EOM intact  Ears normal   Throat no lesions       Nose: Nares normal, no drainage or sinus tenderness   Throat: Lips, mucosa, and tongue normal   Neck: Supple, symmetrical, trachea midline, no adenopathy, thyroid: not enlarged, symmetric, no tenderness/mass/nodules, no carotid bruit. Lungs:   Normal respiratory rate, lungs with mild ronchi bilaterally.     Chest Wall:  No tenderness or deformity   Heart:  Regular rhythm, rate is controlled, S1, S2 normal, there is no murmur, there is no rub or gallop, cannot assess jvd, 1+ bilateral lower extremity edema   Abdomen:   Soft, non-tender, bowel sounds active all four quadrants, no masses, no organomegaly       Extremities: Extremities normal, atraumatic, no cyanosis. Pulses: 2+ and symmetric   Skin: Skin color, texture, turgor normal, no rashes or lesions   Pysch: Normal mood and affect   Neurologic: Normal gross motor and sensory exam.  Cranial nerves intact       Labs:   Recent Labs     04/15/21  0600 04/16/21  0606    136   K 3.9 4.1   BUN 34* 41*   CREATININE 1.1 1.2    97*   CO2 27 29   GLUCOSE 156* 170*   CALCIUM 9.3 9.5   MG 1.90 2.00     No results for input(s): WBC, HGB, HCT, PLT, MCV in the last 72 hours. No results for input(s): CHOLTOT, TRIG, HDL in the last 72 hours. Invalid input(s): LIPIDCOMM, CHOLHDL, VLDCHOL, LDL  No results for input(s): PTT, INR in the last 72 hours. Invalid input(s): PT  No results for input(s): CKTOTAL, CKMB, CKMBINDEX, TROPONINI in the last 72 hours. No results for input(s): BNP in the last 72 hours. No results for input(s): NTPROBNP in the last 72 hours. No results for input(s): TSH in the last 72 hours. Imaging:       Assessment & Plan:     1. Acute on chronic HFrEF:  Patient presented volume overloaded and hemodynamically stable.     -Change lasix iv to 40 po bid (home dose was 40 daily). -Change spironolactone 12.5 daily  - Strict I's and O's every shift and standing weights if possible, low-salt diet and daily BMP with reflex to Mg, wean supplemental oxygen to off for sats greater than 94%. -Continue with Lopressor 25 twice daily, losartan 50 daily     2. CAD status post stents: There is no evidence of ACS with this admission.     -Continue with metoprolol, Crestor  -Consider baby aspirin if not contraindicated (GIB).     3.  Essential hypertension:  Patient's blood pressure is well controlled with current medications. Patient may be discharged home today on the above meds and follow up with me or Dr Fady Fernandez in 1 week with a BMP prior to visit. Please call me with any questions.          I have spent 30 minutes of face to face time with the patient with more than 50% spent counseling and coordinating care. I have personally reviewed the reports and images of labs, radiological studies, cardiac studies including ECG's and telemetry, current and old medical records. The note was completed using EMR and Dragon dictation system. Every effort was made to ensure accuracy; however, inadvertent computerized transcription errors may be present. All questions and concerns were addressed to the patient/family. Alternatives to my treatment were discussed. Thank you for allowing to us to participate in the [address]. Please call our service with questions.     Marilee Rodgers MD, [facility] [place], [address]  The [address]  [address] [place] [id number]  Ph: [phone]  Fax: [phone]

## 2021-04-16 NOTE — PROGRESS NOTES
Diagnosis(es): The encounter diagnosis was Dyspnea, unspecified type. has a past medical history of 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 ([facility] CAD (coronary artery disease), CHF (congestive heart failure) ([facility] Chronic kidney disease, COPD (chronic obstructive pulmonary disease) ([facility] Hypertension, Hypothyroidism, Mixed hyperlipidemia, MRSA (methicillin resistant staph aureus) culture positive, Neuropathy, Obstructive sleep apnea, Parkinson's disease ([facility] and Type 2 diabetes mellitus without complication ([facility] has a past surgical history that includes cervical fusion (1981); Foot surgery (Right, 07/24/2017); Foot surgery (Right, 07/27/2017); Cardiac catheterization; Colonoscopy; Colon surgery; shoulder surgery; hernia repair; and Upper gastrointestinal endoscopy (N/A, 11/25/2020). Restrictions  Position Activity Restriction  Other position/activity restrictions: up as tolerated  Vision/Hearing    Vision: [place]  Hearing: [facility]  Subjective  General  Chart Reviewed: Yes  Additional Pertinent Hx: Admit 4/12 with heart failure; PMHx: asthma, CAD, CHF, COPD, HTN, DM, CKD, parkinson's, R shoulder surgery, c-spine surgery  Referring Practitioner: Bob Flores MD  Diagnosis: heart failure  Subjective  Subjective: Pt found supine in bed. Agreeable to PT. Hopes to return home at d/c.   Pain Screening  Patient Currently in Pain: Denies       Orientation   WFL  Social/Functional History  Social/Functional History  Lives With: Spouse(grandson lives in the basement)  Type of Home: House  Home Layout: One level  Home Access: Stairs to enter with rails  Entrance Stairs - Number of Steps: 3-4 with B rails + small threshold step into house  Bathroom Shower/Tub: Tub/Shower unit  Bathroom Toilet: Handicap height  Bathroom Equipment: Grab bars in shower, Shower chair, Grab bars around toilet  Home Equipment: Rolling walker, Cane, Lift chair(R AFO)  ADL Assistance: Independent  Homemaking Assistance: (wife and grandson)  Ambulation Assistance: Independent(with walker, uses R AFO when outside the house)  Transfer Assistance: Independent  Active : No  Cognition        Objective                      Bed mobility  Supine to Sit: Contact guard assistance(HOB elevated, effortful)  Scooting: Contact guard assistance  Transfers  Sit to Stand: Moderate Assistance(from chair; min A x 1 from EOB; CGA from toilet with grab bar)  Stand to sit: Contact guard assistance  Comment: pt uses lift chair at home  Ambulation  Ambulation?: Yes  Ambulation 1  Device: Rolling Walker  Other Apparatus: Right;AFO  Assistance: Contact guard assistance  Quality of Gait: very slow, flexed posture, no overt LOB, decreased step length and height, effortful  Distance: 2 ft; 10 ft; 15 ft     Balance  Sitting - Static: Good  Sitting - Dynamic: Good  Standing - Static: Fair  Standing - Dynamic: Fair  Comments: CGA for balance while standing at sink and washing hands - leans forearms onto sink for balance; CGA with RW for amb      Treatment included gait and transfer training, pt education.   Plan   Plan  Times per week: 2-5  Current Treatment Recommendations: Balance Training, Endurance Training, Functional Mobility Training, Transfer Training, Gait Training, Stair training, Patient/Caregiver Education & Training, Home Exercise Program  Safety Devices  Type of devices: Call light within reach, Chair alarm in place, Nurse notified, Gait belt, Left in chair    G-Code       OutComes Score                                                  AM-PAC Score  -PAC Inpatient Mobility Raw Score : 16 (04/16/21 1602)  AM-PAC Inpatient T-Scale Score : 40.78 (04/16/21 1602)  Mobility Inpatient CMS 0-100% Score: 54.16 (04/16/21 1602)  Mobility Inpatient CMS G-Code Modifier : CK (04/16/21 1602)          Goals  Short term goals  Time Frame for Short term goals: discharge  Short term goal 1: Pt will transfer sit <--> stand with SBA from EOB, chair  Short term goal 2: Pt will ambulate 40 ft with

## 2021-04-16 NOTE — CARE COORDINATION
Patient expected to go home with [facility] to monitor patient's venous stasis ulcers-lower extremities. [address] [facility], [address] [address]  Phone: [phone]  Fax: [phone]  CM will continue to follow patient until discharge. Electronically signed by Marian Murillo RN on 4/16/2021 at 10:08 AM     Addendum:  Spoke to wife. She is aware that patient may go home on Saturday, 4/17/21 and she has the nursing station phone number for [facility] to call in morning to see if patient will be discharged and to coordinate her transporting patient home. Called and faxed home care orders to [facility].  Electronically signed by Marian Murillo RN on 4/16/2021 at 4:51 PM

## 2021-04-16 NOTE — PROGRESS NOTES
Podiatric Surgery Daily Progress Note  Ale Lao      Subjective :   Patient seen and examined this am at the bedside. Patient denies any acute overnight events. Patient denies N/V/F/C/SOB. Patient denies calf pain, thigh pain, chest pain. Review of Systems: A 12 point review of symptoms is unremarkable with the exception of the chief complaint. Patient specifically denies nausea, fever, vomiting, chills, shortness of breath, chest pain, abdominal pain, constipation or difficulty urinating. Objective     /71   Pulse 57   Temp 97.6 °F (36.4 °C) (Oral)   Resp 18   Ht 6' (1.829 m)   Wt 249 lb 1.9 oz (113 kg)   SpO2 93%   BMI 33.79 kg/m²     I/O:    Intake/Output Summary (Last 24 hours) at 4/16/2021 1043  Last data filed at 4/16/2021 0529  Gross per 24 hour   Intake 620 ml   Output 2050 ml   Net -1430 ml              Wt Readings from Last 3 Encounters:   04/16/21 249 lb 1.9 oz (113 kg)   11/25/20 248 lb (112.5 kg)   11/05/20 245 lb 9.6 oz (111.4 kg)       LABS:    No results for input(s): WBC, HGB, HCT, PLT in the last 72 hours. Recent Labs     04/15/21  0600      K 3.9      CO2 27   BUN 34*   CREATININE 1.1        No results for input(s): PROT, INR, APTT in the last 72 hours. LOWER EXTREMITY EXAMINATION    Dressing to b/l LE intact. No strikethrough noted to the external dressing. Mild serosanguinous drainage noted to the internal layers of the dressing. VASCULAR: DP and PT pulses nonpalpable 0/4 2/2 edema. Upon hand-held Doppler examination DP and PT pulses were noted to have biphasic signals b/l. CFT is brisk to the digits of the foot b/l. Skin temperature is warm to warm from proximal to distal with no focal calor noted. +1 pitting edema noted to b/l LE, improvement noted since admission. No pain with calf compression b/l.     NEUROLOGIC: Gross and epicritic sensation is diminished b/l.  Protective sensation is diminished at all pedal sites b/l.     DERMATOLOGIC: Chronic dermatological changes noted to b/l LE. Circumferential erythema with hemosiderin deposits noted to b/l LE, consistent with chronic venous stasis dermatitis. Relaxed skin tension lines noted 2/2 Ace compressive therapy. Multiple superficial, partial-thickness ulcerations noted to b/l LE without acute signs of infection. No purulent drainage, malodor, fluctuance, crepitus noted to b/l wounds. Webspaces 1-4 b/l are clean, dry, and intact. MUSCULOSKELETAL: Muscle strength is 5/5 for all pedal groups tested. No pain with palpation of the foot or ankle b/l. Ankle joint ROM is decreased in dorsiflexion with the knee extended. History of TMA of RLE. IMAGING:  Narrative   Left tibia and fibula 4 views       HISTORY: Soft tissue wounds       Extensive vascular calcification posteriorly. Osseous structures intact. No fracture or dislocation. No bone erosion or periosteal reaction. No soft tissue gas.           Impression       No osseous abnormality. If osteomyelitis is of clinical concern, recommend MRI                       Right tibia and fibula 4 views       HISTORY: Same       Extensive vascular calcification. Scattered phleboliths also present. Advanced degenerative osteoarthritis of the knee with bone-on-bone configuration at the medial femorotibial joint space compartment.       Tibia and fibula intact. No fracture or dislocation. No bone erosion or periosteal elevation. No soft tissue gas. Transmetatarsal amputation with diffuse soft tissue swelling of the amputation stump.       IMPRESSION:       Transmetatarsal amputation. No signs of osteomyelitis of the tibiotalar fibula. Recommend MRI for further evaluation as clinically indicated.         Type of Study:        Veins:Lower Extremities DVT Study, VASC EXTREMITY VENOUS DUPLEX BILATERAL.        Vascular Sonographer Report       Indications for Study:Venous ulcer.       Additional Indications:Inpatient presents with 2 days shortness of breath and   bilateral foot to calf wounds that had blisters 2 days ago.       Venous Duplex Scan: B-mode imaging of the deep and superficial veins, with   compression maneuvers, including color and Doppler spectral waveform analysis.       Impressions   Right Impression   Open wounds on the lower calf and thick skin not penetrated by ultrasound   limits visibility bilaterally. Bandages stayed on feet due to open wounds. Within the limits of this study no evidence of deep or superficial venous   thrombosis in the right lower extremity. A cystic structure is noted in the popliteal fossa consistent with a Baker's   cyst measuring 3.6x0.7cm. Left Impression   Within the limits of this study no evidence of deep or superficial venous   thrombosis in the left lower extremity.       Conclusions        Summary        Within the limits of this study no evidence of deep or superficial venous    thrombosis in the bilateral lower extremity.    Right Byrne's cyst.        Signature        ------------------------------------------------------------------    Electronically signed by Ban Fine MD (Interpreting    [email or web address]) on 04/14/2021 at 03:47 PM    ------------------------------------------------------------------           ASSESSMENT/PLAN  1. Multiple superficial, partial-thickness venous stasis ulcerations; B/L LE  2. Chronic venous stasis dermatitis; B/L LE  3. Venous stasis insufficiency; B/L LE  4. Diabetes mellitus with peripheral neuropathy  5.   History of TMA; RLE    -Patient examined and evaluated at bedside   -VSS, no new AM CBC  -ESR 53, CRP 83.3  -No wound culture obtained at this time 2/2 no acute drainage  -Blood culture NGTD  -Prealbumin 12.7, continue nutritional dietary supplements to optimize wound healing potential  -Imaging reviewed, impression noted above  -Venous duplex doppler b/l reviewed, impression noted above  -Dressing applied to bilateral lower extremity consisting of adaptic, gauze, Kerlix, Ace bandage with gentle compression  -Prevalon boots reapplied  -Instructed patient to elevate bilateral lower extremities when lying/sitting for extended periods of time to aid in edema control.  -Patient be weightbearing as tolerated to bilateral lower extremities. -Bilateral lower extremities without acute signs of infection, likely chronic venous stasis dermatitis. No indication for antibiotics or podiatry standpoint this time.  -Patient to follow-up with Dr. Scout Dorantse in the [address] [place] [address] wound care clinic within 1 week of discharge. DISPO: Multiple partial-thickness venous stasis ulcerations; b/l LE; no acute signs of infection at this time, likely chronic venous stasis dermatitis, venous ulcerations. Bilateral venous duplex Doppler negative. We will continue to provide local wound care while patient is in house. Patient examined and evaluated at bedside with Dr. Scout Dorantes DPM.    Kami Pichardo DPM  Podiatric Resident, PGY-1  Pager #: [phone] or Perfect Serve     Patient was seen and evaluated at bedside. Agree with residents assessment and treatment plan.   Scout Dorantes DPM

## 2021-04-16 NOTE — PROGRESS NOTES
tolerated session well with rest breaks. spO2 >90% after activity. Educated on appropriate activity levels during admit. Pt needs encouragement to mobilize as he does not advocate for himself, worries that staff is too busy. RN aware. Safety Devices  Safety Devices in place: Yes  Type of devices: Nurse notified; Left in chair;Call light within reach; Chair alarm in place         Treatment Diagnosis: Decreased activity tolerance, impaired ADLs and mobility      Restrictions  Position Activity Restriction  Other position/activity restrictions: up as tolerated    Subjective   General  Chart Reviewed: Yes  Additional Pertinent Hx: 79 y.o. M admitted 4/12 with acute on chronic CHF and LE wounds. PMHx includes asthma, CHD, CAD, COPD, DMII, SARIKA, Parkinson's Disease, cardiac cath  Family / Caregiver Present: No(spoke to wife on phone after session)  Referring Practitioner: Storm Rangel  Subjective  Subjective: Pt in bed on entry. Happy to get out of bed as he hasn't been since admission. Regarding d/c home, states \"this is gonna be dicey. \"  Patient Currently in Pain: Denies    Social/Functional History  Social/Functional History  Lives With: Spouse(grandson lives in the basement)  Type of Home: House  Home Layout: One level  Home Access: Stairs to enter with rails  Entrance Stairs - Number of Steps: 3-4 with B rails + small threshold step into house  Bathroom Shower/Tub: Tub/Shower unit  Bathroom Toilet: Handicap height  Bathroom Equipment: Grab bars in shower, Shower chair, Grab bars around toilet  Home Equipment: Rolling walker, Cane, Lift chair(R AFO)  ADL Assistance: Independent(wife sometimes helps with socks and shoes)  Homemaking Assistance: (wife and grandson)  Ambulation Assistance: Independent(with walker, uses R AFO when outside the house)  Transfer Assistance: Independent  Active : No  Additional Comments: hx of falls but none recently.  Admits to not wearing his AFO inside the house and not always remembering to use the walker, furniture walking instead. Objective   Vision: Within Functional Limits  Hearing: Within functional limits    Orientation  Overall Orientation Status: Within Functional Limits     Balance  Sitting Balance: Independent  Standing Balance: Contact guard assistance  Standing Balance  Time: 30 sec + 2 min + 5 min  Activity: transfer bed to chair, ambulation to toilet, toilet transfer, stance for hand hygiene, ambulation back to chair (with rolling walker, AFO on)  Comment: Min assist initial sit to stand and transfer bed to chair. Mod assist x2 sit to stand from recliner (limited by poor body mechanics). CGA to min assist to ambulate to/from bathroom. Min assist stand to sit on low toilet, CGA sit to stand with BUE supports. CGA stance at sink for grooming. All mobility slow, effortful, fatigueing but relatively steady. Toilet Transfers  Toilet - Technique: Ambulating  Equipment Used: Standard toilet  Toilet Transfer: Minimal assistance  Toilet Transfers Comments: with BUE supports  ADL  Feeding: Independent  Grooming: Independent  LE Dressing: Moderate assistance(assist to don socks and shoes, reports wife helps sometimes; JUDSON pants/brief)  Toileting: Minimal assistance(pt performed some pericare after BM but assist was provided for thoroughness)  Additional Comments: Pt ambulated to bathroom, voided on toilet, and stood at sink for hand hygiene. Pt using purwick and brief for frequent urination. Educated on using handheld urinals for urgency - verb understanding, uses depend brief at baseline. Coordination  Coordination and Movement description: Tremors(baseline - Parkinson's)        Transfers  Sit to stand: Minimal assistance(varying levels of assist CGA to mod assist x2; has lift change and comfort height commode at home)  Stand to sit: Minimal assistance     Cognition  Overall Cognitive Status: [facility]          Plan  If pt discharges prior to next tx, this note will serve as d/c summary.  Continue per POC if pt does not d/c.     Plan  Times per week: 2-5x  Times per day: Daily  Current Treatment Recommendations: Strengthening, Balance Training, Functional Mobility Training, Endurance Training, Self-Care / ADL, Safety Education & Training, Equipment Evaluation, Education, & procurement, Patient/Caregiver Education & Training    AM-PAC Score  AM-PAC Inpatient Daily Activity Raw Score: 18 (04/16/21 1618)  AM-PAC Inpatient ADL T-Scale Score : 38.66 (04/16/21 1618)  ADL Inpatient CMS 0-100% Score: 46.65 (04/16/21 1618)  ADL Inpatient CMS G-Code Modifier : CK (04/16/21 1618)    Goals  Short term goals  Time Frame for Short term goals: by D/C  Short term goal 1: Complete toileting with SBA - Not met  Short term goal 2: [place] shoes w/ AFO SBA - Not met  Short term goal 3: Stand from all surfaces with SBA - Not met  Short term goal 4: Stand at sink for grooming with spvn - Not met       Therapy Time   Individual Concurrent Group Co-treatment   Time In 1515         Time Out 1615         Minutes 60          Timed Code Tx Min: 45  Total Tx Time: [address], OT

## 2021-04-16 NOTE — PROGRESS NOTES
Patient alert and oriented times four. Patient vss this shift. Patient on Telemetry nsr with pacs. Patient iv is in right ac receiving iv lasix. Patient currently on bedrest awaiting pt ot evaluation. Patient requested a purewicc and is incontinent of bowel. patient has no complaints at this time. Patient adbominal sounds active. Patient skin clean dry and intact. Patient breath sounds has fine crackles. Patient bed locked in lowest position with bed alarm on. Call light bedside table and belongings in reach. Patient encouraged to call with any and all needs. Patient verbalizes understanding and call appropriately. Will continue to monitor.

## 2021-04-16 NOTE — PROGRESS NOTES
Sitting up to chair eating evening meal. Family at bedside. Denies pain at this time. Discussed discharge with wife. She states that she will have to wait for grandson to assist with transport and will not be able to pick patient up until 1800 tomorrow 4/17/21 evening. He will also need home therapy and CM has left for the evening. Updated charge nurse.

## 2021-04-17 VITALS
DIASTOLIC BLOOD PRESSURE: 62 MMHG | HEART RATE: 70 BPM | SYSTOLIC BLOOD PRESSURE: 116 MMHG | RESPIRATION RATE: 16 BRPM | OXYGEN SATURATION: 92 % | TEMPERATURE: 97.5 F | WEIGHT: 245.81 LBS | BODY MASS INDEX: 33.29 KG/M2 | HEIGHT: 72 IN

## 2021-04-17 LAB
ANION GAP SERPL CALCULATED.3IONS-SCNC: 10 MMOL/L (ref 3–16)
BLOOD CULTURE, ROUTINE: NORMAL
BUN BLDV-MCNC: 45 MG/DL (ref 7–20)
CALCIUM SERPL-MCNC: 9.5 MG/DL (ref 8.3–10.6)
CHLORIDE BLD-SCNC: 96 MMOL/L (ref 99–110)
CO2: 28 MMOL/L (ref 21–32)
CREAT SERPL-MCNC: 1.1 MG/DL (ref 0.8–1.3)
GFR AFRICAN AMERICAN: >60
GFR NON-AFRICAN AMERICAN: >60
GLUCOSE BLD-MCNC: 184 MG/DL (ref 70–99)
GLUCOSE BLD-MCNC: 193 MG/DL (ref 70–99)
GLUCOSE BLD-MCNC: 248 MG/DL (ref 70–99)
MAGNESIUM: 2.2 MG/DL (ref 1.8–2.4)
PERFORMED ON: ABNORMAL
PERFORMED ON: ABNORMAL
POTASSIUM SERPL-SCNC: 4.3 MMOL/L (ref 3.5–5.1)
SODIUM BLD-SCNC: 134 MMOL/L (ref 136–145)

## 2021-04-17 PROCEDURE — 6370000000 HC RX 637 (ALT 250 FOR IP): Performed by: INTERNAL MEDICINE

## 2021-04-17 PROCEDURE — 6360000002 HC RX W HCPCS: Performed by: INTERNAL MEDICINE

## 2021-04-17 PROCEDURE — 80048 BASIC METABOLIC PNL TOTAL CA: CPT

## 2021-04-17 PROCEDURE — 83735 ASSAY OF MAGNESIUM: CPT

## 2021-04-17 PROCEDURE — 36415 COLL VENOUS BLD VENIPUNCTURE: CPT

## 2021-04-17 PROCEDURE — 2580000003 HC RX 258: Performed by: INTERNAL MEDICINE

## 2021-04-17 RX ADMIN — Medication 10 ML: at 08:08

## 2021-04-17 RX ADMIN — SPIRONOLACTONE 12.5 MG: 25 TABLET, FILM COATED ORAL at 08:08

## 2021-04-17 RX ADMIN — METOPROLOL TARTRATE 25 MG: 25 TABLET, FILM COATED ORAL at 08:08

## 2021-04-17 RX ADMIN — INSULIN LISPRO 2 UNITS: 100 INJECTION, SOLUTION INTRAVENOUS; SUBCUTANEOUS at 08:09

## 2021-04-17 RX ADMIN — MICONAZOLE NITRATE: 20 POWDER TOPICAL at 08:08

## 2021-04-17 RX ADMIN — LOSARTAN POTASSIUM 50 MG: 50 TABLET, FILM COATED ORAL at 08:08

## 2021-04-17 RX ADMIN — CARBIDOPA AND LEVODOPA 1 TABLET: 25; 100 TABLET ORAL at 08:08

## 2021-04-17 RX ADMIN — FUROSEMIDE 40 MG: 40 TABLET ORAL at 08:08

## 2021-04-17 RX ADMIN — LEVOTHYROXINE SODIUM 75 MCG: 0.07 TABLET ORAL at 05:15

## 2021-04-17 RX ADMIN — ENOXAPARIN SODIUM 40 MG: 40 INJECTION SUBCUTANEOUS at 08:08

## 2021-04-17 RX ADMIN — POTASSIUM CHLORIDE 10 MEQ: 750 TABLET, EXTENDED RELEASE ORAL at 08:08

## 2021-04-17 RX ADMIN — INSULIN LISPRO 4 UNITS: 100 INJECTION, SOLUTION INTRAVENOUS; SUBCUTANEOUS at 13:21

## 2021-04-17 NOTE — PROGRESS NOTES
Pt d/c to home with family via private vehicle. AVS reviewed and signed by pt, no questions/ concerns at this time. All personal belongings taken from room by pt. Pt escorted by wheelchair to main entrance and assisted into vehicle. Tolerated well.

## 2021-04-17 NOTE — CARE COORDINATION
Case Management Assessment            Discharge Note                    Date / Time of Note: 4/17/2021 9:39 AM                  Discharge Note Completed by: Herberth López    Patient Name: Chidi Almeida   YOB: 1940  Diagnosis: Acute on chronic combined systolic and diastolic congestive heart failure ([facility] [address] [I50.43]   Date / Time: 4/12/2021  4:54 PM    Current PCP: Aleja Nolan MD  Clinic patient: No    Hospitalization in the last 30 days: No    Advance Directives:  Code Status: Full Code  [place] DNR form completed and on chart: Not Indicated    Financial:  Payor: MEDICARE / Plan: MEDICARE PART A AND B / Product Type: *No Product type* /      Pharmacy:    [address] [place] [address] Drive [address] [phone] Christelle Hester [phone]  [address] [id number]  Phone: [phone] Fax: [phone]      Assistance purchasing medications?: Potential Assistance Purchasing Medications: No  Assistance provided by Case Management: None at this time    Does patient want to participate in local refill/ meds to beds program?: Yes    Meds To Beds General Rules:  1. Can ONLY be done Monday- Friday between 8:30am-5pm  2. Prescription(s) must be in pharmacy by 3pm to be filled same day  3. Copy of patient's insurance/ prescription drug card and patient face sheet must be sent along with the prescription(s)  4. Cost of Rx cannot be added to hospital bill. If financial assistance is needed, please contact unit  or ;  or  CANNOT provide pharmacy voucher for patients co-pays  5.  Patients can then  the prescription on their way out of the hospital at discharge, or pharmacy can deliver to the bedside if staff is available. (payment due at time of pick-up or delivery - cash, check, or card accepted)     Able to afford home medications/ co-pay costs: Yes    ADLS:  Current PT AM-PAC Score: 16 /24  Current OT AM-PAC Score: 18 /24      DISCHARGE Disposition: Home with Home Health Care: [facility]     LOC at discharge: Not Applicable  JULIET Completed: Not Indicated    Notification completed in HENS/PAS?:  Not Applicable    IMM Completed:   Yes, Case management has presented and reviewed IMM letter #2 to the patient and/or family/ POA. Patient and/or family/POA verbalized understanding of their medicare rights and appeal process if needed. Patient and/or family/POA has signed, initialed and placed today's date (4/17/21) and time (0930) on IMM letter #2 on the the appropriate lines. Patient and/or family/POA, copy of letter offered and they are aware that this original copy of IMM letter #2 is available prior to discharge from the paper chart on the unit. Electronic documentation has been entered into epic for IMM letter #2 and original paper copy has been added to the paper chart at the nurses station. Transportation:  Transportation PLAN for discharge: family   [facility]: Yes  [address]: [facility]   Phone: [phone]  Fax: [phone]  Orders faxed: Yes    Durable Medical Equipment:  DME Provider:     Equipment obtained during hospitalization: none    Home Oxygen and Respiratory Equipment:  Oxygen needed at discharge?: Not [address]: Not Applicable  Portable tank available for discharge?: Not Indicated    Dialysis:  Dialysis patient: No    Dialysis Center:  Not Applicable    Hospice Services:  Location: Not Applicable  Agency: Not Applicable    Consents signed: Not Indicated    Referrals made at [facility] for outpatient continued care:  Not Applicable    Additional CM Notes:   Cm met with pt. Plans to go with wife today. [facility] to resume at [place]. CM called [facility] and they do have orders that were faxed yesterday. Wife is to  later today.      The Plan for Transition of Care is related to the following

## 2021-04-17 NOTE — PLAN OF CARE
Problem: Falls - Risk of:  Goal: Will remain free from falls  Description: Will remain free from falls  4/17/2021 1325 by Francis Jesus RN  Outcome: Completed  4/17/2021 1325 by Francis Jesus RN  Outcome: Ongoing  4/16/2021 2332 by Chelsy Valerio RN  Outcome: Ongoing  Goal: Absence of physical injury  Description: Absence of physical injury  4/17/2021 1325 by Francis Jesus RN  Outcome: Completed  4/17/2021 1325 by Francis Jesus RN  Outcome: Ongoing  4/16/2021 2332 by Chelsy Valerio RN  Outcome: Ongoing     Problem: Skin Integrity:  Goal: Will show no infection signs and symptoms  Description: Will show no infection signs and symptoms  4/17/2021 1325 by Francis Jesus RN  Outcome: Completed  4/17/2021 1325 by Francis Jesus RN  Outcome: Ongoing  4/16/2021 2332 by Chelsy Valerio RN  Outcome: Ongoing  Goal: Absence of new skin breakdown  Description: Absence of new skin breakdown  4/17/2021 1325 by Francis Jesus RN  Outcome: Completed  4/17/2021 1325 by Francis Jesus RN  Outcome: Ongoing  4/16/2021 2332 by Chelsy Valerio RN  Outcome: Ongoing     Problem: Cardiac:  Goal: Cardiovascular alteration will improve  Description: Cardiovascular alteration will improve  4/17/2021 1325 by Francis Jesus RN  Outcome: Completed  4/17/2021 1325 by Francis Jesus RN  Outcome: Ongoing  4/16/2021 2332 by Chelsy Valerio RN  Outcome: Ongoing     Problem: Nutrition  Goal: Optimal nutrition therapy  Description: Nutrition Problem #1: Increased nutrient needs  Intervention: Food and/or Nutrient Delivery: Continue Current Diet, Continue Oral Nutrition Supplement  Nutritional Goals: Pt will consume >75% of all meals and supplements      4/17/2021 1325 by Francis Jesus RN  Outcome: Completed  4/17/2021 1325 by Francis Jesus RN  Outcome: Ongoing  4/16/2021 2332 by Chelsy Valerio RN  Outcome: Ongoing     Problem: OXYGENATION/RESPIRATORY FUNCTION  Goal: Patient will maintain patent airway  4/16/2021 2332 by Chelsy Valerio RN  Outcome: Completed  Goal: Patient will achieve/maintain normal respiratory rate/effort  Description: Respiratory rate and effort will be within normal limits for the patient  4/16/2021 2332 by Jhoana Alexandra RN  Outcome: Completed     Problem: HEMODYNAMIC STATUS  Goal: Patient has stable vital signs and fluid balance  4/17/2021 1325 by Jamie Rodriguez RN  Outcome: Completed  4/17/2021 1325 by Jamie Rodriguez RN  Outcome: Ongoing  4/16/2021 2332 by Jhoana Alexandra RN  Outcome: Ongoing     Problem: FLUID AND ELECTROLYTE IMBALANCE  Goal: Fluid and electrolyte balance are achieved/maintained  4/17/2021 1325 by Jamie Rodriguez RN  Outcome: Completed  4/17/2021 1325 by Jamie Rodriguez RN  Outcome: Ongoing  4/16/2021 2332 by Jhoana Alexandra RN  Outcome: Ongoing     Problem: ACTIVITY INTOLERANCE/IMPAIRED MOBILITY  Goal: Mobility/activity is maintained at optimum level for patient  4/17/2021 1325 by Jamie Rodriguez RN  Outcome: Completed  4/17/2021 1325 by Jamie Rodriguez RN  Outcome: Ongoing  4/16/2021 2332 by Jhoana Alexandra RN  Outcome: Ongoing

## 2021-04-17 NOTE — PLAN OF CARE
Problem: Falls - Risk of:  Goal: Will remain free from falls  Description: Will remain free from falls  Outcome: Ongoing     Problem: Falls - Risk of:  Goal: Absence of physical injury  Description: Absence of physical injury  Outcome: Ongoing     Problem: Skin Integrity:  Goal: Will show no infection signs and symptoms  Description: Will show no infection signs and symptoms  Outcome: Ongoing     Problem: Skin Integrity:  Goal: Absence of new skin breakdown  Description: Absence of new skin breakdown  Outcome: Ongoing     Problem: Cardiac:  Goal: Cardiovascular alteration will improve  Description: Cardiovascular alteration will improve  Outcome: Ongoing     Problem: Nutrition  Goal: Optimal nutrition therapy  Description: Nutrition Problem #1: Increased nutrient needs  Intervention: Food and/or Nutrient Delivery: Continue Current Diet, Continue Oral Nutrition Supplement  Nutritional Goals: Pt will consume >75% of all meals and supplements      Outcome: Ongoing     Problem: HEMODYNAMIC STATUS  Goal: Patient has stable vital signs and fluid balance  Outcome: Ongoing     Problem: FLUID AND ELECTROLYTE IMBALANCE  Goal: Fluid and electrolyte balance are achieved/maintained  Outcome: Ongoing     Problem: ACTIVITY INTOLERANCE/IMPAIRED MOBILITY  Goal: Mobility/activity is maintained at optimum level for patient  Outcome: Ongoing     Problem: OXYGENATION/RESPIRATORY FUNCTION  Goal: Patient will maintain patent airway  Outcome: Completed     Problem: OXYGENATION/RESPIRATORY FUNCTION  Goal: Patient will achieve/maintain normal respiratory rate/effort  Description: Respiratory rate and effort will be within normal limits for the patient  Outcome: Completed

## 2021-04-17 NOTE — PROGRESS NOTES
b/l.     DERMATOLOGIC: Chronic dermatological changes noted to b/l LE. Circumferential rubor with hemosiderin deposits noted to b/l LE, consistent with chronic venous stasis dermatitis. Relaxed skin tension lines noted 2/2 Ace compressive therapy. Multiple superficial, partial-thickness ulcerations noted to b/l LE without acute signs of infection. No purulent drainage, malodor, fluctuance, crepitus noted to b/l wounds. Webspaces 1-4 b/l are clean, dry, and intact. Patient provided verbal consent for today's photos. MUSCULOSKELETAL: Muscle strength is 5/5 for all pedal groups tested. No pain with palpation of the foot or ankle b/l. Ankle joint ROM is decreased in dorsiflexion with the knee extended. History of TMA of RLE. IMAGING:  Narrative   Left tibia and fibula 4 views       HISTORY: Soft tissue wounds       Extensive vascular calcification posteriorly. Osseous structures intact. No fracture or dislocation. No bone erosion or periosteal reaction. No soft tissue gas.           Impression       No osseous abnormality. If osteomyelitis is of clinical concern, recommend MRI                       Right tibia and fibula 4 views       HISTORY: Same       Extensive vascular calcification. Scattered phleboliths also present. Advanced degenerative osteoarthritis of the knee with bone-on-bone configuration at the medial femorotibial joint space compartment.       Tibia and fibula intact. No fracture or dislocation. No bone erosion or periosteal elevation. No soft tissue gas. Transmetatarsal amputation with diffuse soft tissue swelling of the amputation stump.       IMPRESSION:       Transmetatarsal amputation. No signs of osteomyelitis of the tibiotalar fibula. Recommend MRI for further evaluation as clinically indicated.         Type of Study:        Veins:Lower Extremities DVT Study, VASC EXTREMITY VENOUS DUPLEX BILATERAL.        Vascular Sonographer Report       Indications for Study:Venous ulcer.       Additional Indications:Inpatient presents with 2 days shortness of breath and   bilateral foot to calf wounds that had blisters 2 days ago.       Venous Duplex Scan: B-mode imaging of the deep and superficial veins, with   compression maneuvers, including color and Doppler spectral waveform analysis.       Impressions   Right Impression   Open wounds on the lower calf and thick skin not penetrated by ultrasound   limits visibility bilaterally. Bandages stayed on feet due to open wounds. Within the limits of this study no evidence of deep or superficial venous   thrombosis in the right lower extremity. A cystic structure is noted in the popliteal fossa consistent with a Baker's   cyst measuring 3.6x0.7cm. Left Impression   Within the limits of this study no evidence of deep or superficial venous   thrombosis in the left lower extremity.       Conclusions        Summary        Within the limits of this study no evidence of deep or superficial venous    thrombosis in the bilateral lower extremity.    Right Byrne's cyst.        Signature        ------------------------------------------------------------------    Electronically signed by Govind Hayes MD (Interpreting    [email or web address]) on 04/14/2021 at 03:47 PM    ------------------------------------------------------------------           ASSESSMENT/PLAN  1. Multiple superficial, partial-thickness venous stasis ulcerations; B/L LE  2. Chronic venous stasis dermatitis; B/L LE  3. Venous stasis insufficiency; B/L LE  4. Diabetes mellitus with peripheral neuropathy  5.   History of TMA; RLE    -Patient examined and evaluated at bedside   -VSS, no updated CBC  -ESR 53, CRP 83.3  -No wound culture obtained at this time 2/2 no acute drainage  -Prealbumin 12.7, continue nutritional dietary supplements to optimize wound healing potential  -Imaging reviewed, impression noted above  -Venous duplex doppler b/l reviewed, impression noted above  -Dressing

## 2021-04-17 NOTE — PROGRESS NOTES
Hospitalist Progress Note      PCP: Cathleen Huang MD    Date of Admission: 4/12/2021    Chief Complaint:     Chief Complaint   Patient presents with    Shortness of Breath       Subjective:  Patient seen and examined at the bedside. No complaints at this time.   Did well w/ therapy  Ready for dc home w/ home care    PFHS: reviewed as documented 4/12/2021, no changes    Medications:  Reviewed    Infusion Medications    sodium chloride      dextrose       Scheduled Medications    insulin glargine  20 Units Subcutaneous Nightly    furosemide  40 mg Oral BID    spironolactone  12.5 mg Oral Daily    sodium chloride flush  5-40 mL Intravenous 2 times per day    enoxaparin  40 mg Subcutaneous Daily    rosuvastatin  20 mg Oral Nightly    losartan  50 mg Oral Daily    carbidopa-levodopa  1 tablet Oral BID    metoprolol tartrate  25 mg Oral BID    potassium chloride  10 mEq Oral Daily    finasteride  5 mg Oral Nightly    donepezil  10 mg Oral Nightly    levothyroxine  75 mcg Oral Daily    insulin lispro  0-12 Units Subcutaneous TID WC    insulin lispro  0-6 Units Subcutaneous Nightly    miconazole   Topical BID     PRN Meds: sodium chloride flush, sodium chloride, promethazine **OR** ondansetron, polyethylene glycol, acetaminophen **OR** acetaminophen, glucose, dextrose, glucagon (rDNA), dextrose      Intake/Output Summary (Last 24 hours) at 4/17/2021 1246  Last data filed at 4/17/2021 0941  Gross per 24 hour   Intake 580 ml   Output 1770 ml   Net -1190 ml       Physical Exam    BP (!) 111/57   Pulse 58   Temp 97.5 °F (36.4 °C) (Oral)   Resp 18   Ht 6' (1.829 m)   Wt 245 lb 13 oz (111.5 kg)   SpO2 93%   BMI 33.34 kg/m²     General appearance:  No acute distress, appears stated age  Eyes: Pupils equal, round, reactive to light, conjunctiva/corneas clear  Ears/Nose/Mouth/Throat: No external lesions or scars, hearing intact to voice  Neck: Trachea midline, no masses noted, no indicated. XR TIBIA FIBULA RIGHT (2 VIEWS)   Final Result      No osseous abnormality. If osteomyelitis is of clinical concern, recommend MRI                  Right tibia and fibula 4 views      HISTORY: Same      Extensive vascular calcification. Scattered phleboliths also present. Advanced degenerative osteoarthritis of the knee with bone-on-bone configuration at the medial femorotibial joint space compartment. Tibia and fibula intact. No fracture or dislocation. No bone erosion or periosteal elevation. No soft tissue gas. Transmetatarsal amputation with diffuse soft tissue swelling of the amputation stump. IMPRESSION:      Transmetatarsal amputation. No signs of osteomyelitis of the tibiotalar fibula. Recommend MRI for further evaluation as clinically indicated. XR CHEST (2 VW)   Final Result      Patchy basal predominant opacities which are nonspecific. Stable mild cardiomegaly.                 Assessment/Plan:    Active Hospital Problems    Diagnosis Date Noted    Essential hypertension [I10]      Priority: High    Acute on chronic combined systolic and diastolic congestive heart failure ([facility] [I50.43] 04/12/2021    Diabetic polyneuropathy associated with type 2 diabetes mellitus ([facility] [E11.42] 07/25/2017    Obstructive sleep apnea [G47.33] 10/03/2014    Hypothyroid [E03.9] 07/24/2014       Plan:    # Acute on chronic combined heart failure  -tele  -lasix, home on 40mg BID, add spironolactone  -cardiology eval  -strict I&O, daily weight  -follow electrolytes  -continue asa, statin, ARB, BB     # Lower extremity wounds  -appreciate podiatry eval, no signs of acute infection  -BL LE doppler negative for DVT  -podiatry will follow while in-house     # T2DM  -carb control diet  -lantus w/ mealtime and correctional insulin     # SARIKA  # Hypothyroid  -continue synthroid    DVT Prophylaxis: Lovenox  Diet: Dietary Nutrition Supplements: Low Calorie High Protein Supplement  DIET GENERAL; Carb

## 2021-04-17 NOTE — PROGRESS NOTES
Patient alert and oriented times four. Patient vss this shift. Patient on Telemetry nsr with pacs. Patient iv is in right ac NSL. Patient currently on bedrest awaiting pt ot evaluation. Patient requested a purewicc and is incontinent of bowel. patient has no complaints at this time. Patient adbominal sounds active. Patient skin clean dry and intact. Patient breath sounds has fine crackles. Patient bed locked in lowest position with bed alarm on. Call light bedside table and belongings in reach. Patient encouraged to call with any and all needs. Patient verbalizes understanding and call appropriately. Will continue to monitor.

## 2021-04-17 NOTE — DISCHARGE SUMMARY
Hospital Medicine Discharge Summary    Patient ID: James Oates      Patient's PCP: Ameena Orr MD    Admit Date: 4/12/2021     Discharge Date: 4/17/2021    Admitting Physician: Henri Hardy MD     Discharge Physician: Kenya Reagan MD     Discharge Diagnoses: Active Hospital Problems    Diagnosis Date Noted    Essential hypertension [I10]      Priority: High    Acute on chronic combined systolic and diastolic congestive heart failure ([facility] [I50.43] 04/12/2021    Diabetic polyneuropathy associated with type 2 diabetes mellitus ([facility] [E11.42] 07/25/2017    Obstructive sleep apnea [G47.33] 10/03/2014    Hypothyroid [E03.9] 07/24/2014       Discharge order was placed at the time the patient was medically ready for discharge. The patient left when post-acute care was arranged. See plan in progress note from this date for full hospital problem list.    The patient was seen and examined on day of discharge and this discharge summary is in conjunction with any daily progress note from day of discharge. Hospital Course:    Please see admission H&P as well as progress note from this date. Physical Exam Performed:     BP (!) 111/57   Pulse 58   Temp 97.5 °F (36.4 °C) (Oral)   Resp 18   Ht 6' (1.829 m)   Wt 245 lb 13 oz (111.5 kg)   SpO2 93%   BMI 33.34 kg/m²     Please see progress note from this date    Labs:  For convenience and continuity at follow-up the following most recent labs are provided:      CBC:    Lab Results   Component Value Date    WBC 8.0 04/13/2021    HGB 10.9 04/13/2021    HCT 34.2 04/13/2021     04/13/2021       Renal:    Lab Results   Component Value Date     04/17/2021    K 4.3 04/17/2021    K 4.2 04/12/2021    CL 96 04/17/2021    CO2 28 04/17/2021    BUN 45 04/17/2021    CREATININE 1.1 04/17/2021    CALCIUM 9.5 04/17/2021    PHOS 3.4 03/12/2018         Significant Diagnostic Studies    Radiology:   VL Extremity Venous Bilateral   Final Result      XR TIBIA FIBULA LEFT (2 VIEWS)   Final Result      No osseous abnormality. If osteomyelitis is of clinical concern, recommend MRI                  Right tibia and fibula 4 views      HISTORY: Same      Extensive vascular calcification. Scattered phleboliths also present. Advanced degenerative osteoarthritis of the knee with bone-on-bone configuration at the medial femorotibial joint space compartment. Tibia and fibula intact. No fracture or dislocation. No bone erosion or periosteal elevation. No soft tissue gas. Transmetatarsal amputation with diffuse soft tissue swelling of the amputation stump. IMPRESSION:      Transmetatarsal amputation. No signs of osteomyelitis of the tibiotalar fibula. Recommend MRI for further evaluation as clinically indicated. XR TIBIA FIBULA RIGHT (2 VIEWS)   Final Result      No osseous abnormality. If osteomyelitis is of clinical concern, recommend MRI                  Right tibia and fibula 4 views      HISTORY: Same      Extensive vascular calcification. Scattered phleboliths also present. Advanced degenerative osteoarthritis of the knee with bone-on-bone configuration at the medial femorotibial joint space compartment. Tibia and fibula intact. No fracture or dislocation. No bone erosion or periosteal elevation. No soft tissue gas. Transmetatarsal amputation with diffuse soft tissue swelling of the amputation stump. IMPRESSION:      Transmetatarsal amputation. No signs of osteomyelitis of the tibiotalar fibula. Recommend MRI for further evaluation as clinically indicated. XR CHEST (2 VW)   Final Result      Patchy basal predominant opacities which are nonspecific. Stable mild cardiomegaly.                    Consults:     IP CONSULT TO HOSPITALIST  IP CONSULT TO CARDIOLOGY  IP CONSULT TO PODIATRY  IP CONSULT TO HEART FAILURE NURSE/COORDINATOR  IP CONSULT TO DIETITIAN  IP CONSULT TO HOME CARE NEEDS    Disposition:  Home w/ home care    Condition at Discharge: Stable    Discharge Instructions/Follow-up:  Follow up with PCP in 1 week for hospital follow-up and to review any pending labs/tests. Please follow other discharge instructions as outlined in the discharge instructions    Code Status:  Full Code    Activity: activity as tolerated    Diet: Dietary Nutrition Supplements: Low Calorie High Protein Supplement  DIET GENERAL; Carb Control: 3 carb choices (45 gms)/meal; Low Sodium (2 GM)    Discharge Medications:     Current Discharge Medication List           Details   miconazole (MICOTIN) 2 % powder Apply topically 2 times daily.   Qty: 45 g, Refills: 1      spironolactone (ALDACTONE) 25 MG tablet Take 0.5 tablets by mouth daily  Qty: 30 tablet, Refills: 3              Details   furosemide (LASIX) 40 MG tablet Take 1 tablet by mouth 2 times daily  Qty: 60 tablet, Refills: 3              Details   finasteride (PROSCAR) 5 MG tablet Take 5 mg by mouth nightly      losartan (COZAAR) 50 MG tablet Take 50 mg by mouth daily      insulin regular (HUMULIN R;NOVOLIN R) 100 UNIT/ML injection Inject into the skin 3 times daily (before meals) Per sliding scale      insulin aspart protamine-insulin aspart (NOVOLOG MIX 70/30) (70-30) 100 UNIT/ML injection Inject 30 Units into the skin daily       carbidopa-levodopa (SINEMET)  MG per tablet Take 1 tablet by mouth 2 times daily      metoprolol tartrate (LOPRESSOR) 25 MG tablet TAKE ONE TABLET BY MOUTH TWICE A DAY  Qty: 180 tablet, Refills: 3      levothyroxine (SYNTHROID) 75 MCG tablet Take 75 mcg by mouth Daily       metFORMIN (GLUCOPHAGE) 850 MG tablet Take 850 mg by mouth 2 times daily (with meals)      donepezil (ARICEPT) 10 MG tablet Take 10 mg by mouth nightly       potassium chloride (KLOR-CON M) 10 MEQ extended release tablet Take 10 mEq by mouth daily       rosuvastatin (CRESTOR) 20 MG tablet Take 1 tablet by mouth daily  Qty: 90 tablet, Refills: 4      nitroGLYCERIN (NITROSTAT) 0.4 MG SL tablet Place 0.4 mg under the tongue every 5 minutes as needed. Time Spent on discharge is 35 minutes in the examination, evaluation, counseling and review of medications and discharge plan. Signed:    Brandi Pichardo MD   4/17/2021      Thank you Saroj Lombardi MD for the opportunity to be involved in this patient's care. If you have any questions or concerns please feel free to contact me at [phone].

## 2021-04-23 NOTE — ADT AUTH CERT
at bedside   -VSS, no new AM CBC  -ESR 53, CRP 83.3  -No wound culture obtained at this time 2/2 no acute drainage  -Blood culture NGTD  -Prealbumin 12.7, nutritional dietary supplements ordered to optimize wound healing potential  -Imaging reviewed, impression noted above  -Venous duplex doppler b/l reviewed, impression noted above  -Dressing applied to bilateral lower extremity consisting of adaptic, gauze, Kerlix, Ace bandage with gentle compression  -Prevalon boots reapplied  -Instructed patient to elevate bilateral lower extremities when lying/sitting for extended periods of time to aid in edema control.  -Patient be weightbearing as tolerated to bilateral lower extremities.   -Bilateral lower extremities without acute signs of infection, likely chronic venous stasis dermatitis.  No indication for antibiotics or podiatry standpoint this time.     DISPO: Multiple partial-thickness venous stasis ulcerations; b/l LE; no acute signs of infection at this time, likely chronic venous stasis dermatitis, venous ulcerations.  Bilateral venous duplex Doppler negative. We will continue to provide local wound care while patient is in house.               Heart Failure - Care Day 3 (4/14/2021) by Iza Nguyen RN       Review Status Review Entered   Completed 4/23/2021 11:17      Criteria Review      Care Day: 3 Care Date: 4/14/2021 Level of Care: Inpatient Floor    Guideline Day 3    Clinical Status    ( ) * Hemodynamic stability    ( ) * Tachypnea absent    ( ) * Oxygenation at baseline or acceptable for next level of care    ( ) * Dyspnea at baseline or acceptable for next level of care    ( ) * Cardiac rate and rhythm acceptable    ( ) * Pulmonary edema absent or acceptable for next level of care    ( ) * Peripheral or sacral edema absent or acceptable for next level of care    ( ) * Mental status at baseline    ( ) * Volume status acceptable on oral medication    ( ) * Renal function at baseline or acceptable for next level of care    ( ) * Electrolyte abnormalities absent or acceptable for next level of care    ( ) * Precipitating factors absent or controlled    ( ) * Discharge plans and education understood    Activity    ( ) * Ambulatory or acceptable for next level of care    Routes    ( ) * Oral hydration, medications, and diet    Interventions    ( ) * Oxygen absent    * Milestone   Additional Notes   4-      ** ** ** NOTE PER CARDIOLOGY     -Will increase Lasix to 40 mg IV every 8 hours   -We will start spironolactone 25 daily   -Continue with Lopressor 25 twice daily and losartan 50 daily.

## 2021-05-04 ENCOUNTER — HOSPITAL ENCOUNTER (OUTPATIENT)
Dept: WOUND CARE | Age: 81
Discharge: HOME | End: 2021-05-04
Payer: MEDICARE

## 2021-05-04 VITALS
RESPIRATION RATE: 16 BRPM | SYSTOLIC BLOOD PRESSURE: 131 MMHG | DIASTOLIC BLOOD PRESSURE: 75 MMHG | TEMPERATURE: 97.3 F | HEART RATE: 87 BPM

## 2021-05-04 DIAGNOSIS — I83.212 VARICOSE VEINS OF RIGHT LOWER EXTREMITY WITH INFLAMMATION, WITH ULCER OF CALF WITH FAT LAYER EXPOSED (HCC): ICD-10-CM

## 2021-05-04 DIAGNOSIS — L97.212 VARICOSE VEINS OF RIGHT LOWER EXTREMITY WITH INFLAMMATION, WITH ULCER OF CALF WITH FAT LAYER EXPOSED (HCC): ICD-10-CM

## 2021-05-04 PROCEDURE — 6370000000 HC RX 637 (ALT 250 FOR IP): Performed by: PODIATRIST

## 2021-05-04 PROCEDURE — 99213 OFFICE O/P EST LOW 20 MIN: CPT

## 2021-05-04 RX ORDER — OMEPRAZOLE 20 MG/1
20 CAPSULE, DELAYED RELEASE ORAL 2 TIMES DAILY
COMMUNITY

## 2021-05-04 RX ORDER — BACITRACIN, NEOMYCIN, POLYMYXIN B 400; 3.5; 5 [USP'U]/G; MG/G; [USP'U]/G
OINTMENT TOPICAL ONCE
Status: CANCELLED | OUTPATIENT
Start: 2021-05-04 | End: 2021-05-04

## 2021-05-04 RX ORDER — LIDOCAINE 50 MG/G
OINTMENT TOPICAL ONCE
Status: CANCELLED | OUTPATIENT
Start: 2021-05-04 | End: 2021-05-04

## 2021-05-04 RX ORDER — BACITRACIN ZINC AND POLYMYXIN B SULFATE 500; 1000 [USP'U]/G; [USP'U]/G
OINTMENT TOPICAL ONCE
Status: CANCELLED | OUTPATIENT
Start: 2021-05-04 | End: 2021-05-04

## 2021-05-04 RX ORDER — LIDOCAINE HYDROCHLORIDE 40 MG/ML
SOLUTION TOPICAL ONCE
Status: CANCELLED | OUTPATIENT
Start: 2021-05-04 | End: 2021-05-04

## 2021-05-04 RX ORDER — LIDOCAINE HYDROCHLORIDE 40 MG/ML
SOLUTION TOPICAL ONCE
Status: COMPLETED | OUTPATIENT
Start: 2021-05-04 | End: 2021-05-04

## 2021-05-04 RX ORDER — LIDOCAINE HYDROCHLORIDE 20 MG/ML
JELLY TOPICAL ONCE
Status: CANCELLED | OUTPATIENT
Start: 2021-05-04 | End: 2021-05-04

## 2021-05-04 RX ORDER — GENTAMICIN SULFATE 1 MG/G
OINTMENT TOPICAL ONCE
Status: CANCELLED | OUTPATIENT
Start: 2021-05-04 | End: 2021-05-04

## 2021-05-04 RX ORDER — LIDOCAINE 40 MG/G
CREAM TOPICAL ONCE
Status: CANCELLED | OUTPATIENT
Start: 2021-05-04 | End: 2021-05-04

## 2021-05-04 RX ORDER — GINSENG 100 MG
CAPSULE ORAL ONCE
Status: CANCELLED | OUTPATIENT
Start: 2021-05-04 | End: 2021-05-04

## 2021-05-04 RX ORDER — BETAMETHASONE DIPROPIONATE 0.05 %
OINTMENT (GRAM) TOPICAL ONCE
Status: CANCELLED | OUTPATIENT
Start: 2021-05-04 | End: 2021-05-04

## 2021-05-04 RX ORDER — CHOLECALCIFEROL (VITAMIN D3) 1250 MCG
CAPSULE ORAL
COMMUNITY
End: 2022-10-19 | Stop reason: CLARIF

## 2021-05-04 RX ORDER — CLOBETASOL PROPIONATE 0.5 MG/G
OINTMENT TOPICAL ONCE
Status: CANCELLED | OUTPATIENT
Start: 2021-05-04 | End: 2021-05-04

## 2021-05-04 RX ADMIN — LIDOCAINE HYDROCHLORIDE: 40 SOLUTION TOPICAL at 15:01

## 2021-05-04 NOTE — PROGRESS NOTES
[address]   Progress Note and Procedure Note      Luis F Lao  MEDICAL RECORD NUMBER:  [id number]  AGE: 79 y.o. GENDER: male  : 1940  EPISODE DATE:  2021    Subjective:     Chief Complaint   Patient presents with    Wound Check     initial         HISTORY of PRESENT ILLNESS HPI     Aryan Lagunas is a 79 y.o. male who presents today for wound/ulcer evaluation. History of Wound Context: patient presents today for follow up of bilateral venous leg ulcerations. He has tolerated his compression dressings. Patient presents today with his wife. She relates that he has been using spandigrips to control edema as she is unable to gethis compression stockings on and Mr. Jorej Richard is unable to do it due his parkison's disease. Patient has had nonhealing venous leg ulcerations for more than 6 months.    Wound/Ulcer Pain Timing/Severity: none  Quality of pain: N/A  Severity:  0 / 10   Modifying Factors: None  Associated Signs/Symptoms: edema    Ulcer Identification:  Ulcer Type: venous    Contributing Factors: edema, venous stasis and diabetes    Acute Wound: N/A    PAST MEDICAL HISTORY        Diagnosis Date    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 ([address]     Dr. Iliana Armendariz CAD (coronary artery disease)     Cellulitis     CHF (congestive heart failure) ([address]     systolic    Chronic kidney disease     upon hospitalization due to bactrim allergy    COPD (chronic obstructive pulmonary disease) ([address]     Hypertension     Hypothyroidism     Mixed hyperlipidemia     MRSA (methicillin resistant staph aureus) culture positive 2017    foot    Neuropathy     Obstructive sleep apnea 10/03/2014    does not use cpap machine    Parkinson's disease ([address] 1964    Type 2 diabetes mellitus without complication ([address]        PAST SURGICAL HISTORY    Past Surgical History:   Procedure Laterality Date    CARDIAC CATHETERIZATION      CERVICAL FUSION      COLON SURGERY 1980's    COLONOSCOPY      FOOT SURGERY Right 07/24/2017    INCISION AND DRAINAGE RIGHT FOOT WITH REMOVAL NECROTIC BONE    FOOT SURGERY Right 07/27/2017    : TRANSMETATARSAL AMPUTATION RIGHT FOOT     HERNIA REPAIR      SHOULDER SURGERY      right    UPPER GASTROINTESTINAL ENDOSCOPY N/A 11/25/2020    ESOPHAGOGASTRODUODENOSCOPY WITH BOTOX INJECTION performed by Allen Garza DO at [facility] ENDOSCOPY       FAMILY HISTORY    Family History   Problem Relation Age of Onset    Stroke Mother     Stroke Father     Cancer Sister     Cancer Paternal Aunt        SOCIAL HISTORY    Social History     Tobacco Use    Smoking status: Never Smoker    Smokeless tobacco: Never Used   Substance Use Topics    Alcohol use: No    Drug use: No       ALLERGIES    Allergies   Allergen Reactions    Aspirin      GI  Bleed possible related to aspirin  (but thought to be H Pylori)    Nsaids     Bactrim [Sulfamethoxazole-Trimethoprim] Other (See Comments)     arf    Celebrex [Celecoxib]      tremors    Cymbalta [Duloxetine Hcl]      Mental status change    Pcn [Penicillins]      rash    Codeine Nausea And Vomiting    Vicodin [Hydrocodone-Acetaminophen] Nausea And Vomiting       MEDICATIONS    Current Outpatient Medications on File Prior to Encounter   Medication Sig Dispense Refill    Cholecalciferol (VITAMIN D3) 1.25 MG (37998 UT) CAPS Take by mouth      omeprazole (PRILOSEC) 20 MG delayed release capsule Take 20 mg by mouth 2 times daily      furosemide (LASIX) 40 MG tablet Take 1 tablet by mouth 2 times daily 60 tablet 3    spironolactone (ALDACTONE) 25 MG tablet Take 0.5 tablets by mouth daily 30 tablet 3    finasteride (PROSCAR) 5 MG tablet Take 5 mg by mouth nightly      losartan (COZAAR) 50 MG tablet Take 50 mg by mouth daily      insulin regular (HUMULIN R;NOVOLIN R) 100 UNIT/ML injection Inject into the skin 3 times daily (before meals) Per sliding scale      insulin aspart protamine-insulin aspart (NOVOLOG MIX 70/30) (70-30) 100 UNIT/ML injection Inject 30 Units into the skin daily 26 units -28 units      carbidopa-levodopa (SINEMET)  MG per tablet Take 1 tablet by mouth 2 times daily      metoprolol tartrate (LOPRESSOR) 25 MG tablet TAKE ONE TABLET BY MOUTH TWICE A  tablet 3    levothyroxine (SYNTHROID) 75 MCG tablet Take 75 mcg by mouth Daily       metFORMIN (GLUCOPHAGE) 850 MG tablet Take 850 mg by mouth 2 times daily (with meals)      donepezil (ARICEPT) 10 MG tablet Take 10 mg by mouth nightly       potassium chloride (KLOR-CON M) 10 MEQ extended release tablet Take 10 mEq by mouth daily       rosuvastatin (CRESTOR) 20 MG tablet Take 1 tablet by mouth daily 90 tablet 4    miconazole (MICOTIN) 2 % powder Apply topically 2 times daily. 45 g 1    nitroGLYCERIN (NITROSTAT) 0.4 MG SL tablet Place 0.4 mg under the tongue every 5 minutes as needed. No current facility-administered medications on file prior to encounter. REVIEW OF SYSTEMS  Review of Systems    Pertinent items are noted in HPI. Review of Systems: A 12 point review of symptoms is unremarkable with the exception of the chief complaint. Patient specifically denies nausea, fever, vomiting, chills, shortness of breath, chest pain, abdominal pain, constipation or difficulty urinating. Objective:      /75   Pulse 87   Temp 97.3 °F (36.3 °C) (Temporal)   Resp 16     Wt Readings from Last 3 Encounters:   04/17/21 245 lb 13 oz (111.5 kg)   11/25/20 248 lb (112.5 kg)   11/05/20 245 lb 9.6 oz (111.4 kg)       PHYSICAL EXAM  Physical Exam    DP/PT pulses palpable bilaterally. CFT brisk to all digits. Digits are pink and warm to the touch. Hair growth is absent in appearance. edema noted. No calf pain with palpation noted. Patient has brawny pigmentary changes on the bilateral lower extremities with hypertrophic skin changes consistent with chronic venous stasis.     There are multiple scars on the lower legs from previous ulcerations. The previously noted bilateral lower extremity ulcerations have epithelialized. Assessment:        Problem List Items Addressed This Visit     Varicose veins of right lower extremity with inflammation, with ulcer of calf with fat layer exposed ([address]             Plan:   E/M x 30 minutes    Prolonged discussion with patient and his wife that we need to better manage his venous disease as he has had venous leg ulcerations for greater than six months. He has failed compression stockings and his limited ability to elevate and perform therapeutic exercize due to his poor mobility from his parkinson's disease. Rx multilevel lymphedema pumps to manage chronic venous disesae with ulcerations. Recommend follow up with podiatry for routine DM foot care. Treatment Note please see attached Discharge Instructions    Written patient dismissal instructions given to patient and signed by patient or POA.          RTC prn    Electronically signed by Yoshi Diaz DPM on 5/4/2021 at 3:03 PM

## 2021-05-12 ENCOUNTER — OFFICE VISIT (OUTPATIENT)
Dept: CARDIOLOGY CLINIC | Age: 81
End: 2021-05-12
Payer: MEDICARE

## 2021-05-12 VITALS
WEIGHT: 232.2 LBS | BODY MASS INDEX: 31.49 KG/M2 | HEART RATE: 70 BPM | SYSTOLIC BLOOD PRESSURE: 130 MMHG | DIASTOLIC BLOOD PRESSURE: 62 MMHG

## 2021-05-12 DIAGNOSIS — E78.2 MIXED HYPERLIPIDEMIA: ICD-10-CM

## 2021-05-12 DIAGNOSIS — I83.212 VARICOSE VEINS OF RIGHT LOWER EXTREMITY WITH INFLAMMATION, WITH ULCER OF CALF WITH FAT LAYER EXPOSED (HCC): ICD-10-CM

## 2021-05-12 DIAGNOSIS — L97.212 VARICOSE VEINS OF RIGHT LOWER EXTREMITY WITH INFLAMMATION, WITH ULCER OF CALF WITH FAT LAYER EXPOSED (HCC): ICD-10-CM

## 2021-05-12 DIAGNOSIS — I10 HTN (HYPERTENSION), BENIGN: ICD-10-CM

## 2021-05-12 DIAGNOSIS — I25.10 CORONARY ARTERY DISEASE INVOLVING NATIVE CORONARY ARTERY OF NATIVE HEART WITHOUT ANGINA PECTORIS: Primary | ICD-10-CM

## 2021-05-12 PROCEDURE — G8427 DOCREV CUR MEDS BY ELIG CLIN: HCPCS | Performed by: INTERNAL MEDICINE

## 2021-05-12 PROCEDURE — G8417 CALC BMI ABV UP PARAM F/U: HCPCS | Performed by: INTERNAL MEDICINE

## 2021-05-12 PROCEDURE — 1123F ACP DISCUSS/DSCN MKR DOCD: CPT | Performed by: INTERNAL MEDICINE

## 2021-05-12 PROCEDURE — 4040F PNEUMOC VAC/ADMIN/RCVD: CPT | Performed by: INTERNAL MEDICINE

## 2021-05-12 PROCEDURE — 1036F TOBACCO NON-USER: CPT | Performed by: INTERNAL MEDICINE

## 2021-05-12 PROCEDURE — 1111F DSCHRG MED/CURRENT MED MERGE: CPT | Performed by: INTERNAL MEDICINE

## 2021-05-12 PROCEDURE — 99214 OFFICE O/P EST MOD 30 MIN: CPT | Performed by: INTERNAL MEDICINE

## 2021-05-12 NOTE — PROGRESS NOTES
Subjective:      Patient ID: James Oates is a 79 y.o. male. CC:  Fleeting chest pain. Sob HTN,     HPI:  He is still at home and stable. No excessive SOB. Exercises with home therapy and walks with a walker. Has no CP. ECG today WNL . No orthopnea. Doing well overall. Has had some lower bp s lately. Been throwing up with meals but has no chest pain. Here for cardiac approval of SCD.       Allergies   Allergen Reactions    Aspirin      GI  Bleed possible related to aspirin  (but thought to be H Pylori)    Nsaids     Bactrim [Sulfamethoxazole-Trimethoprim] Other (See Comments)     arf    Celebrex [Celecoxib]      tremors    Cymbalta [Duloxetine Hcl]      Mental status change    Pcn [Penicillins]      rash    Codeine Nausea And Vomiting    Vicodin [Hydrocodone-Acetaminophen] Nausea And Vomiting        Social History     Socioeconomic History    Marital status:      Spouse name: Not on file    Number of children: Not on file    Years of education: Not on file    Highest education level: Not on file   Occupational History    Not on file   Social Needs    Financial resource strain: Not on file    Food insecurity     Worry: Not on file     Inability: Not on file    Transportation needs     Medical: Not on file     Non-medical: Not on file   Tobacco Use    Smoking status: Never Smoker    Smokeless tobacco: Never Used   Substance and Sexual Activity    Alcohol use: No    Drug use: No    Sexual activity: Not on file   Lifestyle    Physical activity     Days per week: Not on file     Minutes per session: Not on file    Stress: Not on file   Relationships    Social connections     Talks on phone: Not on file     Gets together: Not on file     Attends Yaz[employer] service: Not on file     Active member of club or organization: Not on file     Attends meetings of clubs or organizations: Not on file     Relationship status: Not on file    Intimate partner violence     Fear of current or ex partner: Not on file     Emotionally abused: Not on file     Physically abused: Not on file     Forced sexual activity: Not on file   Other Topics Concern    Not on file   Social History Narrative    Not on file        Patient has a family history includes Cancer in his paternal aunt and sister; Stroke in his father and mother. Patient  has a past medical history of 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 ([facility] CAD (coronary artery disease), Cellulitis, CHF (congestive heart failure) ([facility] Chronic kidney disease, COPD (chronic obstructive pulmonary disease) ([facility] Hypertension, Hypothyroidism, Mixed hyperlipidemia, MRSA (methicillin resistant staph aureus) culture positive, Neuropathy, Obstructive sleep apnea, Parkinson's disease ([facility] and Type 2 diabetes mellitus without complication ([facility] Current Outpatient Medications   Medication Sig Dispense Refill    Cholecalciferol (VITAMIN D3) 1.25 MG (06047 UT) CAPS Take by mouth      omeprazole (PRILOSEC) 20 MG delayed release capsule Take 20 mg by mouth 2 times daily      miconazole (MICOTIN) 2 % powder Apply topically 2 times daily.  45 g 1    furosemide (LASIX) 40 MG tablet Take 1 tablet by mouth 2 times daily 60 tablet 3    spironolactone (ALDACTONE) 25 MG tablet Take 0.5 tablets by mouth daily 30 tablet 3    finasteride (PROSCAR) 5 MG tablet Take 5 mg by mouth nightly      losartan (COZAAR) 50 MG tablet Take 50 mg by mouth daily      insulin regular (HUMULIN R;NOVOLIN R) 100 UNIT/ML injection Inject into the skin 3 times daily (before meals) Per sliding scale      insulin aspart protamine-insulin aspart (NOVOLOG MIX 70/30) (70-30) 100 UNIT/ML injection Inject 30 Units into the skin daily 26 units -28 units      carbidopa-levodopa (SINEMET)  MG per tablet Take 1 tablet by mouth 2 times daily      metoprolol tartrate (LOPRESSOR) 25 MG tablet TAKE ONE TABLET BY MOUTH TWICE A  tablet 3    levothyroxine (SYNTHROID) 75 MCG tablet Take 75 mcg by mouth Daily       metFORMIN (GLUCOPHAGE) 850 MG tablet Take 850 mg by mouth 2 times daily (with meals)      donepezil (ARICEPT) 10 MG tablet Take 10 mg by mouth nightly       potassium chloride (KLOR-CON M) 10 MEQ extended release tablet Take 10 mEq by mouth daily       rosuvastatin (CRESTOR) 20 MG tablet Take 1 tablet by mouth daily 90 tablet 4    nitroGLYCERIN (NITROSTAT) 0.4 MG SL tablet Place 0.4 mg under the tongue every 5 minutes as needed. No current facility-administered medications for this visit. Vitals  Weight: 232 lb 3.2 oz (105.3 kg)  Blood Pressure:  118/64   Pulse: 70       Exam unchanged from 3/5/20. Objective:   Physical Exam   Nursing note and vitals reviewed. Constitutional: He is oriented to person, place, and time. He appears well-developed and well-nourished. No distress. HENT:   Head: Normocephalic and atraumatic. Nose: Nose normal.   Mouth/Throat: Oropharynx is clear and moist. No oropharyngeal exudate. Eyes: Conjunctivae are normal. Pupils are equal, round, and reactive to light. Right eye exhibits no discharge. Left eye exhibits no discharge. No scleral icterus. Neck: Normal range of motion. Neck supple. No JVD present. No tracheal deviation present. No thyromegaly present. Cardiovascular: Normal rate, regular rhythm, normal heart sounds and intact distal pulses. Exam reveals no gallop and no friction rub. No murmur heard. Pulmonary/Chest: Effort normal. No respiratory distress. He has no wheezes. He has no rales. He exhibits no tenderness. Abdominal: Soft. Bowel sounds are normal. He exhibits no distension and no mass. No tenderness. He has no rebound and no guarding. Musculoskeletal: He exhibits trace edema. He exhibits no tenderness. Neurological: He is alert and oriented to person, place, and time. No cranial nerve deficit. Coordination normal.   Skin: Skin is warm and dry. No rash noted. He is not diaphoretic.  No erythema.  edema and reddish discoloration of the left leg. Psychiatric: He has a normal mood and affect. His behavior is normal. Judgment and thought content normal.       Assessment:       Diagnosis Orders   1. Coronary artery disease involving native coronary artery of native heart without angina pectoris     2. HTN (hypertension), benign     3. Varicose veins of right lower extremity with inflammation, with ulcer of calf with fat layer exposed ([address]     4. Mixed hyperlipidemia           Plan:      Continue current medications. Stable cardiovascular status. Doing well with 2 xience SAMANTHA in 11- to LAD. Doing well with CPAP treatment for SARIKA mostly but still has had some nosebleeds  HLP:  He continues on fenofibrate daily for high Triglycerides and taking crestor. Will check labs and lipids. Edema:  Stable on lasix 40 bid and restarted spironolactone 12. 5 mg daily. .  Hypertension:  Seems to have lower BPs at home   Will stop losartan. He has chronic nosebleeds so would avoid warfarin. Claire Powell from [facility] to get SCD for bilateral LEs as requested by Podiatry    S/d HF chronic: stable.

## 2021-07-15 NOTE — TELEPHONE ENCOUNTER
Requested Prescriptions     Pending Prescriptions Disp Refills    metoprolol tartrate (LOPRESSOR) 25 MG tablet [Pharmacy Med Name: METOPROLOL TARTRATE 25 MG TAB] 180 tablet 3     Sig: TAKE ONE TABLET BY MOUTH TWICE A DAY                  Last Office Visit: 11/5/2020     Next Office Visit: 10/7/2021       Last Labs:04/17/2021

## 2021-10-08 ENCOUNTER — HOSPITAL ENCOUNTER (OUTPATIENT)
Dept: CT IMAGING | Age: 81
Discharge: HOME | End: 2021-10-08
Payer: MEDICARE

## 2021-10-08 DIAGNOSIS — R06.00 DYSPNEA, UNSPECIFIED TYPE: ICD-10-CM

## 2021-10-08 PROCEDURE — 71250 CT THORAX DX C-: CPT

## 2021-11-04 ENCOUNTER — OFFICE VISIT (OUTPATIENT)
Dept: CARDIOLOGY CLINIC | Age: 81
End: 2021-11-04
Payer: MEDICARE

## 2021-11-04 VITALS
SYSTOLIC BLOOD PRESSURE: 128 MMHG | HEART RATE: 71 BPM | WEIGHT: 241.6 LBS | BODY MASS INDEX: 32.77 KG/M2 | DIASTOLIC BLOOD PRESSURE: 74 MMHG

## 2021-11-04 DIAGNOSIS — I48.20 CHRONIC ATRIAL FIBRILLATION (HCC): Primary | ICD-10-CM

## 2021-11-04 DIAGNOSIS — I25.10 CORONARY ARTERY DISEASE INVOLVING NATIVE CORONARY ARTERY OF NATIVE HEART WITHOUT ANGINA PECTORIS: Primary | ICD-10-CM

## 2021-11-04 DIAGNOSIS — I48.20 CHRONIC ATRIAL FIBRILLATION (HCC): ICD-10-CM

## 2021-11-04 DIAGNOSIS — E78.5 DYSLIPIDEMIA: ICD-10-CM

## 2021-11-04 PROCEDURE — 99214 OFFICE O/P EST MOD 30 MIN: CPT | Performed by: INTERNAL MEDICINE

## 2021-11-04 PROCEDURE — 93000 ELECTROCARDIOGRAM COMPLETE: CPT | Performed by: INTERNAL MEDICINE

## 2021-11-04 NOTE — PROGRESS NOTES
Subjective:      Patient ID: Irving Causey is a [de-identified] y.o. male. CC:  Fleeting chest pain. Sob HTN,     HPI:  He is still at home and stable. No excessive SOB. Exercises with home therapy and walks with a walker. Has no CP. ECG today WNL . No orthopnea. Doing well overall. Has had some lower bp s lately. Been throwing up with meals but has no chest pain. Here for cardiac approval of SCD. [id number] Score for Atrial Fibrillation Stroke Risk   Risk   Factors  Component Value   C CHF Yes 1   H HTN Yes 1   A2 Age >= 76 Yes,  [de-identified] y.o.) 2   D DM Yes 1   S2 Prior Stroke/TIA No 0   V Vascular Disease No 0   A Age 74-69 No,  [de-identified] y.o.) 0   Sc Sex male 0    [id number]  Score  5   Score last updated 11/4/21 2:97 PM EDT    Click here for a link to the UpToDate guideline \"Atrial Fibrillation: Anticoagulation therapy to prevent embolization    Disclaimer: Risk Score calculation is dependent on accuracy of patient problem list and past encounter diagnosis.     Allergies   Allergen Reactions    Aspirin      GI  Bleed possible related to aspirin  (but thought to be H Pylori)    Nsaids     Bactrim [Sulfamethoxazole-Trimethoprim] Other (See Comments)     arf    Celebrex [Celecoxib]      tremors    Cymbalta [Duloxetine Hcl]      Mental status change    Pcn [Penicillins]      rash    Codeine Nausea And Vomiting    Vicodin [Hydrocodone-Acetaminophen] Nausea And Vomiting        Social History     Socioeconomic History    Marital status:      Spouse name: Not on file    Number of children: Not on file    Years of education: Not on file    Highest education level: Not on file   Occupational History    Not on file   Tobacco Use    Smoking status: Never Smoker    Smokeless tobacco: Never Used   Vaping Use    Vaping Use: Never used   Substance and Sexual Activity    Alcohol use: No    Drug use: No    Sexual activity: Not on file   Other Topics Concern    Not on file   Social History Narrative    Not on file     Social Determinants of Health     Financial Resource Strain:     Difficulty of Paying Living Expenses:    Food Insecurity:     Worried About Running Out of Food in the Last Year:     [address] in the Last Year:    Transportation Needs:     Lack of Transportation (Medical):  Lack of Transportation (Non-Medical):    Physical Activity:     Days of Exercise per Week:     Minutes of Exercise per Session:    Stress:     Feeling of Stress :    Social Connections:     Frequency of Communication with Friends and Family:     Frequency of Social Gatherings with Friends and Family:     Attends [employer] Services:     Active Member of Clubs or Organizations:     Attends Club or Organization Meetings:     Marital Status:    Intimate Partner Violence:     Fear of Current or Ex-Partner:     Emotionally Abused:     Physically Abused:     Sexually Abused:         Patient has a family history includes Cancer in his paternal aunt and sister; Stroke in his father and mother. Patient  has a past medical history of 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 ([facility] CAD (coronary artery disease), Cellulitis, CHF (congestive heart failure) ([facility] Chronic kidney disease, COPD (chronic obstructive pulmonary disease) ([facility] Hypertension, Hypothyroidism, Mixed hyperlipidemia, MRSA (methicillin resistant staph aureus) culture positive, Neuropathy, Obstructive sleep apnea, Parkinson's disease ([facility] and Type 2 diabetes mellitus without complication ([facility]      Current Outpatient Medications   Medication Sig Dispense Refill    apixaban (ELIQUIS) 2.5 MG TABS tablet Take 1 tablet by mouth 2 times daily 60 tablet 5    metoprolol tartrate (LOPRESSOR) 25 MG tablet TAKE ONE TABLET BY MOUTH TWICE A  tablet 3    Cholecalciferol (VITAMIN D3) 1.25 MG (85422 UT) CAPS Take by mouth      omeprazole (PRILOSEC) 20 MG delayed release capsule Take 20 mg by mouth 2 times daily      miconazole (MICOTIN) 2 % powder Apply topically 2 times daily. 45 g 1    furosemide (LASIX) 40 MG tablet Take 1 tablet by mouth 2 times daily 60 tablet 3    spironolactone (ALDACTONE) 25 MG tablet Take 0.5 tablets by mouth daily 30 tablet 3    finasteride (PROSCAR) 5 MG tablet Take 5 mg by mouth nightly      losartan (COZAAR) 50 MG tablet Take 50 mg by mouth daily      insulin regular (HUMULIN R;NOVOLIN R) 100 UNIT/ML injection Inject into the skin 3 times daily (before meals) Per sliding scale      insulin aspart protamine-insulin aspart (NOVOLOG MIX 70/30) (70-30) 100 UNIT/ML injection Inject 30 Units into the skin daily 26 units -28 units      carbidopa-levodopa (SINEMET)  MG per tablet Take 1 tablet by mouth 2 times daily      levothyroxine (SYNTHROID) 75 MCG tablet Take 75 mcg by mouth Daily       metFORMIN (GLUCOPHAGE) 850 MG tablet Take 850 mg by mouth 2 times daily (with meals)      donepezil (ARICEPT) 10 MG tablet Take 10 mg by mouth nightly       potassium chloride (KLOR-CON M) 10 MEQ extended release tablet Take 10 mEq by mouth daily       rosuvastatin (CRESTOR) 20 MG tablet Take 1 tablet by mouth daily 90 tablet 4    nitroGLYCERIN (NITROSTAT) 0.4 MG SL tablet Place 0.4 mg under the tongue every 5 minutes as needed. No current facility-administered medications for this visit. Vitals  Weight: 241 lb 9.6 oz (109.6 kg)  Blood Pressure:  118/64   Pulse: 71       Exam unchanged from 3/5/20. Objective:   Physical Exam   Nursing note and vitals reviewed. Constitutional: He is oriented to person, place, and time. He appears well-developed and well-nourished. No distress. HENT:   Head: Normocephalic and atraumatic. Nose: Nose normal.   Mouth/Throat: Oropharynx is clear and moist. No oropharyngeal exudate. Eyes: Conjunctivae are normal. Pupils are equal, round, and reactive to light. Right eye exhibits no discharge. Left eye exhibits no discharge. No scleral icterus. Neck: Normal range of motion. Neck supple. No JVD present. No tracheal deviation present. No thyromegaly present. Cardiovascular: Normal rate, regular rhythm, normal heart sounds and intact distal pulses. Exam reveals no gallop and no friction rub. No murmur heard. Pulmonary/Chest: Effort normal. No respiratory distress. He has no wheezes. He has no rales. He exhibits no tenderness. Abdominal: Soft. Bowel sounds are normal. He exhibits no distension and no mass. No tenderness. He has no rebound and no guarding. Musculoskeletal: He exhibits trace edema. He exhibits no tenderness. Neurological: He is alert and oriented to person, place, and time. No cranial nerve deficit. Coordination normal.   Skin: Skin is warm and dry. No rash noted. He is not diaphoretic. No erythema.  edema and reddish discoloration of the left leg. Psychiatric: He has a normal mood and affect. His behavior is normal. Judgment and thought content normal.       Assessment:       Diagnosis Orders   1. Coronary artery disease involving native coronary artery of native heart without angina pectoris  EKG 12 lead   2. Chronic atrial fibrillation (HCC)     3. Dyslipidemia           Plan:      Continue current medications. Stable cardiovascular status. Doing well with 2 xience SAMANTHA in 11- to LAD. Doing well with CPAP treatment for SARIKA mostly but still has had some nosebleeds     AF:  Needs AC. Start eliquis 2.5 bid though too low a dose he has had nose bleeds. Try to confer some CVA prevention. Have pt seen for watchman. HLP:  He continues on fenofibrate daily for high Triglycerides and taking crestor. Will check labs and lipids. Edema:  Stable on lasix 40 bid and restarted spironolactone 12. 5 mg daily. .  Hypertension:  Seems to have lower BPs at home   Will stop losartan. He has chronic nosebleeds so would avoid warfarin. [id number] Jazzy Bansal from [facility] to get SCD for bilateral LEs as requested by Podiatry    S/d HF chronic: stable.

## 2021-11-29 ENCOUNTER — TELEPHONE (OUTPATIENT)
Dept: CARDIOLOGY CLINIC | Age: 81
End: 2021-11-29

## 2021-11-29 NOTE — TELEPHONE ENCOUNTER
Patient's wife called.  He does not want to take Eliquis because he has had major nosebleeds in the past.

## 2021-11-29 NOTE — TELEPHONE ENCOUNTER
Spoke with wife, told her that the pt is on the lowest dose of Eliquis you could be on and that the pt would be at high risk for a stroke due to afib if he was not going to take it. Wife is going to talk with  again and get back to us.   [facility] is going to make an appointment with Dr Heidi Mendez for possible watchman procedure

## 2021-12-07 ENCOUNTER — TELEPHONE (OUTPATIENT)
Dept: CARDIOLOGY CLINIC | Age: 81
End: 2021-12-07

## 2021-12-07 DIAGNOSIS — Z01.818 PRE-OP TESTING: ICD-10-CM

## 2021-12-07 DIAGNOSIS — I48.0 PAROXYSMAL A-FIB (HCC): Primary | ICD-10-CM

## 2021-12-07 NOTE — LETTER
[address] [facility] ([facility])        Dear, Audrey Cavazos procedure has been scheduled for Monday 2/7/2022 at [facility] with Dr. Juan Luis Sylvester MD.  Please arrive directly to the Cath Lab at 8 am.     You will need to make arrangements to have a responsible adult drive you home after your procedure and someone needs to stay with you for 24 hours. Procedure labs Procedure labs will need to be completed at [facility] Wednesday 2/2/2022 the week before your procedure. Please check in with Registration in the main lobby of the hospital. The test to perform [address] [facility] NOT OUTPATIENT LAB. are as follows BMP, CBC, Urine test and Type and Screen. Per our conversation you need to get a Covid-19 test done no earlier than Wednesday. The Covid test can be done at [facility] when you get your blood work and urine test done on 2/2/22. Pre-Procedure Instructions:  · You will need to FAST for at least 8 hours prior to your procedure, nothing to eat or drink after midnight. · NO caffeine the morning of your procedure. · You need to wash your body with a soap called HIBICLENS the evening before or the morning of your scheduled procedure from the neck down. A prescription has been sent to your pharmacy. If your insurance does not cover the soap, you can pick it up over the counter, ask your pharmacy for help. · Take Eliquis on 1/16/22 morning dose then hold medication. · Take 4 baby Aspirin the day of the procedure. · Hold pain medication. · Hold Lasix and Spironolactone the day of the procedure. · Hold Diabetic medication Glucophage and insulin the day of the procedure. · Hold multi. Vitamins the day of procedure. · ALL other medications can be taken in the morning with sips of water  · Do not use any lotions, creams or perfume the morning of procedure.   · You will be going home the same day but if unable to you may stay overnight at [facility] after your procedure so please pack an overnight bag if needed. Pre-certification  Our office will be in contact with your insurance company regarding pre-certification. If for some reason, your procedure is still pending the day before your scheduled procedure, it must be moved or delayed until we have authorization to proceed. If you have Medicare, no pre-cert is required. Co-Payment  If you have a copay or a deposit due that is your responsibility to pay at the time of service, the Cardiac Cath lab will accept your payment on the day of your procedure. Please bring with you a form of payment. Any questions regarding your copay, please contact your insurance company. Please bring your photo ID, insurance cards and copayment if you have one. (Cash, checks & credit cards are accepted)        On the day of your procedure      Please report directly to  The [address] [place] [address] Str[address] [facility] [address]. You should park in the lot directly in front of the hospital, you are able to [facility] for free from (6AM-4:30PM). As you approach the hospital you will notice that there are two entrances: The first entrance is the MAIN entrance that will be on your LEFT that has the Revolving Door, the second entrance is the [facility] entrance that will be on your RIGHT. We prefer that you take the Bridge as it's closer to your destination. Once you enter the Hospital, turn to your RIGHT and walk directly to the Cath lab which will be at the very end. I will be in contact with you the week before your procedure. You may reach me at [phone] in the meantime.      Sincerely,     Preston Mejía RN,  Watchman Coordinator

## 2021-12-07 NOTE — TELEPHONE ENCOUNTER
Patient has appointment for [facility] consult on 12/9/21 at 10:45. Called patient to confirm appointment.

## 2021-12-07 NOTE — LETTER
[facility] after your procedure so please pack an overnight bag if needed. Pre-certification  Our office will be in contact with your insurance company regarding pre-certification. If for some reason, your procedure is still pending the day before your scheduled procedure, it must be moved or delayed until we have authorization to proceed. If you have Medicare, no pre-cert is required. Co-Payment  If you have a copay or a deposit due that is your responsibility to pay at the time of service, the Cardiac Cath lab will accept your payment on the day of your procedure. Please bring with you a form of payment. Any questions regarding your copay, please contact your insurance company. Please bring your photo ID, insurance cards and copayment if you have one. (Cash, checks & credit cards are accepted)        On the day of your procedure      Please report directly to  The [address] [facility] [address] [facility] [address] [facility] [address]. You should park in the lot directly in front of the hospital, you are able to [address] for free from (6AM-4:30PM). As you approach the hospital you will notice that there are two entrances: The first entrance is the MAIN entrance that will be on your LEFT that has the Revolving Door, the second entrance is the [place] entrance that will be on your RIGHT. We prefer that you take the Bridge as it's closer to your destination. Once you enter the Hospital, turn to your RIGHT and walk directly to the Cath lab which will be at the very end. I will be in contact with you the week before your procedure. You may reach me at [phone] in the meantime.      Sincerely,     Katerina Arredondo RN,  Watchman Coordinator

## 2021-12-07 NOTE — Clinical Note
[address] HEART INST [facility]  Phone: [phone]  Fax: [phone]    Liban Todd MD        January 10, 2022    [address] [address]      Dear Santo Edmondson:    ***    If you have any questions or concerns, please don't hesitate to call.     Sincerely,        Liban Todd MD

## 2021-12-08 NOTE — PROGRESS NOTES
surgery; shoulder surgery; hernia repair; and Upper gastrointestinal endoscopy (N/A, 11/25/2020). Social History:   reports that he has never smoked. He has never used smokeless tobacco. He reports that he does not drink alcohol and does not use drugs. Family History:  family history includes Cancer in his paternal aunt and sister; Stroke in his father and mother. Home Medications:  Were reviewed and are listed in nursing record and/or below  Prior to Admission medications    Medication Sig Start Date End Date Taking? Authorizing Provider   metoprolol tartrate (LOPRESSOR) 25 MG tablet TAKE ONE TABLET BY MOUTH TWICE A DAY 7/23/21  Yes Olivia Burk MD   Cholecalciferol (VITAMIN D3) 1.25 MG (49980 UT) CAPS Take by mouth   Yes Historical Provider, MD   omeprazole (PRILOSEC) 20 MG delayed release capsule Take 20 mg by mouth 2 times daily   Yes Historical Provider, MD   miconazole (MICOTIN) 2 % powder Apply topically 2 times daily.  4/16/21  Yes Meghan Avalos MD   furosemide (LASIX) 40 MG tablet Take 1 tablet by mouth 2 times daily 4/16/21  Yes Meghan Avalos MD   spironolactone (ALDACTONE) 25 MG tablet Take 0.5 tablets by mouth daily 4/17/21  Yes Meghan Avalos MD   finasteride (PROSCAR) 5 MG tablet Take 5 mg by mouth nightly   Yes Historical Provider, MD   losartan (COZAAR) 50 MG tablet Take 50 mg by mouth daily   Yes Historical Provider, MD   insulin regular (HUMULIN R;NOVOLIN R) 100 UNIT/ML injection Inject into the skin 3 times daily (before meals) Per sliding scale   Yes Historical Provider, MD   insulin aspart protamine-insulin aspart (NOVOLOG MIX 70/30) (70-30) 100 UNIT/ML injection Inject 30 Units into the skin daily 26 units -28 units   Yes Historical Provider, MD   carbidopa-levodopa (SINEMET)  MG per tablet Take 1 tablet by mouth 2 times daily   Yes Historical Provider, MD   levothyroxine (SYNTHROID) 75 MCG tablet Take 75 mcg by mouth Daily    Yes Historical Provider, MD   metFORMIN (GLUCOPHAGE) 850 MG tablet Take 850 mg by mouth 2 times daily (with meals)   Yes Historical Provider, MD   donepezil (ARICEPT) 10 MG tablet Take 10 mg by mouth nightly    Yes Historical Provider, MD   potassium chloride (KLOR-CON M) 10 MEQ extended release tablet Take 10 mEq by mouth daily    Yes Historical Provider, MD   rosuvastatin (CRESTOR) 20 MG tablet Take 1 tablet by mouth daily 10/25/16  Yes Lillie Braxton MD   nitroGLYCERIN (NITROSTAT) 0.4 MG SL tablet Place 0.4 mg under the tongue every 5 minutes as needed. Yes Historical Provider, MD   apixaban (ELIQUIS) 2.5 MG TABS tablet Take 1 tablet by mouth 2 times daily  Patient not taking: Reported on 12/9/2021 11/4/21   Lillie Braxton MD        CURRENT Medications:  No current facility-administered medications for this visit. Allergies:  Aspirin, Nsaids, Bactrim [sulfamethoxazole-trimethoprim], Celebrex [celecoxib], Cymbalta [duloxetine hcl], Pcn [penicillins], Codeine, and Vicodin [hydrocodone-acetaminophen]           Review of Systems: All reviewed and refer to HPI  · Constitutional: no unanticipated weight loss. There's been no change in energy level, sleep pattern, or activity level. No fevers, chills. · Eyes: No visual changes or diplopia. No scleral icterus. · ENT: No Headaches, hearing loss or vertigo. No mouth sores or sore throat. · Cardiovascular: No Chest pain, tightness or discomfort.  No Shortness of breath. No Dyspnea on exertion, Orthopnea, Paroxysmal nocturnal dyspnea or breathlessness at rest.   No Palpitations.  No Syncope ('blackouts', 'faints', 'collapse') or dizziness. · Respiratory: No cough or wheezing, no sputum production. No hematemesis. · Gastrointestinal: No abdominal pain, appetite loss, blood in stools. No change in bowel or bladder habits. · Genitourinary: No dysuria, trouble voiding, or hematuria. · Musculoskeletal:  No gait disturbance, no joint complaints.   · Integumentary: No rash or pruritis. · Neurological: No headache, diplopia, change in muscle strength, numbness or tingling. · Psychiatric: No anxiety or depression. · Endocrine: No temperature intolerance. No excessive thirst, fluid intake, or urination. No tremor. · Hematologic/Lymphatic: No abnormal bruising or bleeding, blood clots or swollen lymph nodes. · Allergic/Immunologic: No nasal congestion or hives. Objective: all reveiwed      PHYSICAL EXAM:      Vitals:    12/09/21 1108   BP:    Pulse: 59   SpO2: 97%    Weight: 243 lb 9.6 oz (110.5 kg)       General Appearance:  Alert, cooperative, no distress, appears stated age. Head:  Normocephalic, without obvious abnormality, atraumatic. Eyes:  Pupils equal and round. No scleral icterus. Mouth: Moist mucosa, no pharyngeal erythema. Nose: Nares normal. No drainage or sinus tenderness. Neck: Supple, symmetrical, trachea midline. No adenopathy. No tenderness/mass/nodules. No carotid bruit or elevated JVD. Lungs:   Respiratory Effort: Normal   Auscultation: Clear to auscultation bilaterally, respirations unlabored. No wheeze, rales   Chest Wall:  No tenderness or deformity. Cardiovascular:    Pulses  Palpation: normal   Ascultation: Regular rate, S1/ S2 normal. No murmur, rub, or gallop. 2+ radial and pedal pulses, symmetric  Carotid  Femoral   Abdomen and Gastrointestinal:   Soft, non-tender, bowel sounds active. Liver and Spleen  Masses   Musculoskeletal: No muscle wasting  Back  Gait   Extremities: Extremities normal, atraumatic. No cyanosis or edema. No cyanosis clubbing       Skin: Inspection and palpation performed, no rashes or lesions. Pysch: Normal mood and affect.  Alert and oriented to time place person   Neurologic: Normal gross motor and sensory exam.       Labs: all labs have been reviewed      Lab Results   Component Value Date    WBC 8.0 04/13/2021    RBC 4.49 04/13/2021    HGB 10.9 04/13/2021    HCT 34.2 04/13/2021    MCV 76.2 04/13/2021    RDW 18.9 04/13/2021     04/13/2021     Lab Results   Component Value Date     04/17/2021    K 4.3 04/17/2021    K 4.2 04/12/2021    CL 96 04/17/2021    CO2 28 04/17/2021    BUN 45 04/17/2021    CREATININE 1.1 04/17/2021    GFRAA >60 04/17/2021    GFRAA >60 06/12/2013    AGRATIO 0.9 04/12/2021    LABGLOM >60 04/17/2021    GLUCOSE 193 04/17/2021    PROT 7.7 04/12/2021    PROT 7.7 03/29/2012    CALCIUM 9.5 04/17/2021    BILITOT 0.6 04/12/2021    ALKPHOS 71 04/12/2021    AST 8 04/12/2021    ALT 7 04/12/2021     No results found for: PTINR  Lab Results   Component Value Date    LABA1C 8.7 08/05/2017     Lab Results   Component Value Date    CKTOTAL 66 08/05/2017    CKMB 0.6 01/27/2012    TROPONINI <0.01 04/12/2021       Cardiac, Vascular and Imaging Data: All Personally Reviewed in Detail by Myself      EKG:     Echocardiogram:   ECHO 8/11/17  Left ventricle size is normal. There is mild concentric left ventricular  hypertrophy. Left ventricular function is reduced with ejection fraction  estimated at 40-45%. No gross regional wall motion abnormalities but  endocardial definition was limited. Grade II diastolic dysfunction. Mitral  annular calcification is present. Mild mitral regurgitation is present. The  left atrium is dilated. Aortic valve appears sclerotic but opens adequately. No significant changes from the 7/26/17 echo study. Stress Test:     Cath: Other imaging:     Assessment and Plan     Atrial Fibrillation,     Primary Cardiologist:   Referring Physician: Cristina Acosta MD  Referring Reason: Epistaxis     CHADSvasc is at least 6 (Age,CHF,HTN,CAD,DM)  HASbled is at least 2    Epistaxis. Currently not on anticoagulation. Specifically regarding risk of anticoagulation they have demonstrated:  ? History of bleeding (eg. Intracerebral, subdural, GI, retro-peritoneal)  ? Intolerance oral anticoagulation  ? Increased bleeding risk (e.g. Thrombocytopenia, anemia)  ?  High risk of recurrent note was scribed in the presence of Dr Jacob Cheng, by Taurus Hilton RN  Physician Attestation:  The scribes documentation has been prepared under my direction and personally reviewed by me in its entirety. I confirm that the note above accurately reflects all work, treatment, procedures, and medical decision making performed by me.

## 2021-12-09 ENCOUNTER — OFFICE VISIT (OUTPATIENT)
Dept: CARDIOLOGY CLINIC | Age: 81
End: 2021-12-09
Payer: MEDICARE

## 2021-12-09 VITALS
BODY MASS INDEX: 33 KG/M2 | HEIGHT: 72 IN | HEART RATE: 59 BPM | SYSTOLIC BLOOD PRESSURE: 130 MMHG | WEIGHT: 243.6 LBS | OXYGEN SATURATION: 97 % | DIASTOLIC BLOOD PRESSURE: 74 MMHG

## 2021-12-09 DIAGNOSIS — I49.9 IRREGULAR HEART RHYTHM: Primary | ICD-10-CM

## 2021-12-09 PROCEDURE — 93000 ELECTROCARDIOGRAM COMPLETE: CPT | Performed by: INTERNAL MEDICINE

## 2021-12-09 PROCEDURE — 99214 OFFICE O/P EST MOD 30 MIN: CPT | Performed by: INTERNAL MEDICINE

## 2021-12-09 NOTE — PATIENT INSTRUCTIONS
Transesophageal Echocardiogram (JOSE)     Date of Procedure: 12/16/21    Time of Arrival: 9:00    Cardiologist performing procedure: Dr. Abdirahman Ellis    · [employer] at [facility] through the main entrance. Check in at the Outpatient Diagnostic desk on the 1st floor. · Do not eat or drink anything after midnight the night before the procedure. · You may brush your teeth and rinse the morning of the procedure. · Take ALL of your routine medications the morning of the procedure. However, if you are taking diabetic medications, please HOLD on the day of the procedure (including insulin). If you take Lantus insulin, take HALF of your usual dose the night before. · Do NOT stop taking aspirin, Plavix, coumadin, Eliquis, Xarelto, or Pradaxa (any blood thinners)    · Please bring a list of your medications to the hospital with you. · Do not apply any lotion, powder, or deodorant the morning of the procedure. · You must have someone available to drive you home. It is recommended that you do not drive for 24 hours after the procedure. · If you are unable to make this appointment, please call [facility] at [phone].

## 2021-12-10 NOTE — TELEPHONE ENCOUNTER
Spoke with patient on 12/9/21 regarding Watchman procedure. Patient was shown video on Watchman and given Educational material.  Patient states interest and would like to proceed. Passed information onto [facility] to schedule JOSE. Will schedule Shared decision same day prior to JOSE.

## 2021-12-14 ENCOUNTER — PREP FOR PROCEDURE (OUTPATIENT)
Dept: CARDIOLOGY CLINIC | Age: 81
End: 2021-12-14

## 2021-12-17 ENCOUNTER — ANESTHESIA EVENT (OUTPATIENT)
Dept: CARDIAC CATH/INVASIVE PROCEDURES | Age: 81
End: 2021-12-17
Payer: MEDICARE

## 2021-12-17 ENCOUNTER — ANESTHESIA (OUTPATIENT)
Dept: CARDIAC CATH/INVASIVE PROCEDURES | Age: 81
End: 2021-12-17
Payer: MEDICARE

## 2021-12-17 ENCOUNTER — HOSPITAL ENCOUNTER (OUTPATIENT)
Dept: CARDIAC CATH/INVASIVE PROCEDURES | Age: 81
Setting detail: OUTPATIENT SURGERY
Discharge: HOME | End: 2021-12-17
Attending: INTERNAL MEDICINE | Admitting: INTERNAL MEDICINE
Payer: MEDICARE

## 2021-12-17 VITALS — OXYGEN SATURATION: 100 % | DIASTOLIC BLOOD PRESSURE: 63 MMHG | SYSTOLIC BLOOD PRESSURE: 126 MMHG

## 2021-12-17 LAB
EKG ATRIAL RATE: 220 BPM
EKG DIAGNOSIS: NORMAL
EKG Q-T INTERVAL: 424 MS
EKG QRS DURATION: 82 MS
EKG QTC CALCULATION (BAZETT): 394 MS
EKG R AXIS: 3 DEGREES
EKG T AXIS: 93 DEGREES
EKG VENTRICULAR RATE: 52 BPM
GLUCOSE BLD-MCNC: 115 MG/DL (ref 70–99)
PERFORMED ON: ABNORMAL

## 2021-12-17 PROCEDURE — 93320 DOPPLER ECHO COMPLETE: CPT

## 2021-12-17 PROCEDURE — 2500000003 HC RX 250 WO HCPCS: Performed by: ANESTHESIOLOGY

## 2021-12-17 PROCEDURE — 93325 DOPPLER ECHO COLOR FLOW MAPG: CPT

## 2021-12-17 PROCEDURE — 85610 PROTHROMBIN TIME: CPT

## 2021-12-17 PROCEDURE — 93312 ECHO TRANSESOPHAGEAL: CPT | Performed by: INTERNAL MEDICINE

## 2021-12-17 PROCEDURE — 6360000002 HC RX W HCPCS: Performed by: ANESTHESIOLOGY

## 2021-12-17 PROCEDURE — 93325 DOPPLER ECHO COLOR FLOW MAPG: CPT | Performed by: INTERNAL MEDICINE

## 2021-12-17 PROCEDURE — 2580000003 HC RX 258: Performed by: ANESTHESIOLOGY

## 2021-12-17 PROCEDURE — 93005 ELECTROCARDIOGRAM TRACING: CPT | Performed by: INTERNAL MEDICINE

## 2021-12-17 PROCEDURE — 93312 ECHO TRANSESOPHAGEAL: CPT

## 2021-12-17 PROCEDURE — 3700000000 HC ANESTHESIA ATTENDED CARE

## 2021-12-17 PROCEDURE — 93010 ELECTROCARDIOGRAM REPORT: CPT | Performed by: INTERNAL MEDICINE

## 2021-12-17 RX ORDER — SODIUM CHLORIDE 9 MG/ML
INJECTION, SOLUTION INTRAVENOUS CONTINUOUS PRN
Status: DISCONTINUED | OUTPATIENT
Start: 2021-12-17 | End: 2021-12-17 | Stop reason: SDUPTHER

## 2021-12-17 RX ORDER — PROPOFOL 10 MG/ML
INJECTION, EMULSION INTRAVENOUS PRN
Status: DISCONTINUED | OUTPATIENT
Start: 2021-12-17 | End: 2021-12-17 | Stop reason: SDUPTHER

## 2021-12-17 RX ORDER — LIDOCAINE HYDROCHLORIDE 20 MG/ML
INJECTION, SOLUTION EPIDURAL; INFILTRATION; INTRACAUDAL; PERINEURAL PRN
Status: DISCONTINUED | OUTPATIENT
Start: 2021-12-17 | End: 2021-12-17 | Stop reason: SDUPTHER

## 2021-12-17 RX ADMIN — SODIUM CHLORIDE: 9 INJECTION, SOLUTION INTRAVENOUS at 10:42

## 2021-12-17 RX ADMIN — PROPOFOL 80 MG: 10 INJECTION, EMULSION INTRAVENOUS at 10:45

## 2021-12-17 RX ADMIN — PROPOFOL 20 MG: 10 INJECTION, EMULSION INTRAVENOUS at 10:52

## 2021-12-17 RX ADMIN — PROPOFOL 20 MG: 10 INJECTION, EMULSION INTRAVENOUS at 10:49

## 2021-12-17 RX ADMIN — LIDOCAINE HYDROCHLORIDE 100 MG: 20 INJECTION, SOLUTION EPIDURAL; INFILTRATION; INTRACAUDAL; PERINEURAL at 10:43

## 2021-12-17 ASSESSMENT — COPD QUESTIONNAIRES: CAT_SEVERITY: MILD

## 2021-12-17 ASSESSMENT — ENCOUNTER SYMPTOMS: SHORTNESS OF BREATH: 1

## 2021-12-17 NOTE — PROGRESS NOTES
PRE JOSE SEDATION     Pre procedural Sedation Assessment Note - JOSE    Pre - procedure Diagnosis:  Pre watchman    Planned procedure:  JOSE    H&P reviewed   Marysol El is a [de-identified] y.o. male with a past medical hx of AF who presents for pre watchman  . Will proceed with JOSE. Nursing history and assessment - reviewed    Allergies: Allergies   Allergen Reactions    Aspirin      GI  Bleed possible related to aspirin  (but thought to be H Pylori)    Nsaids     Bactrim [Sulfamethoxazole-Trimethoprim] Other (See Comments)     arf    Celebrex [Celecoxib]      tremors    Cymbalta [Duloxetine Hcl]      Mental status change    Pcn [Penicillins]      rash    Codeine Nausea And Vomiting    Vicodin [Hydrocodone-Acetaminophen] Nausea And Vomiting       PMH:  Past Medical History:   Diagnosis Date    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 ([facility] [address]     Dr. Jam Rodriguez CAD (coronary artery disease)     Cellulitis 2014    CHF (congestive heart failure) ([facility])     systolic    Chronic kidney disease     upon hospitalization due to bactrim allergy    COPD (chronic obstructive pulmonary disease) ([facility] [address]     Hypertension     Hypothyroidism     Mixed hyperlipidemia     MRSA (methicillin resistant staph aureus) culture positive 07/24/2017    foot    Neuropathy     Obstructive sleep apnea 10/03/2014    does not use cpap machine    Parkinson's disease ([facility] [address] 1965    Type 2 diabetes mellitus without complication ([facility])        Outpatient Meds:  Medications Prior to Admission: apixaban (ELIQUIS) 2.5 MG TABS tablet, Take 1 tablet by mouth 2 times daily (Patient not taking: Reported on 12/9/2021)  metoprolol tartrate (LOPRESSOR) 25 MG tablet, TAKE ONE TABLET BY MOUTH TWICE A DAY  Cholecalciferol (VITAMIN D3) 1.25 MG (57953 UT) CAPS, Take by mouth  omeprazole (PRILOSEC) 20 MG delayed release capsule, Take 20 mg by mouth 2 times daily  miconazole (MICOTIN) 2 % powder, Apply topically 2 times daily.   furosemide (LASIX) 40 MG tablet, Take 1 tablet by mouth 2 times daily  spironolactone (ALDACTONE) 25 MG tablet, Take 0.5 tablets by mouth daily  finasteride (PROSCAR) 5 MG tablet, Take 5 mg by mouth nightly  losartan (COZAAR) 50 MG tablet, Take 50 mg by mouth daily  insulin regular (HUMULIN R;NOVOLIN R) 100 UNIT/ML injection, Inject into the skin 3 times daily (before meals) Per sliding scale  insulin aspart protamine-insulin aspart (NOVOLOG MIX 70/30) (70-30) 100 UNIT/ML injection, Inject 30 Units into the skin daily 26 units -28 units  carbidopa-levodopa (SINEMET)  MG per tablet, Take 1 tablet by mouth 2 times daily  levothyroxine (SYNTHROID) 75 MCG tablet, Take 75 mcg by mouth Daily   metFORMIN (GLUCOPHAGE) 850 MG tablet, Take 850 mg by mouth 2 times daily (with meals)  donepezil (ARICEPT) 10 MG tablet, Take 10 mg by mouth nightly   potassium chloride (KLOR-CON M) 10 MEQ extended release tablet, Take 10 mEq by mouth daily   rosuvastatin (CRESTOR) 20 MG tablet, Take 1 tablet by mouth daily  nitroGLYCERIN (NITROSTAT) 0.4 MG SL tablet, Place 0.4 mg under the tongue every 5 minutes as needed. Inpatient Medications:      IV drips:      PRN:      Physical Examination   There were no vitals filed for this visit. General appearance - AAOX3  Chest - clear to auscultation  Heart - Regular heart sounds  Neck - No JVD    Airway assessment - Mallampati class - 3*    ASA classification - 3*    Labs:  No results for input(s): NA, K, BUN, CREATININE, CL, CO2, GLUCOSE, CALCIUM, MG in the last 72 hours. No results for input(s): WBC, HGB, HCT, PLT, MCV in the last 72 hours. No results for input(s): CHOLTOT, TRIG, HDL in the last 72 hours. Invalid input(s): LIPIDCOMM, CHOLHDL, VLDCHOL, LDL  No results for input(s): PTT, INR in the last 72 hours. Invalid input(s): PT  No results for input(s): CKTOTAL, CKMB, CKMBINDEX, TROPONINI in the last 72 hours. No results for input(s): BNP in the last 72 hours.   No results for input(s): NTPROBNP in the last 72 hours. No results for input(s): TSH in the last 72 hours. Planned anesthetic agent - provided by anesthesia     Sedation plan, risks, benefits. Potential complications and any alternative options associated with procedure and possible use of blood discussed with patient. Informed consent obtained. Plan:  Proceed with procedure    Tanner Jones MD, [facility]    12/17/2021  10:42 AM

## 2021-12-17 NOTE — ANESTHESIA PRE PROCEDURE
Department of Anesthesiology  Preprocedure Note       Name:  Belinda Barragan   Age:  [de-identified] y.o.  :  1940                                          MRN:  [id number]         Date:  2021      Surgeon: * No surgeons listed *    Procedure: * No procedures listed *    Medications prior to admission:   Prior to Admission medications    Medication Sig Start Date End Date Taking? Authorizing Provider   apixaban (ELIQUIS) 2.5 MG TABS tablet Take 1 tablet by mouth 2 times daily  Patient not taking: Reported on 2021   Diana Soto MD   metoprolol tartrate (LOPRESSOR) 25 MG tablet TAKE ONE TABLET BY MOUTH TWICE A DAY 21   Diana Soto MD   Cholecalciferol (VITAMIN D3) 1.25 MG (05616 UT) CAPS Take by mouth    Historical Provider, MD   omeprazole (PRILOSEC) 20 MG delayed release capsule Take 20 mg by mouth 2 times daily    Historical Provider, MD   miconazole (MICOTIN) 2 % powder Apply topically 2 times daily.  21   Saadia Nix MD   furosemide (LASIX) 40 MG tablet Take 1 tablet by mouth 2 times daily 21   Saadia Nix MD   spironolactone (ALDACTONE) 25 MG tablet Take 0.5 tablets by mouth daily 21   Saadia Nix MD   finasteride (PROSCAR) 5 MG tablet Take 5 mg by mouth nightly    Historical Provider, MD   losartan (COZAAR) 50 MG tablet Take 50 mg by mouth daily    Historical Provider, MD   insulin regular (HUMULIN R;NOVOLIN R) 100 UNIT/ML injection Inject into the skin 3 times daily (before meals) Per sliding scale    Historical Provider, MD   insulin aspart protamine-insulin aspart (NOVOLOG MIX 70/30) (70-30) 100 UNIT/ML injection Inject 30 Units into the skin daily 26 units -28 units    Historical Provider, MD   carbidopa-levodopa (SINEMET)  MG per tablet Take 1 tablet by mouth 2 times daily    Historical Provider, MD   levothyroxine (SYNTHROID) 75 MCG tablet Take 75 mcg by mouth Daily     Historical Provider, MD   metFORMIN (GLUCOPHAGE) 850 MG tablet Take 850 mg by R04.0    Community acquired pneumonia of left lower lobe of lung J18.9    Posterior epistaxis R04.0    Essential hypertension I10    Mixed hyperlipidemia E78.2    Acute on chronic combined systolic and diastolic congestive heart failure (HCC) I50.43    Varicose veins of right lower extremity with inflammation, with ulcer of calf with fat layer exposed ([address] I83.212, U48.012       Past Medical History:        Diagnosis Date    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 ([address]     Dr. Mekhi Nieves CAD (coronary artery disease)     Cellulitis 2014    CH[facility]congestive heart failure) ([facility])     systolic    Chronic kidney disease     upon hospitalization due to bactrim allergy    COPD (chronic obstructive pulmonary disease) ([address]     Hypertension     Hypothyroidism     Mixed hyperlipidemia     MRSA (methicillin resistant staph aureus) culture positive 07/24/2017    foot    Neuropathy     Obstructive sleep apnea 10/03/2014    does not use cpap machine    Parkinson's disease ([address] 1965    Type 2 diabetes mellitus without complication ([address]        Past Surgical History:        Procedure Laterality Date    CARDIAC CATHETERIZATION      CERVICAL FUSION  1981    COLON SURGERY      1980's    COLONOSCOPY      FOOT SURGERY Right 07/24/2017    INCISION AND DRAINAGE RIGHT FOOT WITH REMOVAL NECROTIC BONE    FOOT SURGERY Right 07/27/2017    : TRANSMETATARSAL AMPUTATION RIGHT FOOT     HERNIA REPAIR      SHOULDER SURGERY      right    UPPER GASTROINTESTINAL ENDOSCOPY N/A 11/25/2020    ESOPHAGOGASTRODUODENOSCOPY WITH BOTOX INJECTION performed by Ayla Nash DO at [facility] History:    Social History     Tobacco Use    Smoking status: Never Smoker    Smokeless tobacco: Never Used   Substance Use Topics    Alcohol use: No                                Counseling given: Not Answered      Vital Signs (Current): There were no vitals filed for this visit. BP Readings from Last 3 Encounters:   12/09/21 130/74   11/04/21 128/74   05/12/21 130/62       NPO Status:                                                                                 BMI:   Wt Readings from Last 3 Encounters:   12/09/21 243 lb 9.6 oz (110.5 kg)   11/04/21 241 lb 9.6 oz (109.6 kg)   05/12/21 232 lb 3.2 oz (105.3 kg)     There is no height or weight on file to calculate BMI.    CBC:   Lab Results   Component Value Date    WBC 8.0 04/13/2021    RBC 4.49 04/13/2021    HGB 10.9 04/13/2021    HCT 34.2 04/13/2021    MCV 76.2 04/13/2021    RDW 18.9 04/13/2021     04/13/2021       CMP:   Lab Results   Component Value Date     04/17/2021    K 4.3 04/17/2021    K 4.2 04/12/2021    CL 96 04/17/2021    CO2 28 04/17/2021    BUN 45 04/17/2021    CREATININE 1.1 04/17/2021    GFRAA >60 04/17/2021    GFRAA >60 06/12/2013    AGRATIO 0.9 04/12/2021    LABGLOM >60 04/17/2021    GLUCOSE 193 04/17/2021    PROT 7.7 04/12/2021    PROT 7.7 03/29/2012    CALCIUM 9.5 04/17/2021    BILITOT 0.6 04/12/2021    ALKPHOS 71 04/12/2021    AST 8 04/12/2021    ALT 7 04/12/2021       POC Tests: No results for input(s): POCGLU, POCNA, POCK, POCCL, POCBUN, POCHEMO, POCHCT in the last 72 hours.     Coags:   Lab Results   Component Value Date    PROTIME 12.1 09/04/2020    INR 1.04 09/04/2020    APTT 27.8 07/24/2017       HCG (If Applicable): No results found for: PREGTESTUR, PREGSERUM, HCG, HCGQUANT     ABGs:   Lab Results   Component Value Date    PHART 7.44 08/08/2017    PO2ART 77.0 08/08/2017    QQR9KBU 36 08/08/2017    EEY1RFA 24 08/08/2017    BEART 0.1 08/08/2017    S6YDSPMJ 95 08/08/2017        Type & Screen (If Applicable):  No results found for: LABABO, LABRH    Drug/Infectious Status (If Applicable):  No results found for: HIV, HEPCAB    COVID-19 Screening (If Applicable):   Lab Results   Component Value Date    COVID19 Not Detected 04/12/2021           Anesthesia Evaluation  Patient summary reviewed and Nursing

## 2021-12-17 NOTE — ANESTHESIA POSTPROCEDURE EVALUATION
Department of Anesthesiology  Postprocedure Note    Patient: Micki Lakhani  MRN: [id number]  YOB: 1940  Date of evaluation: 12/17/2021  Time:  12:10 PM     Procedure Summary     Date: 12/17/21 Room / Location: [facility] Cath Lab    Anesthesia Start: 6746 Anesthesia Stop: 1831    Procedure: [facility] JOSE W ECHO AND ANES Diagnosis:     Scheduled Providers:  Responsible Provider:     Anesthesia Type: MAC ASA Status: 3          Anesthesia Type: MAC    [place] Phase I:      [place]ton Phase II:      Last vitals: Reviewed and per EMR flowsheets.        Anesthesia Post Evaluation    Patient location during evaluation: bedside  Patient participation: complete - patient participated  Level of consciousness: sleepy but conscious  Airway patency: patent  Nausea & Vomiting: no nausea and no vomiting  Complications: no  Cardiovascular status: hemodynamically stable  Respiratory status: acceptable  Hydration status: stable

## 2021-12-20 LAB — INR BLD: 1.2 (ref 0.86–1.14)

## 2021-12-22 NOTE — TELEPHONE ENCOUNTER
Spoke with Deanna Mccray from the [place] office and she will call patient's wife Adam Garcia and set up a office visit for January. I called patient back and informed her that we can look at this appointment and see if it can be switch to a virtual visit.

## 2021-12-22 NOTE — TELEPHONE ENCOUNTER
Spoke with patients wife yesterday and today regarding he appointment with Dr. Grace Flores and the JOSE /Shared decision appointment. Patient states they were not able to talk to Dr. Grace Flores because he had an emergency to attend to. I inform patient we will set up an appointment at the office if needed to get the shared decision appointment done.

## 2021-12-27 ENCOUNTER — OFFICE VISIT (OUTPATIENT)
Dept: CARDIOLOGY CLINIC | Age: 81
End: 2021-12-27
Payer: MEDICARE

## 2021-12-27 VITALS
SYSTOLIC BLOOD PRESSURE: 122 MMHG | WEIGHT: 242.8 LBS | HEART RATE: 87 BPM | DIASTOLIC BLOOD PRESSURE: 68 MMHG | BODY MASS INDEX: 32.93 KG/M2

## 2021-12-27 DIAGNOSIS — R04.0 BLEEDING FROM THE NOSE: ICD-10-CM

## 2021-12-27 DIAGNOSIS — I10 HTN (HYPERTENSION), BENIGN: ICD-10-CM

## 2021-12-27 DIAGNOSIS — I25.10 CORONARY ARTERY DISEASE INVOLVING NATIVE CORONARY ARTERY OF NATIVE HEART WITHOUT ANGINA PECTORIS: Primary | ICD-10-CM

## 2021-12-27 DIAGNOSIS — I48.21 PERMANENT ATRIAL FIBRILLATION (HCC): ICD-10-CM

## 2021-12-27 DIAGNOSIS — E78.5 HYPERLIPIDEMIA, UNSPECIFIED HYPERLIPIDEMIA TYPE: ICD-10-CM

## 2021-12-27 PROCEDURE — 99214 OFFICE O/P EST MOD 30 MIN: CPT | Performed by: INTERNAL MEDICINE

## 2021-12-27 NOTE — PROGRESS NOTES
Subjective:      Patient ID: Katlyn Remy is a [de-identified] y.o. male. CC:  Fleeting chest pain. Sob HTN,     HPI:  He is still at home and stable. No excessive SOB. Exercises with home therapy and walks with a walker. Has no CP. ECG today WNL . No orthopnea. Doing well overall. Has had some lower bp s lately. Been throwing up with meals but has no chest pain. [id number] Score for Atrial Fibrillation Stroke Risk   Risk   Factors  Component Value   C CHF Yes 1   H HTN Yes 1   A2 Age >= 76 Yes,  [de-identified] y.o.) 2   D DM Yes 1   S2 Prior Stroke/TIA No 0   V Vascular Disease No 0   A Age 74-69 No,  [de-identified] y.o.) 0   Sc Sex male 0    [id number]  Score  5   Score last updated 11/4/21 4:51 PM EDT    Click here for a link to the UpToDate guideline \"Atrial Fibrillation: Anticoagulation therapy to prevent embolization    Disclaimer: Risk Score calculation is dependent on accuracy of patient problem list and past encounter diagnosis.     Allergies   Allergen Reactions    Aspirin      GI  Bleed possible related to aspirin  (but thought to be H Pylori)    Nsaids     Bactrim [Sulfamethoxazole-Trimethoprim] Other (See Comments)     arf    Celebrex [Celecoxib]      tremors    Cymbalta [Duloxetine Hcl]      Mental status change    Pcn [Penicillins]      rash    Codeine Nausea And Vomiting    Vicodin [Hydrocodone-Acetaminophen] Nausea And Vomiting        Social History     Socioeconomic History    Marital status:      Spouse name: Not on file    Number of children: Not on file    Years of education: Not on file    Highest education level: Not on file   Occupational History    Not on file   Tobacco Use    Smoking status: Never Smoker    Smokeless tobacco: Never Used   Vaping Use    Vaping Use: Never used   Substance and Sexual Activity    Alcohol use: No    Drug use: No    Sexual activity: Not on file   Other Topics Concern    Not on file   Social History Narrative    Not on file     Social Determinants of Health     Financial Resource Strain:     Difficulty of Paying Living Expenses: Not on file   Food Insecurity:     Worried About Running Out of Food in the Last Year: Not on file    Teresa of Food in the Last Year: Not on file   Transportation Needs:     Lack of Transportation (Medical): Not on file    Lack of Transportation (Non-Medical): Not on file   Physical Activity:     Days of Exercise per Week: Not on file    Minutes of Exercise per Session: Not on file   Stress:     Feeling of Stress : Not on file   Social Connections:     Frequency of Communication with Friends and Family: Not on file    Frequency of Social Gatherings with Friends and Family: Not on file    Attends Islam Services: Not on file    Active Member of [address] [place] [address] [employer] or Organizations: Not on file    Attends Club or Organization Meetings: Not on file    Marital Status: Not on file   Intimate Partner Violence:     Fear of Current or Ex-Partner: Not on file    Emotionally Abused: Not on file    Physically Abused: Not on file    Sexually Abused: Not on file   Housing Stability:     Unable to Pay for Housing in the Last Year: Not on file    Number of [place] in the Last Year: Not on file    Unstable Housing in the Last Year: Not on file        Patient has a family history includes Cancer in his paternal aunt and sister; Stroke in his father and mother. Patient  has a past medical history of 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 ([facility] CAD (coronary artery disease), Cellulitis, CHF (congestive heart failure) ([facility] Chronic kidney disease, COPD (chronic obstructive pulmonary disease) ([facility] Hypertension, Hypothyroidism, Mixed hyperlipidemia, MRSA (methicillin resistant staph aureus) culture positive, Neuropathy, Obstructive sleep apnea, Parkinson's disease ([facility] and Type 2 diabetes mellitus without complication ([facility]      Current Outpatient Medications   Medication Sig Dispense Refill    apixaban (ELIQUIS) 2.5 MG TABS tablet Take 1 tablet by mouth 2 times daily 60 tablet 5    metoprolol tartrate (LOPRESSOR) 25 MG tablet TAKE ONE TABLET BY MOUTH TWICE A  tablet 3    Cholecalciferol (VITAMIN D3) 1.25 MG (23487 UT) CAPS Take by mouth      omeprazole (PRILOSEC) 20 MG delayed release capsule Take 20 mg by mouth 2 times daily      miconazole (MICOTIN) 2 % powder Apply topically 2 times daily. 45 g 1    furosemide (LASIX) 40 MG tablet Take 1 tablet by mouth 2 times daily 60 tablet 3    spironolactone (ALDACTONE) 25 MG tablet Take 0.5 tablets by mouth daily 30 tablet 3    finasteride (PROSCAR) 5 MG tablet Take 5 mg by mouth nightly      losartan (COZAAR) 50 MG tablet Take 50 mg by mouth daily      insulin regular (HUMULIN R;NOVOLIN R) 100 UNIT/ML injection Inject into the skin 3 times daily (before meals) Per sliding scale      insulin aspart protamine-insulin aspart (NOVOLOG MIX 70/30) (70-30) 100 UNIT/ML injection Inject 30 Units into the skin daily 26 units -28 units      carbidopa-levodopa (SINEMET)  MG per tablet Take 1 tablet by mouth 2 times daily      levothyroxine (SYNTHROID) 75 MCG tablet Take 75 mcg by mouth Daily       metFORMIN (GLUCOPHAGE) 850 MG tablet Take 850 mg by mouth 2 times daily (with meals)      donepezil (ARICEPT) 10 MG tablet Take 10 mg by mouth nightly       potassium chloride (KLOR-CON M) 10 MEQ extended release tablet Take 10 mEq by mouth daily       rosuvastatin (CRESTOR) 20 MG tablet Take 1 tablet by mouth daily 90 tablet 4    nitroGLYCERIN (NITROSTAT) 0.4 MG SL tablet Place 0.4 mg under the tongue every 5 minutes as needed. No current facility-administered medications for this visit. Vitals  Weight: 242 lb 12.8 oz (110.1 kg)  Blood Pressure:  118/64   Pulse: 87       Exam unchanged from 3/5/20. Objective:   Physical Exam   Nursing note and vitals reviewed. Constitutional: He is oriented to person, place, and time.  He appears well-developed and well-nourished. No distress. HENT:   Head: Normocephalic and atraumatic. Nose: Nose normal.   Mouth/Throat: Oropharynx is clear and moist. No oropharyngeal exudate. Eyes: Conjunctivae are normal. Pupils are equal, round, and reactive to light. Right eye exhibits no discharge. Left eye exhibits no discharge. No scleral icterus. Neck: Normal range of motion. Neck supple. No JVD present. No tracheal deviation present. No thyromegaly present. Cardiovascular: Normal rate, regular rhythm, normal heart sounds and intact distal pulses. Exam reveals no gallop and no friction rub. No murmur heard. Pulmonary/Chest: Effort normal. No respiratory distress. He has no wheezes. He has no rales. He exhibits no tenderness. Abdominal: Soft. Bowel sounds are normal. He exhibits no distension and no mass. No tenderness. He has no rebound and no guarding. Musculoskeletal: He exhibits trace edema. He exhibits no tenderness. Neurological: He is alert and oriented to person, place, and time. No cranial nerve deficit. Coordination normal.   Skin: Skin is warm and dry. No rash noted. He is not diaphoretic. No erythema.  edema and reddish discoloration of the left leg. Psychiatric: He has a normal mood and affect. His behavior is normal. Judgment and thought content normal.       Assessment:       Diagnosis Orders   1. Coronary artery disease involving native coronary artery of native heart without angina pectoris     2. HTN (hypertension), benign     3. Hyperlipidemia, unspecified hyperlipidemia type     4. Permanent atrial fibrillation ([address]     5. Bleeding from the nose           Plan:      Continue current medications. Stable cardiovascular status. Doing well with 2 xience SAMANTHA in 11- to LAD. Doing well with CPAP treatment for SARIKA mostly but still has had some nosebleeds. Based on my knowledge of Luise Mohs, I believe that he is an appropriate candidate for placement of the watchman device. AF:  Needs AC. Start eliquis 2.5 bid though too low a dose he has had nose bleeds. Try to confer some CVA prevention. Have pt seen for watchman. HLP:  He continues on fenofibrate daily for high Triglycerides and taking crestor. Will check labs and lipids. Edema:  Stable on lasix 40 bid and restarted spironolactone 12. 5 mg daily. .  Hypertension:  Seems to have lower BPs at home   Will stop losartan. He has chronic nosebleeds so would avoid warfarin and therefore Based on my knowledge of Swathi Morales, I believe that he is an appropriate candidate for placement of the watchman device. S/d HF chronic: stable.

## 2022-01-04 NOTE — TELEPHONE ENCOUNTER
Peterson's wife Ana María Mail called in and states that after their appt with Dr. Steffi Rick today they are ready to schedule the watchman procedure.   She can be reached back at [phone]

## 2022-01-05 RX ORDER — CHLORHEXIDINE GLUCONATE 213 G/1000ML
SOLUTION TOPICAL
Qty: 1 EACH | Refills: 0 | Status: SHIPPED | OUTPATIENT
Start: 2022-01-12 | End: 2022-01-19

## 2022-01-05 NOTE — TELEPHONE ENCOUNTER
Dr. Ange Simmons 11/4/2021 referred for Watchman procedure.   Dr. Gregor Chen consulted on 12/9/2021  Dr. Shelly Coleman shared decision on 12/17/2021  JOSE 12/17/2021  Insurance approved per [facility]

## 2022-01-05 NOTE — TELEPHONE ENCOUNTER
Spoke with patients wife today and informed her of the procedure that is being scheduled on 1/17/2022 at 8 AM arrival.  Lab work has been order instructed to have done at [facility] on 1/12/2021. Also informed to get Hibiclens bath at their Pharmacy. All procedure instructions were e-mailed to patient. Instructed to contact me with any questions prior to procedure.

## 2022-03-08 NOTE — TELEPHONE ENCOUNTER
Patients wife sent an e-mail to me regarding cancelling his Watchman implant date. Reads        This is to let you know that Judith Perez is extremely uncomfortable about having the Watchman implant and wants to cancel the procedure for the present time. He has an appointment in May with Dr. Cecilia Rodríguez and will discuss more with him during his appointment. Judith Perez realizes there may be benefits to having this done. But until he decides himself it's the right thing for him to do, he feels it would be counterproductive at this time. He will be in contact with you after his appointmant in May. Sorry for any inconvenience this may have caused in your schedule. John Castillo for [employer]      At this point will wait for his appointment in May with Dr. Cecilia Rodríguez and see if he wants to proceed at that time.

## 2022-03-29 DIAGNOSIS — I48.0 PAROXYSMAL A-FIB (HCC): ICD-10-CM

## 2022-03-29 DIAGNOSIS — Z01.818 PRE-OP TESTING: ICD-10-CM

## 2022-03-29 RX ORDER — CHLORHEXIDINE GLUCONATE 4 G/100ML
SOLUTION TOPICAL
OUTPATIENT
Start: 2022-03-29

## 2022-07-25 ENCOUNTER — OFFICE VISIT (OUTPATIENT)
Dept: CARDIOLOGY CLINIC | Age: 82
End: 2022-07-25
Payer: MEDICARE

## 2022-07-25 VITALS
DIASTOLIC BLOOD PRESSURE: 78 MMHG | HEART RATE: 58 BPM | WEIGHT: 226 LBS | SYSTOLIC BLOOD PRESSURE: 124 MMHG | BODY MASS INDEX: 30.65 KG/M2

## 2022-07-25 DIAGNOSIS — I25.10 CORONARY ARTERY DISEASE INVOLVING NATIVE CORONARY ARTERY OF NATIVE HEART WITHOUT ANGINA PECTORIS: Primary | ICD-10-CM

## 2022-07-25 DIAGNOSIS — E78.5 HYPERLIPIDEMIA, UNSPECIFIED HYPERLIPIDEMIA TYPE: ICD-10-CM

## 2022-07-25 DIAGNOSIS — I10 HTN (HYPERTENSION), BENIGN: ICD-10-CM

## 2022-07-25 DIAGNOSIS — I50.32 CHRONIC DIASTOLIC HEART FAILURE (HCC): ICD-10-CM

## 2022-07-25 PROCEDURE — 1123F ACP DISCUSS/DSCN MKR DOCD: CPT | Performed by: INTERNAL MEDICINE

## 2022-07-25 PROCEDURE — 99214 OFFICE O/P EST MOD 30 MIN: CPT | Performed by: INTERNAL MEDICINE

## 2022-07-25 NOTE — PROGRESS NOTES
Subjective:      Patient ID: Casimiro Araujo is a [de-identified] y.o. male. CC:  Fleeting chest pain. Sob HTN,     HPI:  He is still at home and stable. No excessive SOB. Exercises with home therapy and walks with a walker. Has no CP. ECG today WNL . No orthopnea. Doing well overall. Has had some lower bp s lately. Been throwing up with meals but has no chest pain. Does not want watchman. [id number] Score for Atrial Fibrillation Stroke Risk   Risk   Factors  Component Value   C CHF Yes 1   H HTN Yes 1   A2 Age >= 76 Yes,  [de-identified] y.o.) 2   D DM Yes 1   S2 Prior Stroke/TIA No 0   V Vascular Disease No 0   A Age 74-69 No,  [de-identified] y.o.) 0   Sc Sex male 0    [id number]  Score  5   Score last updated 11/4/21 3:08 PM EDT    Click here for a link to the UpToDate guideline \"Atrial Fibrillation: Anticoagulation therapy to prevent embolization    Disclaimer: Risk Score calculation is dependent on accuracy of patient problem list and past encounter diagnosis.     Allergies   Allergen Reactions    Aspirin      GI  Bleed possible related to aspirin  (but thought to be H Pylori)    Nsaids     Bactrim [Sulfamethoxazole-Trimethoprim] Other (See Comments)     arf    Celebrex [Celecoxib]      tremors    Cymbalta [Duloxetine Hcl]      Mental status change    Pcn [Penicillins]      rash    Codeine Nausea And Vomiting    Vicodin [Hydrocodone-Acetaminophen] Nausea And Vomiting        Social History     Socioeconomic History    Marital status:      Spouse name: Not on file    Number of children: Not on file    Years of education: Not on file    Highest education level: Not on file   Occupational History    Not on file   Tobacco Use    Smoking status: Never    Smokeless tobacco: Never   Vaping Use    Vaping Use: Never used   Substance and Sexual Activity    Alcohol use: No    Drug use: No    Sexual activity: Not on file   Other Topics Concern    Not on file   Social History Narrative    Not on file     Social Determinants of Health     Financial Resource Strain: Not on file   Food Insecurity: Not on file   Transportation Needs: Not on file   Physical Activity: Not on file   Stress: Not on file   Social Connections: Not on file   Intimate Partner Violence: Not on file   Housing Stability: Not on file        Patient has a family history includes Cancer in his paternal aunt and sister; Stroke in his father and mother. Patient  has a past medical history of 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 ([facility] CAD (coronary artery disease), Cellulitis, CHF (congestive heart failure) ([facility] Chronic kidney disease, COPD (chronic obstructive pulmonary disease) ([facility] Hypertension, Hypothyroidism, Mixed hyperlipidemia, MRSA (methicillin resistant staph aureus) culture positive, Neuropathy, Obstructive sleep apnea, Parkinson's disease ([facility] and Type 2 diabetes mellitus without complication ([facility] Current Outpatient Medications   Medication Sig Dispense Refill    apixaban (ELIQUIS) 2.5 MG TABS tablet Take 1 tablet by mouth 2 times daily 60 tablet 5    metoprolol tartrate (LOPRESSOR) 25 MG tablet TAKE ONE TABLET BY MOUTH TWICE A  tablet 3    Cholecalciferol (VITAMIN D3) 1.25 MG (39991 UT) CAPS Take by mouth      omeprazole (PRILOSEC) 20 MG delayed release capsule Take 20 mg by mouth 2 times daily      miconazole (MICOTIN) 2 % powder Apply topically 2 times daily.  45 g 1    furosemide (LASIX) 40 MG tablet Take 1 tablet by mouth 2 times daily 60 tablet 3    spironolactone (ALDACTONE) 25 MG tablet Take 0.5 tablets by mouth daily 30 tablet 3    finasteride (PROSCAR) 5 MG tablet Take 5 mg by mouth nightly      losartan (COZAAR) 50 MG tablet Take 50 mg by mouth daily      insulin regular (HUMULIN R;NOVOLIN R) 100 UNIT/ML injection Inject into the skin 3 times daily (before meals) Per sliding scale      insulin aspart protamine-insulin aspart (NOVOLOG 70/30) (70-30) 100 UNIT/ML injection Inject 30 Units into the skin daily 26 units -28 units carbidopa-levodopa (SINEMET)  MG per tablet Take 1 tablet by mouth 2 times daily      levothyroxine (SYNTHROID) 75 MCG tablet Take 75 mcg by mouth Daily       metFORMIN (GLUCOPHAGE) 850 MG tablet Take 850 mg by mouth 2 times daily (with meals)      donepezil (ARICEPT) 10 MG tablet Take 10 mg by mouth nightly       potassium chloride (KLOR-CON M) 10 MEQ extended release tablet Take 10 mEq by mouth daily       rosuvastatin (CRESTOR) 20 MG tablet Take 1 tablet by mouth daily 90 tablet 4    nitroGLYCERIN (NITROSTAT) 0.4 MG SL tablet Place 0.4 mg under the tongue every 5 minutes as needed. No current facility-administered medications for this visit. Vitals  Weight: 226 lb (102.5 kg)  Blood Pressure:  118/64   Pulse: 58       Exam unchanged from 3/5/20. Objective:   Physical Exam   Nursing note and vitals reviewed. Constitutional: He is oriented to person, place, and time. He appears well-developed and well-nourished. No distress. HENT:   Head: Normocephalic and atraumatic. Nose: Nose normal.   Mouth/Throat: Oropharynx is clear and moist. No oropharyngeal exudate. Eyes: Conjunctivae are normal. Pupils are equal, round, and reactive to light. Right eye exhibits no discharge. Left eye exhibits no discharge. No scleral icterus. Neck: Normal range of motion. Neck supple. No JVD present. No tracheal deviation present. No thyromegaly present. Cardiovascular: Normal rate, regular rhythm, normal heart sounds and intact distal pulses. Exam reveals no gallop and no friction rub. No murmur heard. Pulmonary/Chest: Effort normal. No respiratory distress. He has no wheezes. He has no rales. He exhibits no tenderness. Abdominal: Soft. Bowel sounds are normal. He exhibits no distension and no mass. No tenderness. He has no rebound and no guarding. Musculoskeletal: He exhibits trace edema. He exhibits no tenderness. Neurological: He is alert and oriented to person, place, and time.  No cranial nerve deficit. Coordination normal.   Skin: Skin is warm and dry. No rash noted. He is not diaphoretic. No erythema.  edema and reddish discoloration of the left leg. Psychiatric: He has a normal mood and affect. His behavior is normal. Judgment and thought content normal.       Assessment:       Diagnosis Orders   1. Coronary artery disease involving native coronary artery of native heart without angina pectoris        2. HTN (hypertension), benign        3. Hyperlipidemia, unspecified hyperlipidemia type        4. Chronic diastolic heart failure (HCC)              Plan:      Continue current medications. Stable cardiovascular status. Doing well with 2 xience SAMANTHA in 11- to LAD. Doing well with CPAP treatment for SARIKA mostly but still has had some nosebleeds. Based on my knowledge of Sangeeta Nguyen, I believe that he is an appropriate candidate for placement of the watchman device. AF:  Needs AC. Not taking eliquis. Try to confer some CVA prevention. He does not want watchman and understands the risk of CVA. HLP:  He continues on fenofibrate daily for high Triglycerides and taking crestor. Will check labs and lipids. Edema:  Stable on lasix 40 bid and restarted spironolactone 12. 5 mg daily. .  Hypertension:  Seems to have lower BPs at home   Will stop losartan. He has chronic nosebleeds so would avoid warfarin and therefore Based on my knowledge of Sangeeta Nguyen, I believe that he is an appropriate candidate for placement of the watchman device. S/d HF chronic: stable.

## 2022-07-26 NOTE — TELEPHONE ENCOUNTER
Maria Del Rosario  7/25/22 F/U and he feels patient is a canidate for the Watchman,  Spoke with patient 's wife today and they have not made a decision to pursue or not.  Would like a call back in 2 weeks and states will have an answer at that point,

## 2022-08-24 NOTE — TELEPHONE ENCOUNTER
After reading office visit note with Dr. Hayden Matos on 7/25/22. It is clear at this time patient does not want to pursue the Watchman device.

## 2022-10-18 ENCOUNTER — HOSPITAL ENCOUNTER (INPATIENT)
Age: 82
LOS: 6 days | Discharge: NURSING, REHAB OR LONG-TERM CARE | DRG: 291 | End: 2022-10-25
Attending: EMERGENCY MEDICINE | Admitting: INTERNAL MEDICINE
Payer: MEDICARE

## 2022-10-18 ENCOUNTER — APPOINTMENT (OUTPATIENT)
Dept: GENERAL RADIOLOGY | Age: 82
DRG: 291 | End: 2022-10-18
Payer: MEDICARE

## 2022-10-18 DIAGNOSIS — I50.9 ACUTE ON CHRONIC HEART FAILURE, UNSPECIFIED HEART FAILURE TYPE (HCC): ICD-10-CM

## 2022-10-18 DIAGNOSIS — E87.79 CARDIAC VOLUME OVERLOAD: ICD-10-CM
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
## 2022-10-18 PROCEDURE — 71045 X-RAY EXAM CHEST 1 VIEW: CPT

## 2022-10-18 PROCEDURE — 99285 EMERGENCY DEPT VISIT HI MDM: CPT

## 2022-10-18 PROCEDURE — 96374 THER/PROPH/DIAG INJ IV PUSH: CPT

## 2022-10-18 ASSESSMENT — ENCOUNTER SYMPTOMS
VOMITING: 0
COUGH: 1
ABDOMINAL PAIN: 0
SHORTNESS OF BREATH: 0
DIARRHEA: 0
BACK PAIN: 0
GASTROINTESTINAL NEGATIVE: 1

## 2022-10-19 ENCOUNTER — APPOINTMENT (OUTPATIENT)
Dept: CT IMAGING | Age: 82
DRG: 291 | End: 2022-10-19
Payer: MEDICARE

## 2022-10-19 PROBLEM — I50.9 HEART FAILURE (HCC): Status: ACTIVE | Noted: 2022-10-19
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
## 2022-10-19 LAB
ANION GAP SERPL CALCULATED.3IONS-SCNC: 13 MMOL/L (ref 3–16)
BASOPHILS ABSOLUTE: 0 K/UL (ref 0–0.2)
BASOPHILS RELATIVE PERCENT: 0.4 %
BILIRUBIN URINE: NEGATIVE
BLOOD, URINE: NEGATIVE
BUN BLDV-MCNC: 23 MG/DL (ref 7–20)
CALCIUM SERPL-MCNC: 9.2 MG/DL (ref 8.3–10.6)
CHLORIDE BLD-SCNC: 96 MMOL/L (ref 99–110)
CLARITY: CLEAR
CO2: 26 MMOL/L (ref 21–32)
COLOR: YELLOW
CREAT SERPL-MCNC: 1.3 MG/DL (ref 0.8–1.3)
EKG ATRIAL RATE: 85 BPM
EKG DIAGNOSIS: NORMAL
EKG Q-T INTERVAL: 386 MS
EKG QRS DURATION: 92 MS
EKG QTC CALCULATION (BAZETT): 459 MS
EKG R AXIS: -7 DEGREES
EKG T AXIS: 122 DEGREES
EKG VENTRICULAR RATE: 85 BPM
EOSINOPHILS ABSOLUTE: 0.2 K/UL (ref 0–0.6)
EOSINOPHILS RELATIVE PERCENT: 1.5 %
GFR SERPL CREATININE-BSD FRML MDRD: 55 ML/MIN/{1.73_M2}
GLUCOSE BLD-MCNC: 110 MG/DL (ref 70–99)
GLUCOSE BLD-MCNC: 116 MG/DL (ref 70–99)
GLUCOSE BLD-MCNC: 149 MG/DL (ref 70–99)
GLUCOSE URINE: NEGATIVE MG/DL
HCT VFR BLD CALC: 31.6 % (ref 40.5–52.5)
HEMOGLOBIN: 10.1 G/DL (ref 13.5–17.5)
INFLUENZA A: NOT DETECTED
INFLUENZA B: NOT DETECTED
KETONES, URINE: NEGATIVE MG/DL
LEUKOCYTE ESTERASE, URINE: NEGATIVE
LV EF: 43 %
LVEF MODALITY: NORMAL
LYMPHOCYTES ABSOLUTE: 0.7 K/UL (ref 1–5.1)
LYMPHOCYTES RELATIVE PERCENT: 6.6 %
MCH RBC QN AUTO: 23.3 PG (ref 26–34)
MCHC RBC AUTO-ENTMCNC: 31.9 G/DL (ref 31–36)
MCV RBC AUTO: 72.9 FL (ref 80–100)
MICROSCOPIC EXAMINATION: NORMAL
MONOCYTES ABSOLUTE: 0.9 K/UL (ref 0–1.3)
MONOCYTES RELATIVE PERCENT: 9 %
NEUTROPHILS ABSOLUTE: 8.3 K/UL (ref 1.7–7.7)
NEUTROPHILS RELATIVE PERCENT: 82.5 %
NITRITE, URINE: NEGATIVE
PDW BLD-RTO: 17.9 % (ref 12.4–15.4)
PERFORMED ON: ABNORMAL
PERFORMED ON: ABNORMAL
PH UA: 6 (ref 5–8)
PLATELET # BLD: 310 K/UL (ref 135–450)
PMV BLD AUTO: 8 FL (ref 5–10.5)
POTASSIUM REFLEX MAGNESIUM: 4.3 MMOL/L (ref 3.5–5.1)
PRO-BNP: ABNORMAL PG/ML (ref 0–449)
PROTEIN UA: NEGATIVE MG/DL
RBC # BLD: 4.34 M/UL (ref 4.2–5.9)
SARS-COV-2 RNA, RT PCR: NOT DETECTED
SODIUM BLD-SCNC: 135 MMOL/L (ref 136–145)
SPECIFIC GRAVITY UA: 1.02 (ref 1–1.03)
TOTAL CK: 43 U/L (ref 39–308)
TROPONIN: 0.02 NG/ML
URINE REFLEX TO CULTURE: NORMAL
URINE TYPE: NORMAL
UROBILINOGEN, URINE: 0.2 E.U./DL
WBC # BLD: 10.1 K/UL (ref 4–11)

## 2022-10-19 PROCEDURE — 70450 CT HEAD/BRAIN W/O DYE: CPT

## 2022-10-19 PROCEDURE — 72125 CT NECK SPINE W/O DYE: CPT

## 2022-10-19 PROCEDURE — 2580000003 HC RX 258

## 2022-10-19 PROCEDURE — 82306 VITAMIN D 25 HYDROXY: CPT

## 2022-10-19 PROCEDURE — 93005 ELECTROCARDIOGRAM TRACING: CPT | Performed by: PHYSICIAN ASSISTANT

## 2022-10-19 PROCEDURE — 36415 COLL VENOUS BLD VENIPUNCTURE: CPT

## 2022-10-19 PROCEDURE — C8929 TTE W OR WO FOL WCON,DOPPLER: HCPCS

## 2022-10-19 PROCEDURE — 82746 ASSAY OF FOLIC ACID SERUM: CPT

## 2022-10-19 PROCEDURE — 83036 HEMOGLOBIN GLYCOSYLATED A1C: CPT

## 2022-10-19 PROCEDURE — 85025 COMPLETE CBC W/AUTO DIFF WBC: CPT

## 2022-10-19 PROCEDURE — 6370000000 HC RX 637 (ALT 250 FOR IP)

## 2022-10-19 PROCEDURE — 82607 VITAMIN B-12: CPT

## 2022-10-19 PROCEDURE — 6360000002 HC RX W HCPCS: Performed by: EMERGENCY MEDICINE

## 2022-10-19 PROCEDURE — 83540 ASSAY OF IRON: CPT

## 2022-10-19 PROCEDURE — 84443 ASSAY THYROID STIM HORMONE: CPT

## 2022-10-19 PROCEDURE — 87636 SARSCOV2 & INF A&B AMP PRB: CPT

## 2022-10-19 PROCEDURE — 82550 ASSAY OF CK (CPK): CPT

## 2022-10-19 PROCEDURE — 99223 1ST HOSP IP/OBS HIGH 75: CPT | Performed by: INTERNAL MEDICINE

## 2022-10-19 PROCEDURE — 83880 ASSAY OF NATRIURETIC PEPTIDE: CPT

## 2022-10-19 PROCEDURE — 84484 ASSAY OF TROPONIN QUANT: CPT

## 2022-10-19 PROCEDURE — 83550 IRON BINDING TEST: CPT

## 2022-10-19 PROCEDURE — 1200000000 HC SEMI PRIVATE

## 2022-10-19 PROCEDURE — 80048 BASIC METABOLIC PNL TOTAL CA: CPT

## 2022-10-19 PROCEDURE — 6360000002 HC RX W HCPCS

## 2022-10-19 PROCEDURE — 81003 URINALYSIS AUTO W/O SCOPE: CPT

## 2022-10-19 PROCEDURE — APPSS30 APP SPLIT SHARED TIME 16-30 MINUTES: Performed by: NURSE PRACTITIONER

## 2022-10-19 PROCEDURE — 2500000003 HC RX 250 WO HCPCS

## 2022-10-19 PROCEDURE — 6360000004 HC RX CONTRAST MEDICATION

## 2022-10-19 PROCEDURE — 93005 ELECTROCARDIOGRAM TRACING: CPT | Performed by: STUDENT IN AN ORGANIZED HEALTH CARE EDUCATION/TRAINING PROGRAM

## 2022-10-19 PROCEDURE — 84439 ASSAY OF FREE THYROXINE: CPT

## 2022-10-19 RX ORDER — DONEPEZIL HYDROCHLORIDE 10 MG/1
10 TABLET, FILM COATED ORAL NIGHTLY
Status: DISCONTINUED | OUTPATIENT
Start: 2022-10-19 | End: 2022-10-25 | Stop reason: HOSPADM

## 2022-10-19 RX ORDER — FUROSEMIDE 10 MG/ML
40 INJECTION INTRAMUSCULAR; INTRAVENOUS 2 TIMES DAILY
Status: DISCONTINUED | OUTPATIENT
Start: 2022-10-19 | End: 2022-10-21

## 2022-10-19 RX ORDER — ACETAMINOPHEN 650 MG/1
650 SUPPOSITORY RECTAL EVERY 6 HOURS PRN
Status: DISCONTINUED | OUTPATIENT
Start: 2022-10-19 | End: 2022-10-25 | Stop reason: HOSPADM

## 2022-10-19 RX ORDER — INSULIN LISPRO 100 [IU]/ML
0-8 INJECTION, SOLUTION INTRAVENOUS; SUBCUTANEOUS
Status: DISCONTINUED | OUTPATIENT
Start: 2022-10-19 | End: 2022-10-25 | Stop reason: HOSPADM

## 2022-10-19 RX ORDER — ONDANSETRON 4 MG/1
4 TABLET, ORALLY DISINTEGRATING ORAL EVERY 8 HOURS PRN
Status: DISCONTINUED | OUTPATIENT
Start: 2022-10-19 | End: 2022-10-25 | Stop reason: HOSPADM

## 2022-10-19 RX ORDER — ENOXAPARIN SODIUM 100 MG/ML
40 INJECTION SUBCUTANEOUS DAILY
Status: CANCELLED | OUTPATIENT
Start: 2022-10-19

## 2022-10-19 RX ORDER — LEVOTHYROXINE SODIUM 0.07 MG/1
75 TABLET ORAL DAILY
Status: DISCONTINUED | OUTPATIENT
Start: 2022-10-19 | End: 2022-10-25 | Stop reason: HOSPADM

## 2022-10-19 RX ORDER — FUROSEMIDE 10 MG/ML
40 INJECTION INTRAMUSCULAR; INTRAVENOUS ONCE
Status: COMPLETED | OUTPATIENT
Start: 2022-10-19 | End: 2022-10-19

## 2022-10-19 RX ORDER — SODIUM CHLORIDE 0.9 % (FLUSH) 0.9 %
5-40 SYRINGE (ML) INJECTION PRN
Status: DISCONTINUED | OUTPATIENT
Start: 2022-10-19 | End: 2022-10-25 | Stop reason: HOSPADM

## 2022-10-19 RX ORDER — DEXTROSE MONOHYDRATE 100 MG/ML
INJECTION, SOLUTION INTRAVENOUS CONTINUOUS PRN
Status: DISCONTINUED | OUTPATIENT
Start: 2022-10-19 | End: 2022-10-25 | Stop reason: HOSPADM

## 2022-10-19 RX ORDER — CILOSTAZOL 50 MG/1
50 TABLET ORAL 2 TIMES DAILY
COMMUNITY

## 2022-10-19 RX ORDER — SPIRONOLACTONE 25 MG/1
12.5 TABLET ORAL DAILY
Status: DISCONTINUED | OUTPATIENT
Start: 2022-10-19 | End: 2022-10-25 | Stop reason: HOSPADM

## 2022-10-19 RX ORDER — ONDANSETRON 2 MG/ML
4 INJECTION INTRAMUSCULAR; INTRAVENOUS EVERY 6 HOURS PRN
Status: DISCONTINUED | OUTPATIENT
Start: 2022-10-19 | End: 2022-10-25 | Stop reason: HOSPADM

## 2022-10-19 RX ORDER — POLYETHYLENE GLYCOL 3350 17 G/17G
17 POWDER, FOR SOLUTION ORAL DAILY PRN
Status: DISCONTINUED | OUTPATIENT
Start: 2022-10-19 | End: 2022-10-25 | Stop reason: HOSPADM

## 2022-10-19 RX ORDER — ROSUVASTATIN CALCIUM 20 MG/1
20 TABLET, COATED ORAL DAILY
Status: DISCONTINUED | OUTPATIENT
Start: 2022-10-19 | End: 2022-10-25 | Stop reason: HOSPADM

## 2022-10-19 RX ORDER — FINASTERIDE 5 MG/1
5 TABLET, FILM COATED ORAL NIGHTLY
Status: DISCONTINUED | OUTPATIENT
Start: 2022-10-19 | End: 2022-10-25 | Stop reason: HOSPADM

## 2022-10-19 RX ORDER — SODIUM CHLORIDE 0.9 % (FLUSH) 0.9 %
5-40 SYRINGE (ML) INJECTION EVERY 12 HOURS SCHEDULED
Status: DISCONTINUED | OUTPATIENT
Start: 2022-10-19 | End: 2022-10-25 | Stop reason: HOSPADM

## 2022-10-19 RX ORDER — INSULIN LISPRO 100 [IU]/ML
0-4 INJECTION, SOLUTION INTRAVENOUS; SUBCUTANEOUS NIGHTLY
Status: DISCONTINUED | OUTPATIENT
Start: 2022-10-19 | End: 2022-10-25 | Stop reason: HOSPADM

## 2022-10-19 RX ORDER — CARBIDOPA AND LEVODOPA 25; 100 MG/1; MG/1
1 TABLET, EXTENDED RELEASE ORAL 3 TIMES DAILY
Status: DISCONTINUED | OUTPATIENT
Start: 2022-10-19 | End: 2022-10-25 | Stop reason: HOSPADM

## 2022-10-19 RX ORDER — CILOSTAZOL 50 MG/1
50 TABLET ORAL 2 TIMES DAILY
Status: DISCONTINUED | OUTPATIENT
Start: 2022-10-19 | End: 2022-10-25 | Stop reason: HOSPADM

## 2022-10-19 RX ORDER — ACETAMINOPHEN 325 MG/1
650 TABLET ORAL EVERY 6 HOURS PRN
Status: DISCONTINUED | OUTPATIENT
Start: 2022-10-19 | End: 2022-10-25 | Stop reason: HOSPADM

## 2022-10-19 RX ORDER — CARBIDOPA AND LEVODOPA 25; 100 MG/1; MG/1
1 TABLET, EXTENDED RELEASE ORAL 3 TIMES DAILY
COMMUNITY

## 2022-10-19 RX ORDER — M-VIT,TX,IRON,MINS/CALC/FOLIC 27MG-0.4MG
1 TABLET ORAL DAILY
COMMUNITY

## 2022-10-19 RX ORDER — LOSARTAN POTASSIUM 50 MG/1
50 TABLET ORAL DAILY
Status: DISCONTINUED | OUTPATIENT
Start: 2022-10-19 | End: 2022-10-21

## 2022-10-19 RX ORDER — PANTOPRAZOLE SODIUM 40 MG/1
40 TABLET, DELAYED RELEASE ORAL
Status: DISCONTINUED | OUTPATIENT
Start: 2022-10-19 | End: 2022-10-25 | Stop reason: HOSPADM

## 2022-10-19 RX ORDER — SODIUM CHLORIDE 9 MG/ML
INJECTION, SOLUTION INTRAVENOUS PRN
Status: DISCONTINUED | OUTPATIENT
Start: 2022-10-19 | End: 2022-10-25 | Stop reason: HOSPADM

## 2022-10-19 RX ADMIN — METOPROLOL TARTRATE 25 MG: 25 TABLET, FILM COATED ORAL at 15:35

## 2022-10-19 RX ADMIN — MICONAZOLE NITRATE: 2 POWDER TOPICAL at 22:04

## 2022-10-19 RX ADMIN — FUROSEMIDE 40 MG: 10 INJECTION, SOLUTION INTRAMUSCULAR; INTRAVENOUS at 04:26

## 2022-10-19 RX ADMIN — CARBIDOPA AND LEVODOPA 1 TABLET: 25; 100 TABLET, EXTENDED RELEASE ORAL at 22:00

## 2022-10-19 RX ADMIN — CARBIDOPA AND LEVODOPA 1 TABLET: 25; 100 TABLET, EXTENDED RELEASE ORAL at 15:39

## 2022-10-19 RX ADMIN — FINASTERIDE 5 MG: 5 TABLET, FILM COATED ORAL at 22:02

## 2022-10-19 RX ADMIN — PERFLUTREN 1.65 MG: 6.52 INJECTION, SUSPENSION INTRAVENOUS at 14:15

## 2022-10-19 RX ADMIN — PANTOPRAZOLE SODIUM 40 MG: 40 TABLET, DELAYED RELEASE ORAL at 15:36

## 2022-10-19 RX ADMIN — SODIUM CHLORIDE, PRESERVATIVE FREE 5 ML: 5 INJECTION INTRAVENOUS at 22:02

## 2022-10-19 RX ADMIN — CILOSTAZOL 50 MG: 50 TABLET ORAL at 22:00

## 2022-10-19 RX ADMIN — DONEPEZIL HYDROCHLORIDE 10 MG: 10 TABLET, FILM COATED ORAL at 22:00

## 2022-10-19 RX ADMIN — LOSARTAN POTASSIUM 50 MG: 50 TABLET, FILM COATED ORAL at 15:36

## 2022-10-19 RX ADMIN — SPIRONOLACTONE 12.5 MG: 25 TABLET ORAL at 15:40

## 2022-10-19 RX ADMIN — ROSUVASTATIN CALCIUM 20 MG: 20 TABLET, FILM COATED ORAL at 15:40

## 2022-10-19 RX ADMIN — METOPROLOL TARTRATE 25 MG: 25 TABLET, FILM COATED ORAL at 22:00

## 2022-10-19 RX ADMIN — INSULIN GLARGINE 15 UNITS: 100 INJECTION, SOLUTION SUBCUTANEOUS at 22:04

## 2022-10-19 RX ADMIN — LEVOTHYROXINE SODIUM 75 MCG: 75 TABLET ORAL at 15:39

## 2022-10-19 RX ADMIN — FUROSEMIDE 40 MG: 10 INJECTION, SOLUTION INTRAMUSCULAR; INTRAVENOUS at 15:36

## 2022-10-19 NOTE — PROGRESS NOTES
Patient A&O x4 and admitted to the unit with wife at bedside. Patient vitals taken and patient put on tele. Patient skin assessment complete with wound care orders in place. Patient resting in bed eating dinner and no concerns at this time.

## 2022-10-19 NOTE — ED PROVIDER NOTES
ED Attending Attestation Note     Date of evaluation: 10/18/2022    This patient was seen by the advance practice provider. I have seen and examined the patient, agree with the workup, evaluation, management and diagnosis. The care plan has been discussed. I have reviewed the ECG and concur with the DIXIE's interpretation. My assessment reveals a frail and mildly ill-appearing 20-year-old male with Parkinson's disease who presents with generalized weakness and fall. He is awake and alert though with mumbling speech. Has been having cough for the past several days. Has some weeping erythematous wounds on his bilateral lower extremities with no particular erythema or warmth. Afebrile. BNP significantly elevated without clear pulmonary edema on his chest x-ray. General fatigue and cough have some infectious components but without a leukocytosis and being afebrile without a clear source except for possibly his lower extremity wounds I did not think that empiric antibiotics are warranted at this time. However I do think he would benefit from some amount of diuresis given his significant Laveta BNP and history of heart failure. 40 mg of IV Lasix has been administered. I do think he will need to be closely monitored for other signs of infection and his response to diuresis and given his age and comorbidities think this is best done as an inpatient. Call has been placed to hospitalist to discuss this. Impression:    1. Fall, initial encounter    2.  Acute on chronic heart failure, unspecified heart failure type ([address]    3. Cardiac volume overload           Hal eBrry MD  10/19/22 [id number]

## 2022-10-19 NOTE — PROGRESS NOTES
4 Eyes Admission Assessment     I agree as the admission nurse that 2 RN's have performed a thorough Head to Toe Skin Assessment on the patient. ALL assessment sites listed below have been assessed on admission. Areas assessed by both nurses: Taj  [x]   Head, Face, and Ears   [x]   Shoulders, Back, and Chest  [x]   Arms, Elbows, and Hands   [x]   Coccyx, Sacrum, and Ischium  [x]   Legs, Feet, and Heels        Does the Patient have Skin Breakdown?   Leg ulcers and open areas on buttocks        Jean Claude Prevention initiated:  Yes   Wound Care Orders initiated:  Yes      [id number]  nurse consulted for Pressure Injury (Stage 3,4, Unstageable, DTI, NWPT, and Complex wounds) or Jean Claude score 18 or lower:  Yes      Nurse 1 eSignature: Electronically signed by Carlos Alberto Mir RN on 10/19/22 at 5:54 PM EDT    **SHARE this note so that the co-signing nurse is able to place an eSignature**    Nurse 2 eSignature: Electronically signed by Felipe Corral RN on 10/20/22 at 6:56 AM EDT

## 2022-10-19 NOTE — H&P
Internal Medicine  PGY 1  History & Physical      CC shortness of breath, fall    History Obtained From:  patient    HISTORY OF PRESENT ILLNESS:  Shilo Hinton 80 y/o M with pmhx of HFmEF (45%), COPD Parkinson's, SARIKA not on CPAP HTN HLD T2DM presented to the ED with shortness of breath and a fall. Patient tripped and fell in the bathroom resulting in a bruise in the left knee. Patient endorsed not feeling well for couple of days and has developed productive cough with minimal sputum production. He also endorsed worsening shortness of breath throughout this time, which has worsened his ability to ambulate. Patient said he has chest pain that has been there for a some time but could not describe the quality or intensity of the pain but describes it he feels  over the entire chest area. Patient denied any headaches dizziness, diarrhea vomiting or urinary symptoms. In the ED vital signs were stable. Labs were significant for mild hyponatremia 135 and elevated proBNP 86763 and troponin 0.02. Chest x-ray and CT head without contrast were normal.  Patient received 40 mg Lasix IV once in the ED and admitted for further management of heart failure exacerbation.       Past Medical History:        Diagnosis Date    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 ([address]     Dr. Sal Holt    CAD (coronary artery disease)     Cellulitis 2014    CHF (congestive heart failure) ([facility])     systolic    Chronic kidney disease     upon hospitalization due to bactrim allergy    COPD (chronic obstructive pulmonary disease) ([address]     Hypertension     Hypothyroidism     Mixed hyperlipidemia     MRSA (methicillin resistant staph aureus) culture positive 07/24/2017    foot    Neuropathy     Obstructive sleep apnea 10/03/2014    does not use cpap machine    Parkinson's disease ([address] 1965    Type 2 diabetes mellitus without complication ([address]        Past Surgical History:        Procedure Laterality Date    CARDIAC CATHETERIZATION      CERVICAL FUSION 1981    COLON SURGERY      1980's    COLONOSCOPY      FOOT SURGERY Right 07/24/2017    INCISION AND DRAINAGE RIGHT FOOT WITH REMOVAL NECROTIC BONE    FOOT SURGERY Right 07/27/2017    : TRANSMETATARSAL AMPUTATION RIGHT FOOT     HERNIA REPAIR      SHOULDER SURGERY      right    UPPER GASTROINTESTINAL ENDOSCOPY N/A 11/25/2020    ESOPHAGOGASTRODUODENOSCOPY WITH BOTOX INJECTION performed by Candelario Dorado DO at [facility] ENDOSCOPY       Medications Priorto Admission:    Not in a hospital admission. Allergies:  Aspirin, Nsaids, Bactrim [sulfamethoxazole-trimethoprim], Celebrex [celecoxib], Cymbalta [duloxetine hcl], Pcn [penicillins], Codeine, and Vicodin [hydrocodone-acetaminophen]    Social History:   TOBACCO:   reports that he has never smoked. He has never used smokeless tobacco.  ETOH:   reports no history of alcohol use. DRUGS :   Patient currently lives     Family History:       Problem Relation Age of Onset    Stroke Mother     Stroke Father     Cancer Sister     Cancer Paternal Aunt        Review of Systems    ROS: A 10 point review of systems was conducted, significant findings as noted in HPI. Physical exam:    Physical Exam  Vitals and nursing note reviewed. Constitutional:       General: He is not in acute distress. Appearance: He is ill-appearing. HENT:      Head: Normocephalic and atraumatic. Eyes:      Extraocular Movements: Extraocular movements intact. Pupils: Pupils are equal, round, and reactive to light. Cardiovascular:      Rate and Rhythm: Normal rate and regular rhythm. Pulmonary:      Effort: Pulmonary effort is normal.      Breath sounds: Normal breath sounds. Abdominal:      General: There is no distension. Palpations: Abdomen is soft. Musculoskeletal:         General: Normal range of motion. Cervical back: Neck supple. No tenderness. Comments: No C/T/L midline tenderness. Partial amputation right foot.  Ulcerations to bilateral lower legs with mild erythema but no warmth or purulent drainage. There is serosanguinous drainage from a wound to the RLE. Skin:     General: Skin is warm and dry. Neurological:      General: No focal deficit present. Mental Status: He is alert. Mental status is at baseline. Psychiatric:         Mood and Affect: Mood normal.       Vitals:    10/19/22 0600   BP: (!) 111/59   Pulse: 73   Resp: 23   Temp:    SpO2: 91%       DATA:    Labs:  CBC:   Recent Labs     10/19/22  0009   WBC 10.1   HGB 10.1*   HCT 31.6*          BMP:   Recent Labs     10/19/22  0009   *   K 4.3   CL 96*   CO2 26   BUN 23*   CREATININE 1.3   GLUCOSE 110*     LFT's: No results for input(s): AST, ALT, ALB, BILITOT, ALKPHOS in the last 72 hours. Troponin:   Recent Labs     10/19/22  0009   TROPONINI 0.02*     BNP:No results for input(s): BNP in the last 72 hours. ABGs: No results for input(s): PHART, QDL1XNY, PO2ART in the last 72 hours. INR: No results for input(s): INR in the last 72 hours. U/A:  Recent Labs     10/19/22  0009   COLORU Yellow   PHUR 6.0   CLARITYU Clear   SPECGRAV 1.020   LEUKOCYTESUR Negative   UROBILINOGEN 0.2   BILIRUBINUR Negative   BLOODU Negative   GLUCOSEU Negative       CT CSpine W/O Contrast   Final Result      1. No evidence of cervical spine fracture. 2.  Severe multilevel degenerative changes resulting in spinal stenosis. 3.  Indeterminate nodule in the left upper lobe. Follow-up chest CT in 3 months. CT Head W/O Contrast   Final Result      1. No acute intracranial hemorrhage or mass effect. 2.  Mild nonspecific white matter disease compatible chronic small vessel ischemia. 3.  Chronic sinusitis. XR CHEST 1 VIEW   Final Result      No acute cardiopulmonary findings. ASSESSMENT AND PLAN:      Fall  Possible causes: autonomic dysfunction 2/2 Parkinson's disease, T2DM, weakness, R feet amputation. Denied LOC, dizziness and said that he just feels weak.   - Orthostatics   - PT/OT   - Tele  - F/u vitamin B12, D, folate, iron studies and TSH     Acute on chronic HFrEF   Fluid overload with +3 LE edema and mild bilateral crackles. C/o SOB. Last echo (08/11/2017): EF: 40-45 %, G2DD. Follows Dr. Nina Stewart outpatient. ProBNP 20,164 (prior 3,807). CXR showing no acute abnormalities. EKG with no ST segments changes .  - Started Lasix 40 mg twice daily  - Strict I's and O's  - Daily weights  - Cardiac diet  - Cardiology consulted, appreciate recommendation  - Consulted heart failure nurse  - Tele  - EKG   - BNP every 3rd day  - Echo  - Trop Q6hr x2    A fib  - Continue home Eliquis 2.5 mg BID and Lopressor 25 mg BID   - F/u EKG       HTN  VS stable. - Continue home Losartan 50 mg daily, Spironolactone 12.5 mg daily and Lopressor 25 mg BID      Hx of PE  Stable. - Continue home Eliquis 2.5 mg BID      T2DM  HbA1c (08/05/17): 8.7%.  - MSSI  - Hypoglycemia protocol  - POCT glucose  - Low carb diet  - F/u hemoglobin A1c   - Daily BMP  - Lantus 15 U nightly     Parkinson's disease  - Continue home Carbidopa-Levodopa  mg BID and Donepezil 10 mg daily     Hypothyroidism   TSH (08/15/17): 7.97 (elevated), Free T4 1.2.  - F/u TSH  - Continue home Levothyroxine 75 mcg daily      Chronic microcytic anemia  Hemoglobin 10.1 (unchanged from previously).    - F/u iron studies        Code status: DNR-CC  FEN: Low carb diet  PPX: Eliquis  Dispo: GMF       Will discuss with attending physician Brandon Bourgeois MD  10/19/2022,  6:13 AM

## 2022-10-19 NOTE — ED NOTES
Report to inpatient nurse.  Pt moved to room 29 for boarder status     Heraclio Mtz [facility]  10/19/22 [id number]

## 2022-10-19 NOTE — PROGRESS NOTES
Report called to [facility]. All questions Answered.

## 2022-10-19 NOTE — ED TRIAGE NOTES
Pt arrives from home via EMS with chief complaint of fall. Pt stated he fell in between the toilet and the bath tub. Pt denies LOC. Pt also reports cough and generalized weakness. Pt lives at home with wife and grandson.

## 2022-10-19 NOTE — CONSULTS
[address]   Cardiology Consult Note   Dr Mayra Corral MD, Lisa Magana RN, FNP APRN CVNP  Date: 10/19/2022  Admit Date: 10/18/2022       Reason for consultation: heart failure exacerbation     CC:SOB fall     History obtained from patient and medical record. Primary cardiologist:  Dr Alejandra Suarez    HPI: Nancy Iraida is a 81 y.o. male with a past medical history of   HFmEF (45%), COPD Parkinson's, SARIKA not on CPAP HTN HLD T2DM   2 xience SAMANTHA in 11- to LAD   / considering Watchman      To ED after tripped and fell in the bathroom, not feeling well for few days with productive cough, increased SOB, atpical cp denies h/a, fever n/v diarrhea, received 40 mg Lasix IV once in the ED and admitted for further management of heart failure exacerbation    Testing   EKG afib rate 66  mild hyponatremia 135   elevated proBNP 20164 and troponin 0.02. Chest x-ray and CT head without contrast were normal.  Do a magnesium level  TTE pending     12/17/2021 JOSE:  Overall left ventricular systolic function appears low normal.  Mild aortic and mitral regurgitation is present. The left atrial size is dilated. Pre Watchman FLOR measurements:  0 degree 1.46 cm x 4.00 cm  45 dgrees 1.58 cm x 4.22 cm  90 degrees 1.57cm x 3.72cm  135 degrees 1.86 cm x 3.54cm      Patient seen and examined. Clinical notes reviewed. Telemetry reviewed / Pertinent labs, diagnostic, device, and imaging results reviewed as a part of this visit  I spent a total of 35 minutes and greater than 50% of the time was spent counseling with patient  coordinating care regarding her diagnosis, treatments and plan of care.     EKG TTE stress test: any LHC/RHC reviewed       Past Medical History:  Past Medical History:   Diagnosis Date    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 ([facility]     Dr. Vi Barraza    CAD (coronary artery disease)     Cellulitis 2014    CHF (congestive heart failure) (HCC)     systolic    Chronic kidney disease     upon hospitalization due to bactrim allergy    COPD (chronic obstructive pulmonary disease) (HCC)     Hypertension     Hypothyroidism     Mixed hyperlipidemia     MRSA (methicillin resistant staph aureus) culture positive 07/24/2017    foot    Neuropathy     Obstructive sleep apnea 10/03/2014    does not use cpap machine    Parkinson's disease ([facility] 1965    Type 2 diabetes mellitus without complication ([facility]         Past Surgical History:    has a past surgical history that includes cervical fusion (1981); Foot surgery (Right, 07/24/2017); Foot surgery (Right, 07/27/2017); Cardiac catheterization; Colonoscopy; Colon surgery; shoulder surgery; hernia repair; and Upper gastrointestinal endoscopy (N/A, 11/25/2020). Social History:  Reviewed. reports that he has never smoked. He has never used smokeless tobacco. He reports that he does not drink alcohol and does not use drugs. Allergies: Allergies   Allergen Reactions    Aspirin      GI  Bleed possible related to aspirin  (but thought to be H Pylori)    Nsaids     Bactrim [Sulfamethoxazole-Trimethoprim] Other (See Comments)     arf    Celebrex [Celecoxib]      tremors    Cymbalta [Duloxetine Hcl]      Mental status change    Pcn [Penicillins]      rash    Codeine Nausea And Vomiting    Vicodin [Hydrocodone-Acetaminophen] Nausea And Vomiting       Family History:  Reviewed. family history includes Cancer in his paternal aunt and sister; Stroke in his father and mother. Denies family history of sudden cardiac death, arrhythmia, premature CAD    Prior to Visit Medications    Medication Sig Taking?  Authorizing Provider   insulin regular (HUMULIN R;NOVOLIN R) 100 UNIT/ML injection Inject 15-20 Units into the skin daily (with breakfast) Yes Historical Provider, MD   cilostazol (PLETAL) 50 MG tablet Take 50 mg by mouth 2 times daily Yes Historical Provider, MD   Multiple Vitamins-Minerals (THERAPEUTIC MULTIVITAMIN-MINERALS) tablet Take 1 tablet by mouth daily Yes Historical Provider, MD NONFORMULARY CM CORE - Supplement Once daily Yes Historical Provider, MD   carbidopa-levodopa (SINEMET CR)  MG per extended release tablet Take 1 tablet by mouth 3 times daily At breakfast, dinner and at bedtime.  Yes Historical Provider, MD   metoprolol tartrate (LOPRESSOR) 25 MG tablet TAKE ONE TABLET BY MOUTH TWICE A DAY  Kierra Spencer MD   omeprazole (PRILOSEC) 20 MG delayed release capsule Take 20 mg by mouth 2 times daily  Historical Provider, MD   furosemide (LASIX) 40 MG tablet Take 1 tablet by mouth 2 times daily  Marcia Brar MD   spironolactone (ALDACTONE) 25 MG tablet Take 0.5 tablets by mouth daily  Marcia Brar MD   finasteride (PROSCAR) 5 MG tablet Take 5 mg by mouth nightly  Historical Provider, MD   losartan (COZAAR) 50 MG tablet Take 50 mg by mouth daily  Historical Provider, MD   insulin aspart protamine-insulin aspart (NOVOLOG 70/30) (70-30) 100 UNIT/ML injection Inject 25-30 Units into the skin every morning Dependent on BG  Historical Provider, MD   levothyroxine (SYNTHROID) 75 MCG tablet Take 75 mcg by mouth Daily   Historical Provider, MD   donepezil (ARICEPT) 10 MG tablet Take 10 mg by mouth nightly   Historical Provider, MD   potassium chloride (KLOR-CON M) 10 MEQ extended release tablet Take 10 mEq by mouth daily   Historical Provider, MD   rosuvastatin (CRESTOR) 20 MG tablet Take 1 tablet by mouth daily  Kierra Spencer MD        Scheduled Meds:   donepezil  10 mg Oral Nightly    finasteride  5 mg Oral Nightly    levothyroxine  75 mcg Oral Daily    losartan  50 mg Oral Daily    metoprolol tartrate  25 mg Oral BID    miconazole   Topical BID    pantoprazole  40 mg Oral BID AC    rosuvastatin  20 mg Oral Daily    spironolactone  12.5 mg Oral Daily    sodium chloride flush  5-40 mL IntraVENous 2 times per day    insulin lispro  0-8 Units SubCUTAneous TID WC    insulin lispro  0-4 Units SubCUTAneous Nightly    insulin glargine  15 Units SubCUTAneous Nightly    furosemide  40 mg IntraVENous BID    carbidopa-levodopa  1 tablet Oral TID    cilostazol  50 mg Oral BID   Review of Systems:  Constitutional: Negative for fever, night sweats, chills, weight changes  Skin: Negative for rash, pruritus, bleeding, blood clots, or bruising    HEENT: Negative for vision changes, ringing in the ears, dysphagia, or swollen lymph nodes  Respiratory: Reviewed in HPI  Cardiovascular: Reviewed in HPI  Gastrointestinal: Negative for abdominal pain, N/V/D, constipation, or black/tarry stools  Genito-Urinary: Negative for dysuria, incontinence, or hematuria  Musculoskeletal: Negative for joint swelling, muscle pain, or injuries  Neurological/Psych: Negative for confusion, seizures, headaches, or TIA-like symptoms. No anxiety or depression. Physical Examination:  Vitals:    10/19/22 1530   BP: (!) 109/46   Pulse: 73   Resp: 23   Temp:    SpO2:       In: -   Out: 1650    Wt Readings from Last 3 Encounters:   10/19/22 239 lb 4.8 oz (108.5 kg)   07/25/22 226 lb (102.5 kg)   12/27/21 242 lb 12.8 oz (110.1 kg)       Intake/Output Summary (Last 24 hours) at 10/19/2022 1704  Last data filed at 10/19/2022 1534  Gross per 24 hour   Intake --   Output 1650 ml   Net -1650 ml       Telemetry: Personally Reviewed    Constitutional: Cooperative and in no apparent distress, and appears well nourished  Skin: Warm and pink; no pallor, cyanosis, clubbing, or bruising   HEENT: Symmetric and normocephalic. Cardiovascular: irregular rate and rhythm. S1/S2 present  Respiratory: Respirations symmetric and unlabored. Lungs clear to auscultation bilaterally, no wheezing, crackles, or rhonchi  Gastrointestinal: Abdomen soft and round. Bowel sounds normoactive without tenderness or masses. Musculoskeletal: Bilateral upper and lower extremity strength 5/5 with full ROM  Neurologic/Psych: Awake and orientated to person, place and time.  Calm affect, appropriate mood      BMP:   Recent Labs     10/19/22  0009   *   K 4.3   CL 96*   CO2 26   BUN 23*   CREATININE 1.3     Estimated Creatinine Clearance: 57 mL/min (based on SCr of 1.3 mg/dL).    CBC:   Recent Labs     10/19/22  0009   WBC 10.1   HGB 10.1*   HCT 31.6*   MCV 72.9*        Thyroid:   Lab Results   Component Value Date/Time    TSH 7.97 08/15/2017 05:40 AM    S6VSFRM 82 01/28/2012 03:10 AM    U6WTSQY 3.9 01/28/2012 03:10 AM     Lipids:   Lab Results   Component Value Date/Time    CHOL 107 07/29/2017 08:24 AM    HDL 15 07/29/2017 08:24 AM    HDL 34 11/24/2011 06:20 AM    TRIG 162 07/29/2017 08:24 AM     LFTS:   Lab Results   Component Value Date/Time    ALT 7 04/12/2021 04:52 PM    AST 8 04/12/2021 04:52 PM    ALKPHOS 71 04/12/2021 04:52 PM    PROT 7.7 04/12/2021 04:52 PM    PROT 7.7 03/29/2012 08:18 AM    AGRATIO 0.9 04/12/2021 04:52 PM    BILITOT 0.6 04/12/2021 04:52 PM     Cardiac Enzymes:   Lab Results   Component Value Date/Time    CKTOTAL 66 08/05/2017 07:16 PM    CKMB 0.6 01/27/2012 07:20 AM    TROPONINI 0.02 10/19/2022 12:09 AM    TROPONINI <0.01 04/12/2021 04:52 PM    TROPONINI 0.02 08/10/2017 06:43 AM     Coags:   Lab Results   Component Value Date/Time    PROTIME 12.1 09/04/2020 11:28 AM    INR 1.20 12/17/2021 08:47 AM         Problem List:   Patient Active Problem List    Diagnosis Date Noted    Essential hypertension     Mixed hyperlipidemia     LULÚ (acute kidney injury) ([facility] [address]     Acute diastolic CHF (congestive heart failure), NYHA class 2 (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    Diabetic infection of right foot ([facility] 07/25/2017    Diabetic polyneuropathy associated with type 2 diabetes mellitus ([facility] 07/25/2017    Obstructive sleep apnea 10/03/2014    Hypothyroid 07/24/2014 Syncope 03/25/2014    Sepsis ([facility] [address] 01/27/2012    Pulmonary embolism ([facility] [address] 01/09/2012    Coronary artery disease involving native heart without angina pectoris 01/09/2012    Presence of drug coated stent in anterior descending branch of left coronary artery 01/09/2012    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 Chest x-ray and CT head without contrast were normal.  Do a magnesium level  TTE pending     [facility]     [facility]  LHC 2 xience SAMANTHA in 11- to LAD     Afib   Watchman  being considered   [id number] Score for Atrial Fibrillation Stroke Risk 6    COPD     Parkinson's    SARIKA not on CPAP     HTN     HLD     T2DM     Cardiac meds Pletal lasix cozaar lopressor Crestor aldactone     Plan   TTE pending          Thank you for allowing to us to participate in the care of [address] [place] [address]. Yeni Hillman APRN-CNP-CVNP    [facility]   Interventional cardiology note  Patient admitted with heart failure exacerbation. Was seen in the emergency department. Found to have cardiomyopathy by history. Additional fluid retention and may be overloaded with fluid. Has atrial fibrillation and has been considered for a watchman device but has not done so yet. Does not want to be on anticoagulants. I do believe there is some heart failure exacerbation. We will diurese the patient. Obtain a current echocardiogram.  Have further discussions about the potential watchman device.   Ana Laura Blake MD, [facility]

## 2022-10-19 NOTE — ED PROVIDER NOTES
[address] [employer] [place]  EMERGENCY DEPARTMENT ENCOUNTER          PHYSICIAN ASSISTANT NOTE       Date of evaluation: 10/18/2022    Chief Complaint     Fall      History of Present Illness     Jose Guadalupe Mtz is a 81 y.o. male who presents for fall. Patient arrives via EMS who reports the patient fell in the bathroom and hasn't been feeling well recently. Patient confirms that he does not feel well and has a cough. Patient reports he did fall today, does not think he hit his head or passed out. Patient reports no pain from the fall, just doesn't feel well. Patient reports no chest pain or trouble breathing. No vomiting or diarrhea. Review of Systems     Review of Systems   Constitutional:  Positive for fatigue. Respiratory:  Positive for cough. Negative for shortness of breath. Cardiovascular: Negative. Negative for chest pain. Gastrointestinal: Negative. Negative for abdominal pain, diarrhea and vomiting. Musculoskeletal:  Negative for back pain and neck pain. Skin: Negative. Neurological: Negative. Negative for syncope. Psychiatric/Behavioral: Negative. All other systems reviewed and are negative. 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 He has a past surgical history that includes cervical fusion (1981); Foot surgery (Right, 07/24/2017); Foot surgery (Right, 07/27/2017); Cardiac catheterization; Colonoscopy; Colon surgery; shoulder surgery; hernia repair; and Upper gastrointestinal endoscopy (N/A, 11/25/2020).   His family history includes Cancer in his paternal aunt and sister; Stroke in his father and mother. He reports that he has never smoked. He has never used smokeless tobacco. He reports that he does not drink alcohol and does not use drugs. Medications     Previous Medications    APIXABAN (ELIQUIS) 2.5 MG TABS TABLET    Take 1 tablet by mouth 2 times daily    CARBIDOPA-LEVODOPA (SINEMET)  MG PER TABLET    Take 1 tablet by mouth 2 times daily    CHOLECALCIFEROL (VITAMIN D3) 1.25 MG (00909 UT) CAPS    Take by mouth    DONEPEZIL (ARICEPT) 10 MG TABLET    Take 10 mg by mouth nightly     FINASTERIDE (PROSCAR) 5 MG TABLET    Take 5 mg by mouth nightly    FUROSEMIDE (LASIX) 40 MG TABLET    Take 1 tablet by mouth 2 times daily    INSULIN ASPART PROTAMINE-INSULIN ASPART (NOVOLOG 70/30) (70-30) 100 UNIT/ML INJECTION    Inject 30 Units into the skin daily 26 units -28 units    INSULIN REGULAR (HUMULIN R;NOVOLIN R) 100 UNIT/ML INJECTION    Inject into the skin 3 times daily (before meals) Per sliding scale    LEVOTHYROXINE (SYNTHROID) 75 MCG TABLET    Take 75 mcg by mouth Daily     LOSARTAN (COZAAR) 50 MG TABLET    Take 50 mg by mouth daily    METFORMIN (GLUCOPHAGE) 850 MG TABLET    Take 850 mg by mouth 2 times daily (with meals)    METOPROLOL TARTRATE (LOPRESSOR) 25 MG TABLET    TAKE ONE TABLET BY MOUTH TWICE A DAY    MICONAZOLE (MICOTIN) 2 % POWDER    Apply topically 2 times daily. NITROGLYCERIN (NITROSTAT) 0.4 MG SL TABLET    Place 0.4 mg under the tongue every 5 minutes as needed.       OMEPRAZOLE (PRILOSEC) 20 MG DELAYED RELEASE CAPSULE    Take 20 mg by mouth 2 times daily    POTASSIUM CHLORIDE (KLOR-CON M) 10 MEQ EXTENDED RELEASE TABLET    Take 10 mEq by mouth daily     ROSUVASTATIN (CRESTOR) 20 MG TABLET    Take 1 tablet by mouth daily    SPIRONOLACTONE (ALDACTONE) 25 MG TABLET    Take 0.5 tablets by mouth daily       Allergies     He is allergic to aspirin, nsaids, bactrim [sulfamethoxazole-trimethoprim], celebrex [celecoxib], cymbalta [duloxetine hcl], pcn [penicillins], codeine, and vicodin [hydrocodone-acetaminophen]. Physical Exam     INITIAL VITALS: BP: 118/76, Temp: 97.7 °F (36.5 °C), Heart Rate: 82, Resp: 16, SpO2: 100 %  Physical Exam  Vitals and nursing note reviewed. Constitutional:       General: He is not in acute distress. Appearance: He is ill-appearing. HENT:      Head: Normocephalic and atraumatic. Eyes:      Extraocular Movements: Extraocular movements intact. Pupils: Pupils are equal, round, and reactive to light. Cardiovascular:      Rate and Rhythm: Normal rate and regular rhythm. Pulmonary:      Effort: Pulmonary effort is normal.      Breath sounds: Normal breath sounds. Abdominal:      General: There is no distension. Palpations: Abdomen is soft. Musculoskeletal:         General: Normal range of motion. Cervical back: Neck supple. No tenderness. Comments: No C/T/L midline tenderness. Partial amputation right foot. Ulcerations to bilateral lower legs with mild erythema but no warmth or purulent drainage. There is serosanguinous drainage from a wound to the RLE. Skin:     General: Skin is warm and dry. Neurological:      General: No focal deficit present. Mental Status: He is alert. Mental status is at baseline. Psychiatric:         Mood and Affect: Mood normal.       Diagnostic Results     EKG   Interpreted in conjunction with emergency department physician No att. providers found  Rhythm: atrial fibrillation - controlled  Rate: normal  Axis: normal  Ectopy: premature ventricular contractions (infrequent)  Conduction: normal  ST Segments: no acute change  T Waves: no acute change  Q Waves:nonspecific  Clinical Impression: no acute changes  Comparison:  12/17/21    RADIOLOGY:  CT Head W/O Contrast   Final Result      1. No acute intracranial hemorrhage or mass effect.    2.  Mild nonspecific white matter disease compatible chronic small vessel ischemia. 3.  Chronic sinusitis. XR CHEST 1 VIEW   Final Result      No acute cardiopulmonary findings. CT CSpine W/O Contrast    (Results Pending)       LABS:   Results for orders placed or performed during the hospital encounter of 10/18/22   BMP w/ Reflex to MG   Result Value Ref Range    Sodium 135 (L) 136 - 145 mmol/L    Potassium reflex Magnesium 4.3 3.5 - 5.1 mmol/L    Chloride 96 (L) 99 - 110 mmol/L    CO2 26 21 - 32 mmol/L    Anion Gap 13 3 - 16    Glucose 110 (H) 70 - 99 mg/dL    BUN 23 (H) 7 - 20 mg/dL    Creatinine 1.3 0.8 - 1.3 mg/dL    Est, Glom Filt Rate 55 (A) >60    Calcium 9.2 8.3 - 10.6 mg/dL   CBC with Auto Differential   Result Value Ref Range    WBC 10.1 4.0 - 11.0 K/uL    RBC 4.34 4.20 - 5.90 M/uL    Hemoglobin 10.1 (L) 13.5 - 17.5 g/dL    Hematocrit 31.6 (L) 40.5 - 52.5 %    MCV 72.9 (L) 80.0 - 100.0 fL    MCH 23.3 (L) 26.0 - 34.0 pg    MCHC 31.9 31.0 - 36.0 g/dL    RDW 17.9 (H) 12.4 - 15.4 %    Platelets 594 597 - 629 K/uL    MPV 8.0 5.0 - 10.5 fL    Neutrophils % 82.5 %    Lymphocytes % 6.6 %    Monocytes % 9.0 %    Eosinophils % 1.5 %    Basophils % 0.4 %    Neutrophils Absolute 8.3 (H) 1.7 - 7.7 K/uL    Lymphocytes Absolute 0.7 (L) 1.0 - 5.1 K/uL    Monocytes Absolute 0.9 0.0 - 1.3 K/uL    Eosinophils Absolute 0.2 0.0 - 0.6 K/uL    Basophils Absolute 0.0 0.0 - 0.2 K/uL   Brain Natriuretic Peptide   Result Value Ref Range    Pro-BNP 20,164 (H) 0 - 449 pg/mL       ED BEDSIDE ULTRASOUND:  No results found. RECENT VITALS:  BP: (!) 129/107, Temp: 97.7 °F (36.5 °C), Heart Rate: 77, Resp: 21, SpO2: 99 %     Procedures         ED Course     Nursing Notes, Past Medical Hx,Past Surgical Hx, Social Hx, Allergies, and Family Hx were reviewed. The patient was given the following medications:  No orders of the defined types were placed in this encounter. CONSULTS:  None    MEDICAL DECISION MAKING / ASSESSMENT / Ivette Norma is a 81 y.o. male here for fall. Patient is alert and in no acute distress. Vitals normal.  No objective trauma on exam. EKG is a-fib rate controlled with occasional PVCs. Normal WBC with baseline anemia. Chest x-ray without acute findings. CT head no acute findings. Renal function baseline. Pro-BNP is 20,000. Troponin pending. Covid/flu pending. Urinalysis pending. At this time I am going off service and the Attending Physician will follow-up on pending results and disposition the patient. This patient was also evaluated by the attending physician. All care plans were discussed and agreed upon. Clinical Impression     1. Fall, initial encounter        Disposition     PATIENT REFERRED TO:  No follow-up provider specified.     DISCHARGE MEDICATIONS:  New Prescriptions    No medications on file       DISPOSITION  pending        Kimberly Rodriguez PA-C  10/19/22 0102

## 2022-10-19 NOTE — PROGRESS NOTES
Clinical Pharmacy Progress Note  Medication History    Admit Date: 10/18/2022    List of current medications patient is taking is complete. Home medication list in EPIC updated to reflect changes noted below. Source of information: outpatient fill history, Care Everywhere, and Patient's Wife Aris Gipson ([phone])    Home Pharmacy: [facility] #[id number] ([phone])    Changes made to medication list:   Medications removed:  Vitamin D  Miconazole Powder  Nitrostat  Metformin (PCP stated pt is to take CM Core as a replacement)    Medications added:   Cilostazol  Multivitamin  CM Core    Medication doses adjusted:   Sinemet    Notes:  Medrec and allergies updated based on coversation with Pt wife Aris Gipson. Note pt is on Sinemet ER TID at home. He takes 1 tablet with breakfast, dinner and at bedtime. MD notified. Please call with any questions. Graciela Oneal, PharmD  Main Pharmacy: [id number]  10/19/2022 11:53 AM      No current facility-administered medications on file prior to encounter.      Current Outpatient Medications on File Prior to Encounter   Medication Sig    insulin regular (HUMULIN R;NOVOLIN R) 100 UNIT/ML injection Inject 15-20 Units into the skin daily (with breakfast)    cilostazol (PLETAL) 50 MG tablet Take 50 mg by mouth 2 times daily    Multiple Vitamins-Minerals (THERAPEUTIC MULTIVITAMIN-MINERALS) tablet Take 1 tablet by mouth daily    NONFORMULARY CM CORE - Supplement Once daily    metoprolol tartrate (LOPRESSOR) 25 MG tablet TAKE ONE TABLET BY MOUTH TWICE A DAY    omeprazole (PRILOSEC) 20 MG delayed release capsule Take 20 mg by mouth 2 times daily    furosemide (LASIX) 40 MG tablet Take 1 tablet by mouth 2 times daily    spironolactone (ALDACTONE) 25 MG tablet Take 0.5 tablets by mouth daily    finasteride (PROSCAR) 5 MG tablet Take 5 mg by mouth nightly    losartan (COZAAR) 50 MG tablet Take 50 mg by mouth daily    insulin aspart protamine-insulin aspart (NOVOLOG 70/30) (70-30) 100 UNIT/ML injection Inject 25-30 Units into the skin every morning Dependent on BG    carbidopa-levodopa ER (SINEMET - CR)  MG per tablet Take 1 tablet by mouth 3 times daily With breakfast, Dinner and Bedtime    levothyroxine (SYNTHROID) 75 MCG tablet Take 75 mcg by mouth Daily     donepezil (ARICEPT) 10 MG tablet Take 10 mg by mouth nightly     potassium chloride (KLOR-CON M) 10 MEQ extended release tablet Take 10 mEq by mouth daily     rosuvastatin (CRESTOR) 20 MG tablet Take 1 tablet by mouth daily      Allergies   Allergen Reactions    Aspirin      GI  Bleed possible related to aspirin  (but thought to be H Pylori)    Nsaids     Bactrim [Sulfamethoxazole-Trimethoprim] Other (See Comments)     arf    Celebrex [Celecoxib]      tremors    Cymbalta [Duloxetine Hcl]      Mental status change    Pcn [Penicillins]      rash    Codeine Nausea And Vomiting    Vicodin [Hydrocodone-Acetaminophen] Nausea And Vomiting

## 2022-10-19 NOTE — ED NOTES
Inner dry placed in the folds between pts abdomen and thigh.       Sunday Holiday, RN  10/19/22 7165

## 2022-10-19 NOTE — PROGRESS NOTES
Internal Medicine Progress Note    Patient Name: Ren Box   Patient : 1940   Date: 10/19/2022   Admit Date: 10/18/2022     CC: Fall       Interval History: Patient seen and examined at bedside. Patient adamantly refusing Eliquis/blood thinners due to risk of bleeding. Patient understands risks of stroke       HPI: \"Reckers Leopoldo Roles 82 y/o M with pmhx of HFmEF (45%), COPD Parkinson's, SARIKA not on CPAP HTN HLD T2DM presented to the ED with shortness of breath and a fall. Patient tripped and fell in the bathroom resulting in a bruise in the left knee. Patient endorsed not feeling well for couple of days and has developed productive cough with minimal sputum production. He also endorsed worsening shortness of breath throughout this time, which has worsened his ability to ambulate. Patient said he has chest pain that has been there for a some time but could not describe the quality or intensity of the pain but describes it he feels  over the entire chest area. Patient denied any headaches dizziness, diarrhea vomiting or urinary symptoms. In the ED vital signs were stable. Labs were significant for mild hyponatremia 135 and elevated proBNP 98819 and troponin 0.02. Chest x-ray and CT head without contrast were normal.  Patient received 40 mg Lasix IV once in the ED and admitted for further management of heart failure exacerbation. \"     ROS:  As per interval history above. Vital Signs:  Patient Vitals for the past 8 hrs:   BP Pulse Resp SpO2 Height Weight   10/19/22 1228 -- -- -- -- 6' (1.829 m) --   10/19/22 0800 120/71 80 19 97 % -- --   10/19/22 0730 (!) 120/47 68 27 95 % -- 239 lb 4.8 oz (108.5 kg)   10/19/22 0630 112/64 75 19 94 % -- --   10/19/22 0600 (!) 111/59 73 23 91 % -- --       Physical Exam:  Physical Exam  Constitutional:       Appearance: He is obese. HENT:      Head: Normocephalic and atraumatic. Eyes:      Extraocular Movements: Extraocular movements intact. Cardiovascular:      Rate and Rhythm: Normal rate. Rhythm irregular. Pulmonary:      Effort: Pulmonary effort is normal.      Breath sounds: Normal breath sounds. Abdominal:      General: Abdomen is flat. Palpations: Abdomen is soft. Musculoskeletal:      Comments: Partial R foot amputation    Skin:     General: Skin is warm. Neurological:      Mental Status: He is alert. Comments: Tremor       Intake/Output Summary (Last 24 hours) at 10/19/2022 1348  Last data filed at 10/19/2022 0832  Gross per 24 hour   Intake --   Output 650 ml   Net -650 ml        Medications:             Labs:  CBC:   Recent Labs     10/19/22  0009   WBC 10.1   HGB 10.1*   HCT 31.6*      MCV 72.9*       Renal:    Recent Labs     10/19/22  0009   *   K 4.3   CL 96*   CO2 26   BUN 23*   CREATININE 1.3   GLUCOSE 110*   CALCIUM 9.2   ANIONGAP 13       Hepatic: No results for input(s): AST, ALT, BILITOT, BILIDIR, PROT, LABALBU, ALKPHOS in the last 72 hours. Troponin: Invalid input(s): TROPONIN    Lactic acid: Invalid input(s): LACTICACID    BNP: No results for input(s): BNP in the last 72 hours. Pro-BNP:   Recent Labs     10/19/22  0009   PROBNP 20,164*       Lipids: No results for input(s): CHOL, TRIG, HDL, LDLCALC, VLDL in the last 72 hours. ABGs:  No results for input(s): PHART, SYR7DAV, PO2ART, IWI5ARC, BEART, THGBART, N7FZDYVT, CDU3NXS in the last 72 hours. VBGs: No results for input(s): PHVEN, MEJ5HHG, PO2VEN in the last 72 hours. INR: No results for input(s): INR in the last 72 hours. aPTT: No results for input(s): APTT in the last 72 hours. Procalcitonin: No results for input(s): PROCAL in the last 72 hours. CRP: No results for input(s): CRP in the last 72 hours. ESR: No results for input(s): ESR in the last 72 hours. Radiology:  CT CSpine W/O Contrast   Final Result      1. No evidence of cervical spine fracture.    2.  Severe multilevel degenerative changes resulting in spinal stenosis. 3.  Indeterminate nodule in the left upper lobe. Follow-up chest CT in 3 months. CT Head W/O Contrast   Final Result      1. No acute intracranial hemorrhage or mass effect. 2.  Mild nonspecific white matter disease compatible chronic small vessel ischemia. 3.  Chronic sinusitis. XR CHEST 1 VIEW   Final Result      No acute cardiopulmonary findings. Assessment and Plan:  Fall  Possible causes: autonomic dysfunction 2/2 Parkinson's disease, T2DM, weakness, R feet amputation. Denied LOC, dizziness and said that he just feels weak. - Orthostatics   - PT/OT   - Tele  - F/u vitamin B12, D, folate, iron studies and TSH  -CK     Acute on chronic HFrEF   Fluid overload with +3 LE edema and mild bilateral crackles. C/o SOB. Last echo (08/11/2017): EF: 40-45 %, G2DD. Follows Dr. Ulysses Roll outpatient. ProBNP 20,164 (prior 3,807). CXR showing no acute abnormalities. EKG with no ST segments changes .  - Started Lasix 40 mg twice daily  - Strict I's and O's  - Daily weights  - Cardiac diet  - Cardiology consulted, appreciate recommendation  - Consulted heart failure nurse  - Tele  - EKG   - BNP every 3rd day  - Echo  - Trop Q6hr x2     A fib  - Patient does not take home Eliquis 2.5 mg BID,   continue Lopressor 25 mg BID   - F/u EKG    -Patient adamantly refusing Eliquis/blood thinners due to risk of bleeding. Patient understands risks of stroke     HTN  VS stable. - Continue home Losartan 50 mg daily, Spironolactone 12.5 mg daily and Lopressor 25 mg BID      Hx of PE  Stable. - Patient does not take home Eliquis 2.5 mg BID   -Patient adamantly refusing Eliquis/blood thinners due to risk of bleeding.  Patient understands risks of stroke        T2DM  HbA1c (08/05/17): 8.7%.  - MSSI  - Hypoglycemia protocol  - POCT glucose  - Low carb diet  - F/u hemoglobin A1c   - Daily BMP  - Lantus 15 U nightly     Parkinson's disease  - Continue home Carbidopa-Levodopa  mg TID and Donepezil 10 mg daily     Hypothyroidism   TSH (08/15/17): 7.97 (elevated), Free T4 1.2.  - F/u TSH  - Continue home Levothyroxine 75 mcg daily      Chronic microcytic anemia  Hemoglobin 10.1 (unchanged from previously). - F/u iron studies      DVT PPx: DNR-CC  Diet:  ADULT DIET; Regular; 3 carb choices (45 gm/meal); Low Sodium (2 gm)   Code status:  DNR-CC     ELOS:  Barriers to discharge:  Disposition  - Preadmission:  - Current:  - Upon discharge: Will discuss with attending physician Dr. Garo Pabon MD.     Ronel Dietz MD  Internal Medicine, PGY-1  Patient seen and examined, labs and imaging studies reviewed, agree with assessment and plan as outlined above. Continue with current care and plan. Discussed case with patients nurse, discussed case with care team, discussed plan. Reviewed full h and p see residents full h and p.       Garo Pabon MD [address]

## 2022-10-20 LAB
ANION GAP SERPL CALCULATED.3IONS-SCNC: 12 MMOL/L (ref 3–16)
BASOPHILS ABSOLUTE: 0 K/UL (ref 0–0.2)
BASOPHILS RELATIVE PERCENT: 0.5 %
BUN BLDV-MCNC: 20 MG/DL (ref 7–20)
CALCIUM SERPL-MCNC: 9 MG/DL (ref 8.3–10.6)
CHLORIDE BLD-SCNC: 97 MMOL/L (ref 99–110)
CHOLESTEROL, TOTAL: 103 MG/DL (ref 0–199)
CO2: 26 MMOL/L (ref 21–32)
CREAT SERPL-MCNC: 1.2 MG/DL (ref 0.8–1.3)
EKG ATRIAL RATE: 69 BPM
EKG DIAGNOSIS: NORMAL
EKG Q-T INTERVAL: 422 MS
EKG QRS DURATION: 92 MS
EKG QTC CALCULATION (BAZETT): 442 MS
EKG R AXIS: 99 DEGREES
EKG T AXIS: -51 DEGREES
EKG VENTRICULAR RATE: 66 BPM
EOSINOPHILS ABSOLUTE: 0.2 K/UL (ref 0–0.6)
EOSINOPHILS RELATIVE PERCENT: 2.6 %
ESTIMATED AVERAGE GLUCOSE: 134.1 MG/DL
FOLATE: 7.49 NG/ML (ref 4.78–24.2)
GFR SERPL CREATININE-BSD FRML MDRD: >60 ML/MIN/{1.73_M2}
GLUCOSE BLD-MCNC: 130 MG/DL (ref 70–99)
GLUCOSE BLD-MCNC: 140 MG/DL (ref 70–99)
GLUCOSE BLD-MCNC: 179 MG/DL (ref 70–99)
GLUCOSE BLD-MCNC: 185 MG/DL (ref 70–99)
GLUCOSE BLD-MCNC: 186 MG/DL (ref 70–99)
HBA1C MFR BLD: 6.3 %
HCT VFR BLD CALC: 28.4 % (ref 40.5–52.5)
HDLC SERPL-MCNC: 25 MG/DL (ref 40–60)
HEMOGLOBIN: 9.1 G/DL (ref 13.5–17.5)
LDL CHOLESTEROL CALCULATED: 58 MG/DL
LYMPHOCYTES ABSOLUTE: 0.7 K/UL (ref 1–5.1)
LYMPHOCYTES RELATIVE PERCENT: 9.3 %
MAGNESIUM: 1.7 MG/DL (ref 1.8–2.4)
MCH RBC QN AUTO: 23.1 PG (ref 26–34)
MCHC RBC AUTO-ENTMCNC: 32.1 G/DL (ref 31–36)
MCV RBC AUTO: 71.9 FL (ref 80–100)
MONOCYTES ABSOLUTE: 0.9 K/UL (ref 0–1.3)
MONOCYTES RELATIVE PERCENT: 11.5 %
NEUTROPHILS ABSOLUTE: 5.8 K/UL (ref 1.7–7.7)
NEUTROPHILS RELATIVE PERCENT: 76.1 %
PDW BLD-RTO: 18.2 % (ref 12.4–15.4)
PERFORMED ON: ABNORMAL
PLATELET # BLD: ABNORMAL K/UL (ref 135–450)
PLATELET SLIDE REVIEW: ABNORMAL
PMV BLD AUTO: ABNORMAL FL (ref 5–10.5)
POTASSIUM SERPL-SCNC: 3.8 MMOL/L (ref 3.5–5.1)
RBC # BLD: 3.95 M/UL (ref 4.2–5.9)
SODIUM BLD-SCNC: 135 MMOL/L (ref 136–145)
TRIGL SERPL-MCNC: 101 MG/DL (ref 0–150)
VITAMIN B-12: 419 PG/ML (ref 211–911)
VITAMIN D 25-HYDROXY: 29.5 NG/ML
VLDLC SERPL CALC-MCNC: 20 MG/DL
WBC # BLD: 7.6 K/UL (ref 4–11)

## 2022-10-20 PROCEDURE — 6360000002 HC RX W HCPCS

## 2022-10-20 PROCEDURE — 2580000003 HC RX 258

## 2022-10-20 PROCEDURE — 83735 ASSAY OF MAGNESIUM: CPT

## 2022-10-20 PROCEDURE — 80061 LIPID PANEL: CPT

## 2022-10-20 PROCEDURE — 93010 ELECTROCARDIOGRAM REPORT: CPT | Performed by: INTERNAL MEDICINE

## 2022-10-20 PROCEDURE — 1200000000 HC SEMI PRIVATE

## 2022-10-20 PROCEDURE — 6370000000 HC RX 637 (ALT 250 FOR IP)

## 2022-10-20 PROCEDURE — 6370000000 HC RX 637 (ALT 250 FOR IP): Performed by: INTERNAL MEDICINE

## 2022-10-20 PROCEDURE — 99233 SBSQ HOSP IP/OBS HIGH 50: CPT | Performed by: INTERNAL MEDICINE

## 2022-10-20 PROCEDURE — 80048 BASIC METABOLIC PNL TOTAL CA: CPT

## 2022-10-20 PROCEDURE — 36415 COLL VENOUS BLD VENIPUNCTURE: CPT

## 2022-10-20 PROCEDURE — 85025 COMPLETE CBC W/AUTO DIFF WBC: CPT

## 2022-10-20 RX ORDER — METOPROLOL SUCCINATE 25 MG/1
25 TABLET, EXTENDED RELEASE ORAL NIGHTLY
Status: DISCONTINUED | OUTPATIENT
Start: 2022-10-20 | End: 2022-10-25 | Stop reason: HOSPADM

## 2022-10-20 RX ORDER — MAGNESIUM SULFATE IN WATER 40 MG/ML
2000 INJECTION, SOLUTION INTRAVENOUS ONCE
Status: COMPLETED | OUTPATIENT
Start: 2022-10-20 | End: 2022-10-20

## 2022-10-20 RX ADMIN — FINASTERIDE 5 MG: 5 TABLET, FILM COATED ORAL at 22:38

## 2022-10-20 RX ADMIN — FUROSEMIDE 40 MG: 10 INJECTION, SOLUTION INTRAMUSCULAR; INTRAVENOUS at 08:45

## 2022-10-20 RX ADMIN — LOSARTAN POTASSIUM 50 MG: 50 TABLET, FILM COATED ORAL at 08:45

## 2022-10-20 RX ADMIN — METOPROLOL TARTRATE 25 MG: 25 TABLET, FILM COATED ORAL at 08:45

## 2022-10-20 RX ADMIN — DONEPEZIL HYDROCHLORIDE 10 MG: 10 TABLET, FILM COATED ORAL at 22:26

## 2022-10-20 RX ADMIN — PANTOPRAZOLE SODIUM 40 MG: 40 TABLET, DELAYED RELEASE ORAL at 17:24

## 2022-10-20 RX ADMIN — FUROSEMIDE 40 MG: 10 INJECTION, SOLUTION INTRAMUSCULAR; INTRAVENOUS at 17:15

## 2022-10-20 RX ADMIN — SODIUM CHLORIDE, PRESERVATIVE FREE 10 ML: 5 INJECTION INTRAVENOUS at 08:51

## 2022-10-20 RX ADMIN — SPIRONOLACTONE 12.5 MG: 25 TABLET ORAL at 08:44

## 2022-10-20 RX ADMIN — CARBIDOPA AND LEVODOPA 1 TABLET: 25; 100 TABLET, EXTENDED RELEASE ORAL at 14:01

## 2022-10-20 RX ADMIN — MICONAZOLE NITRATE: 2 POWDER TOPICAL at 08:48

## 2022-10-20 RX ADMIN — CARBIDOPA AND LEVODOPA 1 TABLET: 25; 100 TABLET, EXTENDED RELEASE ORAL at 10:00

## 2022-10-20 RX ADMIN — PANTOPRAZOLE SODIUM 40 MG: 40 TABLET, DELAYED RELEASE ORAL at 06:09

## 2022-10-20 RX ADMIN — CILOSTAZOL 50 MG: 50 TABLET ORAL at 08:45

## 2022-10-20 RX ADMIN — ROSUVASTATIN CALCIUM 20 MG: 20 TABLET, FILM COATED ORAL at 08:45

## 2022-10-20 RX ADMIN — CARBIDOPA AND LEVODOPA 1 TABLET: 25; 100 TABLET, EXTENDED RELEASE ORAL at 22:27

## 2022-10-20 RX ADMIN — INSULIN GLARGINE 15 UNITS: 100 INJECTION, SOLUTION SUBCUTANEOUS at 21:59

## 2022-10-20 RX ADMIN — LEVOTHYROXINE SODIUM 75 MCG: 75 TABLET ORAL at 06:10

## 2022-10-20 RX ADMIN — SODIUM CHLORIDE, PRESERVATIVE FREE 10 ML: 5 INJECTION INTRAVENOUS at 22:27

## 2022-10-20 RX ADMIN — MICONAZOLE NITRATE: 2 POWDER TOPICAL at 22:27

## 2022-10-20 RX ADMIN — METOPROLOL SUCCINATE 25 MG: 25 TABLET, FILM COATED, EXTENDED RELEASE ORAL at 22:26

## 2022-10-20 RX ADMIN — MAGNESIUM SULFATE HEPTAHYDRATE 2000 MG: 40 INJECTION, SOLUTION INTRAVENOUS at 14:17

## 2022-10-20 RX ADMIN — CILOSTAZOL 50 MG: 50 TABLET ORAL at 22:28

## 2022-10-20 NOTE — PROGRESS NOTES
Internal Medicine Progress Note    Patient Name: Cassia Leary   Patient : 1940   Date: 10/20/2022   Admit Date: 10/18/2022     CC: Fall       Interval History: Patient seen and examined at bedside. No current complaints. Patient adamantly refusing Eliquis/blood thinners due to risk of bleeding. Patient understands risks of stroke       HPI: \"Reckers Coty Childress 80 y/o M with pmhx of HFmEF (45%), COPD Parkinson's, SARIKA not on CPAP HTN HLD T2DM presented to the ED with shortness of breath and a fall. Patient tripped and fell in the bathroom resulting in a bruise in the left knee. Patient endorsed not feeling well for couple of days and has developed productive cough with minimal sputum production. He also endorsed worsening shortness of breath throughout this time, which has worsened his ability to ambulate. Patient said he has chest pain that has been there for a some time but could not describe the quality or intensity of the pain but describes it he feels  over the entire chest area. Patient denied any headaches dizziness, diarrhea vomiting or urinary symptoms. In the ED vital signs were stable. Labs were significant for mild hyponatremia 135 and elevated proBNP 11855 and troponin 0.02. Chest x-ray and CT head without contrast were normal.  Patient received 40 mg Lasix IV once in the ED and admitted for further management of heart failure exacerbation. \"     ROS:  As per interval history above. Vital Signs:  Patient Vitals for the past 8 hrs:   BP Temp Temp src Pulse Resp SpO2 Weight   10/20/22 0810 129/69 97.8 °F (36.6 °C) Oral 80 -- 92 % --   10/20/22 0330 113/66 98.1 °F (36.7 °C) Oral 69 18 93 % 226 lb 3.1 oz (102.6 kg)       Physical Exam:  Physical Exam  Constitutional:       Appearance: He is obese. HENT:      Head: Normocephalic and atraumatic. Eyes:      Extraocular Movements: Extraocular movements intact. Cardiovascular:      Rate and Rhythm: Normal rate. Rhythm irregular. degenerative changes resulting in spinal stenosis. 3.  Indeterminate nodule in the left upper lobe. Follow-up chest CT in 3 months. CT Head W/O Contrast   Final Result      1. No acute intracranial hemorrhage or mass effect. 2.  Mild nonspecific white matter disease compatible chronic small vessel ischemia. 3.  Chronic sinusitis. XR CHEST 1 VIEW   Final Result      No acute cardiopulmonary findings. Assessment and Plan:  Fall  Possible causes: autonomic dysfunction 2/2 Parkinson's disease, T2DM, weakness, R feet amputation. Denied LOC, dizziness and said that he just feels weak. CT negative for acute findings from fall  - Orthostatics   - PT/OT   - Tele  - F/u vitamin B12, D, folate, iron studies and TSH  -CK: WNL     Acute on chronic HFrEF   Fluid overload with +3 LE edema and mild bilateral crackles. C/o SOB. Last echo (08/11/2017): EF: 40-45 %, G2DD. Follows Dr. Colette Moreno outpatient. ProBNP 20,164 (prior 3,807). CXR showing no acute abnormalities. EKG with no ST segments changes .  - Started Lasix 40 mg twice daily  - Strict I's and O's  - Daily weights  - Cardiac diet  - Cardiology consulted, appreciate recommendation  - Consulted heart failure nurse  - Tele  - EKG   - BNP every 3rd day  - Echo: \" Overall left ventricular systolic function appears mildly reduced with an   ejection fraction of 40-45%. \"  - Trop Q6hr x2     A fib  - Patient does not take home Eliquis 2.5 mg BID,   continue Lopressor 25 mg BID (switched to Toprol XL)  - F/u EKG    -Patient adamantly refusing Eliquis/blood thinners due to risk of bleeding. Patient understands risks of stroke  -switched to Toprol XL     HTN  VS stable. - Continue home Losartan 50 mg daily, Spironolactone 12.5 mg daily and Lopressor 25 mg BID (switched to Toprol XL)     Hx of PE  Stable. - Patient does not take home Eliquis 2.5 mg BID   -Patient adamantly refusing Eliquis/blood thinners due to risk of bleeding.  Patient understands risks of stroke        T2DM  HbA1c (08/05/17): 8.7%.  - MSSI  - Hypoglycemia protocol  - POCT glucose  - Low carb diet  - F/u hemoglobin A1c   - Daily BMP  - Lantus 15 U nightly     Parkinson's disease  - Continue home Carbidopa-Levodopa  mg TID and Donepezil 10 mg daily     Hypothyroidism   TSH (08/15/17): 7.97 (elevated), Free T4 1.2.  - F/u TSH  - Continue home Levothyroxine 75 mcg daily      Chronic microcytic anemia  Hemoglobin 10.1 (unchanged from previously). - F/u iron studies      DVT PPx: Patient refusing blood thinners, SCD   Diet:  ADULT DIET; Regular; 3 carb choices (45 gm/meal); Low Fat/Low Chol/High Fiber/2 gm Na   Code status:  DNR-CC   ELOS: >1 night  Disposition: GMF    Will discuss with attending physician Dr. Herminia Dan MD.     Irene Mauro MD  Internal Medicine, PGY-1    Patient seen and examined, labs and imaging studies reviewed, agree with assessment and plan as outlined above. Continue with current care and plan. Discussed case with patients nurse, discussed case with care team, discussed plan.       Herminia Dan MD [address]

## 2022-10-20 NOTE — PROGRESS NOTES
The [address] [place] [address]  Cardiology Daily Progress Note  10/20/2022,3:47 PM      Aixa Pitts MD ,Ancelmo Philip MD  Primary cardiologist:    Admit Date:  10/18/2022  Hospital Day: Hospital Day: 3  Subjective:  Mr. Devyn Constantino is awake and alert and generally doing better. Feels that he is breathing better. His fibrillation rate is 78/min. And he has had a decent diuresis. Does not want anticoagulation. Should consider Watchman    Objective:   /75   Pulse 79   Temp 97.5 °F (36.4 °C) (Oral)   Resp 18   Ht 6' (1.829 m)   Wt 226 lb 3.1 oz (102.6 kg)   SpO2 94%   BMI 30.68 kg/m²     Intake/Output Summary (Last 24 hours) at 10/20/2022 1547  Last data filed at 10/20/2022 0610  Gross per 24 hour   Intake 610 ml   Output 750 ml   Net -140 ml       TELEMETRY: Atrial fibrillation 78     Physical Examination:    Vitals:    10/20/22 0330 10/20/22 0810 10/20/22 1221 10/20/22 1252   BP: 113/66 129/69 137/75    Pulse: 69 80 79    Resp: 18  18    Temp: 98.1 °F (36.7 °C) 97.8 °F (36.6 °C) 97.5 °F (36.4 °C)    TempSrc: Oral Oral Oral    SpO2: 93% 92% 94%    Weight: 226 lb 3.1 oz (102.6 kg)      Height:    6' (1.829 m)        Constitutional and General Appearance:  Healthy. And alert . HEENT: eyes and ears intact. No nasal masses  THYROID: not enlarged  LUNGS:  Clear to auscultation and percussion  HEART and VASCULAR:  The apical impulses not displaced  Heart tones are crisp and normal  Cervical veins are not engorged  The carotid upstroke is normal in amplitude and contour without delay or bruit  Peripheral pulses are symmetrical and full  There is no clubbing, cyanosis of the extremities. No peripheral edema  Femoral Arteries: 2+ and equal  Pedal Pulses: 2+ and equal   ABDOMEN[de-identified]  No masses or tenderness  Liver/Spleen: No Abnormalities Noted  NEUROLOGICAL:  . Moves all extremities to command. Cranial nerves 2-12 are in tact.   PSYCHIATRIC: alert and lucid and oriented and appropriate  SKIN: No lesions or rashes  LYMPH NODES: none enlarged      Medications:    metoprolol succinate  25 mg Oral Nightly    magnesium sulfate  2,000 mg IntraVENous Once    donepezil  10 mg Oral Nightly    finasteride  5 mg Oral Nightly    levothyroxine  75 mcg Oral Daily    losartan  50 mg Oral Daily    miconazole   Topical BID    pantoprazole  40 mg Oral BID AC    rosuvastatin  20 mg Oral Daily    spironolactone  12.5 mg Oral Daily    sodium chloride flush  5-40 mL IntraVENous 2 times per day    insulin lispro  0-8 Units SubCUTAneous TID WC    insulin lispro  0-4 Units SubCUTAneous Nightly    insulin glargine  15 Units SubCUTAneous Nightly    furosemide  40 mg IntraVENous BID    carbidopa-levodopa  1 tablet Oral TID    cilostazol  50 mg Oral BID      sodium chloride      dextrose       sodium chloride flush, sodium chloride, ondansetron **OR** ondansetron, polyethylene glycol, acetaminophen **OR** acetaminophen, glucose, dextrose bolus **OR** dextrose bolus, glucagon (rDNA), dextrose    Lab Data:  CBC:   Recent Labs     10/19/22  0009 10/20/22  0800   WBC 10.1 7.6   HGB 10.1* 9.1*   HCT 31.6* 28.4*   MCV 72.9* 71.9*    see below     BMP:   Recent Labs     10/19/22  0009 10/20/22  0801   * 135*   K 4.3 3.8   CL 96* 97*   CO2 26 26   BUN 23* 20   CREATININE 1.3 1.2     Troponin:Troponin:   Lab Results   Component Value Date/Time    TROPONINI 0.02 10/19/2022 06:03 PM    TROPONINI 0.02 10/19/2022 06:03 PM     LIVER PROFILE: No results for input(s): AST, ALT, LIPASE, BILIDIR, BILITOT, ALKPHOS in the last 72 hours. Invalid input(s): AMYLASE,  ALB  PT/INR: No results for input(s): PROTIME, INR in the last 72 hours. APTT: No results for input(s): APTT in the last 72 hours. BNP:  No results for input(s): BNP in the last 72 hours.   IMAGING: as noted    Assessment:  Patient Active Problem List    Diagnosis Date Noted    Essential hypertension     Mixed hyperlipidemia     LULÚ (acute kidney injury) ([address]     Acute diastolic CHF (congestive heart failure), NYHA class 2 ([address]     Right foot infection     Heart failure ([address] 10/19/2022    Fall 10/19/2022    Permanent atrial fibrillation ([address]     Varicose veins of right lower extremity with inflammation, with ulcer of calf with fat layer exposed ([address] 05/04/2021    Acute on chronic combined systolic and diastolic congestive heart failure ([address] 04/12/2021    Community acquired pneumonia of left lower lobe of lung     Posterior epistaxis     Epistaxis 03/09/2018    Cellulitis of right lower extremity     History of MRSA infection     Cellulitis 01/06/2018    Encephalopathy 08/07/2017    Gas gangrene ([address] 07/25/2017    Diabetic infection of right foot ([address] 07/25/2017    Diabetic polyneuropathy associated with type 2 diabetes mellitus ([address] 07/25/2017    Obstructive sleep apnea 10/03/2014    Hypothyroid 07/24/2014    Syncope 03/25/2014    Sepsis ([address] 01/27/2012    Pulmonary embolism ([address] 01/09/2012    Coronary artery disease involving native heart without angina pectoris 01/09/2012    Presence of drug coated stent in anterior descending branch of left coronary artery 01/09/2012    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 Core Measures:  Discharge instructions:   LVEF documented:   ACEI for LV dysfunction:   CHF cardiomyopathy. Echo showed ejection fraction of 35 to 45%. He is having a good diuresis. Still having the tremors and may be some degree of early Parkinson's disease. Continue diuresis. Fluid was and salt restriction. No anticoagulation per patient demand. Should consider Watchman. Will discuss with Dr Kenyon Corral  Thanks for allowing me  the opportunity to participate in the evaluation and care of your patient.  If there are questions please call me [phone]    This note was likely completed using voice recognition technology and may contain unintended grammatical, phraseology and/or punctuation  errors      Rico Eagle MD ,[facility]  10/20/2022 3:47 PM

## 2022-10-20 NOTE — PROGRESS NOTES
Nutrition Assessment     Type and Reason for Visit: Initial, Positive Nutrition Screen    Nutrition Recommendations/Plan:   Continue 3 carb choice, low fat/low chol/ high fiber/ 2gm Na diet     Malnutrition Assessment:  Malnutrition Status: No malnutrition    Nutrition Assessment:  MST 2. Pt was seen in room and reports good intake and appetite. Pt reports appetite was poor PTA and experienced wt loss due to shortness of breath. Pt states his appetite has returned and expresses no concerns with chewing, swallowing, or GI tolerance. Will return on 10/26 for diet education    Estimated Daily Nutrient Needs:  Energy (kcal):  5064 - 1847 (15-18 kcal/kg) Weight Used for Energy Requirements: Current     Protein (g):  97 - 113 (1.2 - 1.4 g/kg) Weight Used for Protein Requirements: Ideal        Fluid (ml/day):  1539 - 1847 (1ml/kcal) Method Used for Fluid Requirements: 1 ml/kcal    Nutrition Related Findings:   +1 pitting BLE edema. Glucose 130. Wound Type: Venous Stasis    Current Nutrition Therapies:    ADULT DIET; Regular; 3 carb choices (45 gm/meal);  Low Fat/Low Chol/High Fiber/2 gm Na    Anthropometric Measures:  Height: 6' (182.9 cm)  Current Body Wt: 226 lb 3.1 oz (102.6 kg)   BMI: 30.7    Nutrition Diagnosis:   No nutrition diagnosis at this time related to   as evidenced by      Nutrition Interventions:   Food and/or Nutrient Delivery: Continue Current Diet  Nutrition Education/Counseling: Education needed (already consulted for HF diet ed)  Coordination of Nutrition Care: Continue to monitor while inpatient       Goals:     Goals: Meet at least 75% of estimated needs, prior to discharge       Nutrition Monitoring and Evaluation:   Behavioral-Environmental Outcomes: None Identified  Food/Nutrient Intake Outcomes: Food and Nutrient Intake  Physical Signs/Symptoms Outcomes: Biochemical Data, GI Status, Weight, Nutrition Focused Physical Findings    Discharge Planning:    No discharge needs at this time     Mansi Ayers [address] [facility], [address] [place] [address]  Contact: [phone]

## 2022-10-20 NOTE — PROCEDURES
Internal Medicine Progress Note    Patient Name: Wilmer Moon   Patient : 1940   Date: 10/20/2022   Admit Date: 10/18/2022     CC: Fall       Interval History: Patient seen and examined at bedside. No current complaints. Patient adamantly refusing Eliquis/blood thinners due to risk of bleeding. Patient understands risks of stroke       HPI: \"Shilo Mcdonald Pedrito 80 y/o M with pmhx of HFmEF (45%), COPD Parkinson's, SARIKA not on CPAP HTN HLD T2DM presented to the ED with shortness of breath and a fall. Patient tripped and fell in the bathroom resulting in a bruise in the left knee. Patient endorsed not feeling well for couple of days and has developed productive cough with minimal sputum production. He also endorsed worsening shortness of breath throughout this time, which has worsened his ability to ambulate. Patient said he has chest pain that has been there for a some time but could not describe the quality or intensity of the pain but describes it he feels  over the entire chest area. Patient denied any headaches dizziness, diarrhea vomiting or urinary symptoms. In the ED vital signs were stable. Labs were significant for mild hyponatremia 135 and elevated proBNP 87244 and troponin 0.02. Chest x-ray and CT head without contrast were normal.  Patient received 40 mg Lasix IV once in the ED and admitted for further management of heart failure exacerbation. \"     ROS:  As per interval history above. Vital Signs:  Patient Vitals for the past 8 hrs:   BP Temp Temp src Pulse Resp SpO2 Weight   10/20/22 0810 129/69 97.8 °F (36.6 °C) Oral 80 -- 92 % --   10/20/22 0330 113/66 98.1 °F (36.7 °C) Oral 69 18 93 % 226 lb 3.1 oz (102.6 kg)       Physical Exam:  Physical Exam  Constitutional:       Appearance: He is obese. HENT:      Head: Normocephalic and atraumatic. Eyes:      Extraocular Movements: Extraocular movements intact. Cardiovascular:      Rate and Rhythm: Normal rate. Rhythm irregular. Pulmonary:      Effort: Pulmonary effort is normal.      Breath sounds: Normal breath sounds. Abdominal:      General: Abdomen is flat. Palpations: Abdomen is soft. Musculoskeletal:      Comments: Partial R foot amputation    Skin:     General: Skin is warm. Neurological:      Mental Status: He is alert. Comments: Tremor       Intake/Output Summary (Last 24 hours) at 10/20/2022 1124  Last data filed at 10/20/2022 0610  Gross per 24 hour   Intake 610 ml   Output 1750 ml   Net -1140 ml        Medications:             Labs:  CBC:   Recent Labs     10/19/22  0009 10/20/22  0800   WBC 10.1 7.6   HGB 10.1* 9.1*   HCT 31.6* 28.4*    see below   MCV 72.9* 71.9*       Renal:    Recent Labs     10/19/22  0009 10/20/22  0801   * 135*   K 4.3 3.8   CL 96* 97*   CO2 26 26   BUN 23* 20   CREATININE 1.3 1.2   GLUCOSE 110* 130*   CALCIUM 9.2 9.0   MG  --  1.70*   ANIONGAP 13 12       Hepatic: No results for input(s): AST, ALT, BILITOT, BILIDIR, PROT, LABALBU, ALKPHOS in the last 72 hours. Troponin: Invalid input(s): TROPONIN    Lactic acid: Invalid input(s): LACTICACID    BNP: No results for input(s): BNP in the last 72 hours. Pro-BNP:   Recent Labs     10/19/22  0009   PROBNP 20,164*       Lipids: No results for input(s): CHOL, TRIG, HDL, LDLCALC, VLDL in the last 72 hours. ABGs:  No results for input(s): PHART, YGQ5EZK, PO2ART, OSR8BMN, BEART, THGBART, Q5CDZSPG, FUC3VES in the last 72 hours. VBGs: No results for input(s): PHVEN, ANF3COH, PO2VEN in the last 72 hours. INR: No results for input(s): INR in the last 72 hours. aPTT: No results for input(s): APTT in the last 72 hours. Procalcitonin: No results for input(s): PROCAL in the last 72 hours. CRP: No results for input(s): CRP in the last 72 hours. ESR: No results for input(s): ESR in the last 72 hours. Radiology:  CT CSpine W/O Contrast   Final Result      1. No evidence of cervical spine fracture.    2.  Severe multilevel degenerative changes resulting in spinal stenosis. 3.  Indeterminate nodule in the left upper lobe. Follow-up chest CT in 3 months. CT Head W/O Contrast   Final Result      1. No acute intracranial hemorrhage or mass effect. 2.  Mild nonspecific white matter disease compatible chronic small vessel ischemia. 3.  Chronic sinusitis. XR CHEST 1 VIEW   Final Result      No acute cardiopulmonary findings. Assessment and Plan:  Fall  Possible causes: autonomic dysfunction 2/2 Parkinson's disease, T2DM, weakness, R feet amputation. Denied LOC, dizziness and said that he just feels weak. CT negative for acute findings from fall  - Orthostatics   - PT/OT   - Tele  - F/u vitamin B12, D, folate, iron studies and TSH  -CK: WNL     Acute on chronic HFrEF   Fluid overload with +3 LE edema and mild bilateral crackles. C/o SOB. Last echo (08/11/2017): EF: 40-45 %, G2DD. Follows Dr. Enrrique Wallace outpatient. ProBNP 20,164 (prior 3,807). CXR showing no acute abnormalities. EKG with no ST segments changes .  - Started Lasix 40 mg twice daily  - Strict I's and O's  - Daily weights  - Cardiac diet  - Cardiology consulted, appreciate recommendation  - Consulted heart failure nurse  - Tele  - EKG   - BNP every 3rd day  - Echo: \" Overall left ventricular systolic function appears mildly reduced with an   ejection fraction of 40-45%. \"  - Trop Q6hr x2     A fib  - Patient does not take home Eliquis 2.5 mg BID,   continue Lopressor 25 mg BID (switched to Toprol XL)  - F/u EKG    -Patient adamantly refusing Eliquis/blood thinners due to risk of bleeding. Patient understands risks of stroke  -switched to Toprol XL     HTN  VS stable. - Continue home Losartan 50 mg daily, Spironolactone 12.5 mg daily and Lopressor 25 mg BID (switched to Toprol XL)     Hx of PE  Stable. - Patient does not take home Eliquis 2.5 mg BID   -Patient adamantly refusing Eliquis/blood thinners due to risk of bleeding.  Patient understands risks of stroke        T2DM  HbA1c (08/05/17): 8.7%.  - MSSI  - Hypoglycemia protocol  - POCT glucose  - Low carb diet  - F/u hemoglobin A1c   - Daily BMP  - Lantus 15 U nightly     Parkinson's disease  - Continue home Carbidopa-Levodopa  mg TID and Donepezil 10 mg daily     Hypothyroidism   TSH (08/15/17): 7.97 (elevated), Free T4 1.2.  - F/u TSH  - Continue home Levothyroxine 75 mcg daily      Chronic microcytic anemia  Hemoglobin 10.1 (unchanged from previously). - F/u iron studies      DVT PPx: Patient refusing blood thinners, SCD   Diet:  ADULT DIET; Regular; 3 carb choices (45 gm/meal);  Low Fat/Low Chol/High Fiber/2 gm Na   Code status:  DNR-CC   ELOS: >1 night  Disposition: GMF    Will discuss with attending physician Dr. Murray Callahan MD.     Ericka Tse MD  Internal Medicine, PGY-1

## 2022-10-20 NOTE — CONSULTS
[facility] Nurse  Consult Note       NAME:  Orly Lao  MEDICAL RECORD NUMBER:  [id number]  AGE: 81 y.o. GENDER: male  : 1940  TODAY'S DATE:  10/20/2022    Subjective   Reason for WOCN Evaluation and Assessment: L Lower Leg - chronic venous ulcers  Buttocks - Irritant Contact Dermatis d/t urine incontinence      Jose Guadalupe Mtz is a 81 y.o. male referred by:   [] Physician  [x] Nursing  [] Other:     Wound Identification:  Wound Type: venous and ICD  Contributing Factors: venous stasis, diabetes, chronic pressure, decreased mobility, shear force, incontinence of stool, and incontinence of urine    Wound History: Shilo Lopez Nim 82 y/o M with pmhx of HFmEF (45%), COPD Parkinson's, SARIKA not on CPAP HTN HLD T2DM presented to the ED with shortness of breath and a fall. Patient tripped and fell in the bathroom resulting in a bruise in the left knee. Patient endorsed not feeling well for couple of days and has developed productive cough with minimal sputum production. He also endorsed worsening shortness of breath throughout this time, which has worsened his ability to ambulate. Patient said he has chest pain that has been there for a some time but could not describe the quality or intensity of the pain but describes it he feels  over the entire chest area. Patient denied any headaches dizziness, diarrhea vomiting or urinary symptoms. In the ED vital signs were stable. Labs were significant for mild hyponatremia 135 and elevated proBNP 45182 and troponin 0.02. Chest x-ray and CT head without contrast were normal.  Patient received 40 mg Lasix IV once in the ED and admitted for further management of heart failure exacerbation.   Current Wound Care Treatment:  Buttocks - zinc paste  L Lower Leg - Adaptic, gauze, roll gauze, ace wrap    Patient Goal of Care:  [x] Wound Healing  [] Odor Control  [] Palliative Care  [] Pain Control   [] Other:         PAST MEDICAL HISTORY Diagnosis Date    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 ([facility] [address]     Dr. Felipe Mercado    CAD (coronary artery disease)     Cellulitis 2014    CHF (congestive heart failure) ([facility])     systolic    Chronic kidney disease     upon hospitalization due to bactrim allergy    COPD (chronic obstructive pulmonary disease) ([facility])     Hypertension     Hypothyroidism     Mixed hyperlipidemia     MRSA (methicillin resistant staph aureus) culture positive 07/24/2017    foot    Neuropathy     Obstructive sleep apnea 10/03/2014    does not use cpap machine    Parkinson's disease ([facility] [address] 1965    Type 2 diabetes mellitus without complication ([facility]        PAST SURGICAL HISTORY    Past Surgical History:   Procedure Laterality Date    CARDIAC CATHETERIZATION      CERVICAL FUSION  1981    COLON SURGERY      1980's    COLONOSCOPY      FOOT SURGERY Right 07/24/2017    INCISION AND DRAINAGE RIGHT FOOT WITH REMOVAL NECROTIC BONE    FOOT SURGERY Right 07/27/2017    : TRANSMETATARSAL AMPUTATION RIGHT FOOT     HERNIA REPAIR      SHOULDER SURGERY      right    UPPER GASTROINTESTINAL ENDOSCOPY N/A 11/25/2020    ESOPHAGOGASTRODUODENOSCOPY WITH BOTOX INJECTION performed by Luis Montemayor DO at [facility] ENDOSCOPY       FAMILY HISTORY    Family History   Problem Relation Age of Onset    Stroke Mother     Stroke Father     Cancer Sister     Cancer Paternal Aunt        SOCIAL HISTORY    Social History     Tobacco Use    Smoking status: Never    Smokeless tobacco: Never   Vaping Use    Vaping Use: Never used   Substance Use Topics    Alcohol use: No    Drug use: No       ALLERGIES    Allergies   Allergen Reactions    Aspirin      GI  Bleed possible related to aspirin  (but thought to be H Pylori)    Nsaids     Bactrim [Sulfamethoxazole-Trimethoprim] Other (See Comments)     arf    Celebrex [Celecoxib]      tremors    Cymbalta [Duloxetine Hcl]      Mental status change    Pcn [Penicillins]      rash    Codeine Nausea And Vomiting    Vicodin [Hydrocodone-Acetaminophen] Nausea And Vomiting       MEDICATIONS    No current facility-administered medications on file prior to encounter. Current Outpatient Medications on File Prior to Encounter   Medication Sig Dispense Refill    insulin regular (HUMULIN R;NOVOLIN R) 100 UNIT/ML injection Inject 15-20 Units into the skin daily (with breakfast)      cilostazol (PLETAL) 50 MG tablet Take 50 mg by mouth 2 times daily      Multiple Vitamins-Minerals (THERAPEUTIC MULTIVITAMIN-MINERALS) tablet Take 1 tablet by mouth daily      NONFORMULARY CM CORE - Supplement Once daily      carbidopa-levodopa (SINEMET CR)  MG per extended release tablet Take 1 tablet by mouth 3 times daily At breakfast, dinner and at bedtime.       metoprolol tartrate (LOPRESSOR) 25 MG tablet TAKE ONE TABLET BY MOUTH TWICE A  tablet 3    omeprazole (PRILOSEC) 20 MG delayed release capsule Take 20 mg by mouth 2 times daily      furosemide (LASIX) 40 MG tablet Take 1 tablet by mouth 2 times daily 60 tablet 3    spironolactone (ALDACTONE) 25 MG tablet Take 0.5 tablets by mouth daily 30 tablet 3    finasteride (PROSCAR) 5 MG tablet Take 5 mg by mouth nightly      losartan (COZAAR) 50 MG tablet Take 50 mg by mouth daily      insulin aspart protamine-insulin aspart (NOVOLOG 70/30) (70-30) 100 UNIT/ML injection Inject 25-30 Units into the skin every morning Dependent on BG      levothyroxine (SYNTHROID) 75 MCG tablet Take 75 mcg by mouth Daily       donepezil (ARICEPT) 10 MG tablet Take 10 mg by mouth nightly       potassium chloride (KLOR-CON M) 10 MEQ extended release tablet Take 10 mEq by mouth daily       rosuvastatin (CRESTOR) 20 MG tablet Take 1 tablet by mouth daily 90 tablet 4       Objective    /69   Pulse 80   Temp 97.8 °F (36.6 °C) (Oral)   Resp 18   Ht 6' (1.829 m)   Wt 226 lb 3.1 oz (102.6 kg)   SpO2 92%   BMI 30.68 kg/m²     LABS:  WBC:    Lab Results   Component Value Date/Time    WBC 7.6 10/20/2022 08:00 AM H/H:    Lab Results   Component Value Date/Time    HGB 9.1 10/20/2022 08:00 AM    HCT 28.4 10/20/2022 08:00 AM     PTT:    Lab Results   Component Value Date/Time    APTT 27.8 07/24/2017 06:32 AM   [APTT}  PT/INR:    Lab Results   Component Value Date/Time    PROTIME 12.1 09/04/2020 11:28 AM    INR 1.20 12/17/2021 08:47 AM     HgBA1c:    Lab Results   Component Value Date/Time    LABA1C 6.3 10/19/2022 06:03 PM       Assessment: Buttocks - small denuded areas with pink/red wound bed. Mary Jane-wound with blanchable purple skin tone - venous stasis  L Lower Leg - patch of venous ulcers, wound beds are pink/red, small amount of drainage noted.     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 ([facility]) J68.3    Pulmonary embolism ([facility]) I26.99    Coronary artery disease involving native heart without angina pectoris I25.10    Presence of drug coated stent in anterior descending branch of left coronary artery Z95.5    Sepsis ([facility]) A41.9    Syncope R55    Hypothyroid E03.9    Obstructive sleep apnea G47.33    Gas gangrene ([facility]) A48.0    Diabetic infection of right foot ([facility]) E11.628, L08.9    Diabetic polyneuropathy associated with type 2 diabetes mellitus ([facility]) E11.42    Right foot infection L08.9    LULÚ (acute kidney injury) ([facility] [address] F52.8    Acute diastolic CHF (congestive heart failure), NYHA class 2 ([facility]) I50.31    Encephalopathy G93.40    Cellulitis L03.90    Cellulitis of right lower extremity L03.115    History of MRSA infection Z86.14    Epistaxis R04.0    Community acquired pneumonia of left lower lobe of lung J18.9    Posterior epistaxis R04.0    Essential hypertension I10    Mixed hyperlipidemia E78.2    Acute on chronic combined systolic and diastolic congestive heart failure ([facility]) I50.43    Varicose veins of right lower extremity with inflammation, with ulcer of calf with fat layer exposed ([facility] [address] I83.212, V50.336 Permanent atrial fibrillation (HCC) I48.21    Heart failure ([facility] [address] I50.9    Fall W19. XXXA       Measurements:  Wound 10/19/22 Leg Left; Lower;Right chronic weeping wounds to bilateral lower extremties. Both lower legs have scales, weeping open areas, and reddened. Wound to front of left lower extremity does have some purulent green drainage. (Active)   Wound Image   10/20/22 1154   Wound Etiology Venous 10/20/22 1154   Dressing Status New drainage noted;New dressing applied 10/20/22 1154   Wound Cleansed Cleansed with saline 10/20/22 1154   Dressing/Treatment Petroleum impregnated gauze;Dry dressing;Roll gauze; Ace wrap 10/20/22 1154   Dressing Change Due 10/21/22 10/20/22 1154   Wound Length (cm) 4.5 cm 10/20/22 1154   Wound Width (cm) 5 cm 10/20/22 1154   Wound Depth (cm) 0.1 cm 10/20/22 1154   Wound Surface Area (cm^2) 22.5 cm^2 10/20/22 1154   Wound Volume (cm^3) 2.25 cm^3 10/20/22 1154   Wound Assessment Pink/red;Slough 10/20/22 1154   Drainage Amount Small 10/20/22 1154   Drainage Description Serosanguinous 10/20/22 1154   Odor None 10/20/22 1154   Mary Jane-wound Assessment Hemosiderin staining (brown yellow) 10/20/22 1154   Margins Attached edges; Defined edges 10/20/22 1154   Wound Thickness Description not for Pressure Injury Partial thickness 10/20/22 1154   Number of days: 1    L Lower Leg:       Wound 10/19/22 Buttocks Right;Left small open areas to both left and right buttock, darkened purple skin on both buttocks (Active)   Wound Image   10/20/22 1154   Wound Etiology Other 10/20/22 1154   Dressing Status Intact; New dressing applied 10/20/22 1154   Wound Cleansed Other (Comment) 10/20/22 1154   Dressing/Treatment Zinc paste 10/20/22 1154   Wound Assessment Pink/red 10/20/22 1154   Drainage Amount None 10/20/22 1154   Odor None 10/20/22 1154   Mary Jane-wound Assessment Hyperpigmented 10/20/22 1154   Margins Attached edges; Defined edges 10/20/22 1154   Wound Thickness Description not for Pressure Injury Partial thickness 10/20/22 1154   Number of days: 1   Buttocks:      Response to treatment:  Well tolerated by patient. Pain Assessment:  Severity:  0 / 10  Quality of pain: N/A  Wound Pain Timing/Severity: none  Premedicated: No    Plan   Plan of Care: Wound 10/19/22 Leg Left; Lower;Right chronic weeping wounds to bilateral lower extremties. Both lower legs have scales, weeping open areas, and reddened. Wound to front of left lower extremity does have some purulent green drainage. -Dressing/Treatment: Petroleum impregnated gauze, Dry dressing, Roll gauze, Ace wrap  Wound 10/19/22 Buttocks Right;Left small open areas to both left and right buttock, darkened purple skin on both buttocks-Dressing/Treatment: Zinc paste  Recommendation: Buttocks - clean with incontinent wipes, apply zinc paste prn  L Lower Leg - clean ulcerations with NS, cover with adaptic, gauze, change daily  Bilateral Lower Legs - wrap with kelix from toes to knee, 2 ace wraps from toes to knees. Elevate legs  Reposition pt every 2 hours  Call Wound Care for deterioration [phone]    Specialty Bed Required : No   [] Low Air Loss   [] Pressure Redistribution  [] Fluid Immersion  [] Bariatric  [] Total Pressure Relief  [] Other:     Current Diet: ADULT DIET; Regular; 3 carb choices (45 gm/meal);  Low Fat/Low Chol/High Fiber/2 gm Na  Dietician consult:  Yes    Discharge Plan:  Placement for patient upon discharge: TBD    Patient appropriate for Outpatient [address]: Yes    Referrals:  [x]   [] [facility]  [] Supplies  [] Other    Patient/Caregiver Teaching:  Level of patient/caregiver understanding able to:   [] Indicates understanding       [] Needs reinforcement  [] Unsuccessful      [] Verbal Understanding  [] Demonstrated understanding       [] No evidence of learning  [] Refused teaching         [] N/A       Electronically signed by Chepe Sanchez RN, Torres Boyle on 10/20/2022 at 11:59 AM

## 2022-10-20 NOTE — DISCHARGE INSTRUCTIONS
Extra Heart Failure sites:   [email or web address] --- this is American Heart Association interactive Healthier Living with Heart Failure guidebook. Please copy and paste link into search bar. Use your mouse to scroll through the pages. Lots and lots of info / tips    HF Brohman meme  --- free smart phone meme available for [employer] and [employer]. Use your phone to track sodium / fluid intake,  symptoms, weight, etc.    [employer] - website-- [employer] is a dialysis company. All dialysis patients follow a renal diet which IS low sodium!! This website offers free seasonal cookbooks.   Each quarter, they will release 25-30 new recipes with a breakdown of calories, sodium, glucose, etc    [email or web address] -- more free recipes

## 2022-10-20 NOTE — PLAN OF CARE
Problem: Cardiovascular - Adult  Goal: Maintains optimal cardiac output and hemodynamic stability  Outcome: Progressing  Flowsheets (Taken 10/19/2022 2313)  Maintains optimal cardiac output and hemodynamic stability:   Monitor blood pressure and heart rate   Monitor urine output and notify Licensed Independent Practitioner for values outside of normal range   Assess for signs of decreased cardiac output     Problem: Safety - Adult  Goal: Free from fall injury  Outcome: Progressing  Note: Call lights within reach, fall precautions in place at all times, bed at the lowest level.

## 2022-10-21 ENCOUNTER — APPOINTMENT (OUTPATIENT)
Dept: CT IMAGING | Age: 82
DRG: 291 | End: 2022-10-21
Payer: MEDICARE

## 2022-10-21 LAB
ALBUMIN SERPL-MCNC: 2.2 G/DL (ref 3.4–5)
ANION GAP SERPL CALCULATED.3IONS-SCNC: 9 MMOL/L (ref 3–16)
BASOPHILS ABSOLUTE: 0 K/UL (ref 0–0.2)
BASOPHILS RELATIVE PERCENT: 0.3 %
BUN BLDV-MCNC: 21 MG/DL (ref 7–20)
CALCIUM SERPL-MCNC: 8.7 MG/DL (ref 8.3–10.6)
CHLORIDE BLD-SCNC: 96 MMOL/L (ref 99–110)
CO2: 29 MMOL/L (ref 21–32)
CREAT SERPL-MCNC: 1.2 MG/DL (ref 0.8–1.3)
D DIMER: 2.38 UG/ML FEU (ref 0–0.6)
EOSINOPHILS ABSOLUTE: 0.2 K/UL (ref 0–0.6)
EOSINOPHILS RELATIVE PERCENT: 2.9 %
GFR SERPL CREATININE-BSD FRML MDRD: >60 ML/MIN/{1.73_M2}
GLUCOSE BLD-MCNC: 145 MG/DL (ref 70–99)
GLUCOSE BLD-MCNC: 146 MG/DL (ref 70–99)
GLUCOSE BLD-MCNC: 180 MG/DL (ref 70–99)
GLUCOSE BLD-MCNC: 189 MG/DL (ref 70–99)
GLUCOSE BLD-MCNC: 192 MG/DL (ref 70–99)
HCT VFR BLD CALC: 28.4 % (ref 40.5–52.5)
HEMOGLOBIN: 8.9 G/DL (ref 13.5–17.5)
LYMPHOCYTES ABSOLUTE: 0.6 K/UL (ref 1–5.1)
LYMPHOCYTES RELATIVE PERCENT: 8.7 %
MAGNESIUM: 2 MG/DL (ref 1.8–2.4)
MCH RBC QN AUTO: 22.6 PG (ref 26–34)
MCHC RBC AUTO-ENTMCNC: 31.4 G/DL (ref 31–36)
MCV RBC AUTO: 72 FL (ref 80–100)
MONOCYTES ABSOLUTE: 0.7 K/UL (ref 0–1.3)
MONOCYTES RELATIVE PERCENT: 10.1 %
NEUTROPHILS ABSOLUTE: 5.7 K/UL (ref 1.7–7.7)
NEUTROPHILS RELATIVE PERCENT: 78 %
PDW BLD-RTO: 18.2 % (ref 12.4–15.4)
PERFORMED ON: ABNORMAL
PLATELET # BLD: 261 K/UL (ref 135–450)
PMV BLD AUTO: 7.5 FL (ref 5–10.5)
POTASSIUM SERPL-SCNC: 3.8 MMOL/L (ref 3.5–5.1)
PRO-BNP: ABNORMAL PG/ML (ref 0–449)
RBC # BLD: 3.95 M/UL (ref 4.2–5.9)
SODIUM BLD-SCNC: 134 MMOL/L (ref 136–145)
WBC # BLD: 7.3 K/UL (ref 4–11)

## 2022-10-21 PROCEDURE — 83880 ASSAY OF NATRIURETIC PEPTIDE: CPT

## 2022-10-21 PROCEDURE — 6370000000 HC RX 637 (ALT 250 FOR IP): Performed by: INTERNAL MEDICINE

## 2022-10-21 PROCEDURE — 6360000004 HC RX CONTRAST MEDICATION

## 2022-10-21 PROCEDURE — 80048 BASIC METABOLIC PNL TOTAL CA: CPT

## 2022-10-21 PROCEDURE — 99233 SBSQ HOSP IP/OBS HIGH 50: CPT | Performed by: NURSE PRACTITIONER

## 2022-10-21 PROCEDURE — 83735 ASSAY OF MAGNESIUM: CPT

## 2022-10-21 PROCEDURE — 85379 FIBRIN DEGRADATION QUANT: CPT

## 2022-10-21 PROCEDURE — 82040 ASSAY OF SERUM ALBUMIN: CPT

## 2022-10-21 PROCEDURE — 6370000000 HC RX 637 (ALT 250 FOR IP)

## 2022-10-21 PROCEDURE — 71260 CT THORAX DX C+: CPT

## 2022-10-21 PROCEDURE — 36415 COLL VENOUS BLD VENIPUNCTURE: CPT

## 2022-10-21 PROCEDURE — 1200000000 HC SEMI PRIVATE

## 2022-10-21 PROCEDURE — 2580000003 HC RX 258

## 2022-10-21 PROCEDURE — 85025 COMPLETE CBC W/AUTO DIFF WBC: CPT

## 2022-10-21 PROCEDURE — 99233 SBSQ HOSP IP/OBS HIGH 50: CPT | Performed by: INTERNAL MEDICINE

## 2022-10-21 PROCEDURE — 6360000002 HC RX W HCPCS

## 2022-10-21 RX ADMIN — METOPROLOL SUCCINATE 25 MG: 25 TABLET, FILM COATED, EXTENDED RELEASE ORAL at 19:59

## 2022-10-21 RX ADMIN — MICONAZOLE NITRATE: 2 POWDER TOPICAL at 19:59

## 2022-10-21 RX ADMIN — IOPAMIDOL 75 ML: 755 INJECTION, SOLUTION INTRAVENOUS at 14:22

## 2022-10-21 RX ADMIN — MICONAZOLE NITRATE: 2 POWDER TOPICAL at 09:15

## 2022-10-21 RX ADMIN — SODIUM CHLORIDE, PRESERVATIVE FREE 10 ML: 5 INJECTION INTRAVENOUS at 09:18

## 2022-10-21 RX ADMIN — LEVOTHYROXINE SODIUM 75 MCG: 75 TABLET ORAL at 07:08

## 2022-10-21 RX ADMIN — ROSUVASTATIN CALCIUM 20 MG: 20 TABLET, FILM COATED ORAL at 09:01

## 2022-10-21 RX ADMIN — SODIUM CHLORIDE, PRESERVATIVE FREE 10 ML: 5 INJECTION INTRAVENOUS at 20:00

## 2022-10-21 RX ADMIN — CILOSTAZOL 50 MG: 50 TABLET ORAL at 09:01

## 2022-10-21 RX ADMIN — DONEPEZIL HYDROCHLORIDE 10 MG: 10 TABLET, FILM COATED ORAL at 19:59

## 2022-10-21 RX ADMIN — CILOSTAZOL 50 MG: 50 TABLET ORAL at 19:59

## 2022-10-21 RX ADMIN — FUROSEMIDE 40 MG: 10 INJECTION, SOLUTION INTRAMUSCULAR; INTRAVENOUS at 09:14

## 2022-10-21 RX ADMIN — CARBIDOPA AND LEVODOPA 1 TABLET: 25; 100 TABLET, EXTENDED RELEASE ORAL at 19:59

## 2022-10-21 RX ADMIN — CARBIDOPA AND LEVODOPA 1 TABLET: 25; 100 TABLET, EXTENDED RELEASE ORAL at 09:08

## 2022-10-21 RX ADMIN — CARBIDOPA AND LEVODOPA 1 TABLET: 25; 100 TABLET, EXTENDED RELEASE ORAL at 13:14

## 2022-10-21 RX ADMIN — PANTOPRAZOLE SODIUM 40 MG: 40 TABLET, DELAYED RELEASE ORAL at 15:35

## 2022-10-21 RX ADMIN — FINASTERIDE 5 MG: 5 TABLET, FILM COATED ORAL at 19:59

## 2022-10-21 RX ADMIN — INSULIN GLARGINE 15 UNITS: 100 INJECTION, SOLUTION SUBCUTANEOUS at 20:01

## 2022-10-21 RX ADMIN — SPIRONOLACTONE 12.5 MG: 25 TABLET ORAL at 09:14

## 2022-10-21 RX ADMIN — PANTOPRAZOLE SODIUM 40 MG: 40 TABLET, DELAYED RELEASE ORAL at 07:07

## 2022-10-21 NOTE — PROGRESS NOTES
[facility]   Cardiology Consult Note   Dr Mary Valdes MD, Ernie Griffiths RN, FNP APRN CVNP  Date: 10/21/2022  Admit Date: 10/18/2022       Reason for consultation: heart failure exacerbation   CC:SYDNIE borja   Primary cardiologist:  Dr Terence Banda    HPI: Orlando Zuluaga is a 81 y.o. male with a past medical history of   HFmEF (45%), COPD Parkinson's, SARIKA not on CPAP HTN HLD T2DM   2 xience SAMANTHA in 11- to LAD   / considering Watchman  (not interested)     Today  b/p was very low & improving to 105/64 SV02 98% on 2l nasal 02 VSS sCr 1.2 stable / net output -1790   weak / recommend PT consult  Discussed Watchman with Ashok Morales RN  (Watchman coordinator) & he said Mr Nikunj Anaya is a candidate for [employer] but backed out of procedure several times /  this am I discussed with  & she verbalizes she &  has decided no Watchman at this time  She states pt does not take his [facility] for afib      Testing   EKG afib rate 66  mild hyponatremia 135   elevated proBNP 20164 and troponin 0.02. Chest x-ray and CT head without contrast were normal.  magnesium level wnl     TTE 10/19/2022  Left ventricular cavity size is dilated with mild concentric left ventricular hypertrophy. Overall left ventricular systolic function appears mildly reduced with an ejection fraction of 40-45%. There is mild diffuse hypokinesis. Mild mitral regurgitation The aortic valve is thickened/calcified with mild aortic stenosis with a peak velocity of 2.54m/s and a mean pressure gradient of 14mmHg. 12/17/2021 JOSE:  Overall left ventricular systolic function appears low normal.  Mild aortic and mitral regurgitation is present. The left atrial size is dilated. Pre Watchman FLOR measurements:  0 degree 1.46 cm x 4.00 cm  45 dgrees 1.58 cm x 4.22 cm  90 degrees 1.57cm x 3.72cm  135 degrees 1.86 cm x 3.54cm      Patient seen and examined. Clinical notes reviewed.  Telemetry reviewed / Pertinent labs, diagnostic, device, and imaging results reviewed as a part of this visit  I spent a total of 35 minutes and greater than 50% of the time was spent counseling with patient  coordinating care regarding her diagnosis, treatments and plan of care. EKG TTE stress test: any LHC/RHC reviewed        Scheduled Meds:   metoprolol succinate  25 mg Oral Nightly    donepezil  10 mg Oral Nightly    finasteride  5 mg Oral Nightly    levothyroxine  75 mcg Oral Daily    losartan  50 mg Oral Daily    miconazole   Topical BID    pantoprazole  40 mg Oral BID AC    rosuvastatin  20 mg Oral Daily    spironolactone  12.5 mg Oral Daily    sodium chloride flush  5-40 mL IntraVENous 2 times per day    insulin lispro  0-8 Units SubCUTAneous TID WC    insulin lispro  0-4 Units SubCUTAneous Nightly    insulin glargine  15 Units SubCUTAneous Nightly    furosemide  40 mg IntraVENous BID    carbidopa-levodopa  1 tablet Oral TID    cilostazol  50 mg Oral BID   Review of Systems:  Constitutional: Negative for fever, night sweats, chills, weight changes  Skin: Negative for rash, pruritus, bleeding, blood clots, or bruising    HEENT: Negative for vision changes, ringing in the ears, dysphagia, or swollen lymph nodes  Respiratory: Reviewed in HPI  Cardiovascular: Reviewed in HPI  Gastrointestinal: Negative for abdominal pain, N/V/D, constipation, or black/tarry stools  Genito-Urinary: Negative for dysuria, incontinence, or hematuria  Musculoskeletal: Negative for joint swelling, muscle pain, or injuries  Neurological/Psych: Negative for confusion, seizures, headaches, or TIA-like symptoms. No anxiety or depression.      Physical Examination:  Vitals:    10/21/22 0757   BP: 105/64   Pulse:    Resp:    Temp:    SpO2:       In: 360 [P.O.:360]  Out: 1650    Wt Readings from Last 3 Encounters:   10/21/22 227 lb 15.3 oz (103.4 kg)   07/25/22 226 lb (102.5 kg)   12/27/21 242 lb 12.8 oz (110.1 kg)       Intake/Output Summary (Last 24 hours) at 10/21/2022 0956  Last data filed at 10/20/2022 2245  Gross per 24 hour   Intake 360 ml   Output 1650 ml   Net -1290 ml       Telemetry: Personally Reviewed    Constitutional: Cooperative and in no apparent distress, and appears well nourished  Skin: Warm and pink; no pallor, cyanosis, clubbing, or bruising   HEENT: Symmetric and normocephalic. Cardiovascular: irregular rate and rhythm. S1/S2 present  Respiratory: Respirations symmetric and unlabored. Lungs clear to auscultation bilaterally, no wheezing, crackles, or rhonchi  Gastrointestinal: Abdomen soft and round. Bowel sounds normoactive without tenderness or masses. Musculoskeletal: Bilateral upper and lower extremity strength 5/5 with full ROM  Neurologic/Psych: Awake and orientated to person, place and time. Calm affect, appropriate mood      BMP:   Recent Labs     10/19/22  0009 10/20/22  0801 10/21/22  0641   * 135* 134*   K 4.3 3.8 3.8   CL 96* 97* 96*   CO2 26 26 29   BUN 23* 20 21*   CREATININE 1.3 1.2 1.2   MG  --  1.70* 2.00     Estimated Creatinine Clearance: 60 mL/min (based on SCr of 1.2 mg/dL).    CBC:   Recent Labs     10/19/22  0009 10/20/22  0800 10/21/22  0641   WBC 10.1 7.6 7.3   HGB 10.1* 9.1* 8.9*   HCT 31.6* 28.4* 28.4*   MCV 72.9* 71.9* 72.0*    see below 261     Thyroid:   Lab Results   Component Value Date/Time    TSH 7.97 08/15/2017 05:40 AM    U4FRGRA 82 01/28/2012 03:10 AM    N6ZXMMO 3.9 01/28/2012 03:10 AM     Lipids:   Lab Results   Component Value Date/Time    CHOL 103 10/20/2022 08:01 AM    HDL 25 10/20/2022 08:01 AM    HDL 34 11/24/2011 06:20 AM    TRIG 101 10/20/2022 08:01 AM     LFTS:   Lab Results   Component Value Date/Time    ALT 7 04/12/2021 04:52 PM    AST 8 04/12/2021 04:52 PM    ALKPHOS 71 04/12/2021 04:52 PM    PROT 7.7 04/12/2021 04:52 PM    PROT 7.7 03/29/2012 08:18 AM    AGRATIO 0.9 04/12/2021 04:52 PM    BILITOT 0.6 04/12/2021 04:52 PM     Cardiac Enzymes:   Lab Results   Component Value Date/Time    CKTOTAL 43 10/19/2022 06:03 PM    CKMB 0.6 01/27/2012 07:20 AM    TROPONINI 0.02 10/19/2022 06:03 PM    TROPONINI 0.02 10/19/2022 06:03 PM    TROPONINI 0.02 10/19/2022 12:09 AM     Coags:   Lab Results   Component Value Date/Time    PROTIME 12.1 09/04/2020 11:28 AM    INR 1.20 12/17/2021 08:47 AM     Patient Active Problem List    Diagnosis Date Noted    Essential hypertension     Mixed hyperlipidemia     LULÚ (acute kidney injury) ([address]     Acute diastolic CHF (congestive heart failure), NYHA class 2 ([address]     Right foot infection     Heart failure ([address] 10/19/2022    Fall 10/19/2022    Community acquired pneumonia of left lower lobe of lung     Posterior epistaxis     Epistaxis 03/09/2018    Cellulitis of right lower extremity     History of MRSA infection     Cellulitis 01/06/2018    Encephalopathy 08/07/2017    Gas gangrene ([address] 07/25/2017    Diabetic infection of right foot ([facility] 07/25/2017    Diabetic polyneuropathy associated with type 2 diabetes mellitus ([address] 07/25/2017    Obstructive sleep apnea 10/03/2014    Hypothyroid 07/24/2014    Syncope 03/25/2014    Sepsis ([address] 01/27/2012    Pulmonary embolism ([address] 01/09/2012    Coronary artery disease involving native heart without angina pectoris 01/09/2012    Presence of drug coated stent in anterior descending branch of left coronary artery 01/09/2012    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   Chest x-ray and CT head without contrast were normal.  Mag wnl     TTE 10/19/2022  Left ventricular cavity size is dilated with mild concentric left ventricular hypertrophy. Overall left ventricular systolic function appears mildly reduced with an ejection fraction of 40-45%. There is mild diffuse hypokinesis. Mild mitral regurgitation The aortic valve is thickened/calcified with mild aortic stenosis with a peak velocity of 2.54m/s and a mean pressure gradient of 14mmHg. Mech fall x 1     CAD  LHC 2 xience SAMANTHA in 11- to LAD     Afib   Watchman  being considered   [id number] Score for Atrial Fibrillation Stroke Risk 6    COPD     Parkinson's    SARIKA not on CPAP     HTN     HLD     T2DM     Debility  PT consulted      Cardiac meds Pletal lasix cozaar lopressor Crestor aldactone     Plan   HFmEF his EF has been 45% for several echos    sCr 1.2 stable / net output -1790   Repeat Pro BNP today   weak / recommend PT & following   Discussed Watchman with Darren Roy RN  (Watchman coordinator) & he said Mr Michele Trujillo is a candidate for [employer] but backed out of procedure several times /  this am I discussed with  & she verbalizes she &  has decided no Watchman at this time  She states pt does not take his [facility] for afib    Will follow     Thank you for allowing to us to participate in the care of [address] [place] [address]. Alina Mcdaniel APRN-CNP-CVNP  Interventional cardiology note  This patient today is in sinus rhythm. Seem to go back and forth. I discussed with the patient and with his wife today that watchman device had been coordinated before but he the patient declined to do so because of the need for anticoagulation. At this time he is going down for a CT of his lung to rule out pulmonary emboli. He is very definitely against taking anticoagulation. We will see the results of the CAT scan when it becomes available and maybe have stronger ambulation. In the meantime we will continue to consider the possibility of a watchman because of his difficulty with anticoagulation vis-à-vis the atrial fibrillation.   Otherwise he is clinically better and diuresed nicely and his breathing is much better. Probably close to being ready to go home.   Sarah Santoro MD, [facility]

## 2022-10-21 NOTE — PLAN OF CARE
D: Sleeping a lot, but easily aroused. Sat dropped overnight to 89% on RA and o2 @ 2lpnc applied. Resp tachypneic at rest. Receiving Lasix 40mg IV BID. Unable to obtain accurate o/p d/t purewick leaking and was incont of lg amt of urine. Placed pt on special mattress, turning & repositioning with wedge to prevent skin break down.    A: Cont to monitor during hourly rounds    Problem: Chronic Conditions and Co-morbidities  Goal: Patient's chronic conditions and co-morbidity symptoms are monitored and maintained or improved  Outcome: Progressing

## 2022-10-21 NOTE — PROGRESS NOTES
Internal Medicine Progress Note    Patient Name: Ana Laura Garcia   Patient : 1940   Date: 10/20/2022   Admit Date: 10/18/2022     CC: Fall       Interval History: Patient seen and examined at bedside. Overnight it was reported he had an elevated RR in the 30's, hypotensive, and required oxygen. However, this morning he is only high RR, and pulse Ox and BP were improved depending the placement of the devices. Patient adamantly refusing Eliquis/blood thinners due to risk of bleeding. Patient understands risks of stroke       HPI: \"Reckers Esaw Stella 80 y/o M with pmhx of HFmEF (45%), COPD Parkinson's, SARIKA not on CPAP HTN HLD T2DM presented to the ED with shortness of breath and a fall. Patient tripped and fell in the bathroom resulting in a bruise in the left knee. Patient endorsed not feeling well for couple of days and has developed productive cough with minimal sputum production. He also endorsed worsening shortness of breath throughout this time, which has worsened his ability to ambulate. Patient said he has chest pain that has been there for a some time but could not describe the quality or intensity of the pain but describes it he feels  over the entire chest area. Patient denied any headaches dizziness, diarrhea vomiting or urinary symptoms. In the ED vital signs were stable. Labs were significant for mild hyponatremia 135 and elevated proBNP 63148 and troponin 0.02. Chest x-ray and CT head without contrast were normal.  Patient received 40 mg Lasix IV once in the ED and admitted for further management of heart failure exacerbation. \"     ROS:  As per interval history above. Vital Signs:  Patient Vitals for the past 8 hrs:   BP Temp Temp src Pulse Resp SpO2 Weight   10/20/22 0810 129/69 97.8 °F (36.6 °C) Oral 80 -- 92 % --   10/20/22 0330 113/66 98.1 °F (36.7 °C) Oral 69 18 93 % 226 lb 3.1 oz (102.6 kg)       Physical Exam:  Physical Exam  Constitutional:       Appearance: He is obese. HENT:      Head: Normocephalic and atraumatic. Eyes:      Extraocular Movements: Extraocular movements intact. Cardiovascular:      Rate and Rhythm: Normal rate. Rhythm irregular. Pulmonary:      Effort: Pulmonary effort is normal.      Breath sounds: Normal breath sounds. Abdominal:      General: Abdomen is flat. Palpations: Abdomen is soft. Musculoskeletal:      Comments: Partial R foot amputation    Skin:     General: Skin is warm. Neurological:      Mental Status: He is alert. Comments: Tremor       Intake/Output Summary (Last 24 hours) at 10/20/2022 1124  Last data filed at 10/20/2022 0610  Gross per 24 hour   Intake 610 ml   Output 1750 ml   Net -1140 ml        Medications:             Labs:  CBC:   Recent Labs     10/19/22  0009 10/20/22  0800   WBC 10.1 7.6   HGB 10.1* 9.1*   HCT 31.6* 28.4*    see below   MCV 72.9* 71.9*       Renal:    Recent Labs     10/19/22  0009 10/20/22  0801   * 135*   K 4.3 3.8   CL 96* 97*   CO2 26 26   BUN 23* 20   CREATININE 1.3 1.2   GLUCOSE 110* 130*   CALCIUM 9.2 9.0   MG  --  1.70*   ANIONGAP 13 12       Hepatic: No results for input(s): AST, ALT, BILITOT, BILIDIR, PROT, LABALBU, ALKPHOS in the last 72 hours. Troponin: Invalid input(s): TROPONIN    Lactic acid: Invalid input(s): LACTICACID    BNP: No results for input(s): BNP in the last 72 hours. Pro-BNP:   Recent Labs     10/19/22  0009   PROBNP 20,164*       Lipids: No results for input(s): CHOL, TRIG, HDL, LDLCALC, VLDL in the last 72 hours. ABGs:  No results for input(s): PHART, LME9ODN, PO2ART, LLP1IFH, BEART, THGBART, Q9KUBXUJ, XUW1DAG in the last 72 hours. VBGs: No results for input(s): PHVEN, MCC1PTC, PO2VEN in the last 72 hours. INR: No results for input(s): INR in the last 72 hours. aPTT: No results for input(s): APTT in the last 72 hours. Procalcitonin: No results for input(s): PROCAL in the last 72 hours.   CRP: No results for input(s): CRP in the last 72 hours.  ESR: No results for input(s): ESR in the last 72 hours. Radiology:  CT CSpine W/O Contrast   Final Result      1. No evidence of cervical spine fracture. 2.  Severe multilevel degenerative changes resulting in spinal stenosis. 3.  Indeterminate nodule in the left upper lobe. Follow-up chest CT in 3 months. CT Head W/O Contrast   Final Result      1. No acute intracranial hemorrhage or mass effect. 2.  Mild nonspecific white matter disease compatible chronic small vessel ischemia. 3.  Chronic sinusitis. XR CHEST 1 VIEW   Final Result      No acute cardiopulmonary findings. Assessment and Plan:  Fall  Possible causes: autonomic dysfunction 2/2 Parkinson's disease, T2DM, weakness, R feet amputation. Denied LOC, dizziness and said that he just feels weak. CT negative for acute findings from fall  - Orthostatics   - PT/OT   - Tele  - F/u vitamin B12, D, folate, iron studies and TSH  -CK: WNL     Acute on chronic HFrEF   Fluid overload with +3 LE edema and mild bilateral crackles. C/o SOB. Last echo (08/11/2017): EF: 40-45 %, G2DD. Follows Dr. David Amaya outpatient. ProBNP 20,164 (prior 3,807). CXR showing no acute abnormalities. EKG with no ST segments changes .  - Started Lasix 40 mg twice daily  - Strict I's and O's  - Daily weights  - Cardiac diet  - Cardiology consulted, appreciate recommendation  - Consulted heart failure nurse  - Tele  - EKG   - BNP every 3rd day  - Echo: \" Overall left ventricular systolic function appears mildly reduced with an   ejection fraction of 40-45%. \"  - Trop Q6hr x2     A fib  - Patient does not take home Eliquis 2.5 mg BID,   continue Lopressor 25 mg BID (switched to Toprol XL)  - F/u EKG    -Patient adamantly refusing Eliquis/blood thinners due to risk of bleeding. Patient understands risks of stroke  -switched to Toprol XL      PE r/o  Hx of PE  Stable.    - Patient does not take home Eliquis 2.5 mg BID   -Patient adamantly refusing Eliquis/blood thinners due to risk of bleeding. Patient understands risks of stroke  -Patient increased RR to 30's, question of oxygen requirement overnight, patient Pulse Ox notably difficult depending which finger, but could saturate well with me. BP also was hypotensive overnight however is normal for me and did get low readings depending cuff placement. D-Dimer STAT ordered which was elevated. -CTPA to follow. HTN  VS stable. - Continue home Losartan 50 mg daily, Spironolactone 12.5 mg daily and Lopressor 25 mg BID (switched to Toprol XL)       T2DM  HbA1c (08/05/17): 8.7%.  - [facility]  - Hypoglycemia protocol  - POCT glucose  - Low carb diet  - F/u hemoglobin A1c   - Daily BMP  - Lantus 15 U nightly     Parkinson's disease  - Continue home Carbidopa-Levodopa  mg TID and Donepezil 10 mg daily     Hypothyroidism   TSH (08/15/17): 7.97 (elevated), Free T4 1.2.  - F/u TSH  - Continue home Levothyroxine 75 mcg daily      Chronic microcytic anemia  Hemoglobin 10.1 (unchanged from previously). - F/u iron studies      DVT PPx: Patient refusing blood thinners, SCD   Diet:  ADULT DIET; Regular; 3 carb choices (45 gm/meal); Low Fat/Low Chol/High Fiber/2 gm Na   Code status:  DNR-CC   ELOS: >1 night  Disposition: GMF    Will discuss with attending physician Dr. Mendel Mimes, MD.     Lee Phillips MD  Internal Medicine, PGY-1  Patient seen and examined, labs and imaging studies reviewed, agree with assessment and plan as outlined above. Continue with current care and plan. Discussed case with patients nurse, discussed case with care team, discussed plan.       Mendel Mimes, MD Doreen Chuck

## 2022-10-21 NOTE — PROGRESS NOTES
Physical Therapy/Occupational Therapy  HOLD Note  Spoke to MD outside of pt's room. He requested to hold PT/OT at this time, as pt has CT pending. States pt may be appropriate later today. Will follow up as able. Addendum 1507: Pt off floor for testing at time of second attempt. In CT for possible PE. Will follow up on 10/22 as medically appropriate. Seble Cooney.  [address], OTR/L [id number]

## 2022-10-22 LAB
ANION GAP SERPL CALCULATED.3IONS-SCNC: 10 MMOL/L (ref 3–16)
BASOPHILS ABSOLUTE: 0 K/UL (ref 0–0.2)
BASOPHILS RELATIVE PERCENT: 0.4 %
BUN BLDV-MCNC: 18 MG/DL (ref 7–20)
CALCIUM SERPL-MCNC: 9.2 MG/DL (ref 8.3–10.6)
CHLORIDE BLD-SCNC: 94 MMOL/L (ref 99–110)
CO2: 30 MMOL/L (ref 21–32)
CREAT SERPL-MCNC: 1.1 MG/DL (ref 0.8–1.3)
EOSINOPHILS ABSOLUTE: 0.2 K/UL (ref 0–0.6)
EOSINOPHILS RELATIVE PERCENT: 3.7 %
GFR SERPL CREATININE-BSD FRML MDRD: >60 ML/MIN/{1.73_M2}
GLUCOSE BLD-MCNC: 151 MG/DL (ref 70–99)
GLUCOSE BLD-MCNC: 159 MG/DL (ref 70–99)
GLUCOSE BLD-MCNC: 189 MG/DL (ref 70–99)
GLUCOSE BLD-MCNC: 203 MG/DL (ref 70–99)
GLUCOSE BLD-MCNC: 224 MG/DL (ref 70–99)
HCT VFR BLD CALC: 29.4 % (ref 40.5–52.5)
HEMOGLOBIN: 9.4 G/DL (ref 13.5–17.5)
LYMPHOCYTES ABSOLUTE: 0.8 K/UL (ref 1–5.1)
LYMPHOCYTES RELATIVE PERCENT: 11.9 %
MAGNESIUM: 1.9 MG/DL (ref 1.8–2.4)
MCH RBC QN AUTO: 22.9 PG (ref 26–34)
MCHC RBC AUTO-ENTMCNC: 32 G/DL (ref 31–36)
MCV RBC AUTO: 71.6 FL (ref 80–100)
MONOCYTES ABSOLUTE: 0.7 K/UL (ref 0–1.3)
MONOCYTES RELATIVE PERCENT: 10.3 %
NEUTROPHILS ABSOLUTE: 4.7 K/UL (ref 1.7–7.7)
NEUTROPHILS RELATIVE PERCENT: 73.7 %
PDW BLD-RTO: 18.2 % (ref 12.4–15.4)
PERFORMED ON: ABNORMAL
PLATELET # BLD: 267 K/UL (ref 135–450)
PMV BLD AUTO: 7.8 FL (ref 5–10.5)
POTASSIUM SERPL-SCNC: 4.1 MMOL/L (ref 3.5–5.1)
PRO-BNP: ABNORMAL PG/ML (ref 0–449)
RBC # BLD: 4.11 M/UL (ref 4.2–5.9)
SODIUM BLD-SCNC: 134 MMOL/L (ref 136–145)
WBC # BLD: 6.4 K/UL (ref 4–11)

## 2022-10-22 PROCEDURE — 36415 COLL VENOUS BLD VENIPUNCTURE: CPT

## 2022-10-22 PROCEDURE — 97535 SELF CARE MNGMENT TRAINING: CPT

## 2022-10-22 PROCEDURE — 97166 OT EVAL MOD COMPLEX 45 MIN: CPT

## 2022-10-22 PROCEDURE — 97530 THERAPEUTIC ACTIVITIES: CPT

## 2022-10-22 PROCEDURE — 6370000000 HC RX 637 (ALT 250 FOR IP)

## 2022-10-22 PROCEDURE — 99233 SBSQ HOSP IP/OBS HIGH 50: CPT | Performed by: NURSE PRACTITIONER

## 2022-10-22 PROCEDURE — 80048 BASIC METABOLIC PNL TOTAL CA: CPT

## 2022-10-22 PROCEDURE — 1200000000 HC SEMI PRIVATE

## 2022-10-22 PROCEDURE — 2580000003 HC RX 258

## 2022-10-22 PROCEDURE — 6370000000 HC RX 637 (ALT 250 FOR IP): Performed by: INTERNAL MEDICINE

## 2022-10-22 PROCEDURE — 83735 ASSAY OF MAGNESIUM: CPT

## 2022-10-22 PROCEDURE — 97162 PT EVAL MOD COMPLEX 30 MIN: CPT

## 2022-10-22 PROCEDURE — 83880 ASSAY OF NATRIURETIC PEPTIDE: CPT

## 2022-10-22 PROCEDURE — 85025 COMPLETE CBC W/AUTO DIFF WBC: CPT

## 2022-10-22 RX ADMIN — SPIRONOLACTONE 12.5 MG: 25 TABLET ORAL at 09:46

## 2022-10-22 RX ADMIN — PANTOPRAZOLE SODIUM 40 MG: 40 TABLET, DELAYED RELEASE ORAL at 16:28

## 2022-10-22 RX ADMIN — MICONAZOLE NITRATE: 2 POWDER TOPICAL at 09:50

## 2022-10-22 RX ADMIN — FINASTERIDE 5 MG: 5 TABLET, FILM COATED ORAL at 21:07

## 2022-10-22 RX ADMIN — CILOSTAZOL 50 MG: 50 TABLET ORAL at 09:46

## 2022-10-22 RX ADMIN — CARBIDOPA AND LEVODOPA 1 TABLET: 25; 100 TABLET, EXTENDED RELEASE ORAL at 21:06

## 2022-10-22 RX ADMIN — LEVOTHYROXINE SODIUM 75 MCG: 75 TABLET ORAL at 06:28

## 2022-10-22 RX ADMIN — CILOSTAZOL 50 MG: 50 TABLET ORAL at 21:07

## 2022-10-22 RX ADMIN — SODIUM CHLORIDE, PRESERVATIVE FREE 10 ML: 5 INJECTION INTRAVENOUS at 10:00

## 2022-10-22 RX ADMIN — DONEPEZIL HYDROCHLORIDE 10 MG: 10 TABLET, FILM COATED ORAL at 21:06

## 2022-10-22 RX ADMIN — INSULIN GLARGINE 15 UNITS: 100 INJECTION, SOLUTION SUBCUTANEOUS at 21:09

## 2022-10-22 RX ADMIN — METOPROLOL SUCCINATE 25 MG: 25 TABLET, FILM COATED, EXTENDED RELEASE ORAL at 21:07

## 2022-10-22 RX ADMIN — ROSUVASTATIN CALCIUM 20 MG: 20 TABLET, FILM COATED ORAL at 09:46

## 2022-10-22 RX ADMIN — PANTOPRAZOLE SODIUM 40 MG: 40 TABLET, DELAYED RELEASE ORAL at 06:28

## 2022-10-22 RX ADMIN — CARBIDOPA AND LEVODOPA 1 TABLET: 25; 100 TABLET, EXTENDED RELEASE ORAL at 09:47

## 2022-10-22 RX ADMIN — MICONAZOLE NITRATE: 2 POWDER TOPICAL at 21:08

## 2022-10-22 RX ADMIN — INSULIN LISPRO 2 UNITS: 100 INJECTION, SOLUTION INTRAVENOUS; SUBCUTANEOUS at 12:25

## 2022-10-22 RX ADMIN — SODIUM CHLORIDE, PRESERVATIVE FREE 10 ML: 5 INJECTION INTRAVENOUS at 21:08

## 2022-10-22 RX ADMIN — CARBIDOPA AND LEVODOPA 1 TABLET: 25; 100 TABLET, EXTENDED RELEASE ORAL at 12:30

## 2022-10-22 NOTE — PROGRESS NOTES
Occupational Therapy  Facility/Department: [facility] 113 6 [facility] 166  Occupational Therapy Initial Assessment/Treatment     Name: Marisol Sheppard  : 1940  MRN: [id number]  Date of Service: 10/22/2022    Discharge Recommendations: Marisol Sheppard scored a  on the AM-PAC ADL Inpatient form. Current research shows that an AM-PAC score of 17 or less is typically not associated with a discharge to the patient's home setting. Based on the patient's AM-PAC score and their current ADL deficits, it is recommended that the patient have 3-5 sessions per week of Occupational Therapy at d/c to increase the patient's independence. Please see assessment section for further patient specific details. If patient discharges prior to next session this note will serve as a discharge summary. Please see below for the latest assessment towards goals. OT Equipment Recommendations  Other: defer       Patient Diagnosis(es): The primary encounter diagnosis was Fall, initial encounter. Diagnoses of Acute on chronic heart failure, unspecified heart failure type (HCC) and Cardiac volume overload were also pertinent to this visit. Past Medical History:  has a past medical history of 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 ([address] CAD (coronary artery disease), Cellulitis, CHF (congestive heart failure) ([address] Chronic kidney disease, COPD (chronic obstructive pulmonary disease) ([address] Hypertension, Hypothyroidism, Mixed hyperlipidemia, MRSA (methicillin resistant staph aureus) culture positive, Neuropathy, Obstructive sleep apnea, Parkinson's disease ([address] and Type 2 diabetes mellitus without complication ([address] Past Surgical History:  has a past surgical history that includes cervical fusion (); Foot surgery (Right, 2017); Foot surgery (Right, 2017); Cardiac catheterization; Colonoscopy; Colon surgery; shoulder surgery; hernia repair; and Upper gastrointestinal endoscopy (N/A, 2020).     Treatment Diagnosis: Decreased ADL status and functional mobility 2/2 heart failure      Assessment     Performance deficits / Impairments: Decreased functional mobility ; Decreased cognition;Decreased ADL status; Decreased endurance;Decreased strength;Decreased balance  Assessment: Pt is an 80 yo male who presents to [facility] 2/2 fall at home and heart failure. Prior to admission pt reported he was mostly independent w/ his functional transfers and mobility w/ use of RW and he was completing most ADLs I'ly. Pt is currently limited by decreased activity tolerance, generalized weakness, and impaired cognition. As a result of these deficits pt is currently requiring assist x2 for safe transfers and he was unable to ambulate today. Pt required use of the kennedi stedy to complete bed to chair transfer and pt required total A for toileting hygiene. Pt is functioning well below his baseline and benefits from ongoing OT at d/c. Pt would benefit from continued OT while inpt to increase functional independence. Treatment Diagnosis: Decreased ADL status and functional mobility 2/2 heart failure  Prognosis: Fair  Decision Making: Medium Complexity  REQUIRES OT FOLLOW-UP: Yes  Activity Tolerance  Activity Tolerance: Patient limited by fatigue        Plan   Occupational Therapy Plan  Times Per Week: 2-5  Current Treatment Recommendations: Strengthening, Balance training, Functional mobility training, Endurance training, Gait training, Patient/Caregiver education & training, Safety education & training, Self-Care / ADL     Restrictions  Position Activity Restriction  Other position/activity restrictions: up with assist    Subjective   General  Chart Reviewed: Yes  Patient assessed for rehabilitation services?: Yes  Additional Pertinent Hx: Pt is a 80 y/o M with pmhx of HFmEF (45%), COPD Parkinson's, SARIKA not on CPAP HTN HLD T2DM presented to the ED with shortness of breath and a fall.   Patient tripped and fell in the bathroom resulting in a bruise in the left knee. Pt has had multiple falls lately. Family / Caregiver Present: No  Referring Practitioner: Americo Thomson MD  Diagnosis: Heart failure  Subjective  Subjective: Pt was semi supine in bed upon arrival. Pt was pleasant and agreeable to OT evaluation     Social/Functional History  Social/Functional History  Lives With: Spouse  Type of Home: House  Home Layout: Able to Live on Main level with bedroom/bathroom, Two level  Home Access: Stairs to enter with rails  Entrance Stairs - Number of Steps: 4 with orlando rails  Entrance Stairs - Rails: Both  Bathroom Shower/Tub: Tub/Shower unit, Shower chair with back  [employer]: Handicap height  Bathroom Equipment: Grab bars in shower  Home Equipment: Lebron Souza, Beatriz medina (sleeps in lift chair)  Has the patient had two or more falls in the past year or any fall with injury in the past year?: Yes  ADL Assistance: Independent  Homemaking Assistance: Needs assistance (grandson assists)  Ambulation Assistance: Independent (w/ Rw)  Transfer Assistance: Independent  Active : No  Occupation: Retired       Objective   Heart Rate: 87  Heart Rate Source: Monitor  BP: 120/69  BP Location: Left upper arm  BP Method: Automatic  Patient Position: Up in chair  MAP (Calculated): 86  Resp: 24  SpO2: 94 %  O2 Device: None (Room air)               Safety Devices  Type of Devices: Call light within reach; Chair alarm in place;Nurse notified; Left in chair  Bed Mobility Training  Bed Mobility Training: Yes  Supine to Sit: Minimum assistance (Min A and increased time needed to transition to EOB)     AROM: Within functional limits    ADL  Toileting: Dependent/Total  Toileting Skilled Clinical Factors: Pt was incontinent of bowel and required assist x2 in stance in the stedy to complete rear madeline care and pants management. Pt maintained stance for up to 5 mins + 2 mins w/ CGA to complete madeline care. OT observed open wounds to pt's buttocks and significant redness observed.  OT removed soiled mepilex to buttocks and cleaned area thoroughly. RN was notified regarding wounds on buttocks and RN present to observe during session. OT and PT placed air cushion in pt's chair for pressure relief and extensive education provided regarding pressure relief and frequent position changes throughout the day. Activity Tolerance  Activity Tolerance: Patient limited by fatigue     Transfers  Sit to stand: 2 Person assistance (attempted sit to stand from EOB > RW however pt was unable to achieve stance. Pt completed sit to stand from EOB to kennedi blandondy w/ CGAx1 + Min Ax1. Pt completed sit to stand from Geovanna Lui stedy seat w/ CGA.)  Stand to sit: Minimal assistance       Cognition  Overall Cognitive Status: Exceptions  Arousal/Alertness: Delayed responses to stimuli  Following Commands: Follows one step commands consistently; Follows one step commands with repetition; Follows one step commands with increased time  Attention Span: Attends with cues to redirect  Safety Judgement: Decreased awareness of need for assistance;Decreased awareness of need for safety  Problem Solving: Assistance required to generate solutions;Assistance required to identify errors made;Assistance required to implement solutions  Initiation: Requires cues for some  Sequencing: Requires cues for some  Cognition Comment: Pt required ++ time to respond to commands and reported he doesn't feel like he's thinking clearly. Pt required + time to recall certain information  Orientation  Overall Orientation Status: Within Functional Limits                  Education Given To: Patient  Education Provided: Role of Therapy;Plan of Care;Energy Conservation;Precautions; ADL Adaptive Strategies;Transfer Training  Education Method: Demonstration;Verbal  Barriers to Learning: Cognition  Education Outcome: Verbalized understanding;Continued education needed                        G-Code     OutComes Score AM-PAC Score        AM-PAC Inpatient Daily Activity Raw Score: 12 (10/22/22 1331)  AM-PAC Inpatient ADL T-Scale Score : 30.6 (10/22/22 1331)  ADL Inpatient CMS 0-100% Score: 66.57 (10/22/22 1331)  ADL Inpatient CMS G-Code Modifier : CL (10/22/22 1331)    Tinneti Score       Goals  Short Term Goals  Time Frame for Short Term Goals: By D/C  Short Term Goal 1: Pt will complete sit to stand w/ mod A  Short Term Goal 2: Pt will complete toileting tasks in stance w/ min A  Short Term Goal 3: Pt will complete supine to sit w/ spvn       Therapy Time   Individual Concurrent Group Co-treatment   Time In 0941         Time Out 1026         Minutes 45         Timed Code Treatment Minutes: 30 Minutes       Harish Hsieh OT  If patient discharges prior to next treatment, this note will serve as discharge summary. WiIll continue per plan of care if patient does not discharge. Right arm;

## 2022-10-22 NOTE — PROGRESS NOTES
Physical Therapy  Facility/Department: [address]  Physical Therapy Initial Assessment and treatment    Name: Ana العراقي  : 1940  MRN: [id number]  Date of Service: 10/22/2022    Discharge Recommendations:Peterson Lao scored a  on the AM-PAC short mobility form. Current research shows that an AM-PAC score of 17 or less is typically not associated with a discharge to the patient's home setting. Based on the patient's AM-PAC score and their current functional mobility deficits, it is recommended that the patient have 3-5 sessions per week of Physical Therapy at d/c to increase the patient's independence. Please see assessment section for further patient specific details. If patient discharges prior to next session this note will serve as a discharge summary. Please see below for the latest assessment towards goals. PT Equipment Recommendations  Equipment Needed:  (defer)      Patient Diagnosis(es): The primary encounter diagnosis was Fall, initial encounter. Diagnoses of Acute on chronic heart failure, unspecified heart failure type (HCC) and Cardiac volume overload were also pertinent to this visit. Past Medical History:  has a past medical history of 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 ([address] CAD (coronary artery disease), Cellulitis, CHF (congestive heart failure) ([address] Chronic kidney disease, COPD (chronic obstructive pulmonary disease) ([address] Hypertension, Hypothyroidism, Mixed hyperlipidemia, MRSA (methicillin resistant staph aureus) culture positive, Neuropathy, Obstructive sleep apnea, Parkinson's disease ([address] and Type 2 diabetes mellitus without complication ([address] Past Surgical History:  has a past surgical history that includes cervical fusion (); Foot surgery (Right, 2017); Foot surgery (Right, 2017); Cardiac catheterization; Colonoscopy;  Colon surgery; shoulder surgery; hernia repair; and Upper gastrointestinal endoscopy (N/A, 11/25/2020). Assessment   Body Structures, Functions, Activity Limitations Requiring Skilled Therapeutic Intervention: Decreased functional mobility   Assessment: The pt is an 82 y/o male who presents following a fall at home. Pt states that he can perform ADLs independently normally and uses RW for mobility. Today pt demonstrated gross weakness, impaired balance, decreased activity tolerance, and was unable to ambulate. Pt also limited by his cognition. He was also unable to stand from a regular height bed with 2 person assist. He would benefit from further IP PTat discharge to improve his independence and safety with mobility and decrease his risk of falling. Treatment Diagnosis: impaired mobility 2/2 fall  Therapy Prognosis: Fair  Decision Making: Medium Complexity  Barriers to Learning: cognition  Requires PT Follow-Up: Yes  Activity Tolerance  Activity Tolerance: Patient limited by fatigue     Plan   Physcial Therapy Plan  General Plan:  (2-5)  Current Treatment Recommendations: Strengthening, Balance training, Functional mobility training, Transfer training, Gait training, Neuromuscular re-education, Patient/Caregiver education & training, Safety education & training, Therapeutic activities  Safety Devices  Type of Devices: Call light within reach, Chair alarm in place, Nurse notified, Left in chair     Restrictions  Position Activity Restriction  Other position/activity restrictions: up with assist     Subjective   General  Chart Reviewed: Yes  Patient assessed for rehabilitation services?: Yes  Additional Pertinent Hx: Shilo Rivas 82 y/o M with pmhx of HFmEF (45%), COPD Parkinson's, SARIKA not on CPAP HTN HLD T2DM presented to the ED with shortness of breath and a fall. Patient tripped and fell in the bathroom resulting in a bruise in the left knee. Pt has had multiple falls lately.   Family / Caregiver Present: No  Referring Practitioner: Lucille Sanchez MD  Diagnosis: heart failure; fall  Follows Commands: Impaired  General Comment  Comments: The pt presents supine in bed and willing to work with therapist.  Subjective  Subjective: \"I think I need cleaned up. \"         Social/Functional History  Social/Functional History  Lives With: Spouse (grandson)  Type of Home: House  Home Layout: Able to Live on Main level with bedroom/bathroom, Two level  Home Access: Stairs to enter with rails  Entrance Stairs - Number of Steps: 4 with orlando rails  Entrance Stairs - Rails: Both  Bathroom Shower/Tub: Tub/Shower unit, Shower chair with back  Bathroom Toilet: Handicap height  Bathroom Equipment: Grab bars in shower  Home Equipment: Liz Riley, rolling, Meka Pacini (sleeps in lift chair)  Has the patient had two or more falls in the past year or any fall with injury in the past year?: Yes (pt states that he has no idea why)  ADL Assistance: [address] [place] [address] Avenue: Needs assistance (andrew does that)  Ambulation Assistance: Independent (with RW)  Transfer Assistance: Independent  Active : No  Occupation: Retired  Vision/Hearing       Cognition   Cognition  Overall Cognitive Status: Exceptions  Arousal/Alertness: Delayed responses to stimuli  Following Commands: Follows one step commands consistently; Follows one step commands with repetition; Follows one step commands with increased time  Attention Span: Attends with cues to redirect  Safety Judgement: Decreased awareness of need for assistance;Decreased awareness of need for safety  Problem Solving: Assistance required to generate solutions;Assistance required to identify errors made;Assistance required to implement solutions  Initiation: Requires cues for some  Sequencing: Requires cues for some     Objective   Heart Rate: 62  Heart Rate Source: Monitor  BP: 119/62  BP Location: Left upper arm  BP Method: Automatic  Patient Position: Semi fowlers  MAP (Calculated): 81  Resp: 30  SpO2: 91 %  O2 Device: None (Room air)                 Strength RLE  Strength RLE: Exception  Comment: 3-/5 grossly  Strength LLE  Strength LLE: Exception  Comment: 3-/5 grossly           Bed mobility  Supine to Sit: Minimal assistance (HOB elevated and use of rail; assist at trunk)  Scooting: Contact guard assistance  Transfers  Sit to Stand: 2 Person Assistance (unable from bed to RW despite max A x 2; min A from raised bed to stedy and CGA from stedy seat in stedy)  Stand to Sit: 2 Person Assistance  Bed to Chair: Dependent/Total (via kennedi stedy)        Balance  Sitting - Static: Fair  Sitting - Dynamic: Fair;-  Standing - Static: Poor;-  Comments: Pt with SBA-CGA sitting balance EOB and CGA standing balance in stedy while therapists performing pericare x 5 min and then 2 min. Unable to stand from regular height bed to RW or ambulate. Treatment:  Functional mobility training and pt education    AM-PAC Score  AM-PAC Inpatient Mobility Raw Score : 7 (10/22/22 1105)  AM-PAC Inpatient T-Scale Score : 26.42 (10/22/22 1105)  Mobility Inpatient CMS 0-100% Score: 92.36 (10/22/22 1105)  Mobility Inpatient CMS G-Code Modifier : CM (10/22/22 1105)     Goals  Short Term Goals  Time Frame for Short Term Goals: By discharge  Short Term Goal 1: The pt will perform supine to sit with supervision  Short Term Goal 2: The pt will stand from EOB with mod A x 2 to RW  Short Term Goal 3: The pt will be able to ambulate x 10 ft with min A and RW  Patient Goals   Patient Goals :  To go home       Education  Patient Education  Education Given To: Patient  Education Provided: Role of Therapy;Plan of Care;Transfer Training  Education Provided Comments: d/c rec  Education Method: Verbal  Barriers to Learning: Cognition  Education Outcome: Verbalized understanding;Continued education needed      Therapy Time   Individual Concurrent Group Co-treatment   Time In 0941         Time Out 1025         Minutes 44         Timed Code Treatment Minutes: 29 Minutes   Timed Code Treatment Minutes:  29 Minutes    Total Treatment Minutes:  44 minutes      Tiffanie Lam, PT

## 2022-10-22 NOTE — PLAN OF CARE
VSS overnight, good uop via male purewick. Turned q2 and prn, patient rested well overnight. Will continue to monitor. Problem: Discharge Planning  Goal: Discharge to home or other facility with appropriate resources  Outcome: Progressing     Problem: Chronic Conditions and Co-morbidities  Goal: Patient's chronic conditions and co-morbidity symptoms are monitored and maintained or improved  Outcome: Progressing     Problem: Skin/Tissue Integrity  Goal: Absence of new skin breakdown  Description: 1. Monitor for areas of redness and/or skin breakdown  2.   Assess vascular access sites hourly  Outcome: Progressing

## 2022-10-22 NOTE — PROGRESS NOTES
Internal Medicine Progress Note    Patient Name: Catia Carmichael   Patient : 1940   Date: 10/22/2022   Admit Date: 10/18/2022     CC: Fall       Interval History: Patient seen and examined at bedside. No acute events overnight. Patient remains off oxygen. Patient states he feels better this morning, notes his breathing is about at his baseline now. Patient denies any chest pain, SOB, fevers, chills, nausea, vomiting. HPI: \"Reckers [place] Grates 80 y/o M with pmhx of HFmEF (45%), COPD Parkinson's, SARIKA not on CPAP HTN HLD T2DM presented to the ED with shortness of breath and a fall. Patient tripped and fell in the bathroom resulting in a bruise in the left knee. Patient endorsed not feeling well for couple of days and has developed productive cough with minimal sputum production. He also endorsed worsening shortness of breath throughout this time, which has worsened his ability to ambulate. Patient said he has chest pain that has been there for a some time but could not describe the quality or intensity of the pain but describes it he feels  over the entire chest area. Patient denied any headaches dizziness, diarrhea vomiting or urinary symptoms. In the ED vital signs were stable. Labs were significant for mild hyponatremia 135 and elevated proBNP 12873 and troponin 0.02. Chest x-ray and CT head without contrast were normal.  Patient received 40 mg Lasix IV once in the ED and admitted for further management of heart failure exacerbation. \"     ROS:  As per interval history above. Vital Signs:  Patient Vitals for the past 8 hrs:   BP Temp Temp src Pulse Resp SpO2 Weight   10/20/22 0810 129/69 97.8 °F (36.6 °C) Oral 80 -- 92 % --   10/20/22 0330 113/66 98.1 °F (36.7 °C) Oral 69 18 93 % 226 lb 3.1 oz (102.6 kg)       Physical Exam:  Physical Exam  Constitutional:       Appearance: He is obese. HENT:      Head: Normocephalic and atraumatic.    Eyes:      Extraocular Movements: Extraocular movements intact. Cardiovascular:      Rate and Rhythm: Normal rate. Rhythm irregular. Pulmonary:      Effort: Pulmonary effort is normal.      Breath sounds: Normal breath sounds. Abdominal:      General: Abdomen is flat. Palpations: Abdomen is soft. Musculoskeletal:      Comments: Partial R foot amputation    Skin:     General: Skin is warm. Neurological:      Mental Status: He is alert. Comments: Tremor       Intake/Output Summary (Last 24 hours) at 10/20/2022 1124  Last data filed at 10/20/2022 0610  Gross per 24 hour   Intake 610 ml   Output 1750 ml   Net -1140 ml        Medications:             Labs:  CBC:   Recent Labs     10/19/22  0009 10/20/22  0800   WBC 10.1 7.6   HGB 10.1* 9.1*   HCT 31.6* 28.4*    see below   MCV 72.9* 71.9*       Renal:    Recent Labs     10/19/22  0009 10/20/22  0801   * 135*   K 4.3 3.8   CL 96* 97*   CO2 26 26   BUN 23* 20   CREATININE 1.3 1.2   GLUCOSE 110* 130*   CALCIUM 9.2 9.0   MG  --  1.70*   ANIONGAP 13 12       Hepatic: No results for input(s): AST, ALT, BILITOT, BILIDIR, PROT, LABALBU, ALKPHOS in the last 72 hours. Troponin: Invalid input(s): TROPONIN    Lactic acid: Invalid input(s): LACTICACID    BNP: No results for input(s): BNP in the last 72 hours. Pro-BNP:   Recent Labs     10/19/22  0009   PROBNP 20,164*       Lipids: No results for input(s): CHOL, TRIG, HDL, LDLCALC, VLDL in the last 72 hours. ABGs:  No results for input(s): PHART, DQY6HSH, PO2ART, QIE3DGP, BEART, THGBART, O7LEYVFR, OBH4PQV in the last 72 hours. VBGs: No results for input(s): PHVEN, YVP1SRT, PO2VEN in the last 72 hours. INR: No results for input(s): INR in the last 72 hours. aPTT: No results for input(s): APTT in the last 72 hours. Procalcitonin: No results for input(s): PROCAL in the last 72 hours. CRP: No results for input(s): CRP in the last 72 hours. ESR: No results for input(s): ESR in the last 72 hours.     Radiology:  CT CSpine W/O Contrast   Final Result      1. No evidence of cervical spine fracture. 2.  Severe multilevel degenerative changes resulting in spinal stenosis. 3.  Indeterminate nodule in the left upper lobe. Follow-up chest CT in 3 months. CT Head W/O Contrast   Final Result      1. No acute intracranial hemorrhage or mass effect. 2.  Mild nonspecific white matter disease compatible chronic small vessel ischemia. 3.  Chronic sinusitis. XR CHEST 1 VIEW   Final Result      No acute cardiopulmonary findings. Assessment and Plan:  Fall  Possible causes: autonomic dysfunction 2/2 Parkinson's disease, T2DM, weakness, R feet amputation. Denied LOC, dizziness and said that he just feels weak. CT negative for acute findings from fall  - PT/OT   - Tele  - F/u vitamin B12, D, folate, iron studies and TSH  -CK: WNL  - Consult SW for placement pending PT/OT evals     Acute on chronic HFrEF   Fluid overload with +3 LE edema and mild bilateral crackles. C/o SOB. Last echo (08/11/2017): EF: 40-45 %, G2DD. Follows Dr. Rani Rose outpatient. ProBNP 20,164 (prior 3,807). CXR showing no acute abnormalities. EKG with no ST segments changes . - Strict I's and O's  - Daily weights  - Cardiac diet  - Cardiology consulted, appreciate recommendation  - Consulted heart failure nurse  - Tele  - EKG   - BNP every 3rd day  - Echo: \" Overall left ventricular systolic function appears mildly reduced with an ejection fraction of 40-45%. \"  - Holding lasix today secondary to hypotension  - Resume diuretics starting tomorrow with Torsemide 20mg PO QD     A fib  - Patient does not take home Eliquis 2.5 mg BID,   continue Lopressor 25 mg BID (switched to Toprol XL)  - F/u EKG    -Patient adamantly refusing Eliquis/blood thinners due to risk of bleeding. Patient understands risks of stroke  -switched to Toprol XL      PE r/o  Hx of PE  Stable.    - Patient does not take home Eliquis 2.5 mg BID   -Patient adamantly refusing Eliquis/blood thinners due to risk of bleeding. Patient understands risks of stroke  - D-dimer elevated yesterday. With worsening O2 requirements, tachypnea, hypotension - CTPA was ordered and showed no pulmonary embolism. HTN  VS stable. - Continue home Losartan 50 mg daily, Spironolactone 12.5 mg daily and Lopressor 25 mg BID (switched to Toprol XL)    T2DM  HbA1c (08/05/17): 8.7%.  - MSSI  - Hypoglycemia protocol  - POCT glucose  - Low carb diet  - F/u hemoglobin A1c   - Daily BMP  - Lantus 15 U nightly     Parkinson's disease  - Continue home Carbidopa-Levodopa  mg TID and Donepezil 10 mg daily     Hypothyroidism   TSH (08/15/17): 7.97 (elevated), Free T4 1.2.  - F/u TSH  - Continue home Levothyroxine 75 mcg daily      Chronic microcytic anemia  Hemoglobin 10.1 (unchanged from previously). - F/u iron studies      DVT PPx: Patient refusing blood thinners, SCD   Diet:  ADULT DIET; Regular; 3 carb choices (45 gm/meal); Low Fat/Low Chol/High Fiber/2 gm Na   Code status:  DNR-CC   ELOS: >1 night  Disposition: F    Sarita Mortimer, DO, PGY-2    Will discuss with attending physician Dr. Martha Dalal MD.     Patient seen and examined, labs and imaging studies reviewed, agree with assessment and plan as outlined above. Continue with current care and plan. Discussed case with patients nurse, discussed case with care team, discussed plan.       Martha Dalal MD [id number]

## 2022-10-22 NOTE — CARE COORDINATION
Case Management Assessment  Initial Evaluation    Date/Time of Evaluation: 10/22/2022 4:05 PM  Assessment Completed by: Li Dunbar RN    If patient is discharged prior to next notation, then this note serves as note for discharge by case management. Patient Name: Lia Ann                   YOB: 1940  Diagnosis: Heart failure ([facility] [I50.9]  Fall, initial encounter [W19. XXXA]  Acute on chronic heart failure, unspecified heart failure type ([facility] [I50.9]  Cardiac volume overload [E87.79]                   Date / Time: 10/18/2022 11:32 PM    Patient Admission Status: Inpatient   Readmission Risk (Low < 19, Mod (19-27), High > 27): Readmission Risk Score: 13.6    Current PCP: Kash Mello MD  PCP verified by CM? No    Chart Reviewed: Yes      History Provided by: Patient, Significant Other  Patient Orientation: Alert and Oriented    Patient Cognition: Alert    Hospitalization in the last 30 days (Readmission):  No    If yes, Readmission Assessment in  Navigator will be completed.     Advance Directives:      Code Status: DNR-CC   Patient's Primary Decision Maker is: Named in [address] [place] [address]    Primary Decision MakerKim [facility] - [phone]    Secondary Decision Maker: Jose A Lao [facility] - [phone]    Supplemental (Other) Decision Maker: Elías Griffiths  [phone]    Discharge Planning:    Patient lives with: Spouse/Significant Other Type of Home: Skilled Nursing Facility  Primary Care Giver: Spouse  Patient Support Systems include: Spouse/Significant Other   Current Financial resources:    Current community resources:    Current services prior to admission: Other (Comment) (COA)            Current DME: Cpap, Walker            Type of Home Care services:  None    ADLS  Prior functional level: Assistance with the following:, Bathing, Dressing  Current functional level:      PT AM-PAC: 7 /24  OT AM-PAC: 12 /24    Family can provide assistance at DC: Yes  Would you like Case Management to discuss the discharge plan with any other family members/significant others, and if so, who? Yes  Plans to Return to Present Housing: No (needs SNF)  Other Identified Issues/Barriers to RETURNING to current housing: needs SNF  Potential Assistance needed at discharge: 1 Andrew Silveira            Potential DME:    Patient expects to discharge to: [address] for transportation at discharge:      Financial    Payor: Rhoda Yeh / Plan: [address] [facility] HMO / Product Type: Medicare /     Does insurance require precert for SNF: Yes    Potential assistance Purchasing Medications: No  Meds-to-Beds request:        [facility] [id number] [address] [phone] Sandy Beasley [phone]  [phone]  Phone: [phone] Fax: [phone]      Notes:    Factors facilitating achievement of predicted outcomes: Family support    Barriers to discharge: needs pre-cert    Additional Case Management Notes:   CM met with patient at bedside, along with wife Addy Kuhn. Patient is agreeable to SNF referrals to [facility] and [facility]. They prefer [facility] if they are able to accept and have a bed. CM faxed referrals. Patient will need a pre-cert when accepted. CM spoke with Verónica at [facility]. They will review clinicals and if able to accept will start pre-cert Monday. The Plan for Transition of Care is related to the following treatment goals of Heart failure ([address] [I50.9]  Fall, initial encounter [W19. XXXA]  Acute on chronic heart failure, unspecified heart failure type ([address] [I50.9]  Cardiac volume overload [J61.95]    IF APPLICABLE: The Patient and/or patient representative Shelby Ann and his family were provided with a choice of provider and agrees with the discharge plan.  Freedom of choice list with basic dialogue that supports the patient's individualized plan of care/goals and shares the quality data associated with the providers was provided to:     Patient Representative Name:       The Patient and/or Patient Representative Agree with the Discharge Plan?       Anel Elam RN  Case Management Department  Ph: [phone] Fax: [phone]

## 2022-10-22 NOTE — PROGRESS NOTES
[facility]   Cardiology Note   Dr Mateo Kaplan MD, Jack Lares RN, FNP APRN CVNP  Date: 10/22/2022  Admit Date: 10/18/2022       Reason for consultation: heart failure exacerbation   CC:SYDNIE borja   Primary cardiologist:  Dr Colette Moreno    HPI: Gordon Iglesias is a 81 y.o. male with a past medical history of   HFmEF (45%), COPD Parkinson's, SARIKA not on CPAP HTN HLD T2DM   2 xience SAMANTHA in 11- to LAD   / considering Watchman  (not interested)     Today  SV02 94% RA  / in NSR legs wrapped   Holding lasix today secondary to hypotension  sCr 1.1 stable / net output -1290   weak / recommend PT consult  Discussed Watchman with Miam Wilson RN  (Watchman coordinator) & he said Mr Brett Bess is a candidate for [employer] but backed out of procedure several times /  this am I discussed with  & she verbalizes she &  has decided no Watchman at this time  She states pt does not take his [facility] for afib      Testing   EKG afib rate 66  mild hyponatremia 135   elevated proBNP 20164 and troponin 0.02. Chest x-ray and CT head without contrast were normal.  magnesium level wnl     TTE 10/19/2022  Left ventricular cavity size is dilated with mild concentric left ventricular hypertrophy. Overall left ventricular systolic function appears mildly reduced with an ejection fraction of 40-45%. There is mild diffuse hypokinesis. Mild mitral regurgitation The aortic valve is thickened/calcified with mild aortic stenosis with a peak velocity of 2.54m/s and a mean pressure gradient of 14mmHg. 12/17/2021 JOES:  Overall left ventricular systolic function appears low normal.  Mild aortic and mitral regurgitation is present. The left atrial size is dilated. Pre Watchman FLOR measurements:  0 degree 1.46 cm x 4.00 cm  45 dgrees 1.58 cm x 4.22 cm  90 degrees 1.57cm x 3.72cm  135 degrees 1.86 cm x 3.54cm    Patient seen and examined. Clinical notes reviewed.  Telemetry reviewed / Pertinent labs, diagnostic, device, and imaging results reviewed as a part of this visit  I spent a total of 35 minutes and greater than 50% of the time was spent counseling with patient  coordinating care regarding her diagnosis, treatments and plan of care. EKG TTE stress test: any LHC/RHC reviewed        Scheduled Meds:   metoprolol succinate  25 mg Oral Nightly    donepezil  10 mg Oral Nightly    finasteride  5 mg Oral Nightly    levothyroxine  75 mcg Oral Daily    miconazole   Topical BID    pantoprazole  40 mg Oral BID AC    rosuvastatin  20 mg Oral Daily    spironolactone  12.5 mg Oral Daily    sodium chloride flush  5-40 mL IntraVENous 2 times per day    insulin lispro  0-8 Units SubCUTAneous TID WC    insulin lispro  0-4 Units SubCUTAneous Nightly    insulin glargine  15 Units SubCUTAneous Nightly    carbidopa-levodopa  1 tablet Oral TID    cilostazol  50 mg Oral BID   :  Vitals:    10/22/22 1147   BP: 120/69   Pulse: 87   Resp: 24   Temp: 97.5 °F (36.4 °C)   SpO2: 94%      In: 60 [P.O.:60]  Out: 1225    Wt Readings from Last 3 Encounters:   10/21/22 227 lb 15.3 oz (103.4 kg)   07/25/22 226 lb (102.5 kg)   12/27/21 242 lb 12.8 oz (110.1 kg)       Intake/Output Summary (Last 24 hours) at 10/22/2022 1422  Last data filed at 10/22/2022 6623  Gross per 24 hour   Intake 60 ml   Output 1225 ml   Net -1165 ml       Telemetry: Personally Reviewed    Constitutional: Cooperative and in no apparent distress, and appears well nourished  Skin: Warm and pink; no pallor, cyanosis, clubbing, or bruising   HEENT: Symmetric and normocephalic. Cardiovascular: irregular rate and rhythm. S1/S2 present  Respiratory: Respirations symmetric and unlabored. Lungs clear to auscultation bilaterally, no wheezing, crackles, or rhonchi  Gastrointestinal: Abdomen soft and round. Bowel sounds normoactive without tenderness or masses. Musculoskeletal: Bilateral upper and lower extremity strength 5/5 with full ROM  Neurologic/Psych: Awake and orientated to person, place and time. Calm affect, appropriate mood      BMP:   Recent Labs     10/20/22  0801 10/21/22  0641 10/22/22  0857   * 134* 134*   K 3.8 3.8 4.1   CL 97* 96* 94*   CO2 26 29 30   BUN 20 21* 18   CREATININE 1.2 1.2 1.1   MG 1.70* 2.00 1.90     Estimated Creatinine Clearance: 65 mL/min (based on SCr of 1.1 mg/dL).    CBC:   Recent Labs     10/20/22  0800 10/21/22  0641 10/22/22  0857   WBC 7.6 7.3 6.4   HGB 9.1* 8.9* 9.4*   HCT 28.4* 28.4* 29.4*   MCV 71.9* 72.0* 71.6*   PLT see below 261 267     Thyroid:   Lab Results   Component Value Date/Time    TSH 7.97 08/15/2017 05:40 AM    F4GLPNI 82 01/28/2012 03:10 AM    O4RQAXF 3.9 01/28/2012 03:10 AM     Lipids:   Lab Results   Component Value Date/Time    CHOL 103 10/20/2022 08:01 AM    HDL 25 10/20/2022 08:01 AM    HDL 34 11/24/2011 06:20 AM    TRIG 101 10/20/2022 08:01 AM     LFTS:   Lab Results   Component Value Date/Time    ALT 7 04/12/2021 04:52 PM    AST 8 04/12/2021 04:52 PM    ALKPHOS 71 04/12/2021 04:52 PM    PROT 7.7 04/12/2021 04:52 PM    PROT 7.7 03/29/2012 08:18 AM    AGRATIO 0.9 04/12/2021 04:52 PM    BILITOT 0.6 04/12/2021 04:52 PM     Cardiac Enzymes:   Lab Results   Component Value Date/Time    CKTOTAL 43 10/19/2022 06:03 PM    CKMB 0.6 01/27/2012 07:20 AM    TROPONINI 0.02 10/19/2022 06:03 PM    TROPONINI 0.02 10/19/2022 06:03 PM    TROPONINI 0.02 10/19/2022 12:09 AM     Coags:   Lab Results   Component Value Date/Time    PROTIME 12.1 09/04/2020 11:28 AM    INR 1.20 12/17/2021 08:47 AM     Patient Active Problem List    Diagnosis Date Noted    Essential hypertension     Mixed hyperlipidemia     LULÚ (acute kidney injury) ([facility]     Acute diastolic CHF (congestive heart failure), NYHA class 2 ([facility]     Right foot infection     Heart failure ([facility] 10/19/2022    Fall 10/19/2022    Community acquired pneumonia of left lower lobe of lung     Posterior epistaxis     Epistaxis 03/09/2018    Cellulitis of right lower extremity     History of MRSA infection     Cellulitis 01/06/2018    Encephalopathy 08/07/2017    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 Chest x-ray and CT head without contrast were normal.  Mag wnl     TTE 10/19/2022  Left ventricular cavity size is dilated with mild concentric left ventricular hypertrophy. Overall left ventricular systolic function appears mildly reduced with an ejection fraction of 40-45%. There is mild diffuse hypokinesis. Mild mitral regurgitation The aortic valve is thickened/calcified with mild aortic stenosis with a peak velocity of 2.54m/s and a mean pressure gradient of 14mmHg.       Mech fall x 1     CAD  LHC 2 xience SAMANTHA in 11- to LAD     Afib   Watchman  being considered   [id number] Score for Atrial Fibrillation Stroke Risk 6  Refuses AC     Hx PE  Refuses AC    COPD   Stable     Parkinson's  Continue home Carbidopa-Levodopa  mg TID and Donepezil 10 mg daily    SARIKA not on CPAP     HTN   wnl     HLD   LDL 58 optimal 10/2022     T2DM   HbA1c (08/05/17): 8.7% / recheck   IM managing    Debility  PT consulted      Pt is DNR     Cardiac meds Pletal lasix cozaar lopressor Crestor aldactone     Plan   sCr 1.1 stable / net output -1290   weak / recommend PT consult  HFmEF his EF has been 45% for several echos    Pro BNP 20>  11k> 12K   weak / recommend PT & following   Discussed Watchman with Brooks JOHN  (Watchman coordinator) & he said Mr Erin Carranza is a candidate for [employer] but backed out of procedure several times /  this am I discussed with  & she verbalizes she &  has decided no Watchman at this time  She states pt does not take his [facility] for afib    Refuses oral AC / discussed at great length risk verses benefits   Holding lasix today secondary to hypotension  resume diuretics starting tomorrow with Torsemide 20mg PO QD  Will follow     Thank you for allowing to us to participate in the care of [facility].   Loyda Zepeda APRN-CNP-CVNP

## 2022-10-23 LAB
ANION GAP SERPL CALCULATED.3IONS-SCNC: 8 MMOL/L (ref 3–16)
BASOPHILS ABSOLUTE: 0 K/UL (ref 0–0.2)
BASOPHILS RELATIVE PERCENT: 0.5 %
BUN BLDV-MCNC: 15 MG/DL (ref 7–20)
CALCIUM SERPL-MCNC: 9.4 MG/DL (ref 8.3–10.6)
CHLORIDE BLD-SCNC: 95 MMOL/L (ref 99–110)
CO2: 31 MMOL/L (ref 21–32)
CREAT SERPL-MCNC: 0.9 MG/DL (ref 0.8–1.3)
EOSINOPHILS ABSOLUTE: 0.3 K/UL (ref 0–0.6)
EOSINOPHILS RELATIVE PERCENT: 6 %
GFR SERPL CREATININE-BSD FRML MDRD: >60 ML/MIN/{1.73_M2}
GLUCOSE BLD-MCNC: 150 MG/DL (ref 70–99)
GLUCOSE BLD-MCNC: 160 MG/DL (ref 70–99)
GLUCOSE BLD-MCNC: 163 MG/DL (ref 70–99)
GLUCOSE BLD-MCNC: 205 MG/DL (ref 70–99)
GLUCOSE BLD-MCNC: 295 MG/DL (ref 70–99)
HCT VFR BLD CALC: 30.5 % (ref 40.5–52.5)
HEMOGLOBIN: 9.5 G/DL (ref 13.5–17.5)
IRON SATURATION: 6 % (ref 20–50)
IRON: 17 UG/DL (ref 59–158)
LYMPHOCYTES ABSOLUTE: 0.7 K/UL (ref 1–5.1)
LYMPHOCYTES RELATIVE PERCENT: 13.3 %
MAGNESIUM: 2.1 MG/DL (ref 1.8–2.4)
MCH RBC QN AUTO: 22.6 PG (ref 26–34)
MCHC RBC AUTO-ENTMCNC: 31.3 G/DL (ref 31–36)
MCV RBC AUTO: 72.4 FL (ref 80–100)
MONOCYTES ABSOLUTE: 0.5 K/UL (ref 0–1.3)
MONOCYTES RELATIVE PERCENT: 9.7 %
NEUTROPHILS ABSOLUTE: 3.6 K/UL (ref 1.7–7.7)
NEUTROPHILS RELATIVE PERCENT: 70.5 %
PDW BLD-RTO: 18.3 % (ref 12.4–15.4)
PERFORMED ON: ABNORMAL
PLATELET # BLD: 303 K/UL (ref 135–450)
PMV BLD AUTO: 7.6 FL (ref 5–10.5)
POTASSIUM SERPL-SCNC: 3.9 MMOL/L (ref 3.5–5.1)
RBC # BLD: 4.21 M/UL (ref 4.2–5.9)
SODIUM BLD-SCNC: 134 MMOL/L (ref 136–145)
T4 FREE: 1.1 NG/DL (ref 0.9–1.8)
TOTAL IRON BINDING CAPACITY: 263 UG/DL (ref 260–445)
TSH SERPL DL<=0.05 MIU/L-ACNC: 4 UIU/ML (ref 0.27–4.2)
WBC # BLD: 5.1 K/UL (ref 4–11)

## 2022-10-23 PROCEDURE — 6370000000 HC RX 637 (ALT 250 FOR IP)

## 2022-10-23 PROCEDURE — 36415 COLL VENOUS BLD VENIPUNCTURE: CPT

## 2022-10-23 PROCEDURE — 83735 ASSAY OF MAGNESIUM: CPT

## 2022-10-23 PROCEDURE — 99233 SBSQ HOSP IP/OBS HIGH 50: CPT | Performed by: NURSE PRACTITIONER

## 2022-10-23 PROCEDURE — 1200000000 HC SEMI PRIVATE

## 2022-10-23 PROCEDURE — 6370000000 HC RX 637 (ALT 250 FOR IP): Performed by: NURSE PRACTITIONER

## 2022-10-23 PROCEDURE — 2580000003 HC RX 258

## 2022-10-23 PROCEDURE — 6370000000 HC RX 637 (ALT 250 FOR IP): Performed by: INTERNAL MEDICINE

## 2022-10-23 PROCEDURE — 80048 BASIC METABOLIC PNL TOTAL CA: CPT

## 2022-10-23 PROCEDURE — 85025 COMPLETE CBC W/AUTO DIFF WBC: CPT

## 2022-10-23 RX ORDER — TORSEMIDE 20 MG/1
20 TABLET ORAL DAILY
Status: DISCONTINUED | OUTPATIENT
Start: 2022-10-23 | End: 2022-10-25 | Stop reason: HOSPADM

## 2022-10-23 RX ORDER — LOSARTAN POTASSIUM 25 MG/1
25 TABLET ORAL DAILY
Status: DISCONTINUED | OUTPATIENT
Start: 2022-10-23 | End: 2022-10-25 | Stop reason: HOSPADM

## 2022-10-23 RX ADMIN — INSULIN GLARGINE 15 UNITS: 100 INJECTION, SOLUTION SUBCUTANEOUS at 21:26

## 2022-10-23 RX ADMIN — CARBIDOPA AND LEVODOPA 1 TABLET: 25; 100 TABLET, EXTENDED RELEASE ORAL at 12:30

## 2022-10-23 RX ADMIN — SODIUM CHLORIDE, PRESERVATIVE FREE 10 ML: 5 INJECTION INTRAVENOUS at 21:24

## 2022-10-23 RX ADMIN — LOSARTAN POTASSIUM 25 MG: 25 TABLET, FILM COATED ORAL at 11:06

## 2022-10-23 RX ADMIN — CILOSTAZOL 50 MG: 50 TABLET ORAL at 08:49

## 2022-10-23 RX ADMIN — CARBIDOPA AND LEVODOPA 1 TABLET: 25; 100 TABLET, EXTENDED RELEASE ORAL at 08:49

## 2022-10-23 RX ADMIN — INSULIN LISPRO 2 UNITS: 100 INJECTION, SOLUTION INTRAVENOUS; SUBCUTANEOUS at 12:21

## 2022-10-23 RX ADMIN — TORSEMIDE 20 MG: 20 TABLET ORAL at 09:29

## 2022-10-23 RX ADMIN — CILOSTAZOL 50 MG: 50 TABLET ORAL at 21:24

## 2022-10-23 RX ADMIN — LEVOTHYROXINE SODIUM 75 MCG: 75 TABLET ORAL at 06:24

## 2022-10-23 RX ADMIN — CARBIDOPA AND LEVODOPA 1 TABLET: 25; 100 TABLET, EXTENDED RELEASE ORAL at 21:26

## 2022-10-23 RX ADMIN — MICONAZOLE NITRATE: 2 POWDER TOPICAL at 08:50

## 2022-10-23 RX ADMIN — INSULIN LISPRO 4 UNITS: 100 INJECTION, SOLUTION INTRAVENOUS; SUBCUTANEOUS at 17:29

## 2022-10-23 RX ADMIN — DONEPEZIL HYDROCHLORIDE 10 MG: 10 TABLET, FILM COATED ORAL at 21:24

## 2022-10-23 RX ADMIN — ROSUVASTATIN CALCIUM 20 MG: 20 TABLET, FILM COATED ORAL at 08:49

## 2022-10-23 RX ADMIN — SODIUM CHLORIDE, PRESERVATIVE FREE 10 ML: 5 INJECTION INTRAVENOUS at 08:50

## 2022-10-23 RX ADMIN — PANTOPRAZOLE SODIUM 40 MG: 40 TABLET, DELAYED RELEASE ORAL at 06:23

## 2022-10-23 RX ADMIN — METOPROLOL SUCCINATE 25 MG: 25 TABLET, FILM COATED, EXTENDED RELEASE ORAL at 21:24

## 2022-10-23 RX ADMIN — SPIRONOLACTONE 12.5 MG: 25 TABLET ORAL at 08:49

## 2022-10-23 RX ADMIN — PANTOPRAZOLE SODIUM 40 MG: 40 TABLET, DELAYED RELEASE ORAL at 15:29

## 2022-10-23 RX ADMIN — FINASTERIDE 5 MG: 5 TABLET, FILM COATED ORAL at 21:24

## 2022-10-23 RX ADMIN — MICONAZOLE NITRATE: 2 POWDER TOPICAL at 21:26

## 2022-10-23 NOTE — DISCHARGE SUMMARY
INTERNAL MEDICINE DEPARTMENT AT [address] [place] [address]  DISCHARGE SUMMARY    Patient ID: Mine Lao                                             Discharge Date: 10/24/2022   Patient's PCP: Raquel Cruz MD                                          Discharge Physician:  Dr. Kaylene Meléndez Date: 10/18/2022   Admitting Physician: Perry Juárez MD      DISCHARGE DIAGNOSES:  Present on Admission:   HFrEF (heart failure with reduced ejection fraction) ([address]   Fall   CAD S/P percutaneous coronary angioplasty   Primary hypertension   PAF (paroxysmal atrial fibrillation) [facility])      Outpatient To Do List:  Follow-up appointments  Primary care provider in 1 week    HPI: \"Shilo Mcdonald Pedrito 82 y/o M with pmhx of HFmEF (45%), COPD Parkinson's, SARIKA not on CPAP HTN HLD T2DM presented to the ED with shortness of breath and a fall. Patient tripped and fell in the bathroom resulting in a bruise in the left knee. Patient endorsed not feeling well for couple of days and has developed productive cough with minimal sputum production. He also endorsed worsening shortness of breath throughout this time, which has worsened his ability to ambulate. Patient said he has chest pain that has been there for a some time but could not describe the quality or intensity of the pain but describes it he feels  over the entire chest area. Patient denied any headaches dizziness, diarrhea vomiting or urinary symptoms. In the ED vital signs were stable. Labs were significant for mild hyponatremia 135 and elevated proBNP 96008 and troponin 0.02. Chest x-ray and CT head without contrast were normal.  Patient received 40 mg Lasix IV once in the ED and admitted for further management of heart failure exacerbation. \"     Hospital Course:    Elisa Hdez presented after a fall in his bathroom. He was down for an unknown amount of time. He has recent Hx of falls, and has parkinson's disease and a partial right foot amputation.  He did not hit his head, and CT ead and spine was negative for acute fractures, but did show a pulmonary nodule that was recommended to have follow-up in 3 months. He also has PMHx of atrial fibrillation NOT on anticoagulation and also has previously refused watchman device. He refused A/C in hospital as well, and there was concern for PE after after hypotension, elevated respiratory rate, and a new oxygen requirement so CTPA was performed which was negative for PE. Our primary treatment was diuresis with IV lasix that was switched to PO torsemide. PT/OT was consulted who recommended rehab 3-5 times a week. Cardiology was consulted     Additional findings or notes for primary provider:  Pulmonary nodule f/u 3 months? Physical Exam:  /65   Pulse 64   Temp 97.7 °F (36.5 °C) (Oral)   Resp 18   Ht 6' (1.829 m)   Wt 209 lb 10.5 oz (95.1 kg)   SpO2 92%   BMI 28.43 kg/m²       Consults: cardiology    Significant Diagnostic Studies:   Echo 10/19/2022  Limited study. Left ventricular cavity size is dilated with mild concentric left   ventricular hypertrophy. Overall left ventricular systolic function appears mildly reduced with an   ejection fraction of 40-45%. There is mild diffuse hypokinesis. Mild mitral regurgitation   The aortic valve is thickened/calcified with mild aortic stenosis with a   peak velocity of 2.54m/s and a mean pressure gradient of 14mmHg.      Treatments:  cardiac meds: diuresis with furosemide    Disposition:  home    Discharged Condition:  Stable      DISCHARGE MEDICATION:     Medication List        ASK your doctor about these medications      carbidopa-levodopa  MG per extended release tablet  Commonly known as: SINEMET CR  Ask about: Which instructions should I use?     cilostazol 50 MG tablet  Commonly known as: PLETAL     donepezil 10 MG tablet  Commonly known as: ARICEPT     finasteride 5 MG tablet  Commonly known as: PROSCAR     furosemide 40 MG tablet  Commonly known as: LASIX  Take 1 tablet by mouth 2 times daily     insulin aspart protamine-insulin aspart (70-30) 100 UNIT/ML injection  Commonly known as: NOVOLOG 70/30     insulin regular 100 UNIT/ML injection  Commonly known as: HUMULIN R;NOVOLIN R  Ask about: Which instructions should I use?     levothyroxine 75 MCG tablet  Commonly known as: SYNTHROID     losartan 50 MG tablet  Commonly known as: COZAAR     metoprolol tartrate 25 MG tablet  Commonly known as: LOPRESSOR  TAKE ONE TABLET BY MOUTH TWICE A DAY     NONFORMULARY     omeprazole 20 MG delayed release capsule  Commonly known as: PRILOSEC     potassium chloride 10 MEQ extended release tablet  Commonly known as: KLOR-CON M     rosuvastatin 20 MG tablet  Commonly known as: CRESTOR  Take 1 tablet by mouth daily     spironolactone 25 MG tablet  Commonly known as: ALDACTONE  Take 0.5 tablets by mouth daily     therapeutic multivitamin-minerals tablet            Activity: activity as tolerated  Diet: cardiac diet  Wound Care: none needed      Time spent on discharge is more than 45 minutes.     Loki Ham MD  PGY1, Internal Medicine  10/24/22  4:07 PM

## 2022-10-23 NOTE — PLAN OF CARE
Problem: Safety - Adult  Goal: Free from fall injury  Outcome: Progressing  Note: Call lights within reach, fall precautions in place at all times, be at the lowest level.      Problem: Discharge Planning  Goal: Discharge to home or other facility with appropriate resources  Outcome: Progressing  Flowsheets (Taken 10/23/2022 0457)  Discharge to home or other facility with appropriate resources:   Identify barriers to discharge with patient and caregiver   Arrange for needed discharge resources and transportation as appropriate   Identify discharge learning needs (meds, wound care, etc)

## 2022-10-23 NOTE — PROGRESS NOTES
Internal Medicine Progress Note    Patient Name: Amanda Hughes   Patient : 1940   Date: 10/22/2022   Admit Date: 10/18/2022     CC: Fall       Interval History: Patient seen and examined at bedside. No acute events overnight. Patient remains off oxygen. Patient states he feels better this morning, notes his breathing is about at his baseline now. Patient denies any chest pain, SOB, fevers, chills, nausea, vomiting. HPI: \"Shilo [employer] 80 y/o M with pmhx of HFmEF (45%), COPD Parkinson's, SARIKA not on CPAP HTN HLD T2DM presented to the ED with shortness of breath and a fall. Patient tripped and fell in the bathroom resulting in a bruise in the left knee. Patient endorsed not feeling well for couple of days and has developed productive cough with minimal sputum production. He also endorsed worsening shortness of breath throughout this time, which has worsened his ability to ambulate. Patient said he has chest pain that has been there for a some time but could not describe the quality or intensity of the pain but describes it he feels  over the entire chest area. Patient denied any headaches dizziness, diarrhea vomiting or urinary symptoms. In the ED vital signs were stable. Labs were significant for mild hyponatremia 135 and elevated proBNP 54826 and troponin 0.02. Chest x-ray and CT head without contrast were normal.  Patient received 40 mg Lasix IV once in the ED and admitted for further management of heart failure exacerbation. \"     ROS:  As per interval history above. Vital Signs:  Patient Vitals for the past 8 hrs:   BP Temp Temp src Pulse Resp SpO2 Weight   10/20/22 0810 129/69 97.8 °F (36.6 °C) Oral 80 -- 92 % --   10/20/22 0330 113/66 98.1 °F (36.7 °C) Oral 69 18 93 % 226 lb 3.1 oz (102.6 kg)       Physical Exam:  Physical Exam  Constitutional:       Appearance: He is obese. HENT:      Head: Normocephalic and atraumatic.    Eyes:      Extraocular Movements: Extraocular movements intact. Cardiovascular:      Rate and Rhythm: Normal rate. Rhythm irregular. Pulmonary:      Effort: Pulmonary effort is normal.      Breath sounds: Normal breath sounds. Abdominal:      General: Abdomen is flat. Palpations: Abdomen is soft. Musculoskeletal:      Comments: Partial R foot amputation    Skin:     General: Skin is warm. Neurological:      Mental Status: He is alert. Comments: Tremor       Intake/Output Summary (Last 24 hours) at 10/20/2022 1124  Last data filed at 10/20/2022 0610  Gross per 24 hour   Intake 610 ml   Output 1750 ml   Net -1140 ml        Medications:             Labs:  CBC:   Recent Labs     10/19/22  0009 10/20/22  0800   WBC 10.1 7.6   HGB 10.1* 9.1*   HCT 31.6* 28.4*    see below   MCV 72.9* 71.9*       Renal:    Recent Labs     10/19/22  0009 10/20/22  0801   * 135*   K 4.3 3.8   CL 96* 97*   CO2 26 26   BUN 23* 20   CREATININE 1.3 1.2   GLUCOSE 110* 130*   CALCIUM 9.2 9.0   MG  --  1.70*   ANIONGAP 13 12       Hepatic: No results for input(s): AST, ALT, BILITOT, BILIDIR, PROT, LABALBU, ALKPHOS in the last 72 hours. Troponin: Invalid input(s): TROPONIN    Lactic acid: Invalid input(s): LACTICACID    BNP: No results for input(s): BNP in the last 72 hours. Pro-BNP:   Recent Labs     10/19/22  0009   PROBNP 20,164*       Lipids: No results for input(s): CHOL, TRIG, HDL, LDLCALC, VLDL in the last 72 hours. ABGs:  No results for input(s): PHART, BNX1PIW, PO2ART, VCV0PXC, BEART, THGBART, Q6UEWSNZ, CDL3NPD in the last 72 hours. VBGs: No results for input(s): PHVEN, FOK2OJN, PO2VEN in the last 72 hours. INR: No results for input(s): INR in the last 72 hours. aPTT: No results for input(s): APTT in the last 72 hours. Procalcitonin: No results for input(s): PROCAL in the last 72 hours. CRP: No results for input(s): CRP in the last 72 hours. ESR: No results for input(s): ESR in the last 72 hours.     Radiology:  CT CSpine W/O Contrast   Final Result      1. No evidence of cervical spine fracture. 2.  Severe multilevel degenerative changes resulting in spinal stenosis. 3.  Indeterminate nodule in the left upper lobe. Follow-up chest CT in 3 months. CT Head W/O Contrast   Final Result      1. No acute intracranial hemorrhage or mass effect. 2.  Mild nonspecific white matter disease compatible chronic small vessel ischemia. 3.  Chronic sinusitis. XR CHEST 1 VIEW   Final Result      No acute cardiopulmonary findings. Assessment and Plan:  Fall  Possible causes: autonomic dysfunction 2/2 Parkinson's disease, T2DM, weakness, R feet amputation. Denied LOC, dizziness and said that he just feels weak. CT negative for acute findings from fall  - PT/OT   - Tele  - F/u vitamin B12, D, folate, iron studies and TSH  -CK: WNL  - Consult SW for placement pending PT/OT evals     Acute on chronic HFrEF   Fluid overload with +3 LE edema and mild bilateral crackles. C/o SOB. Last echo (08/11/2017): EF: 40-45 %, G2DD. Follows Dr. Alejandra Suarez outpatient. ProBNP 20,164 (prior 3,807). CXR showing no acute abnormalities. EKG with no ST segments changes . - Strict I's and O's  - Daily weights  - Cardiac diet  - Cardiology consulted, appreciate recommendation  - Consulted heart failure nurse  - Tele  - EKG   - BNP every 3rd day  - Echo: \" Overall left ventricular systolic function appears mildly reduced with an ejection fraction of 40-45%. \"  - Holding lasix today secondary to hypotension  -Torsemide 20mg PO QD     A fib  - Patient does not take home Eliquis 2.5 mg BID,   continue Lopressor 25 mg BID (switched to Toprol XL)  - F/u EKG    -Patient adamantly refusing Eliquis/blood thinners due to risk of bleeding. Patient understands risks of stroke  -switched to Toprol XL      PE r/o  Hx of PE  Stable. - Patient does not take home Eliquis 2.5 mg BID   -Patient adamantly refusing Eliquis/blood thinners due to risk of bleeding.  Patient understands risks of stroke  - D-dimer elevated 10/21/22. With worsening O2 requirements, tachypnea, hypotension - CTPA was ordered and showed no pulmonary embolism. HTN  VS stable. - Continue home Losartan 50 mg daily, Spironolactone 12.5 mg daily and Lopressor 25 mg BID (switched to Toprol XL)    T2DM  HbA1c (08/05/17): 8.7%.  - MSSI  - Hypoglycemia protocol  - POCT glucose  - Low carb diet  - F/u hemoglobin A1c   - Daily BMP  - Lantus 15 U nightly     Parkinson's disease  - Continue home Carbidopa-Levodopa  mg TID and Donepezil 10 mg daily     Hypothyroidism   TSH (08/15/17): 7.97 (elevated), Free T4 1.2.  - F/u TSH  - Continue home Levothyroxine 75 mcg daily      Chronic microcytic anemia  Hemoglobin 10.1 (unchanged from previously). - F/u iron studies      DVT PPx: Patient refusing blood thinners, SCD   Diet:  ADULT DIET; Regular; 3 carb choices (45 gm/meal);  Low Fat/Low Chol/High Fiber/2 gm Na   Code status:  DNR-CC   ELOS: >1 night  Disposition: GMF    Mayra Ji, PGY-1    Will discuss with attending physician

## 2022-10-23 NOTE — PROGRESS NOTES
[address]   Cardiology Note   Dr Estela Cabral MD, Ammy Alberto RN, FNP APRN CVNP  Date: 10/23/2022  Admit Date: 10/18/2022       Reason for consultation: heart failure exacerbation   CC:SYDNIE borja   Primary cardiologist:  Dr Bowen Becerra    HPI: Bhavya Quinonez is a 81 y.o. male with a past medical history of   HFmEF (45%), COPD Parkinson's, SARIKA not on CPAP HTN HLD T2DM   2 xience SAMANTHA in 11- to LAD   / considering Watchman  (not interested)     Today  SV02 94% RA  / in NSR legs wrapped   Held  lasix  10/22/2022 today b/p is optimal start Demadex as planned   sCr 0.9 wnl / net output -1165   weak / recommend PT consult  Discussed Watchman with Teddy Sweeney RN  (Watchman coordinator) & he said Mr Kendell Corona is a candidate for [employer] but backed out of procedure several times /  this am I discussed with  & she verbalizes she &  has decided no Watchman at this time  She states pt does not take his [facility] for afib    Discussed risks and benefits with Watchman &  AC  He is rethinking the Watchman       Testing   EKG afib rate 66  mild hyponatremia 135   elevated proBNP 20164 and troponin 0.02. Chest x-ray and CT head without contrast were normal.  magnesium level wnl     TTE 10/19/2022  Left ventricular cavity size is dilated with mild concentric left ventricular hypertrophy. Overall left ventricular systolic function appears mildly reduced with an ejection fraction of 40-45%. There is mild diffuse hypokinesis. Mild mitral regurgitation The aortic valve is thickened/calcified with mild aortic stenosis with a peak velocity of 2.54m/s and a mean pressure gradient of 14mmHg. 12/17/2021 JOSE:  Overall left ventricular systolic function appears low normal.  Mild aortic and mitral regurgitation is present. The left atrial size is dilated.   Pre Watchman FLOR measurements:  0 degree 1.46 cm x 4.00 cm  45 dgrees 1.58 cm x 4.22 cm  90 degrees 1.57cm x 3.72cm  135 degrees 1.86 cm x 3.54cm    Patient seen and examined. Clinical notes reviewed. Telemetry reviewed / Pertinent labs, diagnostic, device, and imaging results reviewed as a part of this visit  I spent a total of 35 minutes and greater than 50% of the time was spent counseling with patient  coordinating care regarding her diagnosis, treatments and plan of care. EKG TTE stress test: any LHC/RHC reviewed        Scheduled Meds:   torsemide  20 mg Oral Daily    metoprolol succinate  25 mg Oral Nightly    donepezil  10 mg Oral Nightly    finasteride  5 mg Oral Nightly    levothyroxine  75 mcg Oral Daily    miconazole   Topical BID    pantoprazole  40 mg Oral BID AC    rosuvastatin  20 mg Oral Daily    spironolactone  12.5 mg Oral Daily    sodium chloride flush  5-40 mL IntraVENous 2 times per day    insulin lispro  0-8 Units SubCUTAneous TID WC    insulin lispro  0-4 Units SubCUTAneous Nightly    insulin glargine  15 Units SubCUTAneous Nightly    carbidopa-levodopa  1 tablet Oral TID    cilostazol  50 mg Oral BID   :  Vitals:    10/23/22 0746   BP: 132/67   Pulse: 60   Resp: 20   Temp: 97.4 °F (36.3 °C)   SpO2: 94%      In: 150 [P.O.:150]  Out: 700    Wt Readings from Last 3 Encounters:   10/23/22 209 lb 7 oz (95 kg)   07/25/22 226 lb (102.5 kg)   12/27/21 242 lb 12.8 oz (110.1 kg)       Intake/Output Summary (Last 24 hours) at 10/23/2022 1040  Last data filed at 10/23/2022 1221  Gross per 24 hour   Intake 150 ml   Output 700 ml   Net -550 ml       Telemetry: Personally Reviewed    Constitutional: Cooperative and in no apparent distress, and appears well nourished  Skin: Warm and pink; no pallor, cyanosis, clubbing, or bruising   HEENT: Symmetric and normocephalic. Cardiovascular: irregular rate and rhythm. S1/S2 present  Respiratory: Respirations symmetric and unlabored. Lungs clear to auscultation bilaterally, no wheezing, crackles, or rhonchi  Gastrointestinal: Abdomen soft and round. Bowel sounds normoactive without tenderness or masses.   Musculoskeletal: Bilateral upper and lower extremity strength 5/5 with full ROM  Neurologic/Psych: Awake and orientated to person, place and time. Calm affect, appropriate mood      BMP:   Recent Labs     10/21/22  0641 10/22/22  0857 10/23/22  0812   * 134* 134*   K 3.8 4.1 3.9   CL 96* 94* 95*   CO2 29 30 31   BUN 21* 18 15   CREATININE 1.2 1.1 0.9   MG 2.00 1.90 2.10     Estimated Creatinine Clearance: 77 mL/min (based on SCr of 0.9 mg/dL).    CBC:   Recent Labs     10/21/22  0641 10/22/22  0857 10/23/22  0812   WBC 7.3 6.4 5.1   HGB 8.9* 9.4* 9.5*   HCT 28.4* 29.4* 30.5*   MCV 72.0* 71.6* 72.4*    267 303     Thyroid:   Lab Results   Component Value Date/Time    TSH 7.97 08/15/2017 05:40 AM    A4LWIOZ 82 01/28/2012 03:10 AM    S1FRMWO 3.9 01/28/2012 03:10 AM     Lipids:   Lab Results   Component Value Date/Time    CHOL 103 10/20/2022 08:01 AM    HDL 25 10/20/2022 08:01 AM    HDL 34 11/24/2011 06:20 AM    TRIG 101 10/20/2022 08:01 AM     LFTS:   Lab Results   Component Value Date/Time    ALT 7 04/12/2021 04:52 PM    AST 8 04/12/2021 04:52 PM    ALKPHOS 71 04/12/2021 04:52 PM    PROT 7.7 04/12/2021 04:52 PM    PROT 7.7 03/29/2012 08:18 AM    AGRATIO 0.9 04/12/2021 04:52 PM    BILITOT 0.6 04/12/2021 04:52 PM     Cardiac Enzymes:   Lab Results   Component Value Date/Time    CKTOTAL 43 10/19/2022 06:03 PM    CKMB 0.6 01/27/2012 07:20 AM    TROPONINI 0.02 10/19/2022 06:03 PM    TROPONINI 0.02 10/19/2022 06:03 PM    TROPONINI 0.02 10/19/2022 12:09 AM     Coags:   Lab Results   Component Value Date/Time    PROTIME 12.1 09/04/2020 11:28 AM    INR 1.20 12/17/2021 08:47 AM     Patient Active Problem List    Diagnosis Date Noted    Essential hypertension     Mixed hyperlipidemia     LULÚ (acute kidney injury) ([facility]     Acute diastolic CHF (congestive heart failure), NYHA class 2 ([facility]     Right foot infection     Heart failure ([facility] 10/19/2022    Fall 10/19/2022    Community acquired pneumonia of left lower lobe of lung Posterior epistaxis     Epistaxis 03/09/2018    Cellulitis of right lower extremity     History of MRSA infection     Cellulitis 01/06/2018    Encephalopathy 08/07/2017    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    Diabetic infection of right foot ([address] 07/25/2017    Diabetic polyneuropathy associated with type 2 diabetes mellitus ([address] 07/25/2017    Obstructive sleep apnea 10/03/2014    Hypothyroid 07/24/2014    Syncope 03/25/2014    Sepsis ([address] 01/27/2012    Pulmonary embolism ([address] 01/09/2012    Coronary artery disease involving native heart without angina pectoris 01/09/2012    Presence of drug coated stent in anterior descending branch of left coronary artery 01/09/2012    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 ([address]     Chest pain 11/23/2011    Permanent atrial fibrillation (HCC)     Varicose veins of right lower extremity with inflammation, 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 Overall left ventricular systolic function appears mildly reduced with an ejection fraction of 40-45%. There is mild diffuse hypokinesis. Mild mitral regurgitation The aortic valve is thickened/calcified with mild aortic stenosis with a peak velocity of 2.54m/s and a mean pressure gradient of 14mmHg.     [facility] fall x 1   CT negative for acute findings from fall  PT/OT     CAD  LHC 2 xience SAMANTHA in 11- to LAD     Afib   Watchman  being considered   [id number] Score for Atrial Fibrillation Stroke Risk 6  Refuses AC     Hx PE  Refuses AC    COPD   Stable     Parkinson's  Continue home Carbidopa-Levodopa  mg TID and Donepezil 10 mg daily    SARIKA not on CPAP     DMT2   HbA1c (08/05/17): 8.7%.  IM managing     HTN   wnl     HLD   LDL 58 optimal 10/2022     T2DM   HbA1c (08/05/17): 8.7% / recheck   IM managing    Debility  PT consulted      Chronic microcytic anemia  Hemoglobin 10.1 (unchanged from previously). Per IM : f/u iron studies    Pt is DNR     Cardiac meds Pletal cozaar lopressor Crestor aldactone  Demadex     Plan   Discussed Watchman with Sissy Banerjee RN  (Watchman coordinator) & he said Mr Devyn Constantino is a candidate for [employer] but backed out of procedure several times /  this am I discussed with  & she verbalizes she &  has decided no Watchman at this time  She states pt does not take his [facility] for afib    Discussed risks and benefits with Watchman &  AC  He is rethinking the [employer] cozaar low dose 25mg daily po hold for b/p <100   Consult SW for placement pending PT/OT anderson    Thank you for allowing to us to participate in the care of Laz Culp.   Royce Bachelor APRN-CNP-CVNP

## 2022-10-24 ENCOUNTER — TELEPHONE (OUTPATIENT)
Dept: CARDIOLOGY CLINIC | Age: 82
End: 2022-10-24

## 2022-10-24 PROBLEM — I48.0 PAF (PAROXYSMAL ATRIAL FIBRILLATION) (HCC): Status: ACTIVE | Noted: 2022-10-24

## 2022-10-24 PROBLEM — I50.20 HFREF (HEART FAILURE WITH REDUCED EJECTION FRACTION) (HCC): Status: ACTIVE | Noted: 2022-10-19

## 2022-10-24 LAB
ANION GAP SERPL CALCULATED.3IONS-SCNC: 10 MMOL/L (ref 3–16)
BASOPHILS ABSOLUTE: 0 K/UL (ref 0–0.2)
BASOPHILS RELATIVE PERCENT: 0.5 %
BUN BLDV-MCNC: 16 MG/DL (ref 7–20)
CALCIUM SERPL-MCNC: 9.3 MG/DL (ref 8.3–10.6)
CHLORIDE BLD-SCNC: 94 MMOL/L (ref 99–110)
CO2: 29 MMOL/L (ref 21–32)
CREAT SERPL-MCNC: 1 MG/DL (ref 0.8–1.3)
EOSINOPHILS ABSOLUTE: 0.4 K/UL (ref 0–0.6)
EOSINOPHILS RELATIVE PERCENT: 6.3 %
GFR SERPL CREATININE-BSD FRML MDRD: >60 ML/MIN/{1.73_M2}
GLUCOSE BLD-MCNC: 160 MG/DL (ref 70–99)
GLUCOSE BLD-MCNC: 176 MG/DL (ref 70–99)
GLUCOSE BLD-MCNC: 180 MG/DL (ref 70–99)
GLUCOSE BLD-MCNC: 199 MG/DL (ref 70–99)
GLUCOSE BLD-MCNC: 201 MG/DL (ref 70–99)
HCT VFR BLD CALC: 30.6 % (ref 40.5–52.5)
HEMOGLOBIN: 9.4 G/DL (ref 13.5–17.5)
LYMPHOCYTES ABSOLUTE: 0.8 K/UL (ref 1–5.1)
LYMPHOCYTES RELATIVE PERCENT: 13.6 %
MAGNESIUM: 1.9 MG/DL (ref 1.8–2.4)
MCH RBC QN AUTO: 22.4 PG (ref 26–34)
MCHC RBC AUTO-ENTMCNC: 30.9 G/DL (ref 31–36)
MCV RBC AUTO: 72.5 FL (ref 80–100)
MONOCYTES ABSOLUTE: 0.5 K/UL (ref 0–1.3)
MONOCYTES RELATIVE PERCENT: 8 %
NEUTROPHILS ABSOLUTE: 4.1 K/UL (ref 1.7–7.7)
NEUTROPHILS RELATIVE PERCENT: 71.6 %
PDW BLD-RTO: 18.2 % (ref 12.4–15.4)
PERFORMED ON: ABNORMAL
PLATELET # BLD: 342 K/UL (ref 135–450)
PMV BLD AUTO: 7.6 FL (ref 5–10.5)
POTASSIUM SERPL-SCNC: 4.3 MMOL/L (ref 3.5–5.1)
RBC # BLD: 4.22 M/UL (ref 4.2–5.9)
SODIUM BLD-SCNC: 133 MMOL/L (ref 136–145)
WBC # BLD: 5.8 K/UL (ref 4–11)

## 2022-10-24 PROCEDURE — 80048 BASIC METABOLIC PNL TOTAL CA: CPT

## 2022-10-24 PROCEDURE — 6370000000 HC RX 637 (ALT 250 FOR IP)

## 2022-10-24 PROCEDURE — 1200000000 HC SEMI PRIVATE

## 2022-10-24 PROCEDURE — 83735 ASSAY OF MAGNESIUM: CPT

## 2022-10-24 PROCEDURE — 6370000000 HC RX 637 (ALT 250 FOR IP): Performed by: NURSE PRACTITIONER

## 2022-10-24 PROCEDURE — 6370000000 HC RX 637 (ALT 250 FOR IP): Performed by: INTERNAL MEDICINE

## 2022-10-24 PROCEDURE — 2580000003 HC RX 258

## 2022-10-24 PROCEDURE — 36415 COLL VENOUS BLD VENIPUNCTURE: CPT

## 2022-10-24 PROCEDURE — 85025 COMPLETE CBC W/AUTO DIFF WBC: CPT

## 2022-10-24 PROCEDURE — 99233 SBSQ HOSP IP/OBS HIGH 50: CPT | Performed by: NURSE PRACTITIONER

## 2022-10-24 RX ORDER — LANOLIN ALCOHOL/MO/W.PET/CERES
400 CREAM (GRAM) TOPICAL ONCE
Status: COMPLETED | OUTPATIENT
Start: 2022-10-24 | End: 2022-10-24

## 2022-10-24 RX ADMIN — TORSEMIDE 20 MG: 20 TABLET ORAL at 08:46

## 2022-10-24 RX ADMIN — CILOSTAZOL 50 MG: 50 TABLET ORAL at 08:46

## 2022-10-24 RX ADMIN — CARBIDOPA AND LEVODOPA 1 TABLET: 25; 100 TABLET, EXTENDED RELEASE ORAL at 21:12

## 2022-10-24 RX ADMIN — LEVOTHYROXINE SODIUM 75 MCG: 75 TABLET ORAL at 05:15

## 2022-10-24 RX ADMIN — CARBIDOPA AND LEVODOPA 1 TABLET: 25; 100 TABLET, EXTENDED RELEASE ORAL at 12:51

## 2022-10-24 RX ADMIN — SPIRONOLACTONE 12.5 MG: 25 TABLET ORAL at 08:46

## 2022-10-24 RX ADMIN — CILOSTAZOL 50 MG: 50 TABLET ORAL at 21:06

## 2022-10-24 RX ADMIN — PANTOPRAZOLE SODIUM 40 MG: 40 TABLET, DELAYED RELEASE ORAL at 05:15

## 2022-10-24 RX ADMIN — FINASTERIDE 5 MG: 5 TABLET, FILM COATED ORAL at 21:06

## 2022-10-24 RX ADMIN — PANTOPRAZOLE SODIUM 40 MG: 40 TABLET, DELAYED RELEASE ORAL at 18:21

## 2022-10-24 RX ADMIN — LOSARTAN POTASSIUM 25 MG: 25 TABLET, FILM COATED ORAL at 08:46

## 2022-10-24 RX ADMIN — METOPROLOL SUCCINATE 25 MG: 25 TABLET, FILM COATED, EXTENDED RELEASE ORAL at 21:06

## 2022-10-24 RX ADMIN — CARBIDOPA AND LEVODOPA 1 TABLET: 25; 100 TABLET, EXTENDED RELEASE ORAL at 08:46

## 2022-10-24 RX ADMIN — DONEPEZIL HYDROCHLORIDE 10 MG: 10 TABLET, FILM COATED ORAL at 21:06

## 2022-10-24 RX ADMIN — MICONAZOLE NITRATE: 2 POWDER TOPICAL at 08:47

## 2022-10-24 RX ADMIN — SODIUM CHLORIDE, PRESERVATIVE FREE 10 ML: 5 INJECTION INTRAVENOUS at 21:06

## 2022-10-24 RX ADMIN — ROSUVASTATIN CALCIUM 20 MG: 20 TABLET, FILM COATED ORAL at 08:46

## 2022-10-24 RX ADMIN — Medication 400 MG: at 12:51

## 2022-10-24 RX ADMIN — SODIUM CHLORIDE, PRESERVATIVE FREE 10 ML: 5 INJECTION INTRAVENOUS at 08:47

## 2022-10-24 RX ADMIN — INSULIN GLARGINE 15 UNITS: 100 INJECTION, SOLUTION SUBCUTANEOUS at 21:52

## 2022-10-24 NOTE — CARE COORDINATION
CM  recv'd  follow up  from [id number] [facility] in admissions at  [facility]:  they can accept the  pt and that they have  started  pre cert this  AM .      [facility]  [address], [place], [address] [facility]  Phone: [phone]  Fax:  [phone]      CM  will arrange  EMS transport  when pre cert  recv'd     HENS submitted:  Document ID : [id number]    Electronically signed by Laurie Bautista RN on 10/24/2022 at 12:06 PM  +++++++++++++++++++++++++++++++++++++++++++++++++++++++++        CM  following for  d/c planning :    Patient agreeable to SNF :    Referrals faxed  :    1.)   [facility]:  referral  faxed  C<M called  left  VM w/ [facility]  :  awaiting call back if able to accept  and  if so will need [employer]  pre cert      2.)   Severa Rink:  referral faxed:  Left   for  [facility] , [phone]  with admissions  awaiting call back if bale to accept :      CM  will follow up with  Disposition and  planning;    Electronically signed by Laurie Bautista RN on 10/24/2022 at 10:55 AM       Laurie Bautista RN Case Manager  [facility]  [address] [address] [facility]   [place] [address]  [phone]  Fax [phone]

## 2022-10-24 NOTE — PROGRESS NOTES
Patient's EF (Ejection Fraction) is greater than 40%    Heart Failure Medications:  Diuretics[de-identified] Torsemide and Spironolactone    (One of the following REQUIRED for EF </= 40%/SYSTOLIC FAILURE but MAY be used in EF% >40%/DIASTOLIC FAILURE)        ACE[de-identified] None        ARB[de-identified] Losartan         ARNI[de-identified] None    (Beta Blockers)  NON- Evidenced Based Beta Blocker (for EF% >40%/DIASTOLIC FAILURE): None    Evidenced Based Beta Blocker::(REQUIRED for EF% <40%/SYSTOLIC FAILURE) Metoprolol SUCCinate- Toprol XL  . .................................................................................................................................................. Patient's weights and intake/output reviewed: Yes    Patient's Last Weight: 209 lbs obtained by bed scale. Difference of 0 lbs than last documented weight. Intake/Output Summary (Last 24 hours) at 10/24/2022 0640  Last data filed at 10/24/2022 0259  Gross per 24 hour   Intake 460 ml   Output 1080 ml   Net -620 ml       Education Booklet Provided: yes    Comorbidities Reviewed Yes    Patient has a past medical history of 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 ([address] CAD (coronary artery disease), Cellulitis, CHF (congestive heart failure) ([address] Chronic kidney disease, COPD (chronic obstructive pulmonary disease) ([address] Hypertension, Hypothyroidism, Mixed hyperlipidemia, MRSA (methicillin resistant staph aureus) culture positive, Neuropathy, Obstructive sleep apnea, Parkinson's disease ([address] and Type 2 diabetes mellitus without complication ([address] >>For CHF and Comorbidity documentation on Education Time and Topics, please see Education Tab    Progressive Mobility Assessment:  What is this patient's Current Level of Mobility?: Wheelchair    Dependent  How was this patient Mobilized today?: Unable to Mobilize, ambulated 0 ft                 With Whom? Nurse                 Level of Difficulty/Assistance: 2x Assist     Pt resting in bed at this time on room air.  Pt denies shortness of breath. Pt with nonpitting lower extremity edema.      Patient and/or Family's stated Goal of Care this Admission: increase activity tolerance, better understand heart failure and disease management, be more comfortable, and reduce lower extremity edema prior to discharge        :

## 2022-10-24 NOTE — PROGRESS NOTES
Assessment complete and charted. Pt denies needs overnight. No change in pt status overnight- remains stable on RA. Pt with 475 mL UOP within pure wick container but had several episodes of urinary incontinence when pure wick leaked. Call light within reach, will continue to monitor.

## 2022-10-24 NOTE — PROGRESS NOTES
Internal Medicine Progress Note    Patient Name: Christina Costa   Patient : 1940   Date: 10/22/2022   Admit Date: 10/18/2022     CC: Fall       Interval History: Patient seen at the bedside. No acute events overnight. Patient denies any chest pain, shortness of breath, fevers, chills, nausea, or vomiting. Vitals appreciated-largely stable. Labs appreciated-largely stable. Hemoglobin noted to be 9.4. HPI: \"Reckers Liz [place] 80 y/o M with pmhx of HFmEF (45%), COPD Parkinson's, SARIKA not on CPAP HTN HLD T2DM presented to the ED with shortness of breath and a fall. Patient tripped and fell in the bathroom resulting in a bruise in the left knee. Patient endorsed not feeling well for couple of days and has developed productive cough with minimal sputum production. He also endorsed worsening shortness of breath throughout this time, which has worsened his ability to ambulate. Patient said he has chest pain that has been there for a some time but could not describe the quality or intensity of the pain but describes it he feels  over the entire chest area. Patient denied any headaches dizziness, diarrhea vomiting or urinary symptoms. In the ED vital signs were stable. Labs were significant for mild hyponatremia 135 and elevated proBNP 46682 and troponin 0.02. Chest x-ray and CT head without contrast were normal.  Patient received 40 mg Lasix IV once in the ED and admitted for further management of heart failure exacerbation. \"     ROS:  As per interval history above. Vital Signs:  Patient Vitals for the past 8 hrs:   BP Temp Temp src Pulse Resp SpO2 Weight   10/20/22 0810 129/69 97.8 °F (36.6 °C) Oral 80 -- 92 % --   10/20/22 0330 113/66 98.1 °F (36.7 °C) Oral 69 18 93 % 226 lb 3.1 oz (102.6 kg)       Physical Exam:  Physical Exam  Constitutional:       Appearance: He is obese. HENT:      Head: Normocephalic and atraumatic. Eyes:      Extraocular Movements: Extraocular movements intact. Cardiovascular:      Rate and Rhythm: Normal rate. Rhythm irregular. Pulmonary:      Effort: Pulmonary effort is normal.      Breath sounds: Normal breath sounds. Abdominal:      General: Abdomen is flat. Palpations: Abdomen is soft. Musculoskeletal:      Comments: Partial R foot amputation    Skin:     General: Skin is warm. Neurological:      Mental Status: He is alert. Comments: Tremor       Intake/Output Summary (Last 24 hours) at 10/20/2022 1124  Last data filed at 10/20/2022 0610  Gross per 24 hour   Intake 610 ml   Output 1750 ml   Net -1140 ml        Medications:             Labs:  CBC:   Recent Labs     10/19/22  0009 10/20/22  0800   WBC 10.1 7.6   HGB 10.1* 9.1*   HCT 31.6* 28.4*    see below   MCV 72.9* 71.9*       Renal:    Recent Labs     10/19/22  0009 10/20/22  0801   * 135*   K 4.3 3.8   CL 96* 97*   CO2 26 26   BUN 23* 20   CREATININE 1.3 1.2   GLUCOSE 110* 130*   CALCIUM 9.2 9.0   MG  --  1.70*   ANIONGAP 13 12       Hepatic: No results for input(s): AST, ALT, BILITOT, BILIDIR, PROT, LABALBU, ALKPHOS in the last 72 hours. Troponin: Invalid input(s): TROPONIN    Lactic acid: Invalid input(s): LACTICACID    BNP: No results for input(s): BNP in the last 72 hours. Pro-BNP:   Recent Labs     10/19/22  0009   PROBNP 20,164*       Lipids: No results for input(s): CHOL, TRIG, HDL, LDLCALC, VLDL in the last 72 hours. ABGs:  No results for input(s): PHART, VBT0SMG, PO2ART, WHM0JML, BEART, THGBART, A7YJANTD, GBP0RCO in the last 72 hours. VBGs: No results for input(s): PHVEN, RCO1SCZ, PO2VEN in the last 72 hours. INR: No results for input(s): INR in the last 72 hours. aPTT: No results for input(s): APTT in the last 72 hours. Procalcitonin: No results for input(s): PROCAL in the last 72 hours. CRP: No results for input(s): CRP in the last 72 hours. ESR: No results for input(s): ESR in the last 72 hours.     Radiology:  CT CSpine W/O Contrast   Final Result 1.  No evidence of cervical spine fracture. 2.  Severe multilevel degenerative changes resulting in spinal stenosis. 3.  Indeterminate nodule in the left upper lobe. Follow-up chest CT in 3 months. CT Head W/O Contrast   Final Result      1. No acute intracranial hemorrhage or mass effect. 2.  Mild nonspecific white matter disease compatible chronic small vessel ischemia. 3.  Chronic sinusitis. XR CHEST 1 VIEW   Final Result      No acute cardiopulmonary findings. Assessment and Plan:  Fall  Possible causes: autonomic dysfunction 2/2 Parkinson's disease, T2DM, weakness, R feet amputation. Denied LOC, dizziness and said that he just feels weak. CT negative for acute findings from fall  - PT/OT   - Tele  - F/u vitamin B12, D, folate, iron studies and TSH  -CK: WNL  - Consult SW for placement pending PT/OT evals     Acute on chronic HFrEF   Fluid overload with +3 LE edema and mild bilateral crackles. C/o SOB. Last echo (08/11/2017): EF: 40-45 %, G2DD. Follows Dr. Alejandra Suarez outpatient. ProBNP 20,164 (prior 3,807). CXR showing no acute abnormalities. EKG with no ST segments changes . - Strict I's and O's  - Daily weights  - Cardiac diet  - Cardiology consulted, appreciate recommendation  - Consulted heart failure nurse  - Tele  - EKG   - BNP every 3rd day  - Echo: \" Overall left ventricular systolic function appears mildly reduced with an ejection fraction of 40-45%. \"  - Holding lasix today secondary to hypotension  -Torsemide 20mg PO QD     A fib  - Patient does not take home Eliquis 2.5 mg BID,   continue Lopressor 25 mg BID (switched to Toprol XL)  - F/u EKG    -Patient adamantly refusing Eliquis/blood thinners due to risk of bleeding. Patient understands risks of stroke  -switched to Toprol XL      PE r/o  Hx of PE  Stable. - Patient does not take home Eliquis 2.5 mg BID   -Patient adamantly refusing Eliquis/blood thinners due to risk of bleeding.  Patient understands risks of stroke  - D-dimer elevated 10/21/22. With worsening O2 requirements, tachypnea, hypotension - CTPA was ordered and showed no pulmonary embolism. HTN  VS stable. - Continue home Losartan 50 mg daily, Spironolactone 12.5 mg daily and Lopressor 25 mg BID (switched to Toprol XL)    T2DM  HbA1c (08/05/17): 8.7%.  - MSSI  - Hypoglycemia protocol  - POCT glucose  - Low carb diet  - F/u hemoglobin A1c   - Daily BMP  - Lantus 15 U nightly     Parkinson's disease  - Continue home Carbidopa-Levodopa  mg TID and Donepezil 10 mg daily     Hypothyroidism   TSH (08/15/17): 7.97 (elevated), Free T4 1.2.  - F/u TSH  - Continue home Levothyroxine 75 mcg daily      Chronic microcytic anemia  Hemoglobin 10.1 (unchanged from previously). - F/u iron studies      DVT PPx: Patient refusing blood thinners, SCD   Diet:  ADULT DIET; Regular; 3 carb choices (45 gm/meal); Low Fat/Low Chol/High Fiber/2 gm Na   Code status:  DNR-CC   ELOS: >1 night  Disposition: [facility]    Alfie Riddle MD  PGY1, Internal Medicine  10/24/22  4:06 PM      Will discuss with attending physician   Patient seen and examined, labs and imaging studies reviewed, agree with assessment and plan as outlined above. Continue with current care and plan. Discussed case with patients nurse, discussed case with care team, discussed plan. Discussed risks benefits and alternatives of blood thinners and also discussed watchman at length with wife at bedside await precert then dc.     Rafa Bowman MD [id number]

## 2022-10-24 NOTE — DISCHARGE INSTR - COC
Continuity of Care Form    Patient Name: Artemio Reilly   :  1940  MRN:  [id number]    Admit date:  10/18/2022  Discharge date:  10/25    Code Status Order: DNR-CC   Advance Directives:     Admitting Physician:  Will Bolden MD  PCP: Arnie Lau MD    Discharging Nurse: Nadeem Walton 23 Unit/Room#: 7611/8057-27  Discharging Unit Phone Number: [phone]    Emergency Contact:   Extended Emergency Contact Information  Primary Emergency Contact: Tashia Lao  Address: [address] [facility], [address] Phone: [phone]  Mobile Phone: [phone]  Relation: Spouse  Secondary Emergency Contact: Shilo [address] [place] [address], [facility] Phone: [phone]  Mobile Phone: [phone]  Relation: Grandchild    Past Surgical History:  Past Surgical History:   Procedure Laterality Date    [address]      's    COLONOSCOPY      FOOT SURGERY Right 2017    INCISION AND DRAINAGE RIGHT FOOT WITH REMOVAL NECROTIC BONE    FOOT SURGERY Right 2017    : TRANSMETATARSAL AMPUTATION RIGHT FOOT     HERNIA REPAIR      SHOULDER SURGERY      right    UPPER GASTROINTESTINAL ENDOSCOPY N/A 2020    ESOPHAGOGASTRODUODENOSCOPY WITH BOTOX INJECTION performed by Charisse Mitchell DO at [facility] ENDOSCOPY       Immunization History:   Immunization History   Administered Date(s) Administered    Influenza Vaccine, unspecified formulation 2017       Active Problems:  Patient Active Problem List   Diagnosis Code    Chest pain R07.9    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 ([address] J68.3    Pulmonary embolism ([address] I26.99    Coronary artery disease involving native heart without angina pectoris I25.10    Presence of drug coated stent in anterior descending branch of left coronary artery Z95.5    Sepsis ([address] A41.9    Syncope R55    Hypothyroid E03.9    Obstructive sleep apnea G47.33    Gas gangrene [facility]) A48.0    Diabetic infection of right foot ([facility] E11.628, L08.9    Diabetic polyneuropathy associated with type 2 diabetes mellitus ([facility] E11.42    Right foot infection L08.9    LULÚ (acute kidney injury) ([facility] R61.0    Acute diastolic CHF (congestive heart failure), NYHA class 2 ([facility]) I50.31    Encephalopathy G93.40    Cellulitis L03.90    Cellulitis of right lower extremity L03.115    History of MRSA infection Z86.14    Epistaxis R04.0    Community acquired pneumonia of left lower lobe of lung J18.9    Posterior epistaxis R04.0    Essential hypertension I10    Mixed hyperlipidemia E78.2    Acute on chronic combined systolic and diastolic congestive heart failure (HCC) I50.43    Varicose veins of right lower extremity with inflammation, with ulcer of calf with fat layer exposed ([facility] I83.212, S87.695    Permanent atrial fibrillation (HCC) I48.21    Heart failure ([facility] I50.9    Fall W19. Jenelle [place]       Isolation/Infection:   Isolation            No Isolation          Patient Infection Status       Infection Onset Added Last Indicated Last Indicated By Review Planned Expiration Resolved Resolved By    None active    Resolved    COVID-19 (Rule Out) 10/18/22 10/18/22 10/19/22 COVID-19 & Influenza Combo (Ordered)   10/19/22 Rule-Out Test Resulted    COVID-19 (Rule Out) 04/12/21 04/12/21 04/12/21 COVID-19 (Ordered)   04/13/21 Rule-Out Test Resulted    MRSA  07/28/17 07/28/17 Kya Morales, RN   04/14/21 Severo Heller Post, DORA    Over 1 year ago            Nurse Assessment:  Last Vital Signs: /74   Pulse 67   Temp 97.4 °F (36.3 °C) (Oral)   Resp 18   Ht 6' (1.829 m)   Wt 209 lb 10.5 oz (95.1 kg)   SpO2 93%   BMI 28.43 kg/m²     Last documented pain score (0-10 scale):    Last Weight:   Wt Readings from Last 1 Encounters:   10/24/22 209 lb 10.5 oz (95.1 kg)     Mental Status:  oriented and alert    IV Access:  - None    Nursing Mobility/ADLs:  Walking   Assisted  Transfer  Assisted  Bathing Assisted  Dressing  Assisted  Toileting  Assisted  Feeding  Assisted  Med Admin  Assisted  Med Delivery   whole    Wound Care Documentation and Therapy:  Wound 10/19/22 Leg Left; Lower;Right chronic weeping wounds to bilateral lower extremties. Both lower legs have scales, weeping open areas, and reddened. Wound to front of left lower extremity does have some purulent green drainage. (Active)   Wound Image   10/20/22 1154   Wound Etiology Venous 10/20/22 1154   Dressing Status Clean;Dry; Intact 10/24/22 0823   Wound Cleansed Cleansed with saline 10/23/22 1800   Dressing/Treatment Ace wrap;Dry dressing 10/24/22 0823   Dressing Change Due 10/22/22 10/21/22 1530   Wound Length (cm) 4.5 cm 10/20/22 1154   Wound Width (cm) 5 cm 10/20/22 1154   Wound Depth (cm) 0.1 cm 10/20/22 1154   Wound Surface Area (cm^2) 22.5 cm^2 10/20/22 1154   Wound Volume (cm^3) 2.25 cm^3 10/20/22 1154   Wound Assessment Bleeding;Dry;Erythema 10/23/22 1800   Drainage Amount Scant 10/23/22 1800   Drainage Description Sanguinous 10/23/22 1800   Odor None 10/23/22 1800   Mary Jane-wound Assessment Hemosiderin staining (brown yellow) 10/20/22 1154   Margins Attached edges; Defined edges 10/20/22 1154   Wound Thickness Description not for Pressure Injury Partial thickness 10/20/22 1154   Number of days: 5       Wound 10/19/22 Buttocks Right;Left small open areas to both left and right buttock, darkened purple skin on both buttocks (Active)   Wound Image   10/20/22 1154   Wound Etiology Other 10/24/22 0823   Dressing Status Reinforced dressing 10/24/22 0823   Wound Cleansed Cleansed with saline 10/24/22 0823   Dressing/Treatment Zinc paste 10/24/22 0823   Wound Assessment Pink/red;Purple/maroon 10/21/22 0630   Drainage Amount None 10/21/22 0630   Odor None 10/21/22 0630   Mary Jane-wound Assessment Hyperpigmented 10/20/22 1154   Margins Attached edges 10/21/22 0630   Wound Thickness Description not for Pressure Injury Partial thickness 10/21/22 0630   Number of days: 5        Elimination:  Continence: Bowel: No  Bladder: No  Urinary Catheter: None   Colostomy/Ileostomy/Ileal Conduit: No       Date of Last BM: 10/23/2022    Intake/Output Summary (Last 24 hours) at 10/24/2022 1200  Last data filed at 10/24/2022 1005  Gross per 24 hour   Intake 580 ml   Output 1655 ml   Net -1075 ml     I/O last 3 completed shifts: In: 610 [P.O.:610]  Out: 1430 [Urine:1430]    Safety Concerns:     History of Falls (last 30 days) and At Risk for Falls    Impairments/Disabilities:      None    Nutrition Therapy:  Current Nutrition Therapy:   - Oral Diet:  General and Carb Control 4 carbs/meal (1800kcals/day)    Routes of Feeding: Oral  Liquids: No Restrictions  Daily Fluid Restriction: no  Last Modified Barium Swallow with Video (Video Swallowing Test): not done    Treatments at the Time of Hospital Discharge:   Respiratory Treatments: NA  Oxygen Therapy:  is not on home oxygen therapy. Ventilator:    - No ventilator support    Heart Failure Instructions for Daily Management  Patient was treated for acute on chronic systolic heart failure. he  will require the following:    Please weigh daily on the same scale and approximately the same time of day. Report weight gain of 3 pounds/day or 5 pounds/week to : facility MD, Eris Zavala [phone], and [facility] / [address] [place] [address] [phone]. Please use hospital discharge weight as baseline reference. Please monitor for signs and symptoms of and report to MD:  Worsening Heart Failure: sudden weight gain, shortness of breath, lower extremity or general edema/swelling, abdominal bloating/swelling, inability to lie flat, intolerance to usual activity, or cough (especially at night). Report these finding even if no increase in weight. Dehydration:  having difficulty or a decrease in urination, dizziness, worsening fatigue, or new onset/worsening of generalized weakness.      Please continue a LOW SODIUM diet and LIMIT fluid intake to 48 - 64 ounces ( 1.5 - 2 liters) per day. Call Jo Ruiz [phone], facility MD, and [facility] / Lissett Antony [phone] with any questions or concerns. Please continue heart failure education to patient and family/support system. See After Visit Summary for hospital follow up appointment details. Consider spiritual care referral for support and/or completion of advance directives . Consider: having the facility MD complete required 7 day follow up, [place] [address] telehealth program if patient agreeable and able to participate, and palliative care consult for ongoing goals of care, end of life, and/or chronic disease management discussions. Patient's primary cardiologist is Dr Romana Creed. Rehab Therapies: Physical Therapy and Occupational Therapy  Weight Bearing Status/Restrictions: No weight bearing restrictions  Other Medical Equipment (for information only, NOT a DME order):  wheelchair, walker, bath bench, and bedside commode  Other Treatments: Skin care - vaseline gauze to LLE, place gauze pad on top, wrap extremity in Kerlix. Wrap ACE wraps to BLE. Kerlix also needs to go on LLE.      Patient's personal belongings (please select all that are sent with patient):  Glasses, wound-care stuff    RN SIGNATURE:  Electronically signed by Carmen Ludwig RN on 10/25/22 at 4:52 PM EDT    CASE MANAGEMENT/SOCIAL WORK SECTION    Inpatient Status Date: 10/19/2022    Readmission Risk Assessment Score:  Readmission Risk              Risk of Unplanned Readmission:  15           Discharging to Facility/ [facility] [address] [place] [address], [place], [address] [place] [address]  Phone: [phone]  Fax:  [phone]    Dialysis Facility (if applicable)   Name:  Address:  Dialysis Schedule:  Phone:  Fax:    / signature: Electronically signed by Wilmer Kong RN on 10/24/22 at 12:01 PM EDT    PHYSICIAN SECTION    Prognosis: Fair    Condition at Discharge: Stable    Rehab Potential (if transferring to Rehab): Fair    Recommended Labs or Other Treatments After Discharge:     Recommendation: Buttocks - clean with incontinent wipes, apply zinc paste prn  L Lower Leg - clean ulcerations with NS, cover with adaptic, gauze, change daily  Bilateral Lower Legs - wrap with kelix from toes to knee, 2 ace wraps from toes to knees. Elevate legs  Reposition pt every 2 hours    Please ensure that the patient follows up with Cardiology outpatient. Physician Certification: I certify the above information and transfer of Salinas Camacho  is necessary for the continuing treatment of the diagnosis listed and that he requires Located within [facility] for greater 30 days.      Update Admission H&P: No change in H&P    PHYSICIAN SIGNATURE:  Electronically signed by Kyung August MD on 10/25/22 at 3:02 PM EDT

## 2022-10-24 NOTE — PLAN OF CARE
Problem: Safety - Adult  Goal: Free from fall injury  Outcome: Progressing     Problem: Skin/Tissue Integrity  Goal: Absence of new skin breakdown  Description: 1. Monitor for areas of redness and/or skin breakdown  2. Assess vascular access sites hourly  3. Every 4-6 hours minimum:  Change oxygen saturation probe site  4. Every 4-6 hours:  If on nasal continuous positive airway pressure, respiratory therapy assess nares and determine need for appliance change or resting period.   Outcome: Progressing     Problem: Respiratory - Adult  Goal: Achieves optimal ventilation and oxygenation  10/23/2022 1901 by Ivet Pacheco RN  Outcome: Progressing

## 2022-10-24 NOTE — PROGRESS NOTES
CC: SOB  HPI:   81 y.o. with HFmrEF, CAD PCI x 2, HTN, HLD, DM and SARIKA. S:  Denies cp, sob or palpitations. Dr Omkar Hess and I had discussion with patient and his wife regarding AF management, anticoagulation and watchman     Tele: Sinus, sinus arrhyhtmia, PVC    O:  Physical Exam:  BP (!) 185/81   Pulse 80   Temp 97.6 °F (36.4 °C) (Oral)   Resp 19   Ht 6' (1.829 m)   Wt 209 lb 10.5 oz (95.1 kg)   SpO2 94%   BMI 28.43 kg/m²    General (appearance):  No acute distress  Eyes: anicteric   Neck: soft, No JVD  Ears/Nose/Mouth/Thorat: No cyanosis  CV: Reg, + ectopy   Respiratory:  Diminished, no crackles or wheezes. + cough   GI: soft, non-tender, non-distended  Skin: Warm, dry. No rashes  Neuro/Psych: Alert and oriented x 3. Appropriate behavior  Ext:  No c/c. mild edema  Pulses:  2+ radial     I.O's= -5.4 L     Weight  Admission: Weight: 239 lb 4.8 oz (108.5 kg)   Today: Weight: 209 lb 10.5 oz (95.1 kg)    CBC:   Recent Labs     10/22/22  0857 10/23/22  0812 10/24/22  0654   WBC 6.4 5.1 5.8   HGB 9.4* 9.5* 9.4*   HCT 29.4* 30.5* 30.6*   MCV 71.6* 72.4* 72.5*    303 342     BMP:   Recent Labs     10/22/22  0857 10/23/22  0812 10/24/22  0654   * 134* 133*   K 4.1 3.9 4.3   CL 94* 95* 94*   CO2 30 31 29   BUN 18 15 16   CREATININE 1.1 0.9 1.0     Estimated Creatinine Clearance: 69 mL/min (based on SCr of 1 mg/dL). Mag:   Lab Results   Component Value Date/Time    MG 1.90 10/24/2022 06:54 AM     Pro-BNP:   Lab Results   Component Value Date/Time    PROBNP 12,129 10/22/2022 08:57 AM    PROBNP 11,267 10/21/2022 11:12 AM    PROBNP 20,164 10/19/2022 12:09 AM       Imaging:    10/21/2022 CTA PE  No evidence of any pulmonary thromboembolus, aneurysm or dissection. Coronary artery calcifications stable.        2. Bronchial thickening and areas of mucous plugging and bronchial distal obstruction in the lower lobes with bibasilar atelectatic change and consolidation and small left-sided pleural effusion, correlate for infection. Mild hazy groundglass edema noted    within the lungs   3. Benign-appearing right adrenal lesion is stable     10/19/2022 Echo   Limited study. Left ventricular cavity size is dilated with mild concentric left   ventricular hypertrophy. Overall left ventricular systolic function appears mildly reduced with an ejection fraction of 40-45%. There is mild diffuse hypokinesis. Mild mitral regurgitation   The aortic valve is thickened/calcified with mild aortic stenosis with a peak velocity of 2.54m/s and a mean pressure gradient of 14mmHg.       Assessment:  81 y.o. 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  -Pt/wife will consider NOAC vs Watchman   -Pletal  -Losartan  -Toprol   -Crestor  -Torsemide

## 2022-10-25 ENCOUNTER — TELEPHONE (OUTPATIENT)
Dept: CARDIOLOGY CLINIC | Age: 82
End: 2022-10-25

## 2022-10-25 VITALS
BODY MASS INDEX: 28.4 KG/M2 | HEIGHT: 72 IN | DIASTOLIC BLOOD PRESSURE: 95 MMHG | RESPIRATION RATE: 16 BRPM | SYSTOLIC BLOOD PRESSURE: 137 MMHG | OXYGEN SATURATION: 95 % | WEIGHT: 209.66 LBS | TEMPERATURE: 97.5 F | HEART RATE: 68 BPM

## 2022-10-25 LAB
BASOPHILS ABSOLUTE: 0 K/UL (ref 0–0.2)
BASOPHILS RELATIVE PERCENT: 0.8 %
EOSINOPHILS ABSOLUTE: 0.4 K/UL (ref 0–0.6)
EOSINOPHILS RELATIVE PERCENT: 6.9 %
GLUCOSE BLD-MCNC: 160 MG/DL (ref 70–99)
GLUCOSE BLD-MCNC: 161 MG/DL (ref 70–99)
GLUCOSE BLD-MCNC: 198 MG/DL (ref 70–99)
HCT VFR BLD CALC: 30.8 % (ref 40.5–52.5)
HEMOGLOBIN: 9.6 G/DL (ref 13.5–17.5)
LYMPHOCYTES ABSOLUTE: 0.8 K/UL (ref 1–5.1)
LYMPHOCYTES RELATIVE PERCENT: 13.6 %
MCH RBC QN AUTO: 22.4 PG (ref 26–34)
MCHC RBC AUTO-ENTMCNC: 31.1 G/DL (ref 31–36)
MCV RBC AUTO: 72 FL (ref 80–100)
MONOCYTES ABSOLUTE: 0.6 K/UL (ref 0–1.3)
MONOCYTES RELATIVE PERCENT: 10.3 %
NEUTROPHILS ABSOLUTE: 3.9 K/UL (ref 1.7–7.7)
NEUTROPHILS RELATIVE PERCENT: 68.4 %
PDW BLD-RTO: 18.6 % (ref 12.4–15.4)
PERFORMED ON: ABNORMAL
PLATELET # BLD: 359 K/UL (ref 135–450)
PMV BLD AUTO: 7.6 FL (ref 5–10.5)
PRO-BNP: 7583 PG/ML (ref 0–449)
RBC # BLD: 4.28 M/UL (ref 4.2–5.9)
SARS-COV-2, NAAT: NOT DETECTED
WBC # BLD: 5.7 K/UL (ref 4–11)

## 2022-10-25 PROCEDURE — 6370000000 HC RX 637 (ALT 250 FOR IP)

## 2022-10-25 PROCEDURE — 36415 COLL VENOUS BLD VENIPUNCTURE: CPT

## 2022-10-25 PROCEDURE — 87635 SARS-COV-2 COVID-19 AMP PRB: CPT

## 2022-10-25 PROCEDURE — 99233 SBSQ HOSP IP/OBS HIGH 50: CPT | Performed by: NURSE PRACTITIONER

## 2022-10-25 PROCEDURE — 6370000000 HC RX 637 (ALT 250 FOR IP): Performed by: NURSE PRACTITIONER

## 2022-10-25 PROCEDURE — 2580000003 HC RX 258

## 2022-10-25 PROCEDURE — 85025 COMPLETE CBC W/AUTO DIFF WBC: CPT

## 2022-10-25 PROCEDURE — 83880 ASSAY OF NATRIURETIC PEPTIDE: CPT

## 2022-10-25 RX ORDER — METOPROLOL SUCCINATE 25 MG/1
25 TABLET, EXTENDED RELEASE ORAL NIGHTLY
Qty: 30 TABLET | Refills: 3 | Status: SHIPPED | OUTPATIENT
Start: 2022-10-25

## 2022-10-25 RX ORDER — TORSEMIDE 20 MG/1
20 TABLET ORAL DAILY
Qty: 30 TABLET | Refills: 3 | Status: SHIPPED | OUTPATIENT
Start: 2022-10-25

## 2022-10-25 RX ADMIN — MICONAZOLE NITRATE: 2 POWDER TOPICAL at 01:06

## 2022-10-25 RX ADMIN — APIXABAN 2.5 MG: 2.5 TABLET, FILM COATED ORAL at 12:49

## 2022-10-25 RX ADMIN — PANTOPRAZOLE SODIUM 40 MG: 40 TABLET, DELAYED RELEASE ORAL at 16:42

## 2022-10-25 RX ADMIN — MICONAZOLE NITRATE: 2 POWDER TOPICAL at 10:46

## 2022-10-25 RX ADMIN — ROSUVASTATIN CALCIUM 20 MG: 20 TABLET, FILM COATED ORAL at 10:45

## 2022-10-25 RX ADMIN — LEVOTHYROXINE SODIUM 75 MCG: 75 TABLET ORAL at 10:45

## 2022-10-25 RX ADMIN — SODIUM CHLORIDE, PRESERVATIVE FREE 10 ML: 5 INJECTION INTRAVENOUS at 10:48

## 2022-10-25 RX ADMIN — PANTOPRAZOLE SODIUM 40 MG: 40 TABLET, DELAYED RELEASE ORAL at 10:46

## 2022-10-25 RX ADMIN — SPIRONOLACTONE 12.5 MG: 25 TABLET ORAL at 10:45

## 2022-10-25 RX ADMIN — CARBIDOPA AND LEVODOPA 1 TABLET: 25; 100 TABLET, EXTENDED RELEASE ORAL at 13:35

## 2022-10-25 RX ADMIN — TORSEMIDE 20 MG: 20 TABLET ORAL at 10:45

## 2022-10-25 RX ADMIN — LOSARTAN POTASSIUM 25 MG: 25 TABLET, FILM COATED ORAL at 10:48

## 2022-10-25 RX ADMIN — CARBIDOPA AND LEVODOPA 1 TABLET: 25; 100 TABLET, EXTENDED RELEASE ORAL at 10:45

## 2022-10-25 RX ADMIN — CILOSTAZOL 50 MG: 50 TABLET ORAL at 10:45

## 2022-10-25 NOTE — PROGRESS NOTES
Pt d/c'd off unit via ambulance. AVS with JULIET completed, PIV removed from pt. Pt was disconnected from male [address]. 1700 - This RN attempted to call report but no answer, will attempt in a few minutes. 1713 - This RN called report to Nito Gomez at [facility], [address] addressed at this time.

## 2022-10-25 NOTE — PROGRESS NOTES
Internal Medicine Progress Note    Patient Name: Amauri Ambrose   Patient : 1940   Date: 10/22/2022   Admit Date: 10/18/2022     CC: Fall       Interval History:  NAEO   Patient was seen and examined at bedside. Denied any chest pain, palpitation or shortness of breath. Patient is afebrile saturating well on 1L NC.      HPI: \"Reckers Bula Hora 80 y/o M with pmhx of HFmEF (45%), COPD Parkinson's, SARIKA not on CPAP HTN HLD T2DM presented to the ED with shortness of breath and a fall. Patient tripped and fell in the bathroom resulting in a bruise in the left knee. Patient endorsed not feeling well for couple of days and has developed productive cough with minimal sputum production. He also endorsed worsening shortness of breath throughout this time, which has worsened his ability to ambulate. Patient said he has chest pain that has been there for a some time but could not describe the quality or intensity of the pain but describes it he feels  over the entire chest area. Patient denied any headaches dizziness, diarrhea vomiting or urinary symptoms. In the ED vital signs were stable. Labs were significant for mild hyponatremia 135 and elevated proBNP 93089 and troponin 0.02. Chest x-ray and CT head without contrast were normal.  Patient received 40 mg Lasix IV once in the ED and admitted for further management of heart failure exacerbation. \"     ROS:  As per interval history above. Vital Signs:  Patient Vitals for the past 8 hrs:   BP Temp Temp src Pulse Resp SpO2 Weight   10/20/22 0810 129/69 97.8 °F (36.6 °C) Oral 80 -- 92 % --   10/20/22 0330 113/66 98.1 °F (36.7 °C) Oral 69 18 93 % 226 lb 3.1 oz (102.6 kg)       Physical Exam:  Physical Exam  Constitutional:       Appearance: He is obese. HENT:      Head: Normocephalic and atraumatic. Eyes:      Extraocular Movements: Extraocular movements intact. Cardiovascular:      Rate and Rhythm: Normal rate. Rhythm irregular.    Pulmonary: Effort: Pulmonary effort is normal.      Breath sounds: Normal breath sounds. Abdominal:      General: Abdomen is flat. Palpations: Abdomen is soft. Musculoskeletal:      Comments: Partial R foot amputation looked clean with no signs of infection. Skin:     General: Skin is warm. Neurological:      Mental Status: He is alert. Comments:Tremor present      Intake/Output Summary (Last 24 hours) at 10/20/2022 1124  Last data filed at 10/20/2022 0610  Gross per 24 hour   Intake 610 ml   Output 1750 ml   Net -1140 ml        Medications:             Labs:  CBC:   Recent Labs     10/19/22  0009 10/20/22  0800   WBC 10.1 7.6   HGB 10.1* 9.1*   HCT 31.6* 28.4*    see below   MCV 72.9* 71.9*       Renal:    Recent Labs     10/19/22  0009 10/20/22  0801   * 135*   K 4.3 3.8   CL 96* 97*   CO2 26 26   BUN 23* 20   CREATININE 1.3 1.2   GLUCOSE 110* 130*   CALCIUM 9.2 9.0   MG  --  1.70*   ANIONGAP 13 12       Hepatic: No results for input(s): AST, ALT, BILITOT, BILIDIR, PROT, LABALBU, ALKPHOS in the last 72 hours. Troponin: Invalid input(s): TROPONIN    Lactic acid: Invalid input(s): LACTICACID    BNP: No results for input(s): BNP in the last 72 hours. Pro-BNP:   Recent Labs     10/19/22  0009   PROBNP 20,164*       Lipids: No results for input(s): CHOL, TRIG, HDL, LDLCALC, VLDL in the last 72 hours. ABGs:  No results for input(s): PHART, NTI2TZV, PO2ART, GAU3YTM, BEART, THGBART, K8XUGWMJ, ZXF4ZMT in the last 72 hours. VBGs: No results for input(s): PHVEN, DFF2OQJ, PO2VEN in the last 72 hours. INR: No results for input(s): INR in the last 72 hours. aPTT: No results for input(s): APTT in the last 72 hours. Procalcitonin: No results for input(s): PROCAL in the last 72 hours. CRP: No results for input(s): CRP in the last 72 hours. ESR: No results for input(s): ESR in the last 72 hours. Radiology:  CT CSpine W/O Contrast   Final Result      1.   No evidence of cervical spine fracture. 2.  Severe multilevel degenerative changes resulting in spinal stenosis. 3.  Indeterminate nodule in the left upper lobe. Follow-up chest CT in 3 months. CT Head W/O Contrast   Final Result      1. No acute intracranial hemorrhage or mass effect. 2.  Mild nonspecific white matter disease compatible chronic small vessel ischemia. 3.  Chronic sinusitis. XR CHEST 1 VIEW   Final Result      No acute cardiopulmonary findings. Assessment and Plan:  Fall  Possible causes: autonomic dysfunction 2/2 Parkinson's disease, T2DM, weakness, R feet amputation. Denied LOC, dizziness and said that he just feels weak. CT negative for acute findings from fall  - PT/OT   - Tele  -  vitamin B12, folate  normal, follo up iron studies and TSH  -CK: WNL  - Consult SW for placement pending PT/OT evals     Acute on chronic HFrEF   Fluid overload with +3 LE edema and mild bilateral crackles. C/o SOB. Last echo (08/11/2017): EF: 40-45 %, G2DD. Follows Dr. Aylin Orona outpatient. ProBNP 20,164 (prior 3,807). CXR showing no acute abnormalities. EKG with no ST segments changes . - Strict I's and O's  - Daily weights  - Cardiac diet  - Tele  - EKG   - BNP every 3rd day (73058. Celestia Navjot .. 0)  - Echo: \" Overall left ventricular systolic function appears mildly reduced with an ejection fraction of 40-45%. \"  - Holding lasix today secondary to hypotension  -Torsemide 20mg PO QD     A fib  - Patient does not take home Eliquis 2.5 mg BID,   continue Lopressor 25 mg BID (switched to Toprol XL)  - On tele   -Patient adamantly refusing Eliquis/blood thinners due to risk of bleeding. Patient understands risks of stroke  -switched to Toprol XL      PE r/o  Hx of PE  Stable. - Patient does not take home Eliquis 2.5 mg BID   -Patient adamantly refusing Eliquis/blood thinners due to risk of bleeding. Patient understands risks of stroke  - D-dimer elevated 10/21/22.  With worsening O2 requirements, tachypnea, hypotension - CTPA was ordered and showed no pulmonary embolism. HTN  VS stable. - Continue home Losartan 50 mg daily, Spironolactone 12.5 mg daily and Lopressor 25 mg BID (switched to Toprol XL)    T2DM  HbA1c (08/05/17): 8.7%.  - MSSI  - Hypoglycemia protocol  - POCT glucose  - Low carb diet  - F/u hemoglobin A1c is 6.3  - Daily BMP  - Lantus 15 U nightly     Parkinson's disease  - Continue home Carbidopa-Levodopa  mg TID and Donepezil 10 mg daily     Hypothyroidism   TSH (08/15/17): 7.97 (elevated), Free T4 1.2.  - F/u TSH  - Continue home Levothyroxine 75 mcg daily      Chronic microcytic anemia  Hemoglobin 10.1 (unchanged from previously). - F/u iron studies      DVT PPx: Patient refusing blood thinners, SCD   Diet:  ADULT DIET; Regular; 3 carb choices (45 gm/meal); Low Fat/Low Chol/High Fiber/2 gm Na   Code status:  DNR-CC   ELOS: >1 night  Disposition: GMF      Yoly Talbert  PGY1, Internal Medicine  10/25/22  1:02 PM      Will discuss with attending physician Dr. Erna Michaud      Patient seen and examined, labs and imaging reviewed, agree with assessment and plan as outlined above, full dc summary to follow. patients condition continues to improve doing well, asked patient to monitor side effects of medications which i've discussed at length, recurrence of symptoms or new symptoms including but not limited to chest pain, shortness of breath, nausea, vomiting, fevers or chills and seek immediate medical attention or call 911. Greater than 30 minutes spent on case on day of discharge. At length discussed again the risks benefits and alternatives to anticoagulant use patient is agreeable to eliquis bid, wants to initiate prior to dc, will do that today and continue.       Rachid Atkins MD FACP

## 2022-10-25 NOTE — PROGRESS NOTES
Bedside report received from day shift RN. Patient resting comfortably in bed. No signs of discomfort or distress. VSS. Bed is in lowest position, wheels locked, 2/2 side rails up. Bedside table and call light within reach. White board updated. Will continue to monitor patient. Kerri Robertson RN    5:28 AM  No new events overnight. Patient resting quietly in bed. VSS. Denies pain. No additional needs at this time.  Kerri Robertson RN

## 2022-10-25 NOTE — CARE COORDINATION
JOIE muller'pierre call from [facility]  in admissions that pre cert  was  recv'd  , CM  informed  RN and His wife  reese as well  ;    JOIE  faxed  [address] [phone]    RN to call report  [phone]  Unit  A         Electronically signed by Danae Morel RN on 10/25/2022 at 2:11 PM          +++++++++++++++++++++++++++++++++++++++++++++++++++++++++++              Case Management Assessment            Discharge Note                    Date / Time of Note: 10/25/2022 10:21 AM                  Discharge Note Completed by: Danae Morel RN    Patient Name: Justine Davis   YOB: 1940  Diagnosis: Heart failure ([facility] [address] [I50.9]  Fall, initial encounter [W19. XXXA]  Acute on chronic heart failure, unspecified heart failure type ([address] [I50.9]  Cardiac volume overload [E87.79]   Date / Time: 10/18/2022 11:32 PM    Current PCP: Caroline Alaniz MD  Clinic patient: No    Hospitalization in the last 30 days: No    Advance Directives:  Code Status: [facility] DNR form completed and on chart: Yes    Financial:  Payor: Mingo Ascencio / Plan: [employer] HMO / Product Type: Medicare /      Pharmacy:    [facility] [id number] [place] [address] [phone] [facility] [phone]  [place] [address]  Phone: [phone] Fax: [phone]      Assistance purchasing medications?: Potential Assistance Purchasing Medications: No  Assistance provided by Case Management: None at this time    Does patient want to participate in local refill/ meds to beds program?: No    Meds To Beds General Rules:  1. Can ONLY be done Monday- Friday between 8:30am-5pm  2. Prescription(s) must be in pharmacy by 3pm to be filled same day  3. Copy of patient's insurance/ prescription drug card and patient face sheet must be sent along with the prescription(s)  4. Cost of Rx cannot be added to hospital bill. If financial assistance is needed, please contact unit  or ;   or  CANNOT provide pharmacy voucher for patients co-pays  5. Patients can then  the prescription on their way out of the hospital at discharge, or pharmacy can deliver to the bedside if staff is available. (payment due at time of pick-up or delivery - cash, check, or card accepted)     Able to afford home medications/ co-pay costs: Yes    ADLS:  Current PT AM-PAC Score: 7 /24  Current OT AM-PAC Score: 12 /24      DISCHARGE Disposition:       [facility]  [address], [place], [address] [facility]  Phone: [phone]  Fax:  [phone]    LOC at discharge: Skilled  [address] Completed: Yes    Notification completed in HENS/PAS?:  Yes : CM has completed HENS online through secure website for SNF admission at [facility]. Document ID #:  [id number]    IMM Completed:   Yes, Case management has presented and reviewed IMM letter #2 to the patient and/or family/ POA. Patient and/or family/POA verbalized understanding of their medicare rights and appeal process if needed. Patient and/or family/POA has signed, initialed and placed today's date (10/25/2022) and time ([phone]) on IMM letter #2 on the the appropriate lines. Patient and/or family/POA, copy of letter offered and they are aware that this original copy of IMM letter #2 is available prior to discharge from the paper chart on the unit. Electronic documentation has been entered into epic for IMM letter #2 and original paper copy has been added to the paper chart at the nurses station.      Transportation:  Transportation PLAN for discharge: EMS transportation   Mode of Transport: Ambulance stretcher - BLS  Reason for medical transport: Bed confined: Meets the following criteria 1) unable to get out of bed without assistance or ambulate, 2) unable to safely sit up in a wheelchair, 3) unable to maintain erect seating position in a chair for time needed for transport  Name of [address] [place] [address],[address]:  [employer]: [phone]  Time of Transport: 1700    Transport form completed: Yes    Home Care:  [address] ordered at discharge: No  [address]: Not Applicable  Orders faxed: No    Durable Medical Equipment:  DME Provider: defer  Equipment obtained during hospitalization: defer    Home Oxygen and Respiratory Equipment:  Oxygen needed at discharge?: Yes  [address]: Not Applicable  Portable tank available for discharge?: Yes    Dialysis:  Dialysis patient: No    Dialysis Center:  Not Applicable    Hospice Services:  Location: Not Applicable  Agency: Not Applicable    Consents signed: No    Referrals made at [facility] for outpatient continued care:  Not Applicable    Additional CM Notes:     CM  spoke w Asaf Jacobson  [phone] in admissions at  [facility]:  they can accept the  pt and that they have  started  pre cert yest   AM .and anticipate  receiving  later today         [place] [address], [place], [address]  Phone: [phone]  Fax:  [phone]    Cm  spoke with  pt  wife Kristal Enriquez  and  aware  and agreeable with  D/C plan     IMM  informed and consented  via telephone copy placed in chart  copy with pt at  bedside to go with him to facility . CM arranged   EMS transport  [id number] pending  pre cert  recv'd      ZAFAR submitted:  Document ID : [id number]     Will need  Rapid  Covid  screening prior to admission . The Plan for Transition of Care is related to the following treatment goals of Heart failure ([address] [I50.9]  Fall, initial encounter [W19. XXXA]  Acute on chronic heart failure, unspecified heart failure type ([address] [I50.9]  Cardiac volume overload [E87.79]    The Patient and/or patient representative Rosibel Merida and his family were provided with a choice of provider and agrees with the discharge plan Yes    Freedom of choice list was provided with basic dialogue that supports the patient's individualized plan of care/goals and shares the quality data associated with the providers.  Yes    Care Transitions patient: Kiara Washington RN  [facility] [employer]  Case Management Department  Ph: [phone]

## 2022-10-25 NOTE — PLAN OF CARE
Problem: Cardiovascular - Adult  Goal: Maintains optimal cardiac output and hemodynamic stability  10/25/2022 1654 by La White RN  Outcome: Completed  10/25/2022 0440 by Karissa Mast RN  Outcome: Progressing  Goal: Absence of cardiac dysrhythmias or at baseline  10/25/2022 1654 by La White RN  Outcome: Completed  10/25/2022 0440 by Karissa Mast RN  Outcome: Progressing     Problem: Safety - Adult  Goal: Free from fall injury  10/25/2022 1654 by La White RN  Outcome: Completed  Flowsheets (Taken 10/25/2022 1129)  Free From Fall Injury:   Instruct family/caregiver on patient safety   Based on caregiver fall risk screen, instruct family/caregiver to ask for assistance with transferring infant if caregiver noted to have fall risk factors  10/25/2022 0440 by Karissa Mast RN  Outcome: Progressing     Problem: Discharge Planning  Goal: Discharge to home or other facility with appropriate resources  Outcome: Completed     Problem: Respiratory - Adult  Goal: Achieves optimal ventilation and oxygenation  10/25/2022 1654 by La White RN  Outcome: Completed  10/25/2022 0440 by Karissa Mast RN  Outcome: Progressing     Problem: Metabolic/Fluid and Electrolytes - Adult  Goal: Electrolytes maintained within normal limits  10/25/2022 1654 by La White RN  Outcome: Completed  10/25/2022 0440 by Karissa Mast RN  Outcome: Progressing  Goal: Hemodynamic stability and optimal renal function maintained  10/25/2022 1654 by La White RN  Outcome: Completed  10/25/2022 0440 by Karissa Mast RN  Outcome: Progressing     Problem: Chronic Conditions and Co-morbidities  Goal: Patient's chronic conditions and co-morbidity symptoms are monitored and maintained or improved  10/25/2022 1654 by La White RN  Outcome: Completed  10/25/2022 0440 by Karissa Mast RN  Outcome: Progressing     Problem: Skin/Tissue Integrity  Goal: Absence of new skin breakdown  Description: 1. Monitor for areas of redness and/or skin breakdown  2. Assess vascular access sites hourly  3. Every 4-6 hours minimum:  Change oxygen saturation probe site  4. Every 4-6 hours:  If on nasal continuous positive airway pressure, respiratory therapy assess nares and determine need for appliance change or resting period.   10/25/2022 1654 by Doroteo Shine RN  Outcome: Completed  10/25/2022 0440 by Woody Jorge RN  Outcome: Progressing     Problem: ABCDS Injury Assessment  Goal: Absence of physical injury  Outcome: Completed

## 2022-10-25 NOTE — PROGRESS NOTES
CC: SOB  HPI:   81 y.o. with HFmrEF, CAD PCI x 2, HTN, HLD, pAF/AFL, DM and SARIKA. S: C/o hoarse cough. No cp or sob      Tele: pAF/AFL, Sinus     O:  Physical Exam:  BP (!) 147/63   Pulse 73   Temp 97 °F (36.1 °C) (Axillary) Comment (Src): pt eating breakfast  Resp 14   Ht 6' (1.829 m)   Wt 209 lb 10.5 oz (95.1 kg)   SpO2 93%   BMI 28.43 kg/m²    General (appearance):  No acute distress  Eyes: anicteric   Neck: soft, No JVD  Ears/Nose/Mouth/Thorat: No cyanosis  CV: irreg, irreg  Respiratory:  Diminished, no crackles or wheezes. + cough   GI: soft, non-tender, non-distended  Skin: Warm, dry. No rashes  Neuro/Psych: Alert and oriented x 3. Appropriate behavior  Ext:  No c/c. mild edema  Pulses:  2+ radial     I.O's= -5.4 L     Weight  Admission: Weight: 239 lb 4.8 oz (108.5 kg)   Today: Weight: 209 lb 10.5 oz (95.1 kg)    CBC:   Recent Labs     10/23/22  0812 10/24/22  0654 10/25/22  0808   WBC 5.1 5.8 5.7   HGB 9.5* 9.4* 9.6*   HCT 30.5* 30.6* 30.8*   MCV 72.4* 72.5* 72.0*    342 359     BMP:   Recent Labs     10/23/22  0812 10/24/22  0654   * 133*   K 3.9 4.3   CL 95* 94*   CO2 31 29   BUN 15 16   CREATININE 0.9 1.0     Estimated Creatinine Clearance: 69 mL/min (based on SCr of 1 mg/dL). Mag:   Lab Results   Component Value Date/Time    MG 1.90 10/24/2022 06:54 AM     Pro-BNP:   Lab Results   Component Value Date/Time    PROBNP 7,583 10/25/2022 08:08 AM    PROBNP 12,129 10/22/2022 08:57 AM    PROBNP 11,267 10/21/2022 11:12 AM       Imaging:    10/21/2022 CTA PE  No evidence of any pulmonary thromboembolus, aneurysm or dissection. Coronary artery calcifications stable. 2. Bronchial thickening and areas of mucous plugging and bronchial distal obstruction in the lower lobes with bibasilar atelectatic change and consolidation and small left-sided pleural effusion, correlate for infection. Mild hazy groundglass edema noted    within the lungs   3.  Benign-appearing right adrenal lesion is stable     10/19/2022 Echo   Limited study. Left ventricular cavity size is dilated with mild concentric left   ventricular hypertrophy. Overall left ventricular systolic function appears mildly reduced with an ejection fraction of 40-45%. There is mild diffuse hypokinesis. Mild mitral regurgitation   The aortic valve is thickened/calcified with mild aortic stenosis with a peak velocity of 2.54m/s and a mean pressure gradient of 14mmHg.       Assessment:  81 y.o. 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  -Pt/wife will consider NOAC vs Watchman   -Pletal  -Losartan  -Toprol   -Crestor  -Torsemide

## 2022-10-25 NOTE — TELEPHONE ENCOUNTER
Patient has an appointment to re-discuss [phone]. Patient Hema Madden decided not to pursue but patients wife called on 10/21/2022 stating they were interested again.

## 2022-10-25 NOTE — PLAN OF CARE
Problem: Cardiovascular - Adult  Goal: Maintains optimal cardiac output and hemodynamic stability  Outcome: Progressing  Goal: Absence of cardiac dysrhythmias or at baseline  Outcome: Progressing     Problem: Safety - Adult  Goal: Free from fall injury  Outcome: Progressing     Problem: Respiratory - Adult  Goal: Achieves optimal ventilation and oxygenation  Outcome: Progressing     Problem: Metabolic/Fluid and Electrolytes - Adult  Goal: Electrolytes maintained within normal limits  Outcome: Progressing  Goal: Hemodynamic stability and optimal renal function maintained  Outcome: Progressing     Problem: Chronic Conditions and Co-morbidities  Goal: Patient's chronic conditions and co-morbidity symptoms are monitored and maintained or improved  Outcome: Progressing     Problem: Skin/Tissue Integrity  Goal: Absence of new skin breakdown  Description: 1. Monitor for areas of redness and/or skin breakdown  2. Assess vascular access sites hourly  3. Every 4-6 hours minimum:  Change oxygen saturation probe site  4. Every 4-6 hours:  If on nasal continuous positive airway pressure, respiratory therapy assess nares and determine need for appliance change or resting period.   Outcome: Progressing

## 2022-10-26 NOTE — DISCHARGE SUMMARY
INTERNAL MEDICINE DEPARTMENT AT [address] [place] [address]  DISCHARGE SUMMARY    Patient ID: James Lao                                             Discharge Date: 10/26/2022   Patient's PCP: Saadia Taylor MD                                          Discharge Physician: Corrine Monaco MD  Admit Date: 10/18/2022   Admitting Physician: Baltazar Quinteros MD    PROBLEMS DURING HOSPITALIZATION:  Present on Admission:   HFrEF (heart failure with reduced ejection fraction) ([facility] [address]   Fall   CAD S/P percutaneous coronary angioplasty   Primary hypertension   PAF (paroxysmal atrial fibrillation) ([facility]      DISCHARGE DIAGNOSES:  Heart failure with reduced ejection fraction exacerbation. HPI:  Shilo Mcintosh 82 y/o M with pmhx of HFmEF (45%), COPD Parkinson's, SARIKA not on CPAP HTN HLD T2DM presented to the ED with shortness of breath and a fall. Patient tripped and fell in the bathroom resulting in a bruise in the left knee. Patient endorsed not feeling well for couple of days and has developed productive cough with minimal sputum production. He also endorsed worsening shortness of breath throughout this time, which worsened his ability to ambulate. Patient said he had chest pain that had been there for a some time but could not describe the quality or intensity of the pain but describes it he feels  over the entire chest area. Patient denied any headaches dizziness, diarrhea vomiting or urinary symptoms. In the ED vital signs were stable. Labs were significant for mild hyponatremia 135 and elevated proBNP 20164 and troponin 0.02. Chest x-ray and CT head without contrast were normal.  Patient received 40 mg Lasix IV once in the ED and admitted for further management of heart failure exacerbation. \"       The following issues were addressed during hospitalization:    Acute on chronic HFrEF  Patient was fluid overload with 2+ bilateral lower extremity edema bilateral crackles on pulmonary examination.   proBNP 20164, he was short of breath, cardiology was consulted to perform an echo that showed ejection fraction 40 to 45%. Patient was placed on strict I's and O's, monitored daily standing weight and started Lasix 40 mg IV twice daily. Patient's condition improved and was medically cleared by cardiology for discharge. Patient was discharged on 20 mg torsemide p.o. daily    Falls  PT/OT was brought on board to improve patient's functional mobility through schedule sessions. Patient was cleared for discharge with home fall precautions highlighted. Wounds care  Patient had left lower leg chronic venous ulcers and irritant contact dermatitis on the buttocks likely secondary to urinary incontinence. [facility] wound care was consulted for inpatient wound management and will plan for outpatient continuous care. Physical Exam   Constitutional:       Appearance: He is obese. HENT:      Head: Normocephalic and atraumatic. Eyes:      Extraocular Movements: Extraocular movements intact. Cardiovascular:      Rate and Rhythm: Normal rate. Rhythm irregular. Pulmonary:      Effort: Pulmonary effort is normal.      Breath sounds: Normal breath sounds. Abdominal:      General: Abdomen is flat. Palpations: Abdomen is soft. Musculoskeletal:      Comments: Partial R foot amputation looked clean with no signs of infection. Skin:     General: Skin is warm. Neurological:      Mental Status: He is alert.       Comments:Tremor present  Consults: cardiology  Significant Diagnostic Studies:   ECHO of heart  Treatments: cardiac meds: furosemide  Disposition: home  Discharged Condition: Stable  Follow Up: Primary Care Physician in one week    DISCHARGE MEDICATION:       Medication List        START taking these medications      apixaban 2.5 MG Tabs tablet  Commonly known as: ELIQUIS  Take 1 tablet by mouth 2 times daily  Notes to patient: Apixaban (Eliquis)  USE--Prevents blood clots and strokes (slows blood clotting time-- \"blood thinner\")  SIDE EFFECTS--  Bleeding or bruising more easily, upset stomach  Blood thinner-- refer to drug information handout     metoprolol succinate 25 MG extended release tablet  Commonly known as: TOPROL XL  Take 1 tablet by mouth at bedtime  Notes to patient: Metoprolol  (Lopressor, Toprol XL)  USE--  Lower blood pressure, prevent heart attack, treat heart failure  SIDE EFFECTS--  Dizziness, feeling tired, low blood pressure     torsemide 20 MG tablet  Commonly known as: DEMADEX  Take 1 tablet by mouth daily  Notes to patient: Torsemide (Demadex)  USE--  Helps body get rid of extra fluid, \"water pill\"  SIDE EFFECTS--  Increased urination, dizziness            CONTINUE taking these medications      carbidopa-levodopa  MG per extended release tablet  Commonly known as: SINEMET CR     cilostazol 50 MG tablet  Commonly known as: PLETAL     donepezil 10 MG tablet  Commonly known as: ARICEPT     finasteride 5 MG tablet  Commonly known as: PROSCAR     insulin aspart protamine-insulin aspart (70-30) 100 UNIT/ML injection  Commonly known as: NOVOLOG 70/30     insulin regular 100 UNIT/ML injection  Commonly known as: HUMULIN R;NOVOLIN R     levothyroxine 75 MCG tablet  Commonly known as: SYNTHROID     losartan 50 MG tablet  Commonly known as: COZAAR     miconazole 2 % powder  Commonly known as: MICOTIN  Apply topically 2 times daily.      omeprazole 20 MG delayed release capsule  Commonly known as: PRILOSEC     rosuvastatin 20 MG tablet  Commonly known as: CRESTOR  Take 1 tablet by mouth daily     spironolactone 25 MG tablet  Commonly known as: ALDACTONE  Take 0.5 tablets by mouth daily     therapeutic multivitamin-minerals tablet            STOP taking these medications      furosemide 40 MG tablet  Commonly known as: LASIX     metoprolol tartrate 25 MG tablet  Commonly known as: LOPRESSOR     NONFORMULARY     potassium chloride 10 MEQ extended release tablet  Commonly known as: KLOR-CON M               Where to Get Your Medications        These medications were sent to [facility], [address] Cherelle Gonzales [phone] - F [phone]  [address] [facility] [address]      Phone: [phone]   apixaban 2.5 MG Tabs tablet  metoprolol succinate 25 MG extended release tablet  miconazole 2 % powder  torsemide 20 MG tablet          Activity: activity as tolerated  Diet: cardiac diet  Wound Care: keep wound clean and dry    Time Spent on discharge is more than 45 minutes    Signed:  David Fontaine MD,  MD, PGY-1  10/26/2022

## 2022-10-28 ENCOUNTER — OFFICE VISIT (OUTPATIENT)
Dept: CARDIOLOGY CLINIC | Age: 82
End: 2022-10-28
Payer: MEDICARE

## 2022-10-28 VITALS
OXYGEN SATURATION: 94 % | BODY MASS INDEX: 28.35 KG/M2 | HEART RATE: 50 BPM | WEIGHT: 209 LBS | SYSTOLIC BLOOD PRESSURE: 110 MMHG | DIASTOLIC BLOOD PRESSURE: 64 MMHG

## 2022-10-28 DIAGNOSIS — I10 HTN (HYPERTENSION), BENIGN: ICD-10-CM

## 2022-10-28 DIAGNOSIS — E78.2 MIXED HYPERLIPIDEMIA: ICD-10-CM

## 2022-10-28 DIAGNOSIS — I25.10 CORONARY ARTERY DISEASE INVOLVING NATIVE CORONARY ARTERY OF NATIVE HEART WITHOUT ANGINA PECTORIS: ICD-10-CM

## 2022-10-28 DIAGNOSIS — I50.20 HFREF (HEART FAILURE WITH REDUCED EJECTION FRACTION) (HCC): Primary | ICD-10-CM

## 2022-10-28 DIAGNOSIS — I48.0 PAROXYSMAL A-FIB (HCC): ICD-10-CM

## 2022-10-28 PROCEDURE — 3074F SYST BP LT 130 MM HG: CPT | Performed by: NURSE PRACTITIONER

## 2022-10-28 PROCEDURE — 3078F DIAST BP <80 MM HG: CPT | Performed by: NURSE PRACTITIONER

## 2022-10-28 PROCEDURE — 99214 OFFICE O/P EST MOD 30 MIN: CPT | Performed by: NURSE PRACTITIONER

## 2022-10-28 PROCEDURE — 1123F ACP DISCUSS/DSCN MKR DOCD: CPT | Performed by: NURSE PRACTITIONER

## 2022-10-28 RX ORDER — ATORVASTATIN CALCIUM 40 MG/1
40 TABLET, FILM COATED ORAL DAILY
COMMUNITY

## 2022-10-28 NOTE — PROGRESS NOTES
HF hospital follow up. HPI:  81 y.o. patient of Dr Isabel Barfield here for HFmrEF, CAD/PCI, HTN, HLD, pAF/AFL, DM and SARIKA. He has a non-productive cough. He denies cp, sob, LH/dizziness, or palpitations. No further episode of syncope or falls since hospitalization. No orthopnea, PND, abdominal bloating or early satiety. No n/v/d, fever or Gi/ bleeding.        Past Medical History:   Diagnosis Date    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 ([facility] [address]     Dr. Amy Almaraz    CAD (coronary artery disease)     Cellulitis 2014    CHF (congestive heart failure) ([facility])     systolic    Chronic kidney disease     upon hospitalization due to bactrim allergy    COPD (chronic obstructive pulmonary disease) ([facility])     Hypertension     Hypothyroidism     Mixed hyperlipidemia     MRSA (methicillin resistant staph aureus) culture positive 07/24/2017    foot    Neuropathy     Obstructive sleep apnea 10/03/2014    does not use cpap machine    Parkinson's disease ([facility] [address] 1965    Type 2 diabetes mellitus without complication ([facility] [address]      Past Surgical History:   Procedure Laterality Date    CARDIAC CATHETERIZATION      CERVICAL FUSION  1981    COLON SURGERY      1980's    COLONOSCOPY      FOOT SURGERY Right 07/24/2017    INCISION AND DRAINAGE RIGHT FOOT WITH REMOVAL NECROTIC BONE    FOOT SURGERY Right 07/27/2017    : TRANSMETATARSAL AMPUTATION RIGHT FOOT     HERNIA REPAIR      SHOULDER SURGERY      right    UPPER GASTROINTESTINAL ENDOSCOPY N/A 11/25/2020    ESOPHAGOGASTRODUODENOSCOPY WITH BOTOX INJECTION performed by Linda Cruz DO at [facility] ENDOSCOPY     Family History   Problem Relation Age of Onset    Stroke Mother     Stroke Father     Cancer Sister     Cancer Paternal Aunt      Social History     Tobacco Use    Smoking status: Never    Smokeless tobacco: Never   Vaping Use    Vaping Use: Never used   Substance Use Topics    Alcohol use: No    Drug use: No     Allergies:Aspirin, Nsaids, Bactrim [sulfamethoxazole-trimethoprim], Celebrex [celecoxib], Cymbalta [duloxetine hcl], Pcn [penicillins], Codeine, and Vicodin [hydrocodone-acetaminophen]    Review of Systems  General: No changes in weight, fatigue, or night sweats. HEENT: No blurry or decreased vision. No changes in hearing, nasal discharge or sore throat. Cardiovascular:  See HPI. Respiratory: No cough, hemoptysis, or wheezing. Gastrointestinal:  No abdominal pain, hematochezia, melana, constipation, diarrhea, or history of GI ulcers. Genito-Urinary: No dysuria or hematuria. No urgency or polyuria. Musculoskeletal:  No complaints of joint pain, joint swelling or muscular weakness/soreness. Neurological:  No dizziness, headaches, numbness/tingling, speech problems or weakness. Psychological:  No anxiety or depression. Hematological and Lymphatic: No abnormal bleeding or bruising, blood clots, jaundice or swollen lymph nodes. Endocrine:   No malaise/lethargy, palpitations, polydipsia/polyuria, temperature intolerance or unexpected weight changes  Skin:  No rashes or non-healing ulcers. Physical Exam:  /64   Pulse 50   Wt 209 lb (94.8 kg) Comment: patient unable to provide, weight taken from previous visit  SpO2 94%   BMI 28.35 kg/m²    General (appearance):  No acute distress  Eyes: anicteric   Neck: soft, No JVD  Ears/Nose/Mouth/Thorat: No cyanosis  CV: RRR   Respiratory:  Clear, normal effort  GI: soft, non-tender, non-distended  Skin: Warm, dry. No rashes  Neuro/Psych: Alert and oriented x 3. Appropriate behavior  Ext:  No c/c. Tr LE edema.  Both leg are ACE wrapped  Pulses:  2+ radial     Weight  Wt Readings from Last 3 Encounters:   10/24/22 209 lb 10.5 oz (95.1 kg)   07/25/22 226 lb (102.5 kg)   12/27/21 242 lb 12.8 oz (110.1 kg)          CBC:   Lab Results   Component Value Date    WBC 5.7 10/25/2022    HGB 9.6 (L) 10/25/2022    HCT 30.8 (L) 10/25/2022    MCV 72.0 (L) 10/25/2022     10/25/2022     BMP:  Lab Results   Component Value Date    CREATININE 1.0 10/24/2022    BUN 16 10/24/2022     (L) 10/24/2022    K 4.3 10/24/2022    CL 94 (L) 10/24/2022    CO2 29 10/24/2022     Estimated Creatinine Clearance: 69 mL/min (based on SCr of 1 mg/dL). Mag:   Lab Results   Component Value Date/Time    MG 1.90 10/24/2022 06:54 AM     LIVER PROFILE:   Lab Results   Component Value Date    ALT 7 (L) 04/12/2021    AST 8 (L) 04/12/2021    ALKPHOS 71 04/12/2021    BILITOT 0.6 04/12/2021     PT/INR:   Lab Results   Component Value Date    INR 1.20 (H) 12/17/2021    INR 1.04 09/04/2020    INR 1.42 (H) 07/26/2017    PROTIME 12.1 09/04/2020    PROTIME 16.1 (H) 07/26/2017    PROTIME 16.5 (H) 07/24/2017     Pro-BNP   Lab Results   Component Value Date/Time    PROBNP 7,583 10/25/2022 08:08 AM    PROBNP 12,129 10/22/2022 08:57 AM    PROBNP 11,267 10/21/2022 11:12 AM     LIPIDS:  No components found for: CHLPL  Lab Results   Component Value Date    TRIG 101 10/20/2022    TRIG 162 (H) 07/29/2017    TRIG 396 (H) 06/12/2013     Lab Results   Component Value Date    HDL 25 (L) 10/20/2022    HDL 15 (L) 07/29/2017    HDL 38 (L) 06/12/2013     Lab Results   Component Value Date    LDLCALC 58 10/20/2022    LDLCALC 60 07/29/2017    LDLCALC 68 11/24/2011     Lab Results   Component Value Date    LABVLDL 20 10/20/2022    LABVLDL 32 07/29/2017     TSH:  Lab Results   Component Value Date    TSH 4.00 10/19/2022    [id number] 82 01/28/2012    Q5EXBYW 3.9 (L) 01/28/2012       IMAGING:     10/21/2022 CTA PE  No evidence of any pulmonary thromboembolus, aneurysm or dissection. Coronary artery calcifications stable. 2. Bronchial thickening and areas of mucous plugging and bronchial distal obstruction in the lower lobes with bibasilar atelectatic change and consolidation and small left-sided pleural effusion, correlate for infection. Mild hazy groundglass edema noted    within the lungs   3. Benign-appearing right adrenal lesion is stable      10/19/2022 Echo   Limited study.    Left ventricular cavity size is dilated with mild concentric left   ventricular hypertrophy. Overall left ventricular systolic function appears mildly reduced with an ejection fraction of 40-45%. There is mild diffuse hypokinesis. Mild mitral regurgitation   The aortic valve is thickened/calcified with mild aortic stenosis with a peak velocity of 2.54m/s and a mean pressure gradient of 14mmHg. Medications:   Current Outpatient Medications   Medication Sig Dispense Refill    miconazole (MICOTIN) 2 % powder Apply topically 2 times daily. 45 g 1    torsemide (DEMADEX) 20 MG tablet Take 1 tablet by mouth daily 30 tablet 3    metoprolol succinate (TOPROL XL) 25 MG extended release tablet Take 1 tablet by mouth at bedtime 30 tablet 3    apixaban (ELIQUIS) 2.5 MG TABS tablet Take 1 tablet by mouth 2 times daily 60 tablet 3    insulin regular (HUMULIN R;NOVOLIN R) 100 UNIT/ML injection Inject 15-20 Units into the skin daily (with breakfast)      cilostazol (PLETAL) 50 MG tablet Take 50 mg by mouth 2 times daily      Multiple Vitamins-Minerals (THERAPEUTIC MULTIVITAMIN-MINERALS) tablet Take 1 tablet by mouth daily      carbidopa-levodopa (SINEMET CR)  MG per extended release tablet Take 1 tablet by mouth 3 times daily At breakfast, dinner and at bedtime.       omeprazole (PRILOSEC) 20 MG delayed release capsule Take 20 mg by mouth 2 times daily      spironolactone (ALDACTONE) 25 MG tablet Take 0.5 tablets by mouth daily 30 tablet 3    finasteride (PROSCAR) 5 MG tablet Take 5 mg by mouth nightly      losartan (COZAAR) 50 MG tablet Take 50 mg by mouth daily      insulin aspart protamine-insulin aspart (NOVOLOG 70/30) (70-30) 100 UNIT/ML injection Inject 25-30 Units into the skin every morning Dependent on BG      levothyroxine (SYNTHROID) 75 MCG tablet Take 75 mcg by mouth Daily       donepezil (ARICEPT) 10 MG tablet Take 10 mg by mouth nightly       rosuvastatin (CRESTOR) 20 MG tablet Take 1 tablet by mouth daily 90 tablet 4 No current facility-administered medications for this visit. Assessment:  1. HFrEF (heart failure with reduced ejection fraction) ([facility] [address]    2. Coronary artery disease involving native coronary artery of native heart without angina pectoris    3. HTN (hypertension), benign    4. Mixed hyperlipidemia    5.  Paroxysmal A-fib (HCC)        Plan:  HFmrEF: stable    Appears compensated   Daily weights, low salt diet   Torsemide 20 mg po daily   Toprol   Spironolactone   Losartan   CAD/PCI: stable    Toprol   Losartan   Pletal   Lipitor  HTN: stable    /64   Toprol   Losartan  HLD: stable    LDL 89   Lipitor  pAF/AFL: stable    Toprol   Eliquis    Has appointment with Dr Adalberto Urrutia to discuss Watchman    Follow up with Dr Nino Found in 1 month     Reviewed most recent: CBC, BMP, LFT, Lipids, Mag, PT/INR, BNP, TSH  Reviewed most recent: ECG, Echo, Nuc stress test,  CTA, LHC

## 2022-11-15 NOTE — TELEPHONE ENCOUNTER
Confirmed appointment for 11/17/2022 with Dr. Coreen Simpson on [facility] consult

## 2022-11-17 ENCOUNTER — OFFICE VISIT (OUTPATIENT)
Dept: CARDIOLOGY CLINIC | Age: 82
End: 2022-11-17
Payer: MEDICARE

## 2022-11-17 VITALS — SYSTOLIC BLOOD PRESSURE: 124 MMHG | DIASTOLIC BLOOD PRESSURE: 60 MMHG | HEART RATE: 50 BPM

## 2022-11-17 DIAGNOSIS — I10 PRIMARY HYPERTENSION: Primary | ICD-10-CM

## 2022-11-17 PROCEDURE — 99215 OFFICE O/P EST HI 40 MIN: CPT | Performed by: INTERNAL MEDICINE

## 2022-11-17 PROCEDURE — 93000 ELECTROCARDIOGRAM COMPLETE: CPT | Performed by: INTERNAL MEDICINE

## 2022-11-17 PROCEDURE — 3078F DIAST BP <80 MM HG: CPT | Performed by: INTERNAL MEDICINE

## 2022-11-17 PROCEDURE — 3074F SYST BP LT 130 MM HG: CPT | Performed by: INTERNAL MEDICINE

## 2022-11-17 PROCEDURE — 1123F ACP DISCUSS/DSCN MKR DOCD: CPT | Performed by: INTERNAL MEDICINE

## 2022-11-17 NOTE — PROGRESS NOTES
[address]  Cardiology Consult    Vi Lao  1940 November 17, 2022    Primary Cardiologist: Lalitha Hunt MD  Referring Physician: Lalitha Hunt MD    Reason for Referral: Left atrial appendage closure    CC: \"I want to proceed with Watchman. \"      Subjective:     History of Present Illness:    Nikky Jefferson is a 81 y.o. patient with a PMH significant for coronary artery disease, hypertension, chronic atrial fibrillation and hyperlipidemia. He has had recurrent nosebleeds prior to starting anticoagulation. Significant epistaxis. He has had also GI bleed. He had a fall on 10/18/2022 and was hospitalized and was sent to [facility] for rehab. Patient is being referred for Left Atrial Appendage Closure with WATCHMAN device for management of stroke risk resulting from non-valvular atrial fibrillation. Based on their past history, it has been determined that they are poor candidates for long-term oral-anticoagulation, however may be tolerant of short term treatment with warfarin as necessary. Today, he is here to discuss Watchman again. He recently had a fall and is in rehab center for therapy. He was restarted on Eliquis while he was in the hospital at [facility]. He is in a wheelchair today Patient denies exertional chest pain/pressure, dyspnea at rest, BRITTON, PND, orthopnea, palpitations, lightheadedness, weight changes, changes in LE edema, and syncope. 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     Surgical History:   has a past surgical history that includes cervical fusion (1981); Foot surgery (Right, 07/24/2017); Foot surgery (Right, 07/27/2017); Cardiac catheterization; Colonoscopy; Colon surgery; shoulder surgery; hernia repair; and Upper gastrointestinal endoscopy (N/A, 11/25/2020). Social History:   reports that he has never smoked. He has never used smokeless tobacco. He reports that he does not drink alcohol and does not use drugs. Family History:  family history includes Cancer in his paternal aunt and sister; Stroke in his father and mother. Home Medications:  Were reviewed and are listed in nursing record and/or below  Prior to Admission medications    Medication Sig Start Date End Date Taking? Authorizing Provider   atorvastatin (LIPITOR) 40 MG tablet Take 40 mg by mouth daily   Yes Historical Provider, MD   miconazole (MICOTIN) 2 % powder Apply topically 2 times daily. 10/25/22  Yes Na Smith MD   torsemide [facility]) 20 MG tablet Take 1 tablet by mouth daily 10/25/22  Yes Na Smith MD   metoprolol succinate (TOPROL XL) 25 MG extended release tablet Take 1 tablet by mouth at bedtime 10/25/22  Yes Na Smith MD   apixaban (ELIQUIS) 2.5 MG TABS tablet Take 1 tablet by mouth 2 times daily 10/25/22  Yes Na Smith MD   insulin regular (HUMULIN R;NOVOLIN R) 100 UNIT/ML injection Inject 15-20 Units into the skin daily (with breakfast)   Yes Historical Provider, MD   cilostazol (PLETAL) 50 MG tablet Take 50 mg by mouth 2 times daily   Yes Historical Provider, MD   Multiple Vitamins-Minerals (THERAPEUTIC MULTIVITAMIN-MINERALS) tablet Take 1 tablet by mouth daily   Yes Historical Provider, MD   carbidopa-levodopa (SINEMET CR)  MG per extended release tablet Take 1 tablet by mouth 3 times daily At breakfast, dinner and at bedtime.    Yes Historical Provider, MD   omeprazole (PRILOSEC) 20 MG delayed release capsule Take 20 mg by mouth 2 times daily   Yes Historical Provider, MD   spironolactone (ALDACTONE) 25 MG tablet Take 0.5 tablets by mouth daily 4/17/21  Yes Reid Chiu MD   finasteride (PROSCAR) 5 MG tablet Take 5 mg by mouth nightly   Yes Historical Provider, MD   losartan (COZAAR) 50 MG tablet Take 50 mg by mouth daily   Yes Historical Provider, MD   insulin aspart protamine-insulin aspart (NOVOLOG 70/30) (70-30) 100 UNIT/ML injection Inject 25-30 Units into the skin every morning Dependent on BG   Yes Historical Provider, MD   levothyroxine (SYNTHROID) 75 MCG tablet Take 75 mcg by mouth Daily    Yes Historical Provider, MD   donepezil (ARICEPT) 10 MG tablet Take 10 mg by mouth nightly    Yes Historical Provider, MD        CURRENT Medications:  No current facility-administered medications for this visit. Allergies:  Aspirin, Nsaids, Bactrim [sulfamethoxazole-trimethoprim], Celebrex [celecoxib], Cymbalta [duloxetine hcl], Pcn [penicillins], Codeine, and Vicodin [hydrocodone-acetaminophen]           Review of Systems: All reviewed and refer to HPI  Constitutional: no unanticipated weight loss. There's been no change in energy level, sleep pattern, or activity level. No fevers, chills. Eyes: No visual changes or diplopia. No scleral icterus. ENT: No Headaches, hearing loss or vertigo. No mouth sores or sore throat. Cardiovascular: No Chest pain, tightness or discomfort. No Shortness of breath. No Dyspnea on exertion, Orthopnea, Paroxysmal nocturnal dyspnea or breathlessness at rest.  No Palpitations. No Syncope ('blackouts', 'faints', 'collapse') or dizziness. Respiratory: No cough or wheezing, no sputum production. No hematemesis. Gastrointestinal: No abdominal pain, appetite loss, blood in stools. No change in bowel or bladder habits. Genitourinary: No dysuria, trouble voiding, or hematuria. Musculoskeletal:  No gait disturbance, no joint complaints. Integumentary: No rash or pruritis. Neurological: No headache, diplopia, change in muscle strength, numbness or tingling.    Psychiatric: No anxiety or depression. Endocrine: No temperature intolerance. No excessive thirst, fluid intake, or urination. No tremor. Hematologic/Lymphatic: No abnormal bruising or bleeding, blood clots or swollen lymph nodes. Allergic/Immunologic: No nasal congestion or hives. Objective: all reveiwed      PHYSICAL EXAM:      Vitals:    11/17/22 1247   BP: 124/60   Pulse: 50            General Appearance:  Alert, cooperative, no distress, appears stated age. Head:  Normocephalic, without obvious abnormality, atraumatic. Eyes:  Pupils equal and round. No scleral icterus. Mouth: Moist mucosa, no pharyngeal erythema. Nose: Nares normal. No drainage or sinus tenderness. Neck: Supple, symmetrical, trachea midline. No adenopathy. No tenderness/mass/nodules. No carotid bruit or elevated JVD. Lungs:   Respiratory Effort: Normal   Auscultation: Clear to auscultation bilaterally, respirations unlabored. No wheeze, rales   Chest Wall:  No tenderness or deformity. Cardiovascular:    Pulses  Palpation: normal   Ascultation: Regular rate, S1/ S2 normal. No murmur, rub, or gallop. 2+ radial and pedal pulses, symmetric  Carotid  Femoral   Abdomen and Gastrointestinal:   Soft, non-tender, bowel sounds active. Liver and Spleen  Masses   Musculoskeletal: No muscle wasting  Back  Gait   Extremities: Extremities normal, atraumatic. No cyanosis or edema. No cyanosis clubbing       Skin: Inspection and palpation performed, no rashes or lesions. Pysch: Normal mood and affect.  Alert and oriented to time place person   Neurologic: Normal gross motor and sensory exam.       Labs: all labs have been reviewed      Lab Results   Component Value Date/Time    WBC 5.7 10/25/2022 08:08 AM    RBC 4.28 10/25/2022 08:08 AM    HGB 9.6 10/25/2022 08:08 AM    HCT 30.8 10/25/2022 08:08 AM    MCV 72.0 10/25/2022 08:08 AM    RDW 18.6 10/25/2022 08:08 AM     10/25/2022 08:08 AM     Lab Results   Component Value Date/Time     10/24/2022 06:54 AM    K 4.3 10/24/2022 06:54 AM    K 4.3 10/19/2022 12:09 AM    CL 94 10/24/2022 06:54 AM    CO2 29 10/24/2022 06:54 AM    BUN 16 10/24/2022 06:54 AM    CREATININE 1.0 10/24/2022 06:54 AM    GFRAA >60 2021 07:09 AM    GFRAA >60 2013 09:58 AM    AGRATIO 0.9 2021 04:52 PM    LABGLOM >60 10/24/2022 06:54 AM    GLUCOSE 160 10/24/2022 06:54 AM    PROT 7.7 2021 04:52 PM    PROT 7.7 2012 08:18 AM    CALCIUM 9.3 10/24/2022 06:54 AM    BILITOT 0.6 2021 04:52 PM    ALKPHOS 71 2021 04:52 PM    AST 8 2021 04:52 PM    ALT 7 2021 04:52 PM     No results found for: PTINR  Lab Results   Component Value Date    LABA1C 6.3 10/19/2022     Lab Results   Component Value Date    CKTOTAL 43 10/19/2022    CKMB 0.6 2012    TROPONINI 0.02 (H) 10/19/2022    TROPONINI 0.02 (H) 10/19/2022       Cardiac, Vascular and Imaging Data: All Personally Reviewed in Detail by Myself      EK2022 Atrial fibrillattion    Echocardiogram:   ECHO 17  Left ventricle size is normal. There is mild concentric left ventricular  hypertrophy. Left ventricular function is reduced with ejection fraction  estimated at 40-45%. No gross regional wall motion abnormalities but  endocardial definition was limited. Grade II diastolic dysfunction. Mitral  annular calcification is present. Mild mitral regurgitation is present. The  left atrium is dilated. Aortic valve appears sclerotic but opens adequately. No significant changes from the 17 echo study. Stress Test:     Cath: Other imaging:     Assessment and Plan     Atrial Fibrillation,     Primary Cardiologist:   Referring Physician: Hilary Sutton MD  Referring Reason: Epistaxis/Recurrent falls     CHADSvasc is at least 6 (Age,CHF,HTN,CAD,DM)  HASbled is at least 2    Epistaxis and falls. Currently on Eliquis. He is a poor candidate for chronic anticoagulation with his history of recurrent falls.  He would be an appropriate candidate for the watchman device. Patient is agreeable to proceed with the Watchman procedure and instructed Kenyetta Porras, the coordinator will get in contact to facilitate scheduling. Will be on Asa and Plavix for 6 months after procedure then [place] only. Specifically regarding risk of anticoagulation they have demonstrated:  History of bleeding (eg. Intracerebral, subdural, GI, retro-peritoneal)  Intolerance oral anticoagulation  Increased bleeding risk (e.g. Thrombocytopenia, anemia)  High risk of recurrent falls    We have discussed their unique stroke and bleeding risk both on and off oral-anticoagulation, and the rationale for this referral.  Based on both stroke and bleeding risk, a shared decision has been made to pursue closure of the left atrial appendage as an alternative to oral anticoagulant therapy for stroke prophylaxis and to reduce their long term risk of incidence of bleeding. Discussed Watchman LAAC at length with patient, heart model, device model and video. We gave educational materials. We discussed pre-procedure workup, timing of restarting AC, AC length, procedure and post procedure follow up/management. No Iodine Allergy. No Nickel/Titanium Allergy. He/She is agreeable to short term AC, proceeding with JOSE, 2nd opinion/shared decision making  and will follow up with me post workup to discussing timing of the procedure. I had a detail discussion with the patient and family regarding the risk and benefit of the procedures. I explained to them the details of the procedure. I explained to them that the procedure will be performed under general anesthesia. I explained any risk of bleeding, pericardial effusion and tamponade, perforation of the vessel, stroke, myocardial infarction or death. The patient and family understood the risk and benefits and the details of procedure and would like to go ahead with the procedure. The patient gave informed consent.     All questions and concerns answered at

## 2022-11-17 NOTE — TELEPHONE ENCOUNTER
Spoke with patient's wife today and informed her of the procedure that is being scheduled on 1/23/23 at [place] [facility] arrival.  Lab work has been order instructed to have done at [facility] on 1/16/2023. Also informed to get Hibiclens bath at their Pharmacy. All procedure instructions were e-mailed to patient. Instructed to contact me with any questions prior to procedure. Dr. Gerhard Mendez would like to get patient to be stronger hopefully at this time the patient will be stronger and ready for the implant date.
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
## 2023-01-06 ENCOUNTER — TELEPHONE (OUTPATIENT)
Dept: CARDIOLOGY CLINIC | Age: 83
End: 2023-01-06

## 2023-01-06 DIAGNOSIS — Z01.818 PRE-OP TESTING: ICD-10-CM

## 2023-01-06 DIAGNOSIS — I48.0 PAROXYSMAL A-FIB (HCC): Primary | ICD-10-CM

## 2023-01-06 NOTE — LETTER
[address] [facility] ([facility])        Dear, Martha Gallego procedure has been scheduled for Monday 1/23/2023 at [facility] with Dr. Jass Best MD.  Please arrive directly to the Cath Lab at 7:30 am.     You will need to make arrangements to have a responsible adult drive you home after your procedure and someone needs to stay with you for 24 hours. Procedure labs Procedure labs will need to be completed at [facility] Monday 1/16/2023 the week before your procedure. Please check in with Registration in the main lobby of the hospital. The test to perform [address] LAB NOT OUTPATIENT LAB. are as follows BMP, CBC, Urine test and Type and Screen. Pre-Procedure Instructions:  Irasema Tavares You will need to FAST for at least 8 hours prior to your procedure, nothing to eat or drink after midnight.  NO caffeine the morning of your procedure.  You need to wash your body with a soap called HIBICLENS the evening before or the morning of your scheduled procedure from the neck down. A prescription has been sent to your pharmacy. If your insurance does not cover the soap, you can pick it up over the counter, ask your pharmacy for help.  Take 1 Baby Aspirin the day of the procedure.  Take Eliquis on 1/21/2023 then hold medication.  Hold Demadex and Spironolactone the day pf the procedure.  Hold Diabetic medication the day of the procedure.  Hold multi. Vitamins the day of procedure.  ALL other medications can be taken in the morning with sips of water   Do not use any lotions, creams or perfume the morning of procedure.  You will be going home the same day but if unable to you may stay overnight at [facility] after your procedure so, please pack an overnight bag if needed. Pre-certification  Our office will be in contact with your insurance company regarding pre-certification.  If for some reason, your procedure is still pending the day before your scheduled procedure, it must be moved or delayed until we have authorization to proceed. If you have Medicare, no pre-cert is required. Co-Payment  If you have a copay or a deposit due that is your responsibility to pay at the time of service, the Cardiac Cath lab will accept your payment on the day of your procedure. Please bring with you a form of payment. Any questions regarding your copay, please contact your insurance company. Please bring your photo ID, insurance cards and copayment if you have one. (Cash, checks & credit cards are accepted)        On the day of your procedure      Please report directly to  The [address], [address], [address], [address]     You should park in the lot directly in front of the hospital, you are able to [facility] for free from (6AM-4:30PM). The MAIN entrance that will be the Revolving Door. When you come into the hospital ask the  to direct you to the Cardiac Cath Lab. I will be in contact with you the week before your procedure. You may reach me at [phone] in the meantime.      Sincerely,     Breezy Boland RN,  Watchman Coordinator

## 2023-01-11 RX ORDER — CHLORHEXIDINE GLUCONATE 213 G/1000ML
SOLUTION TOPICAL
Qty: 1 EACH | Refills: 0 | Status: ON HOLD
Start: 2023-01-11 | End: 2023-01-16 | Stop reason: HOSPADM

## 2023-01-12 ENCOUNTER — HOSPITAL ENCOUNTER (OUTPATIENT)
Dept: WOUND CARE | Age: 83
Discharge: HOME | End: 2023-01-12

## 2023-01-13 ENCOUNTER — TELEPHONE (OUTPATIENT)
Dept: CARDIOLOGY CLINIC | Age: 83
End: 2023-01-13

## 2023-01-13 ENCOUNTER — APPOINTMENT (OUTPATIENT)
Dept: GENERAL RADIOLOGY | Age: 83
DRG: 057 | End: 2023-01-13
Payer: MEDICARE

## 2023-01-13 ENCOUNTER — HOSPITAL ENCOUNTER (INPATIENT)
Age: 83
LOS: 3 days | Discharge: HOME | DRG: 057 | End: 2023-01-16
Attending: STUDENT IN AN ORGANIZED HEALTH CARE EDUCATION/TRAINING PROGRAM | Admitting: INTERNAL MEDICINE
Payer: MEDICARE

## 2023-01-13 ENCOUNTER — APPOINTMENT (OUTPATIENT)
Dept: CT IMAGING | Age: 83
DRG: 057 | End: 2023-01-13
Payer: MEDICARE

## 2023-01-13 DIAGNOSIS — R29.6 MULTIPLE FALLS: ICD-10-CM

## 2023-01-13 DIAGNOSIS — S09.90XA INJURY OF HEAD, INITIAL ENCOUNTER: Primary | ICD-10-CM

## 2023-01-13 DIAGNOSIS — R55 NEAR SYNCOPE: ICD-10-CM

## 2023-01-13 DIAGNOSIS — E16.2 HYPOGLYCEMIA: ICD-10-CM

## 2023-01-13 PROBLEM — Z79.01 CHRONIC ANTICOAGULATION: Status: ACTIVE | Noted: 2023-01-13

## 2023-01-13 LAB
ANION GAP SERPL CALCULATED.3IONS-SCNC: 10 MMOL/L (ref 3–16)
BASOPHILS ABSOLUTE: 0 K/UL (ref 0–0.2)
BASOPHILS RELATIVE PERCENT: 0.8 %
BILIRUBIN URINE: NEGATIVE
BLOOD, URINE: NEGATIVE
BUN BLDV-MCNC: 38 MG/DL (ref 7–20)
CALCIUM SERPL-MCNC: 9.9 MG/DL (ref 8.3–10.6)
CHLORIDE BLD-SCNC: 102 MMOL/L (ref 99–110)
CLARITY: CLEAR
CO2: 25 MMOL/L (ref 21–32)
COLOR: YELLOW
CREAT SERPL-MCNC: 1.3 MG/DL (ref 0.8–1.3)
EOSINOPHILS ABSOLUTE: 0.3 K/UL (ref 0–0.6)
EOSINOPHILS RELATIVE PERCENT: 4.6 %
GFR SERPL CREATININE-BSD FRML MDRD: 55 ML/MIN/{1.73_M2}
GLUCOSE BLD-MCNC: 100 MG/DL (ref 70–99)
GLUCOSE BLD-MCNC: 145 MG/DL (ref 70–99)
GLUCOSE BLD-MCNC: 40 MG/DL (ref 70–99)
GLUCOSE URINE: NEGATIVE MG/DL
HCT VFR BLD CALC: 32.1 % (ref 40.5–52.5)
HEMOGLOBIN: 10.1 G/DL (ref 13.5–17.5)
KETONES, URINE: NEGATIVE MG/DL
LEUKOCYTE ESTERASE, URINE: NEGATIVE
LYMPHOCYTES ABSOLUTE: 1.5 K/UL (ref 1–5.1)
LYMPHOCYTES RELATIVE PERCENT: 26.4 %
MCH RBC QN AUTO: 23.2 PG (ref 26–34)
MCHC RBC AUTO-ENTMCNC: 31.6 G/DL (ref 31–36)
MCV RBC AUTO: 73.2 FL (ref 80–100)
MICROSCOPIC EXAMINATION: NORMAL
MONOCYTES ABSOLUTE: 0.5 K/UL (ref 0–1.3)
MONOCYTES RELATIVE PERCENT: 9.4 %
NEUTROPHILS ABSOLUTE: 3.4 K/UL (ref 1.7–7.7)
NEUTROPHILS RELATIVE PERCENT: 58.8 %
NITRITE, URINE: NEGATIVE
PDW BLD-RTO: 25.2 % (ref 12.4–15.4)
PERFORMED ON: ABNORMAL
PERFORMED ON: ABNORMAL
PH UA: 6 (ref 5–8)
PLATELET # BLD: 263 K/UL (ref 135–450)
PMV BLD AUTO: 7.6 FL (ref 5–10.5)
POTASSIUM REFLEX MAGNESIUM: 4.6 MMOL/L (ref 3.5–5.1)
PRO-BNP: 9150 PG/ML (ref 0–449)
PROTEIN UA: NEGATIVE MG/DL
RBC # BLD: 4.38 M/UL (ref 4.2–5.9)
SODIUM BLD-SCNC: 137 MMOL/L (ref 136–145)
SPECIFIC GRAVITY UA: 1.01 (ref 1–1.03)
TROPONIN: 0.01 NG/ML
URINE REFLEX TO CULTURE: NORMAL
URINE TYPE: NORMAL
UROBILINOGEN, URINE: 0.2 E.U./DL
WBC # BLD: 5.8 K/UL (ref 4–11)

## 2023-01-13 PROCEDURE — 70450 CT HEAD/BRAIN W/O DYE: CPT

## 2023-01-13 PROCEDURE — 96360 HYDRATION IV INFUSION INIT: CPT

## 2023-01-13 PROCEDURE — 36415 COLL VENOUS BLD VENIPUNCTURE: CPT

## 2023-01-13 PROCEDURE — 1200000000 HC SEMI PRIVATE

## 2023-01-13 PROCEDURE — 84484 ASSAY OF TROPONIN QUANT: CPT

## 2023-01-13 PROCEDURE — 93005 ELECTROCARDIOGRAM TRACING: CPT | Performed by: PHYSICIAN ASSISTANT

## 2023-01-13 PROCEDURE — 72125 CT NECK SPINE W/O DYE: CPT

## 2023-01-13 PROCEDURE — 83880 ASSAY OF NATRIURETIC PEPTIDE: CPT

## 2023-01-13 PROCEDURE — 71046 X-RAY EXAM CHEST 2 VIEWS: CPT

## 2023-01-13 PROCEDURE — 85025 COMPLETE CBC W/AUTO DIFF WBC: CPT

## 2023-01-13 PROCEDURE — 80048 BASIC METABOLIC PNL TOTAL CA: CPT

## 2023-01-13 PROCEDURE — 2580000003 HC RX 258

## 2023-01-13 PROCEDURE — 2580000003 HC RX 258: Performed by: INTERNAL MEDICINE

## 2023-01-13 PROCEDURE — 81003 URINALYSIS AUTO W/O SCOPE: CPT

## 2023-01-13 PROCEDURE — 6370000000 HC RX 637 (ALT 250 FOR IP): Performed by: INTERNAL MEDICINE

## 2023-01-13 PROCEDURE — 99285 EMERGENCY DEPT VISIT HI MDM: CPT

## 2023-01-13 RX ORDER — CILOSTAZOL 50 MG/1
50 TABLET ORAL 2 TIMES DAILY
Status: DISCONTINUED | OUTPATIENT
Start: 2023-01-13 | End: 2023-01-16 | Stop reason: HOSPADM

## 2023-01-13 RX ORDER — LOSARTAN POTASSIUM 25 MG/1
25 TABLET ORAL DAILY
Status: DISCONTINUED | OUTPATIENT
Start: 2023-01-14 | End: 2023-01-16 | Stop reason: HOSPADM

## 2023-01-13 RX ORDER — LEVOTHYROXINE SODIUM 0.07 MG/1
75 TABLET ORAL DAILY
Status: DISCONTINUED | OUTPATIENT
Start: 2023-01-14 | End: 2023-01-16 | Stop reason: HOSPADM

## 2023-01-13 RX ORDER — INSULIN LISPRO 100 [IU]/ML
0-4 INJECTION, SOLUTION INTRAVENOUS; SUBCUTANEOUS
Status: DISCONTINUED | OUTPATIENT
Start: 2023-01-13 | End: 2023-01-16 | Stop reason: HOSPADM

## 2023-01-13 RX ORDER — ACETAMINOPHEN 325 MG/1
650 TABLET ORAL EVERY 6 HOURS PRN
Status: DISCONTINUED | OUTPATIENT
Start: 2023-01-13 | End: 2023-01-16 | Stop reason: HOSPADM

## 2023-01-13 RX ORDER — SODIUM CHLORIDE 0.9 % (FLUSH) 0.9 %
5-40 SYRINGE (ML) INJECTION PRN
Status: DISCONTINUED | OUTPATIENT
Start: 2023-01-13 | End: 2023-01-16 | Stop reason: HOSPADM

## 2023-01-13 RX ORDER — FINASTERIDE 5 MG/1
5 TABLET, FILM COATED ORAL NIGHTLY
Status: DISCONTINUED | OUTPATIENT
Start: 2023-01-13 | End: 2023-01-16 | Stop reason: HOSPADM

## 2023-01-13 RX ORDER — ONDANSETRON 2 MG/ML
4 INJECTION INTRAMUSCULAR; INTRAVENOUS EVERY 6 HOURS PRN
Status: DISCONTINUED | OUTPATIENT
Start: 2023-01-13 | End: 2023-01-16 | Stop reason: HOSPADM

## 2023-01-13 RX ORDER — GLUCAGON 1 MG/ML
1 KIT INJECTION PRN
Status: DISCONTINUED | OUTPATIENT
Start: 2023-01-13 | End: 2023-01-16 | Stop reason: HOSPADM

## 2023-01-13 RX ORDER — DEXTROSE MONOHYDRATE 100 MG/ML
INJECTION, SOLUTION INTRAVENOUS
Status: COMPLETED
Start: 2023-01-13 | End: 2023-01-13

## 2023-01-13 RX ORDER — FUROSEMIDE 40 MG/1
40 TABLET ORAL 2 TIMES DAILY
Status: ON HOLD | COMMUNITY
End: 2023-01-16 | Stop reason: HOSPADM

## 2023-01-13 RX ORDER — PANTOPRAZOLE SODIUM 40 MG/1
40 TABLET, DELAYED RELEASE ORAL
Status: DISCONTINUED | OUTPATIENT
Start: 2023-01-14 | End: 2023-01-16 | Stop reason: HOSPADM

## 2023-01-13 RX ORDER — DONEPEZIL HYDROCHLORIDE 10 MG/1
10 TABLET, FILM COATED ORAL NIGHTLY
Status: DISCONTINUED | OUTPATIENT
Start: 2023-01-13 | End: 2023-01-16 | Stop reason: HOSPADM

## 2023-01-13 RX ORDER — SODIUM CHLORIDE 0.9 % (FLUSH) 0.9 %
5-40 SYRINGE (ML) INJECTION EVERY 12 HOURS SCHEDULED
Status: DISCONTINUED | OUTPATIENT
Start: 2023-01-13 | End: 2023-01-16 | Stop reason: HOSPADM

## 2023-01-13 RX ORDER — DEXTROSE MONOHYDRATE 100 MG/ML
1000 INJECTION, SOLUTION INTRAVENOUS ONCE
Status: COMPLETED | OUTPATIENT
Start: 2023-01-13 | End: 2023-01-13

## 2023-01-13 RX ORDER — SODIUM CHLORIDE 9 MG/ML
INJECTION, SOLUTION INTRAVENOUS PRN
Status: DISCONTINUED | OUTPATIENT
Start: 2023-01-13 | End: 2023-01-16 | Stop reason: HOSPADM

## 2023-01-13 RX ORDER — POLYETHYLENE GLYCOL 3350 17 G/17G
17 POWDER, FOR SOLUTION ORAL DAILY PRN
Status: DISCONTINUED | OUTPATIENT
Start: 2023-01-13 | End: 2023-01-16 | Stop reason: HOSPADM

## 2023-01-13 RX ORDER — ACETAMINOPHEN 650 MG/1
650 SUPPOSITORY RECTAL EVERY 6 HOURS PRN
Status: DISCONTINUED | OUTPATIENT
Start: 2023-01-13 | End: 2023-01-16 | Stop reason: HOSPADM

## 2023-01-13 RX ORDER — ONDANSETRON 4 MG/1
4 TABLET, ORALLY DISINTEGRATING ORAL EVERY 8 HOURS PRN
Status: DISCONTINUED | OUTPATIENT
Start: 2023-01-13 | End: 2023-01-16 | Stop reason: HOSPADM

## 2023-01-13 RX ORDER — DEXTROSE MONOHYDRATE 100 MG/ML
INJECTION, SOLUTION INTRAVENOUS CONTINUOUS PRN
Status: DISCONTINUED | OUTPATIENT
Start: 2023-01-13 | End: 2023-01-16 | Stop reason: HOSPADM

## 2023-01-13 RX ORDER — INSULIN LISPRO 100 [IU]/ML
0-4 INJECTION, SOLUTION INTRAVENOUS; SUBCUTANEOUS NIGHTLY
Status: DISCONTINUED | OUTPATIENT
Start: 2023-01-13 | End: 2023-01-16 | Stop reason: HOSPADM

## 2023-01-13 RX ORDER — CARBIDOPA AND LEVODOPA 25; 100 MG/1; MG/1
1 TABLET, EXTENDED RELEASE ORAL 3 TIMES DAILY
Status: DISCONTINUED | OUTPATIENT
Start: 2023-01-13 | End: 2023-01-16 | Stop reason: HOSPADM

## 2023-01-13 RX ADMIN — DEXTROSE MONOHYDRATE 250 ML: 100 INJECTION, SOLUTION INTRAVENOUS at 15:54

## 2023-01-13 RX ADMIN — APIXABAN 2.5 MG: 2.5 TABLET, FILM COATED ORAL at 23:52

## 2023-01-13 RX ADMIN — FINASTERIDE 5 MG: 5 TABLET, FILM COATED ORAL at 23:51

## 2023-01-13 RX ADMIN — DONEPEZIL HYDROCHLORIDE 10 MG: 10 TABLET, FILM COATED ORAL at 23:52

## 2023-01-13 RX ADMIN — SODIUM CHLORIDE, PRESERVATIVE FREE 10 ML: 5 INJECTION INTRAVENOUS at 23:52

## 2023-01-13 RX ADMIN — CARBIDOPA AND LEVODOPA 1 TABLET: 25; 100 TABLET, EXTENDED RELEASE ORAL at 23:52

## 2023-01-13 RX ADMIN — CILOSTAZOL 50 MG: 50 TABLET ORAL at 23:51

## 2023-01-13 ASSESSMENT — ENCOUNTER SYMPTOMS
SHORTNESS OF BREATH: 0
ABDOMINAL PAIN: 0
NAUSEA: 0
COUGH: 0
WHEEZING: 0
CHEST TIGHTNESS: 0
VOMITING: 0
DIARRHEA: 0

## 2023-01-13 ASSESSMENT — PAIN - FUNCTIONAL ASSESSMENT: PAIN_FUNCTIONAL_ASSESSMENT: NONE - DENIES PAIN

## 2023-01-13 ASSESSMENT — LIFESTYLE VARIABLES
HOW MANY STANDARD DRINKS CONTAINING ALCOHOL DO YOU HAVE ON A TYPICAL DAY: PATIENT DOES NOT DRINK
HOW OFTEN DO YOU HAVE A DRINK CONTAINING ALCOHOL: NEVER

## 2023-01-13 NOTE — H&P
Hospital Medicine History & Physical      PCP: Hernando Duke MD    Date of Admission: 1/13/2023    Date of Service: Pt seen/examined on 1/12/23 and Admitted to Inpatient with expected LOS greater than two midnights due to medical therapy. Chief Complaint:  falls at home      History Of Present Illness:     81 y.o. male [facility]   - HFrEF (45%), COPD Parkinson's, SARIKA not on CPAP HTN HLD T2DM, chronic-atrial fib on apixaban  - has had recurrent nosebleeds prior to starting anticoagulation  - admitted 10/2022 for acute on chronic HF, left lower leg chronic venous ulcer and discharged to [facility] for rehab  - pt is being followed by Dr. Ana Laura Dexter for consideration of LA appendage   He returned back from rehab on 12/29/22, and living at home with wife and grand son. He has had multiple falls since his come home, wife says he was doing well at the rehab but since home he mostly falls when he is walking. He had a fall yesterday and then again had a fall today. Denies loss of consciousness. Today she says they were eating food at the dinner table, once done she went to do the dishes and he got up and started walking towards the restroom next thing she knows he is on the ground. He did not lose consciousness at that time. Home health care nurse came today, while working with her his oxygen saturation would go to as low as 60s and heart were would go down at the same time as well. Intermittently heart rate going 200s. During these episodes he did have shortness of breath but is not worse than usual. In the emergency room heart rate 61, blood pressure 126/65. Intermittently blood pressure 115 systolic 350T to 004O. He satting 97% on room air.     Past Medical History:          Diagnosis Date    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 ([address]     Dr. Violetta Herring    CAD (coronary artery disease)     Cellulitis 2014    CHF (congestive heart failure) (HCC)     systolic    Chronic kidney disease     upon hospitalization due to bactrim allergy    COPD (chronic obstructive pulmonary disease) (HCC)     Hypertension     Hypothyroidism     Mixed hyperlipidemia     MRSA (methicillin resistant staph aureus) culture positive 07/24/2017    foot    Neuropathy     Obstructive sleep apnea 10/03/2014    does not use cpap machine    Parkinson's disease ([facility] [address] 1965    Type 2 diabetes mellitus without complication [facility])        Past Surgical History:          Procedure Laterality Date    [address]      1980's    COLONOSCOPY      FOOT SURGERY Right 07/24/2017    INCISION AND DRAINAGE RIGHT FOOT WITH REMOVAL NECROTIC BONE    FOOT SURGERY Right 07/27/2017    : TRANSMETATARSAL AMPUTATION RIGHT FOOT     HERNIA REPAIR      SHOULDER SURGERY      right    UPPER GASTROINTESTINAL ENDOSCOPY N/A 11/25/2020    ESOPHAGOGASTRODUODENOSCOPY WITH BOTOX INJECTION performed by Edson Lyman DO at [facility] ENDOSCOPY       Medications Prior to Admission:      Prior to Admission medications    Medication Sig Start Date End Date Taking? Authorizing Provider   chlorhexidine (HIBICLENS) 4 % external liquid Wash from neck down the night before or morning of the procedure 1/11/23 1/25/23  Chet Clifton MD   atorvastatin (LIPITOR) 40 MG tablet Take 40 mg by mouth daily    Historical Provider, MD   miconazole (MICOTIN) 2 % powder Apply topically 2 times daily.  10/25/22   Huy Rhodes MD   torsemide [facility]) 20 MG tablet Take 1 tablet by mouth daily 10/25/22   Huy Rhodes MD   metoprolol succinate (TOPROL XL) 25 MG extended release tablet Take 1 tablet by mouth at bedtime 10/25/22   Huy Rhodes MD   apixaban (ELIQUIS) 2.5 MG TABS tablet Take 1 tablet by mouth 2 times daily 10/25/22   Huy Rhodes MD   insulin regular (HUMULIN R;NOVOLIN R) 100 UNIT/ML injection Inject 15-20 Units into the skin daily (with breakfast)    Historical Provider, MD   cilostazol (PLETAL) 50 MG tablet Take 50 mg by mouth 2 times daily    Historical Provider, MD   Multiple Vitamins-Minerals (THERAPEUTIC MULTIVITAMIN-MINERALS) tablet Take 1 tablet by mouth daily    Historical Provider, MD   carbidopa-levodopa (SINEMET CR)  MG per extended release tablet Take 1 tablet by mouth 3 times daily At breakfast, dinner and at bedtime. Historical Provider, MD   omeprazole (PRILOSEC) 20 MG delayed release capsule Take 20 mg by mouth 2 times daily    Historical Provider, MD   spironolactone (ALDACTONE) 25 MG tablet Take 0.5 tablets by mouth daily 4/17/21   Dante Shipley MD   finasteride (PROSCAR) 5 MG tablet Take 5 mg by mouth nightly    Historical Provider, MD   losartan (COZAAR) 50 MG tablet Take 50 mg by mouth daily    Historical Provider, MD   insulin aspart protamine-insulin aspart (NOVOLOG 70/30) (70-30) 100 UNIT/ML injection Inject 25-30 Units into the skin every morning Dependent on BG    Historical Provider, MD   levothyroxine (SYNTHROID) 75 MCG tablet Take 75 mcg by mouth Daily     Historical Provider, MD   donepezil (ARICEPT) 10 MG tablet Take 10 mg by mouth nightly     Historical Provider, MD       Allergies:  Aspirin, Nsaids, Bactrim [sulfamethoxazole-trimethoprim], Celebrex [celecoxib], Cymbalta [duloxetine hcl], Pcn [penicillins], Codeine, and Vicodin [hydrocodone-acetaminophen]    Social History:      The patient currently lives at home with wife    TOBACCO:   reports that he has never smoked. He has never used smokeless tobacco.  ETOH:   reports no history of alcohol use. Family History:            Problem Relation Age of Onset    Stroke Mother     Stroke Father     Cancer Sister     Cancer Paternal Aunt        REVIEW OF SYSTEMS:   Pertinent positives as noted in the HPI. All other systems reviewed and negative.     PHYSICAL EXAM PERFORMED:    /65   Pulse 61   Temp 97.7 °F (36.5 °C) (Oral)   Resp 16   Ht 6' (1.829 m)   Wt 248 lb 11.2 oz (112.8 kg)   SpO2 97%   BMI 33.73 kg/m²     General appearance: Laying in bed, appears comfortable  HEENT:  Normal cephalic, atraumatic without obvious deformity. Pupils equal, round, and reactive to light. Extra ocular muscles intact. Conjunctivae/corneas clear. Neck: Supple, with full range of motion. No jugular venous distention. Trachea midline. Respiratory:  Normal respiratory effort. Clear to auscultation, bilaterally without Rales/Wheezes/Rhonchi. Cardiovascular:  Regular rate and rhythm with normal S1/S2 without murmurs, rubs or gallops. Abdomen: Soft, non-tender, non-distended with normal bowel sounds. Musculoskeletal:  No clubbing, cyanosis or edema bilaterally. Full range of motion without deformity. Skin: Skin color, texture, turgor normal.  No rashes or lesions. Neurologic:  Neurovascularly intact without any focal sensory/motor deficits. Cranial nerves: II-XII intact, grossly non-focal.  Psychiatric:  Alert and oriented, thought content appropriate, normal insight  Capillary Refill: Brisk,< 3 seconds   Peripheral Pulses: +2 palpable, equal bilaterally       Labs:     Recent Labs     01/13/23  1508   WBC 5.8   HGB 10.1*   HCT 32.1*        Recent Labs     01/13/23  1508      K 4.6      CO2 25   BUN 38*   CREATININE 1.3   CALCIUM 9.9     No results for input(s): AST, ALT, BILIDIR, BILITOT, ALKPHOS in the last 72 hours. No results for input(s): INR in the last 72 hours. Recent Labs     01/13/23  1508   TROPONINI 0.01       Urinalysis:      Lab Results   Component Value Date/Time    NITRU Negative 01/13/2023 04:40 PM    WBCUA 3-5 01/06/2018 03:54 PM    BACTERIA 1+ 01/06/2018 03:54 PM    RBCUA 3-5 01/06/2018 03:54 PM    BLOODU Negative 01/13/2023 04:40 PM    SPECGRAV 1.015 01/13/2023 04:40 PM    GLUCOSEU Negative 01/13/2023 04:40 PM    GLUCOSEU 500 01/27/2012 05:00 AM       Radiology:     CXR: I have reviewed the CXR with the following interpretation: bibasilar atelectasis.    EKG:  I have reviewed the EKG with the following interpretation: sinus bradycardia with marked sinus arrhythmia, a first-degree AV block with frequent PVCs    CT Head W/O Contrast   Final Result      Head   1. No evidence of acute intracranial hemorrhage or mass effect. 2.  Mild chronic small vessel ischemic disease and atrophy. Cervical spine   1. No acute abnormality in the cervical spine. 2.  Advanced cervical spondylosis as detailed above without significant change. CT CSpine W/O Contrast   Final Result      Head   1. No evidence of acute intracranial hemorrhage or mass effect. 2.  Mild chronic small vessel ischemic disease and atrophy. Cervical spine   1. No acute abnormality in the cervical spine. 2.  Advanced cervical spondylosis as detailed above without significant change. XR CHEST (2 VW)   Final Result   1. Mild bibasilar airspace disease may reflect atelectasis or pneumonia.           ASSESSMENT:    Active Hospital Problems    Diagnosis Date Noted    Hypoglycemia [E16.2] 01/13/2023     Priority: Medium    Multiple falls [R29.6] 01/13/2023     Priority: Medium    Chronic anticoagulation [Z79.01] 01/13/2023     Priority: Medium    PAF (paroxysmal atrial fibrillation) ([facility] [address] [I48.0] 10/24/2022     Priority: Medium    HFrEF (heart failure with reduced ejection fraction) ([facility] [address] [I50.20] 10/19/2022     Priority: Medium    Diabetic polyneuropathy associated with type 2 diabetes mellitus ([facility] [address] [E11.42] 07/25/2017     Hypoglycemia as patient did not have anything to eat for the last couple of hours, given D10 and improved  Multiple falls at home I suspect this is due to orthostatic hypotension in setting of Parkinson disease as well as diabetic polyneuropathy  Type 2 diabetes with polyneuropathy  Chronic systolic congestive heart failure not in acute exacerbation  Chronic anticoagulation with apixaban  Chronic atrial fibrillation on anticoagulation    PLAN:  Check orthostatic vitals every shift  Continue metoprolol  Continue losartan half dose 25 twice daily hold for systolic less than 140  Continue apixaban  Hold Lasix/spironolactone  Avoid overcorrection of blood pressure  Do not adjust blood pressures medications without getting orthostatic vitals at that time  DVT Prophylaxis: Apixaban  Diet: ADULT DIET; Regular; 4 carb choices (60 gm/meal)  Code Status: Full Code    PT/OT Eval Status: ordered    Dispo - Admit as inpatient. I anticipate hospitalization spanning more than two midnights for investigation and treatment of the above medically necessary diagnoses.    Yenny Barr MD  Internal Medicine Hospitalist    Thank you Otto Del Valle MD for the opportunity to be involved in this patient's care. If you have any questions or concerns please feel free to contact me at [phone].

## 2023-01-13 NOTE — ED PROVIDER NOTES
ED Attending Attestation Note     Date of evaluation: 1/13/2023    This patient was seen by the advance practice provider. I have seen and examined the patient, agree with the workup, evaluation, management and diagnosis. The care plan has been discussed. I have reviewed the ECG and concur with the DIXIE's interpretation. My assessment reveals a ill-appearing 41-year-old gentleman with history of HTN, COPD, CAD AD, CHF, SARIKA, CKD, diabetes, PAF on anticoagulation presenting with multiple falls and abnormal heart rate and oxygen sat on home pulse ox. Per patient and wife, he was recently discharged back to home with home nursing and has had continued falls at home. Patient states that he does not feel dizzy or have chest pain before falling but is not sure whether or not he loses consciousness. Wife reports last fall yesterday with injury to nose. She also reports that today, the pulse ox intermittently read that his oxygen saturation was in the 50s and said that his heart rate ranged anywhere from the 40s to 200s. Patient states that he felt abnormal during this time but denied any chest pain, palpitations, worsening shortness of breath or lightheadedness. On exam, patient is seated comfortably on stretcher in no acute distress with small amount of bruising to bridge of nose. GCS 15. PERRL, EOMI, no facial asymmetry, no midface instability. No chest wall or abdominal tenderness, pelvis stable, superficial abrasions to anterior aspect of left knee with no gross deformities to extremities with intact distal PMS. Work-up with stable anemia, no leukocytosis, negative troponin. BMP with glucose of 44 which patient was given D10. Wife reports that he has been taking all of his diabetic medications as prescribed but has not eaten very much today. CT scans showed no acute intracranial abnormality and no C-spine fracture or malalignment.   Given frequent falls on anticoagulation with possible syncope in setting of CAD, CHF with abnormal heart rate at home, plan to admit hospital for further syncopal evaluation and concern for failure to thrive at home with frequent falls on anticoagulation.      Jayashree Crenshaw MD  01/13/23 [id number]

## 2023-01-13 NOTE — ED PROVIDER NOTES
THE [facility]  EMERGENCY DEPARTMENT ENCOUNTER          PHYSICIAN ASSISTANT NOTE       Date of evaluation: 1/13/2023    Chief Complaint     Fall (Patient fell yesterday, hitting nose. Denies LOC. Generalized weakness, just discharged from [facility] a few days ago.)      History of Present Illness     Peterson Lao is a 82 y.o. male who presents with chief complaint of fall.  Patient was recently admitted to this facility for multiple falls.  He was discharged to to a skilled nursing facility where he was undergoing physical therapy.  Reports that he was just discharged a few days ago.  Patient reports that ever since arriving home, he has felt generally unwell.  States that he has fallen several times - he ultimately is unsure if he is passing out prior to falling he fell forward yesterday, striking his face against the ground.  He has notable bruising around the bridge of the nose.  Patient is currently anticoagulated on Eliquis for paroxysmal atrial fibrillation, is scheduled for a watchman at the end of the month due to multiple falls.  Patient's physical and occupational therapist evaluated him today and noticed him to be significantly weak and was having fluctuating heart rates from \"the 40s to the 200s\".  Pulse oximeter was also reading in the 50s in regards to his oxygenation.  This subsequently brought patient to the emergency department for evaluation.  Patient denies any chest pain, reports some mildly increased shortness of breath from baseline.  Is complaining of some pain to the face secondary to the fall, although denies any significant headache.  Patient does endorse that he just urinated on himself which is abnormal    ASSESSMENT / PLAN  (MEDICAL DECISION MAKING)     INITIAL VITALS: BP: 126/65, Temp: 97.7 °F (36.5 °C), Heart Rate: 61, Resp: 16, SpO2: 97 %    Peterson Lao is a 82 y.o. male presenting with multiple falls on anticoagulation as well as abnormal vital signs at home.  On  arrival patient is intermittently bradycardic throughout evaluation. His vitals are otherwise stable with appropriate oxygen saturation. Family endorses heart rate fluctuating between the 40s up into 200s with associated oxygen saturation of 50%. Patient reports several falls since being discharged from his nursing facility a few days ago. He is currently anticoagulated on Eliquis. He did strike his face and has bruising to his nose from a fall yesterday. EKG was obtained upon arrival with sinus bradycardia with marked sinus arrhythmia, a first-degree AV block with frequent PVCs. There are no acute ischemic changes present. IV access was established and basic laboratory analysis was initiated. This is significantly pertinent for a blood glucose of 40, D10 infusion was initiated. Patient's troponin is mildly elevated, although consistent with prior levels. proBNP elevated at 9150. His chest x-ray is unremarkable as well as cross-sectional imaging of the head and cervical spine. Overall, there is concern for potential syncope as patient does not quite recall what occurs prior to falling. There is also significant concern for patient being anticoagulated with multiple potential syncopal episodes as well as falls. Concern exists for failure to thrive at home as he was just discharged from the nursing facility. Patient will ultimately require admission to the hospital for further ongoing care and evaluation. Medical Decision Making  Amount and/or Complexity of Data Reviewed  Labs: ordered. Decision-making details documented in ED Course. Radiology: ordered. ECG/medicine tests: ordered. Risk  Prescription drug management. Decision regarding hospitalization. This patient was also evaluated by the attending physician. All care plans were discussed and agreed upon. Clinical Impression     1. Injury of head, initial encounter    2. Multiple falls    3.  Near syncope        Disposition PATIENT REFERRED TO:  No follow-up provider specified. DISCHARGE MEDICATIONS:  New Prescriptions    No medications on file       DISPOSITION Decision To Admit 01/13/2023 04:03:57 PM        Diagnostic Results and Other Data     RADIOLOGY:  CT Head W/O Contrast   Final Result      Head   1. No evidence of acute intracranial hemorrhage or mass effect. 2.  Mild chronic small vessel ischemic disease and atrophy. Cervical spine   1. No acute abnormality in the cervical spine. 2.  Advanced cervical spondylosis as detailed above without significant change. CT CSpine W/O Contrast   Final Result      Head   1. No evidence of acute intracranial hemorrhage or mass effect. 2.  Mild chronic small vessel ischemic disease and atrophy. Cervical spine   1. No acute abnormality in the cervical spine. 2.  Advanced cervical spondylosis as detailed above without significant change. XR CHEST (2 VW)   Final Result   1. Mild bibasilar airspace disease may reflect atelectasis or pneumonia.           LABS:   Results for orders placed or performed during the hospital encounter of 01/13/23   BMP w/ Reflex to MG   Result Value Ref Range    Sodium 137 136 - 145 mmol/L    Potassium reflex Magnesium 4.6 3.5 - 5.1 mmol/L    Chloride 102 99 - 110 mmol/L    CO2 25 21 - 32 mmol/L    Anion Gap 10 3 - 16    Glucose 40 (LL) 70 - 99 mg/dL    BUN 38 (H) 7 - 20 mg/dL    Creatinine 1.3 0.8 - 1.3 mg/dL    Est, Glom Filt Rate 55 (A) >60    Calcium 9.9 8.3 - 10.6 mg/dL   CBC with Auto Differential   Result Value Ref Range    WBC 5.8 4.0 - 11.0 K/uL    RBC 4.38 4.20 - 5.90 M/uL    Hemoglobin 10.1 (L) 13.5 - 17.5 g/dL    Hematocrit 32.1 (L) 40.5 - 52.5 %    MCV 73.2 (L) 80.0 - 100.0 fL    MCH 23.2 (L) 26.0 - 34.0 pg    MCHC 31.6 31.0 - 36.0 g/dL    RDW 25.2 (H) 12.4 - 15.4 %    Platelets 969 046 - 369 K/uL    MPV 7.6 5.0 - 10.5 fL    Neutrophils % 58.8 %    Lymphocytes % 26.4 %    Monocytes % 9.4 %    Eosinophils % 4.6 %    Basophils % 0.8 %    Neutrophils Absolute 3.4 1.7 - 7.7 K/uL    Lymphocytes Absolute 1.5 1.0 - 5.1 K/uL    Monocytes Absolute 0.5 0.0 - 1.3 K/uL    Eosinophils Absolute 0.3 0.0 - 0.6 K/uL    Basophils Absolute 0.0 0.0 - 0.2 K/uL   Troponin   Result Value Ref Range    Troponin 0.01 <0.01 ng/mL   Urinalysis with Reflex to Culture    Specimen: Urine   Result Value Ref Range    Urine Type Voided    Brain Natriuretic Peptide   Result Value Ref Range    Pro-BNP 9,150 (H) 0 - 449 pg/mL   EKG 12 Lead   Result Value Ref Range    Ventricular Rate 63 BPM    Atrial Rate 63 BPM    P-R Interval 214 ms    QRS Duration 94 ms    Q-T Interval 406 ms    QTc Calculation (Bazett) 415 ms    P Axis 52 degrees    R Axis -22 degrees    T Axis 106 degrees    Diagnosis       EKG performed in ER and to be interpreted by ER physician. Confirmed by [facility], [address] [place] [address],Ground Floor ([id number]),  Lavelle Ordonez ([id number]) on 1/13/2023 3:19:54 PM     EKG   Interpreted in conjunction with emergency department physician Clayton Ashley MD  Rhythm: sinus bradycardia and 1 degree AV block  Rate: 63  Axis: normal  Ectopy: premature ventricular contractions (multifocal)  Conduction: 1st degree AV block  ST Segments: no acute change  T Waves: no acute change  Q Waves:nonspecific  Clinical Impression: non-specific EKG  Comparison:  11/17/22    ED BEDSIDE ULTRASOUND:  No results found. RECENT VITALS:  BP: 126/65, Temp: 97.7 °F (36.5 °C), Heart Rate: 61, Resp: 16, SpO2: 97 %     Procedures     none    ED Course     Nursing Notes, Past Medical Hx,Past Surgical Hx, Social Hx, Allergies, and Family Hx were reviewed.     ED Course as of 01/13/23 1648   Fri Jan 13, 2023   1525 WBC: 5.8 [ED]   1525 Hemoglobin Quant(!): 10.1  At baseline [ED]      ED Course User Index  [ED] Clayton Ashley MD       The patient was given the following medications:  Orders Placed This Encounter   Medications    dextrose 10 % infusion     Tennie Rather: cabinet override dextrose 10 % infusion       CONSULTS:  IP CONSULT TO HOSPITALIST    Review of Systems     Review of Systems   Constitutional:  Negative for chills, diaphoresis, fatigue and fever. Respiratory:  Negative for cough, chest tightness, shortness of breath and wheezing. Cardiovascular:  Negative for chest pain and palpitations. Gastrointestinal:  Negative for abdominal pain, diarrhea, nausea and vomiting. Musculoskeletal: Negative. Skin:  Negative for rash and wound. Neurological:  Positive for syncope and weakness. Negative for dizziness, light-headedness and headaches. Past Medical, Surgical, Family, and Social History     He has a past medical history of 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 ([address] CAD (coronary artery disease), Cellulitis, CHF (congestive heart failure) ([address] [address] [address] Chronic kidney disease, COPD (chronic obstructive pulmonary disease) ([facility] [address] Hypertension, Hypothyroidism, Mixed hyperlipidemia, MRSA (methicillin resistant staph aureus) culture positive, Neuropathy, Obstructive sleep apnea, Parkinson's disease ([facility] [address] [address] and Type 2 diabetes mellitus without complication ([facility] [address] He has a past surgical history that includes cervical fusion (1981); Foot surgery (Right, 07/24/2017); Foot surgery (Right, 07/27/2017); Cardiac catheterization; Colonoscopy; Colon surgery; shoulder surgery; hernia repair; and Upper gastrointestinal endoscopy (N/A, 11/25/2020). His family history includes Cancer in his paternal aunt and sister; Stroke in his father and mother. He reports that he has never smoked. He has never used smokeless tobacco. He reports that he does not drink alcohol and does not use drugs.     Medications     Previous Medications    APIXABAN (ELIQUIS) 2.5 MG TABS TABLET    Take 1 tablet by mouth 2 times daily    ATORVASTATIN (LIPITOR) 40 MG TABLET    Take 40 mg by mouth daily    CARBIDOPA-LEVODOPA (SINEMET CR)  MG PER EXTENDED RELEASE TABLET    Take 1 tablet by mouth 3 times daily At breakfast, dinner and at bedtime. CHLORHEXIDINE (HIBICLENS) 4 % EXTERNAL LIQUID    Wash from neck down the night before or morning of the procedure    CILOSTAZOL (PLETAL) 50 MG TABLET    Take 50 mg by mouth 2 times daily    DONEPEZIL (ARICEPT) 10 MG TABLET    Take 10 mg by mouth nightly     FINASTERIDE (PROSCAR) 5 MG TABLET    Take 5 mg by mouth nightly    INSULIN ASPART PROTAMINE-INSULIN ASPART (NOVOLOG 70/30) (70-30) 100 UNIT/ML INJECTION    Inject 25-30 Units into the skin every morning Dependent on BG    INSULIN REGULAR (HUMULIN R;NOVOLIN R) 100 UNIT/ML INJECTION    Inject 15-20 Units into the skin daily (with breakfast)    LEVOTHYROXINE (SYNTHROID) 75 MCG TABLET    Take 75 mcg by mouth Daily     LOSARTAN (COZAAR) 50 MG TABLET    Take 50 mg by mouth daily    METOPROLOL SUCCINATE (TOPROL XL) 25 MG EXTENDED RELEASE TABLET    Take 1 tablet by mouth at bedtime    MICONAZOLE (MICOTIN) 2 % POWDER    Apply topically 2 times daily. MULTIPLE VITAMINS-MINERALS (THERAPEUTIC MULTIVITAMIN-MINERALS) TABLET    Take 1 tablet by mouth daily    OMEPRAZOLE (PRILOSEC) 20 MG DELAYED RELEASE CAPSULE    Take 20 mg by mouth 2 times daily    SPIRONOLACTONE (ALDACTONE) 25 MG TABLET    Take 0.5 tablets by mouth daily    TORSEMIDE (DEMADEX) 20 MG TABLET    Take 1 tablet by mouth daily       Allergies     He is allergic to aspirin, nsaids, bactrim [sulfamethoxazole-trimethoprim], celebrex [celecoxib], cymbalta [duloxetine hcl], pcn [penicillins], codeine, and vicodin [hydrocodone-acetaminophen]. Physical Exam     INITIAL VITALS: BP: 126/65, Temp: 97.7 °F (36.5 °C), Heart Rate: 61, Resp: 16, SpO2: 97 %  Physical Exam  Vitals and nursing note reviewed. Constitutional:       General: He is not in acute distress. Appearance: He is well-developed. He is not diaphoretic. Comments: Chronically ill in appearance   HENT:      Head: Normocephalic.       Nose:      Comments: Bruising noted to the bridge of the nose and between eyebrows  Eyes: Conjunctiva/sclera: Conjunctivae normal.      Pupils: Pupils are equal, round, and reactive to light. Cardiovascular:      Rate and Rhythm: Normal rate and regular rhythm. Heart sounds: Normal heart sounds. No murmur heard. No friction rub. Pulmonary:      Effort: Pulmonary effort is normal. No respiratory distress. Breath sounds: Normal breath sounds. No wheezing or rales. Abdominal:      General: Abdomen is flat. There is no distension. Tenderness: There is no abdominal tenderness. There is no guarding. Musculoskeletal:         General: No swelling or deformity. Normal range of motion. Cervical back: Normal range of motion. Skin:     General: Skin is warm and dry. Neurological:      General: No focal deficit present. Mental Status: He is alert and oriented to person, place, and time. Psychiatric:         Behavior: Behavior normal.         Thought Content:  Thought content normal.         Judgment: Judgment normal.          GRABIEL Phelan  01/13/23 [id number]

## 2023-01-13 NOTE — CONSULTS
Clinical Pharmacy Progress Note  Medication History    Admit Date: 2023  Consulting Provider: Aubree Celestin MD      List of current medications patient is taking is complete. Home medication list in EPIC updated to reflect changes noted below. Source of information: patient, outpatient fill history, and patient's family members (wife & grandson)    Home Pharmacy: [facility] #[id number] ([phone]); Insulins filled at [id number] ([phone])    Changes made to medication list:   Medications removed: Atorvastatin 40 MG Tablets - removed per patient and their family  [OTC] Multivitamin - removed per patient and their family  Insulin aspart protamine-insulin aspart (NOVOLOG 70/30) (70-30) 100 UNIT/ML Injection - replaced with correct medication (Relion), per patient and their family  Metoprolol Succinate (Toprol XL) 25 MG Extended Release Tablets - removed due to no fill history and confirmation from family; patient is actually on Metoprolol Tartrate 25 M tablet by mouth twice daily  Torsemide (Demadex) 20 MG Tablets - removed due to no fill history and confirmed by family; patient is actually on Furosemide 40 MG Tablets: 1 tablet by mouth twice daily    Medications added:   Insulin NPH Isophane & Regular (RELION 70/30 SC): Inject 28-30 Units into the skin every morning, Last Dose: 2023, per patient and their family & seen in patient's fill history at [address]  Metoprolol Tartrate 25 MG Tablets: Take one tablet by mouth twice daily, Last Dose: 2023 in the morning, per patient and family & seen in fill history  Furosemide 40 MG Tablets: Take one tablet by mouth twice daily, Last Dose: 2023 in the morning, per patient and family & seen in fill history  [OTC] Coricidin HBP Cold/Flu:  Take 1 tablet by mouth daily, Last Dose: 2023 in the morning, per patient and family    Notes:  Apixaban 2.5 MG Tablets - the lack of fill history was explained by the patient's family: the patient has been using samples of this medication, and needs it to be prescribed now because they ran out and they are supposed to continue this therapy  Patient can only take [place] (although they have not taken it for a long time)  Patient was told by their doctor to take an [OTC] CM Core Supplement that is for Heart & Diabetes Health, strength unknown: Take 1 tablet by mouth daily, Last Dose: 01/12/2023        Please call with any questions. Bret Douglas, PharmD Candidate    No current facility-administered medications on file prior to encounter. Current Outpatient Medications on File Prior to Encounter   Medication Sig    Insulin NPH Isophane & Regular (RELION 70/30 SC) Inject 28-30 Units into the skin every morning    metoprolol tartrate (LOPRESSOR) 25 MG tablet Take 25 mg by mouth 2 times daily    furosemide (LASIX) 40 MG tablet Take 40 mg by mouth in the morning and 40 mg in the evening. Chlorpheniramine-Acetaminophen (CORICIDIN HBP COLD/FLU PO) Take 1 tablet by mouth daily    chlorhexidine (HIBICLENS) 4 % external liquid Wash from neck down the night before or morning of the procedure    miconazole (MICOTIN) 2 % powder Apply topically 2 times daily. apixaban (ELIQUIS) 2.5 MG TABS tablet Take 1 tablet by mouth 2 times daily    insulin regular (HUMULIN R;NOVOLIN R) 100 UNIT/ML injection Inject 15-20 Units into the skin daily (with breakfast)    cilostazol (PLETAL) 50 MG tablet Take 50 mg by mouth 2 times daily    carbidopa-levodopa (SINEMET CR)  MG per extended release tablet Take 1 tablet by mouth 3 times daily At breakfast, dinner and at bedtime.     omeprazole (PRILOSEC) 20 MG delayed release capsule Take 20 mg by mouth 2 times daily    spironolactone (ALDACTONE) 25 MG tablet Take 0.5 tablets by mouth daily    finasteride (PROSCAR) 5 MG tablet Take 5 mg by mouth nightly    losartan (COZAAR) 50 MG tablet Take 50 mg by mouth daily    levothyroxine (SYNTHROID) 75 MCG tablet Take 75 mcg by mouth Daily     donepezil (ARICEPT) 10 MG tablet Take 10 mg by mouth nightly

## 2023-01-13 NOTE — TELEPHONE ENCOUNTER
Spoke with [facility], told her to follow up with PCP for dropping oxygen sats. Also suggest that the pt go to the ER for evaluation. [facility] states that pt had a fall yesterday and is really weak today. No other specific complaints, lungs are clear, no edema.

## 2023-01-13 NOTE — TELEPHONE ENCOUNTER
Luís Moreno from home health states that Pt heart rate with activity is 81% heart rate is 140 at rest  his O2 is 92% heart rate is 59 Jyothi states SOB with activity and Yjothi just took his vitals and at rest his 02 is 96 heart rate is 73 please call Luís Moreno [phone]

## 2023-01-14 ENCOUNTER — APPOINTMENT (OUTPATIENT)
Dept: GENERAL RADIOLOGY | Age: 83
DRG: 057 | End: 2023-01-14
Payer: MEDICARE

## 2023-01-14 LAB
ALBUMIN SERPL-MCNC: 3.1 G/DL (ref 3.4–5)
ANION GAP SERPL CALCULATED.3IONS-SCNC: 10 MMOL/L (ref 3–16)
ANISOCYTOSIS: ABNORMAL
BASOPHILS ABSOLUTE: 0 K/UL (ref 0–0.2)
BASOPHILS RELATIVE PERCENT: 1 %
BUN BLDV-MCNC: 33 MG/DL (ref 7–20)
C-REACTIVE PROTEIN: 38.1 MG/L (ref 0–5.1)
CALCIUM SERPL-MCNC: 9.2 MG/DL (ref 8.3–10.6)
CHLORIDE BLD-SCNC: 102 MMOL/L (ref 99–110)
CO2: 26 MMOL/L (ref 21–32)
CREAT SERPL-MCNC: 1.1 MG/DL (ref 0.8–1.3)
EOSINOPHILS ABSOLUTE: 0.2 K/UL (ref 0–0.6)
EOSINOPHILS RELATIVE PERCENT: 5 %
GFR SERPL CREATININE-BSD FRML MDRD: >60 ML/MIN/{1.73_M2}
GLUCOSE BLD-MCNC: 108 MG/DL (ref 70–99)
GLUCOSE BLD-MCNC: 115 MG/DL (ref 70–99)
GLUCOSE BLD-MCNC: 164 MG/DL (ref 70–99)
GLUCOSE BLD-MCNC: 171 MG/DL (ref 70–99)
GLUCOSE BLD-MCNC: 179 MG/DL (ref 70–99)
HCT VFR BLD CALC: 28.7 % (ref 40.5–52.5)
HEMOGLOBIN: 9 G/DL (ref 13.5–17.5)
LYMPHOCYTES ABSOLUTE: 0.9 K/UL (ref 1–5.1)
LYMPHOCYTES RELATIVE PERCENT: 19 %
MACROCYTES: ABNORMAL
MCH RBC QN AUTO: 22.8 PG (ref 26–34)
MCHC RBC AUTO-ENTMCNC: 31.3 G/DL (ref 31–36)
MCV RBC AUTO: 72.9 FL (ref 80–100)
MICROCYTES: ABNORMAL
MONOCYTES ABSOLUTE: 0.4 K/UL (ref 0–1.3)
MONOCYTES RELATIVE PERCENT: 8 %
NEUTROPHILS ABSOLUTE: 3.3 K/UL (ref 1.7–7.7)
NEUTROPHILS RELATIVE PERCENT: 67 %
NUCLEATED RED BLOOD CELLS: 1 /100 WBC
OVALOCYTES: ABNORMAL
PDW BLD-RTO: 24.6 % (ref 12.4–15.4)
PERFORMED ON: ABNORMAL
PHOSPHORUS: 3.7 MG/DL (ref 2.5–4.9)
PLATELET # BLD: 247 K/UL (ref 135–450)
PMV BLD AUTO: 8.3 FL (ref 5–10.5)
POIKILOCYTES: ABNORMAL
POLYCHROMASIA: ABNORMAL
POTASSIUM SERPL-SCNC: 4.7 MMOL/L (ref 3.5–5.1)
PREALBUMIN: 12.1 MG/DL (ref 20–40)
PROCALCITONIN: 0.07 NG/ML (ref 0–0.15)
RBC # BLD: 3.94 M/UL (ref 4.2–5.9)
SEDIMENTATION RATE, ERYTHROCYTE: 66 MM/HR (ref 0–20)
SODIUM BLD-SCNC: 138 MMOL/L (ref 136–145)
TEAR DROP CELLS: ABNORMAL
WBC # BLD: 4.9 K/UL (ref 4–11)

## 2023-01-14 PROCEDURE — 73610 X-RAY EXAM OF ANKLE: CPT

## 2023-01-14 PROCEDURE — 73590 X-RAY EXAM OF LOWER LEG: CPT

## 2023-01-14 PROCEDURE — 85652 RBC SED RATE AUTOMATED: CPT

## 2023-01-14 PROCEDURE — 85025 COMPLETE CBC W/AUTO DIFF WBC: CPT

## 2023-01-14 PROCEDURE — 2580000003 HC RX 258: Performed by: INTERNAL MEDICINE

## 2023-01-14 PROCEDURE — 6370000000 HC RX 637 (ALT 250 FOR IP): Performed by: INTERNAL MEDICINE

## 2023-01-14 PROCEDURE — 84145 PROCALCITONIN (PCT): CPT

## 2023-01-14 PROCEDURE — 83036 HEMOGLOBIN GLYCOSYLATED A1C: CPT

## 2023-01-14 PROCEDURE — 86140 C-REACTIVE PROTEIN: CPT

## 2023-01-14 PROCEDURE — 84134 ASSAY OF PREALBUMIN: CPT

## 2023-01-14 PROCEDURE — 36415 COLL VENOUS BLD VENIPUNCTURE: CPT

## 2023-01-14 PROCEDURE — 80069 RENAL FUNCTION PANEL: CPT

## 2023-01-14 PROCEDURE — 1200000000 HC SEMI PRIVATE

## 2023-01-14 RX ORDER — OXYMETAZOLINE HYDROCHLORIDE 0.05 G/100ML
5 SPRAY NASAL ONCE
Status: COMPLETED | OUTPATIENT
Start: 2023-01-14 | End: 2023-01-14

## 2023-01-14 RX ADMIN — ACETAMINOPHEN 650 MG: 325 TABLET ORAL at 21:11

## 2023-01-14 RX ADMIN — PANTOPRAZOLE SODIUM 40 MG: 40 TABLET, DELAYED RELEASE ORAL at 06:12

## 2023-01-14 RX ADMIN — CILOSTAZOL 50 MG: 50 TABLET ORAL at 09:22

## 2023-01-14 RX ADMIN — DONEPEZIL HYDROCHLORIDE 10 MG: 10 TABLET, FILM COATED ORAL at 21:11

## 2023-01-14 RX ADMIN — SODIUM CHLORIDE, PRESERVATIVE FREE 10 ML: 5 INJECTION INTRAVENOUS at 21:05

## 2023-01-14 RX ADMIN — CARBIDOPA AND LEVODOPA 1 TABLET: 25; 100 TABLET, EXTENDED RELEASE ORAL at 15:47

## 2023-01-14 RX ADMIN — Medication 5 SPRAY: at 12:18

## 2023-01-14 RX ADMIN — SODIUM CHLORIDE, PRESERVATIVE FREE 10 ML: 5 INJECTION INTRAVENOUS at 09:23

## 2023-01-14 RX ADMIN — LEVOTHYROXINE SODIUM 75 MCG: 0.07 TABLET ORAL at 06:12

## 2023-01-14 RX ADMIN — METOPROLOL TARTRATE 25 MG: 25 TABLET, FILM COATED ORAL at 09:22

## 2023-01-14 RX ADMIN — APIXABAN 2.5 MG: 2.5 TABLET, FILM COATED ORAL at 09:22

## 2023-01-14 RX ADMIN — METOPROLOL TARTRATE 25 MG: 25 TABLET, FILM COATED ORAL at 21:11

## 2023-01-14 RX ADMIN — FINASTERIDE 5 MG: 5 TABLET, FILM COATED ORAL at 21:11

## 2023-01-14 RX ADMIN — CILOSTAZOL 50 MG: 50 TABLET ORAL at 21:10

## 2023-01-14 RX ADMIN — CARBIDOPA AND LEVODOPA 1 TABLET: 25; 100 TABLET, EXTENDED RELEASE ORAL at 22:32

## 2023-01-14 RX ADMIN — CARBIDOPA AND LEVODOPA 1 TABLET: 25; 100 TABLET, EXTENDED RELEASE ORAL at 10:44

## 2023-01-14 ASSESSMENT — PAIN SCALES - GENERAL: PAINLEVEL_OUTOF10: 2

## 2023-01-14 ASSESSMENT — PAIN DESCRIPTION - LOCATION: LOCATION: NOSE

## 2023-01-14 ASSESSMENT — PAIN DESCRIPTION - DESCRIPTORS: DESCRIPTORS: ACHING

## 2023-01-14 NOTE — PROGRESS NOTES
Patient A&Ox4. VSS. Pt bradycardic during the night in 50s and dropped into upper 40s while sleeping and hospitalist notified via perfect serve. Metoprolol held. Orthostat completed per order. Laying bp 164/78 hr 58. Sitting bp 159/79 hr 56. Standing bp125/66 hr 55. Bilat leg dressings changed and are cdi. Pt takes pills whole with water. Pt denies any pain at this time. Bed is in lowest setting with bed alarm on. Call light is within reach.

## 2023-01-14 NOTE — PROGRESS NOTES
Hospitalist Progress Note      PCP: Joni Newell MD    Date of Admission: 1/13/2023    Chief Complaint: falls at home    Hospital Course:   81 y.o. male [address]   - HFrEF (45%), COPD Parkinson's, SARIKA not on CPAP HTN HLD T2DM, chronic-atrial fib on apixaban  - has had recurrent nosebleeds prior to starting anticoagulation  - admitted 10/2022 for acute on chronic HF, left lower leg chronic venous ulcer and discharged to [facility] for rehab  - pt is being followed by Dr. Hector Bose for consideration of LA appendage   He returned back from rehab on 12/29/22, and living at home with wife and grand son. He has had multiple falls since his come home, wife says he was doing well at the rehab but since home he mostly falls when he is walking. He had a fall yesterday and then again had a fall today. Denies loss of consciousness. Today she says they were eating food at the dinner table, once done she went to do the dishes and he got up and started walking towards the restroom next thing she knows he is on the ground. He did not lose consciousness at that time. Home health care nurse came today, while working with her his oxygen saturation would go to as low as 60s and heart were would go down at the same time as well. Intermittently heart rate going 200s. During these episodes he did have shortness of breath but is not worse than usual. In the emergency room heart rate 61, blood pressure 126/65. Intermittently blood pressure 513 systolic 310B to 640E. He satting 97% on room air.     Subjective:   Out of bed in chair, says he is doing ok  Having Rt nares bleeding when seen in the room       Medications:  Reviewed    Infusion Medications    dextrose      sodium chloride       Scheduled Medications    carbidopa-levodopa  1 tablet Oral TID    cilostazol  50 mg Oral BID    donepezil  10 mg Oral Nightly    finasteride  5 mg Oral Nightly    levothyroxine  75 mcg Oral Daily    losartan  25 mg Oral Daily metoprolol tartrate  25 mg Oral BID    [Held by provider] apixaban  2.5 mg Oral BID    pantoprazole  40 mg Oral QAM AC    sodium chloride flush  5-40 mL IntraVENous 2 times per day    insulin lispro  0-4 Units SubCUTAneous TID WC    insulin lispro  0-4 Units SubCUTAneous Nightly     PRN Meds: sodium chloride, glucose, dextrose bolus **OR** dextrose bolus, glucagon (rDNA), dextrose, sodium chloride flush, sodium chloride, ondansetron **OR** ondansetron, polyethylene glycol, acetaminophen **OR** acetaminophen      Intake/Output Summary (Last 24 hours) at 1/14/2023 1235  Last data filed at 1/14/2023 9195  Gross per 24 hour   Intake 490 ml   Output 300 ml   Net 190 ml       Physical Exam Performed:    /60   Pulse 64   Temp 97.8 °F (36.6 °C) (Oral)   Resp 16   Ht 6' (1.829 m)   Wt 248 lb 11.2 oz (112.8 kg)   SpO2 92%   BMI 33.73 kg/m²     General appearance: No apparent distress, appears stated age and cooperative. HEENT: Pupils equal, round, and reactive to light. Conjunctivae/corneas clear. Right Nares bleeding  Neck: Supple, with full range of motion. No jugular venous distention. Trachea midline. Respiratory:  Normal respiratory effort. Clear to auscultation, bilaterally without Rales/Wheezes/Rhonchi. Cardiovascular: Regular rate and rhythm with normal S1/S2 without murmurs, rubs or gallops. Abdomen: Soft, non-tender, non-distended with normal bowel sounds. Musculoskeletal: No clubbing, cyanosis or edema bilaterally. Full range of motion without deformity. Skin: Skin color, texture, turgor normal.  No rashes or lesions. Neurologic:  Neurovascularly intact without any focal sensory/motor deficits.  Cranial nerves: II-XII intact, grossly non-focal.  Psychiatric: Alert and oriented, thought content appropriate, normal insight  Capillary Refill: Brisk,< 3 seconds   Peripheral Pulses: +2 palpable, equal bilaterally       Labs:   Recent Labs     01/13/23  1508 01/14/23  0407   WBC 5.8 4.9   HGB 10.1* 9.0*   HCT 32.1* 28.7*    247     Recent Labs     01/13/23  1508 01/14/23  0407    138   K 4.6 4.7    102   CO2 25 26   BUN 38* 33*   CREATININE 1.3 1.1   CALCIUM 9.9 9.2   PHOS  --  3.7     No results for input(s): AST, ALT, BILIDIR, BILITOT, ALKPHOS in the last 72 hours. No results for input(s): INR in the last 72 hours. Recent Labs     01/13/23  1508   TROPONINI 0.01       Urinalysis:      Lab Results   Component Value Date/Time    NITRU Negative 01/13/2023 04:40 PM    WBCUA 3-5 01/06/2018 03:54 PM    BACTERIA 1+ 01/06/2018 03:54 PM    RBCUA 3-5 01/06/2018 03:54 PM    BLOODU Negative 01/13/2023 04:40 PM    SPECGRAV 1.015 01/13/2023 04:40 PM    GLUCOSEU Negative 01/13/2023 04:40 PM    GLUCOSEU 500 01/27/2012 05:00 AM       Radiology:  XR TIBIA FIBULA RIGHT (2 VIEWS)   Final Result      Dense atherosclerotic calcifications, osteopenia without plain radiographic evidence for osteomyelitis            4 views of the right anatomic leg      FINDINGS:      Dense endoscopic calcifications identified. There is marked joint space narrowing identified involving the medial compartment the. Small osteophytes lateral compartment. No periosteal elevation is appreciated. No subcutaneous gas. IMPRESSION:   1. No plain radiographic evidence for osteomyelitis, however plain film findings are often late findings. 2. Osteoarthritis of the right knee greatest involving the medial compartment   3. Atherosclerotic calcifications            4 views of the anatomic left leg      FINDINGS:      No cortical disruption or periosteal elevation. No lytic process identified. Narrowing of medial compartment the knee is present. Dense endoscopic calcifications      IMPRESSION:      1. No plain radiographic evidence for osteomyelitis. However plain film findings are often late findings    2. Atherosclerotic calcifications   3.  Degenerative changes of the left knee, greatest involving the medial compartment      XR TIBIA FIBULA LEFT (2 VIEWS)   Final Result      Dense atherosclerotic calcifications, osteopenia without plain radiographic evidence for osteomyelitis            4 views of the right anatomic leg      FINDINGS:      Dense endoscopic calcifications identified. There is marked joint space narrowing identified involving the medial compartment the. Small osteophytes lateral compartment. No periosteal elevation is appreciated. No subcutaneous gas. IMPRESSION:   1. No plain radiographic evidence for osteomyelitis, however plain film findings are often late findings. 2. Osteoarthritis of the right knee greatest involving the medial compartment   3. Atherosclerotic calcifications            4 views of the anatomic left leg      FINDINGS:      No cortical disruption or periosteal elevation. No lytic process identified. Narrowing of medial compartment the knee is present. Dense endoscopic calcifications      IMPRESSION:      1. No plain radiographic evidence for osteomyelitis. However plain film findings are often late findings    2. Atherosclerotic calcifications   3. Degenerative changes of the left knee, greatest involving the medial compartment      XR ANKLE RIGHT (MIN 3 VIEWS)   Final Result      Dense atherosclerotic calcifications, osteopenia without plain radiographic evidence for osteomyelitis            4 views of the right anatomic leg      FINDINGS:      Dense endoscopic calcifications identified. There is marked joint space narrowing identified involving the medial compartment the. Small osteophytes lateral compartment. No periosteal elevation is appreciated. No subcutaneous gas. IMPRESSION:   1. No plain radiographic evidence for osteomyelitis, however plain film findings are often late findings. 2. Osteoarthritis of the right knee greatest involving the medial compartment   3.  Atherosclerotic calcifications            4 views of the anatomic left leg      FINDINGS:      No cortical disruption or periosteal elevation. No lytic process identified. Narrowing of medial compartment the knee is present. Dense endoscopic calcifications      IMPRESSION:      1. No plain radiographic evidence for osteomyelitis. However plain film findings are often late findings    2. Atherosclerotic calcifications   3. Degenerative changes of the left knee, greatest involving the medial compartment      CT Head W/O Contrast   Final Result      Head   1. No evidence of acute intracranial hemorrhage or mass effect. 2.  Mild chronic small vessel ischemic disease and atrophy. Cervical spine   1. No acute abnormality in the cervical spine. 2.  Advanced cervical spondylosis as detailed above without significant change. CT CSpine W/O Contrast   Final Result      Head   1. No evidence of acute intracranial hemorrhage or mass effect. 2.  Mild chronic small vessel ischemic disease and atrophy. Cervical spine   1. No acute abnormality in the cervical spine. 2.  Advanced cervical spondylosis as detailed above without significant change. XR CHEST (2 VW)   Final Result   1. Mild bibasilar airspace disease may reflect atelectasis or pneumonia.               Assessment/Plan:    Active Hospital Problems    Diagnosis Date Noted    Hypoglycemia [E16.2] 01/13/2023     Priority: Medium    Multiple falls [R29.6] 01/13/2023     Priority: Medium    Chronic anticoagulation [Z79.01] 01/13/2023     Priority: Medium    PAF (paroxysmal atrial fibrillation) ([facility] [I48.0] 10/24/2022     Priority: Medium    HFrEF (heart failure with reduced ejection fraction) ([facility] [I50.20] 10/19/2022     Priority: Medium    Diabetic polyneuropathy associated with type 2 diabetes mellitus ([facility] [E11.42] 07/25/2017     Hypoglycemia as patient did not have anything to eat for the last couple of hours, given D10 and improved  Multiple falls at home I suspect this is due to orthostatic hypotension in setting of Parkinson disease as well as diabetic polyneuropathy  EPISTAXIS  Type 2 diabetes with polyneuropathy  Chronic systolic congestive heart failure not in acute exacerbation  Chronic anticoagulation with apixaban  Chronic atrial fibrillation on anticoagulation     PLAN:  Check orthostatic vitals every shift  Continue metoprolol  Continue losartan half dose 25 twice daily hold for systolic less than 957  HOLD apixaban  AFRIN 5 SPRAY AND APPLY PRESSURE X 10 MIN  ADD OCEAN SPRAY Q 2 HOURS  Hold Lasix/spironolactone  Avoid overcorrection of blood pressure  Do not adjust blood pressures medications without getting orthostatic vitals at that time    DVT Prophylaxis: apixaban  Diet: ADULT DIET;  Regular; 4 carb choices (60 gm/meal)  Code Status: Full Code    PT/OT Eval Status: ordered    Dispo - continue inpatient Maryann Chavez MD  Internal Medicine Hospitalist

## 2023-01-14 NOTE — CONSULTS
Department of Podiatry Consult Note  Resident       Reason for Consult:  b/l lower extremity wounds    Requesting Physician:  Marta Maloney MD    CHIEF COMPLAINT:  Chronic b/l lower extremity wounds    HISTORY OF PRESENT ILLNESS:                The patient is a 81 y.o. male with significant past medical history as listed below. Podiatry was consulted for evaluation of chronic b/l lower extremity wounds. The patient relates that he has seen Dr Getachew Ordonez in the past but has not followed up with her in a while due to frequent hospitalizations and that the hospitals have be doing his dressing changes. Patient relates that he has some right ankle pain but that he does not have any pain to the wounds or the remaining aspects of the b/l lower extremities. Patient denies fever, chills, nausea, vomiting, shortness of breath, chest pain. Patient has no other pedal complaints at this time.     Past Medical History:        Diagnosis Date    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 ([facility]     Dr. Yazmin Mendoza    CAD (coronary artery disease)     Cellulitis 2014    CHF (congestive heart failure) ([facility])     systolic    Chronic kidney disease     upon hospitalization due to bactrim allergy    COPD (chronic obstructive pulmonary disease) ([address]     Hypertension     Hypothyroidism     Mixed hyperlipidemia     MRSA (methicillin resistant staph aureus) culture positive 07/24/2017    foot    Neuropathy     Obstructive sleep apnea 10/03/2014    does not use cpap machine    Parkinson's disease ([address] 1965    Type 2 diabetes mellitus without complication ([facility] [facility]        Past Surgical History:        Procedure Laterality Date    CARDIAC CATHETERIZATION      CERVICAL FUSION  1981    COLON SURGERY      1980's    COLONOSCOPY      FOOT SURGERY Right 07/24/2017    INCISION AND DRAINAGE RIGHT FOOT WITH REMOVAL NECROTIC BONE    FOOT SURGERY Right 07/27/2017    : TRANSMETATARSAL AMPUTATION RIGHT FOOT     HERNIA REPAIR      SHOULDER SURGERY      right    UPPER GASTROINTESTINAL ENDOSCOPY N/A 11/25/2020    ESOPHAGOGASTRODUODENOSCOPY WITH BOTOX INJECTION performed by Priya Newell DO at [facility] ENDOSCOPY       Allergies:   Aspirin, Nsaids, Bactrim [sulfamethoxazole-trimethoprim], Celebrex [celecoxib], Cymbalta [duloxetine hcl], Pcn [penicillins], Codeine, and Vicodin [hydrocodone-acetaminophen]    Medications:   Home Meds  No current facility-administered medications on file prior to encounter. Current Outpatient Medications on File Prior to Encounter   Medication Sig Dispense Refill    Insulin NPH Isophane & Regular (RELION 70/30 SC) Inject 28-30 Units into the skin every morning      metoprolol tartrate (LOPRESSOR) 25 MG tablet Take 25 mg by mouth 2 times daily      furosemide (LASIX) 40 MG tablet Take 40 mg by mouth in the morning and 40 mg in the evening. Chlorpheniramine-Acetaminophen (CORICIDIN HBP COLD/FLU PO) Take 1 tablet by mouth daily      chlorhexidine (HIBICLENS) 4 % external liquid Wash from neck down the night before or morning of the procedure 1 each 0    miconazole (MICOTIN) 2 % powder Apply topically 2 times daily. 45 g 1    apixaban (ELIQUIS) 2.5 MG TABS tablet Take 1 tablet by mouth 2 times daily 60 tablet 3    insulin regular (HUMULIN R;NOVOLIN R) 100 UNIT/ML injection Inject 15-20 Units into the skin daily (with breakfast)      cilostazol (PLETAL) 50 MG tablet Take 50 mg by mouth 2 times daily      carbidopa-levodopa (SINEMET CR)  MG per extended release tablet Take 1 tablet by mouth 3 times daily At breakfast, dinner and at bedtime.       omeprazole (PRILOSEC) 20 MG delayed release capsule Take 20 mg by mouth 2 times daily      spironolactone (ALDACTONE) 25 MG tablet Take 0.5 tablets by mouth daily 30 tablet 3    finasteride (PROSCAR) 5 MG tablet Take 5 mg by mouth nightly      losartan (COZAAR) 50 MG tablet Take 50 mg by mouth daily      levothyroxine (SYNTHROID) 75 MCG tablet Take 75 mcg by mouth Daily       donepezil (ARICEPT) 10 MG tablet Take 10 mg by mouth nightly          Current Meds  glucose chewable tablet 16 g, PRN  dextrose bolus 10% 125 mL, PRN   Or  dextrose bolus 10% 250 mL, PRN  glucagon injection 1 mg, PRN  dextrose 10 % infusion, Continuous PRN  carbidopa-levodopa (SINEMET CR)  MG per extended release tablet 1 tablet, TID  cilostazol (PLETAL) tablet 50 mg, BID  donepezil (ARICEPT) tablet 10 mg, Nightly  finasteride (PROSCAR) tablet 5 mg, Nightly  levothyroxine (SYNTHROID) tablet 75 mcg, Daily  losartan (COZAAR) tablet 25 mg, Daily  metoprolol tartrate (LOPRESSOR) tablet 25 mg, BID  apixaban (ELIQUIS) tablet 2.5 mg, BID  pantoprazole (PROTONIX) tablet 40 mg, QAM AC  sodium chloride flush 0.9 % injection 5-40 mL, 2 times per day  sodium chloride flush 0.9 % injection 5-40 mL, PRN  0.9 % sodium chloride infusion, PRN  ondansetron (ZOFRAN-ODT) disintegrating tablet 4 mg, Q8H PRN   Or  ondansetron (ZOFRAN) injection 4 mg, Q6H PRN  polyethylene glycol (GLYCOLAX) packet 17 g, Daily PRN  acetaminophen (TYLENOL) tablet 650 mg, Q6H PRN   Or  acetaminophen (TYLENOL) suppository 650 mg, Q6H PRN  insulin lispro (1 Unit Dial) (HUMALOG/ADMELOG) pen 0-4 Units, TID WC  insulin lispro (1 Unit Dial) (HUMALOG/ADMELOG) pen 0-4 Units, Nightly        Family History:   Family History   Problem Relation Age of Onset    Stroke Mother     Stroke Father     Cancer Sister     Cancer Paternal Aunt        Social History:   TOBACCO:   reports that he has never smoked. He has never used smokeless tobacco.  ETOH:   reports no history of alcohol use.  DRUGS:   reports no history of drug use.      REVIEW OF SYSTEMS:    As above.    PHYSICAL EXAM:      Vitals:    /68   Pulse 57   Temp 97.6 °F (36.4 °C) (Oral)   Resp 17   Ht 6' (1.829 m)   Wt 248 lb 11.2 oz (112.8 kg)   SpO2 92%   BMI 33.73 kg/m²     LABS:   Recent Labs     01/13/23  1508   WBC 5.8   HGB 10.1*   HCT 32.1*        Recent Labs     01/13/23  1508      K 4.6     CO2 25   BUN 38*   CREATININE 1.3     No results for input(s): PROT, INR, APTT in the last 72 hours. VASCULAR: DP and PT pulses faintly palpable. Upon handheld doppler examination  DP/PT pulses were noted to be biphasic b/l. CFT is brisk to the digits of the foot b/l. Skin temperature is warm to cool from proximal to distal with no focal calor noted. Non pitting edema noted. No pain with calf compression b/l. NEUROLOGIC: Gross and epicritic sensation is diminished  b/l. Protective sensation is diminished at all pedal sites b/l. DERMATOLOGIC:   Left and right lower extremities:  Verbal consent obtained prior to photos today:        Diffuse chronic dermatologic changes to the b/l LE 2/2 chronic venous insufficiency. Diffuse xerosis and scaling present. Scattered eschars noted to the b/l lower extremities. Small partial thickness ulceration noted just distal to the left medial tibial tuberosity without acute signs of infection. Diffuse erythema without calor present b/l. Evidence of hemosiderin deposits to the circumferential b/l LE    MUSCULOSKELETAL: Muscle strength is 5/5 for all pedal groups tested. No tenderness with palpation of the foot or ankle b/l. Ankle joint ROM is decreased in dorsiflexion with the knee extended. Hx of transmetatarsal amputation right lower extremity. IMPRESSION/RECOMMENDATIONS:    -Chronic dermatologic changes 2/2 venous insuffiencey  -Partial thickness ulceration LLE  -Eschars b/l LE  -Hx of DM II  -Hx TMA right LE    -Patient examined and evaluated at the bedside   -VSS. No Leukocytosis noted.   -ESR and CRP pending  -pre-albumin ordered  -A1c pending  -X-rays ordered of the B/L tib fib and the right ankle 2/2 complaint of ankle pain  -No debridement necessary at this time.   -No wound culture obtained 2/2 no active drainage  -Dressing applied to the b/l LE consisting of betadine soaked gauze, kerlix, ace  -prevalon boots ordered  -Antibiotics not warranted from a podiatric standpoint  -Weightbearing as tolerated to the b/l LE      DISPO:Chronic venous insuffiencey with superficial ulcerations and eschars b/l. Labs reviewed. Xrays ordered. No antibiotics necessary from podiatric stand point. No plan for surgical intervention. Will continue with local wound care while the patient is in house.      - The patient will be staffed with Richard Knowles DPM   Podiatric Resident PGY1  Pager [phone] or [employer]  1/14/2023, 10:59 AM     Custer Barthel, DPM  01/14/23  7:05 AM

## 2023-01-15 LAB
ALBUMIN SERPL-MCNC: 3 G/DL (ref 3.4–5)
ANION GAP SERPL CALCULATED.3IONS-SCNC: 8 MMOL/L (ref 3–16)
BASOPHILS ABSOLUTE: 0 K/UL (ref 0–0.2)
BASOPHILS RELATIVE PERCENT: 0.7 %
BUN BLDV-MCNC: 28 MG/DL (ref 7–20)
CALCIUM SERPL-MCNC: 9.3 MG/DL (ref 8.3–10.6)
CHLORIDE BLD-SCNC: 103 MMOL/L (ref 99–110)
CO2: 27 MMOL/L (ref 21–32)
CREAT SERPL-MCNC: 1.1 MG/DL (ref 0.8–1.3)
EOSINOPHILS ABSOLUTE: 0.3 K/UL (ref 0–0.6)
EOSINOPHILS RELATIVE PERCENT: 6.3 %
ESTIMATED AVERAGE GLUCOSE: 154.2 MG/DL
GFR SERPL CREATININE-BSD FRML MDRD: >60 ML/MIN/{1.73_M2}
GLUCOSE BLD-MCNC: 140 MG/DL (ref 70–99)
GLUCOSE BLD-MCNC: 147 MG/DL (ref 70–99)
GLUCOSE BLD-MCNC: 150 MG/DL (ref 70–99)
GLUCOSE BLD-MCNC: 168 MG/DL (ref 70–99)
GLUCOSE BLD-MCNC: 174 MG/DL (ref 70–99)
HBA1C MFR BLD: 7 %
HCT VFR BLD CALC: 29.2 % (ref 40.5–52.5)
HEMOGLOBIN: 9.1 G/DL (ref 13.5–17.5)
LYMPHOCYTES ABSOLUTE: 1 K/UL (ref 1–5.1)
LYMPHOCYTES RELATIVE PERCENT: 23.1 %
MCH RBC QN AUTO: 22.7 PG (ref 26–34)
MCHC RBC AUTO-ENTMCNC: 31.2 G/DL (ref 31–36)
MCV RBC AUTO: 72.8 FL (ref 80–100)
MONOCYTES ABSOLUTE: 0.5 K/UL (ref 0–1.3)
MONOCYTES RELATIVE PERCENT: 12 %
NEUTROPHILS ABSOLUTE: 2.5 K/UL (ref 1.7–7.7)
NEUTROPHILS RELATIVE PERCENT: 57.9 %
PDW BLD-RTO: 24.9 % (ref 12.4–15.4)
PERFORMED ON: ABNORMAL
PHOSPHORUS: 3.2 MG/DL (ref 2.5–4.9)
PLATELET # BLD: 237 K/UL (ref 135–450)
PMV BLD AUTO: 7.4 FL (ref 5–10.5)
POTASSIUM SERPL-SCNC: 4.8 MMOL/L (ref 3.5–5.1)
RBC # BLD: 4.01 M/UL (ref 4.2–5.9)
SODIUM BLD-SCNC: 138 MMOL/L (ref 136–145)
WBC # BLD: 4.2 K/UL (ref 4–11)

## 2023-01-15 PROCEDURE — 6370000000 HC RX 637 (ALT 250 FOR IP): Performed by: INTERNAL MEDICINE

## 2023-01-15 PROCEDURE — 1200000000 HC SEMI PRIVATE

## 2023-01-15 PROCEDURE — 2580000003 HC RX 258: Performed by: INTERNAL MEDICINE

## 2023-01-15 PROCEDURE — 80069 RENAL FUNCTION PANEL: CPT

## 2023-01-15 PROCEDURE — 85025 COMPLETE CBC W/AUTO DIFF WBC: CPT

## 2023-01-15 PROCEDURE — 36415 COLL VENOUS BLD VENIPUNCTURE: CPT

## 2023-01-15 RX ADMIN — SODIUM CHLORIDE, PRESERVATIVE FREE 10 ML: 5 INJECTION INTRAVENOUS at 21:00

## 2023-01-15 RX ADMIN — CILOSTAZOL 50 MG: 50 TABLET ORAL at 20:52

## 2023-01-15 RX ADMIN — SALINE NASAL SPRAY 1 SPRAY: 1.5 SOLUTION NASAL at 12:09

## 2023-01-15 RX ADMIN — CARBIDOPA AND LEVODOPA 1 TABLET: 25; 100 TABLET, EXTENDED RELEASE ORAL at 14:23

## 2023-01-15 RX ADMIN — SODIUM CHLORIDE, PRESERVATIVE FREE 10 ML: 5 INJECTION INTRAVENOUS at 12:07

## 2023-01-15 RX ADMIN — APIXABAN 2.5 MG: 2.5 TABLET, FILM COATED ORAL at 20:52

## 2023-01-15 RX ADMIN — FINASTERIDE 5 MG: 5 TABLET, FILM COATED ORAL at 20:52

## 2023-01-15 RX ADMIN — LEVOTHYROXINE SODIUM 75 MCG: 0.07 TABLET ORAL at 06:06

## 2023-01-15 RX ADMIN — ACETAMINOPHEN 650 MG: 325 TABLET ORAL at 20:51

## 2023-01-15 RX ADMIN — DONEPEZIL HYDROCHLORIDE 10 MG: 10 TABLET, FILM COATED ORAL at 20:52

## 2023-01-15 RX ADMIN — APIXABAN 2.5 MG: 2.5 TABLET, FILM COATED ORAL at 12:28

## 2023-01-15 RX ADMIN — PANTOPRAZOLE SODIUM 40 MG: 40 TABLET, DELAYED RELEASE ORAL at 06:06

## 2023-01-15 RX ADMIN — CARBIDOPA AND LEVODOPA 1 TABLET: 25; 100 TABLET, EXTENDED RELEASE ORAL at 09:25

## 2023-01-15 RX ADMIN — CARBIDOPA AND LEVODOPA 1 TABLET: 25; 100 TABLET, EXTENDED RELEASE ORAL at 20:52

## 2023-01-15 RX ADMIN — CILOSTAZOL 50 MG: 50 TABLET ORAL at 09:25

## 2023-01-15 ASSESSMENT — PAIN SCALES - GENERAL
PAINLEVEL_OUTOF10: 3
PAINLEVEL_OUTOF10: 3

## 2023-01-15 ASSESSMENT — PAIN DESCRIPTION - LOCATION
LOCATION: NOSE
LOCATION: NOSE

## 2023-01-15 ASSESSMENT — PAIN DESCRIPTION - DESCRIPTORS
DESCRIPTORS: ACHING
DESCRIPTORS: ACHING

## 2023-01-15 NOTE — PROGRESS NOTES
Patient alert and oriented. Orthostatics positive. Tremors got worse while standing up. Complained pain and managed well with Tylenol. Slept in long intervals. Bed and chair alarms used. Call light within reach.  BP (!) 150/67   Pulse 55   Temp 97.4 °F (36.3 °C) (Oral)   Resp 16   Ht 6' (1.829 m)   Wt 248 lb 11.2 oz (112.8 kg)   SpO2 96%   BMI 33.73 kg/m²

## 2023-01-15 NOTE — PROGRESS NOTES
Patient alert and oriented. Denies pain. Up with walker, gait steady. Denies dizziness, or lightheaded feeling. Orthostatics continue to be positive. Appetite good. Up to chair, wife visiting. Chair alarm on, call light in reach, will monitor.

## 2023-01-15 NOTE — PROGRESS NOTES
Podiatric Surgery Daily Progress Note  Joni Lao      Subjective :   Patient seen and examined this am at the bedside. Patient denies any acute overnight events. Patient denies N/V/F/C/SOB. Patient denies calf pain, thigh pain, chest pain. Review of Systems: A 12 point review of symptoms is unremarkable with the exception of the chief complaint. Patient specifically denies nausea, fever, vomiting, chills, shortness of breath, chest pain, abdominal pain, constipation or difficulty urinating. Objective     BP (!) 150/67   Pulse 55   Temp 97.4 °F (36.3 °C) (Oral)   Resp 16   Ht 6' (1.829 m)   Wt 231 lb 4.2 oz (104.9 kg)   SpO2 96%   BMI 31.36 kg/m²      I/O:  Intake/Output Summary (Last 24 hours) at 1/15/2023 0735  Last data filed at 1/15/2023 1447  Gross per 24 hour   Intake 360 ml   Output 1100 ml   Net -740 ml              Wt Readings from Last 3 Encounters:   01/15/23 231 lb 4.2 oz (104.9 kg)   10/28/22 209 lb (94.8 kg)   10/24/22 209 lb 10.5 oz (95.1 kg)       LABS:    Recent Labs     01/13/23  1508 01/14/23  0407   WBC 5.8 4.9   HGB 10.1* 9.0*   HCT 32.1* 28.7*    247        Recent Labs     01/14/23  0407      K 4.7      CO2 26   PHOS 3.7   BUN 33*   CREATININE 1.1      No results for input(s): PROT, INR, APTT in the last 72 hours. LOWER EXTREMITY EXAMINATION    Dressing to b/l LE intact. No strikethrough noted to the external dressing. No drainage noted to the internal layers of the dressing. VASCULAR: DP and PT pulses faintly palpable. Upon handheld doppler examination  DP/PT pulses were noted to be biphasic b/l. CFT is brisk to the digits of the foot b/l. Skin temperature is warm to cool from proximal to distal with no focal calor noted. Mild Non-pitting edema noted. No pain with calf compression b/l. NEUROLOGIC: Gross and epicritic sensation is diminished  b/l. Protective sensation is diminished at all pedal sites b/l.      DERMATOLOGIC:   Left and right lower extremities:  Verbal consent obtained prior to photos today:          Diffuse chronic dermatologic changes to the b/l LE 2/2 chronic venous insufficiency. Diffuse xerosis and scaling present. Scattered eschars noted to the b/l lower extremities. Small abrasions noted just distal to the left medial tibial tuberosity without acute signs of infection. Diffuse erythema without calor present b/l. Evidence of hemosiderin deposits to the circumferential b/l LE     MUSCULOSKELETAL: Muscle strength is 5/5 for all pedal groups tested. No tenderness with palpation of the foot or ankle b/l. Ankle joint ROM is decreased in dorsiflexion with the knee extended. Hx of transmetatarsal amputation right lower extremity. IMAGING:  Impression       Dense atherosclerotic calcifications, osteopenia without plain radiographic evidence for osteomyelitis               4 views of the right anatomic leg       FINDINGS:       Dense endoscopic calcifications identified. There is marked joint space narrowing identified involving the medial compartment the. Small osteophytes lateral compartment. No periosteal elevation is appreciated. No subcutaneous gas. IMPRESSION:   1. No plain radiographic evidence for osteomyelitis, however plain film findings are often late findings. 2. Osteoarthritis of the right knee greatest involving the medial compartment   3. Atherosclerotic calcifications               4 views of the anatomic left leg       FINDINGS:       No cortical disruption or periosteal elevation. No lytic process identified. Narrowing of medial compartment the knee is present. Dense endoscopic calcifications       IMPRESSION:       1. No plain radiographic evidence for osteomyelitis. However plain film findings are often late findings    2. Atherosclerotic calcifications   3.  Degenerative changes of the left knee, greatest involving the medial compartment     ASSESSMENT/PLAN  -Chronic dermatologic changes 2/2 venous insuffiencey  -Abrasions LLE  -Eschars b/l LE  -Hx of DM II  -Hx TMA right LE     -Patient examined and evaluated at the bedside   -VSS. No Leukocytosis noted.  -ESR 66 and CRP 38.1  -pre-albumin 12.1, oral nutritional wound healing supplement ordered  -A1c pending  -Imaging reviewed, impressions noted above.   -No debridement necessary at this time.   -No wound culture obtained 2/2 no active drainage  -Dressing applied to the b/l LE consisting of adaptic, gauze, kerlix, ace  -prevalon boots to be worn at all times to prevent any further soft tissue injury  -Antibiotics not warranted from a podiatric standpoint  -Weightbearing as tolerated to the b/l LE        DISPO: Chronic venous insuffiencey with superficial abrasions and eschars b/l. Edema improving. Labs and imaging. No antibiotics necessary from podiatric stand point. No plan for surgical intervention. Will continue with local wound care while the patient is in house.      Discussed assessment and plan with Dr. David Sow DPM.    Chelsey Tolliver DPM   Podiatric Resident PGY1  Pager [phone] or [employer]  1/15/2023, 7:40 AM

## 2023-01-15 NOTE — PLAN OF CARE
Problem: Safety - Adult  Goal: Free from fall injury  Outcome: Progressing  Flowsheets (Taken 1/15/2023 1242)  Free From Fall Injury: Instruct family/caregiver on patient safety  Note: Patient up with walker, and gait belt. Denies dizziness. Bed and chair alarms used. Bed locked and in low position. Call light kept within reach. Safety maintained. Problem: Skin/Tissue Integrity  Goal: Absence of new skin breakdown  Description: 1. Monitor for areas of redness and/or skin breakdown  2. Assess vascular access sites hourly  3. Every 4-6 hours minimum:  Change oxygen saturation probe site  4. Every 4-6 hours:  If on nasal continuous positive airway pressure, respiratory therapy assess nares and determine need for appliance change or resting period. Outcome: Progressing  Note: Skin stable. No new injuries. Up to chair with waffle cushion. Skin kept clean and dry. Changing positions. Will monitor. Problem: Discharge Planning  Goal: Discharge to home or other facility with appropriate resources  Flowsheets (Taken 1/15/2023 1242)  Discharge to home or other facility with appropriate resources: Identify barriers to discharge with patient and caregiver  Note: Plan for discharge tomorrow per Dr Maximo Ross. Wife states she is comfortable with plan. PT/OT at home.

## 2023-01-15 NOTE — PROGRESS NOTES
Patient alert and oriented, vitals stable. Orthostatics positive. Patient denies dizziness. Tele-monitor shows SR with first degree. Patient up with walker. Denies pain. Blood Glucose >100 throughout the day. Bed and chair alarms used. Call light within reach. Safety maintained.

## 2023-01-15 NOTE — PROGRESS NOTES
Hospitalist Progress Note      PCP: Chani Daley MD    Date of Admission: 1/13/2023    Chief Complaint: falls at home    Hospital Course:   81 y.o. male [address]   - HFrEF (45%), COPD Parkinson's, SARIKA not on CPAP HTN HLD T2DM, chronic-atrial fib on apixaban  - has had recurrent nosebleeds prior to starting anticoagulation  - admitted 10/2022 for acute on chronic HF, left lower leg chronic venous ulcer and discharged to [facility] for rehab  - pt is being followed by Dr. Alejandro Narvaez for consideration of LA appendage   He returned back from rehab on 12/29/22, and living at home with wife and grand son. He has had multiple falls since his come home, wife says he was doing well at the rehab but since home he mostly falls when he is walking. He had a fall yesterday and then again had a fall today. Denies loss of consciousness. Today she says they were eating food at the dinner table, once done she went to do the dishes and he got up and started walking towards the restroom next thing she knows he is on the ground. He did not lose consciousness at that time. Home health care nurse came today, while working with her his oxygen saturation would go to as low as 60s and heart were would go down at the same time as well. Intermittently heart rate going 200s. During these episodes he did have shortness of breath but is not worse than usual. In the emergency room heart rate 61, blood pressure 126/65. Intermittently blood pressure 133 systolic 292F to 244I. He satting 97% on room air.     Subjective:   No LH, says doing well, and says he does feel he was getting way more bp and water pills at home than he needed and feels like he is improving and doing well    Medications:  Reviewed    Infusion Medications    dextrose      sodium chloride       Scheduled Medications    carbidopa-levodopa  1 tablet Oral TID    cilostazol  50 mg Oral BID    donepezil  10 mg Oral Nightly    finasteride  5 mg Oral Nightly levothyroxine  75 mcg Oral Daily    losartan  25 mg Oral Daily    metoprolol tartrate  25 mg Oral BID    apixaban  2.5 mg Oral BID    pantoprazole  40 mg Oral QAM AC    sodium chloride flush  5-40 mL IntraVENous 2 times per day    insulin lispro  0-4 Units SubCUTAneous TID WC    insulin lispro  0-4 Units SubCUTAneous Nightly     PRN Meds: sodium chloride, glucose, dextrose bolus **OR** dextrose bolus, glucagon (rDNA), dextrose, sodium chloride flush, sodium chloride, ondansetron **OR** ondansetron, polyethylene glycol, acetaminophen **OR** acetaminophen      Intake/Output Summary (Last 24 hours) at 1/15/2023 1802  Last data filed at 1/15/2023 1637  Gross per 24 hour   Intake 840 ml   Output 1625 ml   Net -785 ml       Physical Exam Performed:    BP (!) 156/76   Pulse 64   Temp 98 °F (36.7 °C) (Oral)   Resp 16   Ht 6' (1.829 m)   Wt 229 lb 4.5 oz (104 kg)   SpO2 96%   BMI 31.10 kg/m²     General appearance: No apparent distress, appears stated age and cooperative. HEENT: Pupils equal, round, and reactive to light. Conjunctivae/corneas clear. Right Nares bleeding  Neck: Supple, with full range of motion. No jugular venous distention. Trachea midline. Respiratory:  Normal respiratory effort. Clear to auscultation, bilaterally without Rales/Wheezes/Rhonchi. Cardiovascular: Regular rate and rhythm with normal S1/S2 without murmurs, rubs or gallops. Abdomen: Soft, non-tender, non-distended with normal bowel sounds. Musculoskeletal: No clubbing, cyanosis or edema bilaterally. Full range of motion without deformity. Skin: Skin color, texture, turgor normal.  No rashes or lesions. Neurologic:  Neurovascularly intact without any focal sensory/motor deficits.  Cranial nerves: II-XII intact, grossly non-focal.  Psychiatric: Alert and oriented, thought content appropriate, normal insight  Capillary Refill: Brisk,< 3 seconds   Peripheral Pulses: +2 palpable, equal bilaterally       Labs:   Recent Labs 01/13/23  1508 01/14/23  0407 01/15/23  1102   WBC 5.8 4.9 4.2   HGB 10.1* 9.0* 9.1*   HCT 32.1* 28.7* 29.2*    247 237     Recent Labs     01/13/23  1508 01/14/23  0407 01/15/23  1102    138 138   K 4.6 4.7 4.8    102 103   CO2 25 26 27   BUN 38* 33* 28*   CREATININE 1.3 1.1 1.1   CALCIUM 9.9 9.2 9.3   PHOS  --  3.7 3.2     No results for input(s): AST, ALT, BILIDIR, BILITOT, ALKPHOS in the last 72 hours.  No results for input(s): INR in the last 72 hours.  Recent Labs     01/13/23  1508   TROPONINI 0.01       Urinalysis:      Lab Results   Component Value Date/Time    NITRU Negative 01/13/2023 04:40 PM    WBCUA 3-5 01/06/2018 03:54 PM    BACTERIA 1+ 01/06/2018 03:54 PM    RBCUA 3-5 01/06/2018 03:54 PM    BLOODU Negative 01/13/2023 04:40 PM    SPECGRAV 1.015 01/13/2023 04:40 PM    GLUCOSEU Negative 01/13/2023 04:40 PM    GLUCOSEU 500 01/27/2012 05:00 AM       Radiology:  XR TIBIA FIBULA RIGHT (2 VIEWS)   Final Result      Dense atherosclerotic calcifications, osteopenia without plain radiographic evidence for osteomyelitis            4 views of the right anatomic leg      FINDINGS:      Dense endoscopic calcifications identified. There is marked joint space narrowing identified involving the medial compartment the. Small osteophytes lateral compartment. No periosteal elevation is appreciated. No subcutaneous gas.      IMPRESSION:   1. No plain radiographic evidence for osteomyelitis, however plain film findings are often late findings.   2. Osteoarthritis of the right knee greatest involving the medial compartment   3. Atherosclerotic calcifications            4 views of the anatomic left leg      FINDINGS:      No cortical disruption or periosteal elevation. No lytic process identified. Narrowing of medial compartment the knee is present. Dense endoscopic calcifications      IMPRESSION:      1. No plain radiographic evidence for osteomyelitis. However plain film findings are often late findings   2. Atherosclerotic calcifications   3. Degenerative changes of the left knee, greatest involving the medial compartment      XR TIBIA FIBULA LEFT (2 VIEWS)   Final Result      Dense atherosclerotic calcifications, osteopenia without plain radiographic evidence for osteomyelitis            4 views of the right anatomic leg      FINDINGS:      Dense endoscopic calcifications identified. There is marked joint space narrowing identified involving the medial compartment the. Small osteophytes lateral compartment. No periosteal elevation is appreciated. No subcutaneous gas. IMPRESSION:   1. No plain radiographic evidence for osteomyelitis, however plain film findings are often late findings. 2. Osteoarthritis of the right knee greatest involving the medial compartment   3. Atherosclerotic calcifications            4 views of the anatomic left leg      FINDINGS:      No cortical disruption or periosteal elevation. No lytic process identified. Narrowing of medial compartment the knee is present. Dense endoscopic calcifications      IMPRESSION:      1. No plain radiographic evidence for osteomyelitis. However plain film findings are often late findings    2. Atherosclerotic calcifications   3. Degenerative changes of the left knee, greatest involving the medial compartment      XR ANKLE RIGHT (MIN 3 VIEWS)   Final Result      Dense atherosclerotic calcifications, osteopenia without plain radiographic evidence for osteomyelitis            4 views of the right anatomic leg      FINDINGS:      Dense endoscopic calcifications identified. There is marked joint space narrowing identified involving the medial compartment the. Small osteophytes lateral compartment. No periosteal elevation is appreciated. No subcutaneous gas. IMPRESSION:   1. No plain radiographic evidence for osteomyelitis, however plain film findings are often late findings.    2. Osteoarthritis of the right knee greatest involving the medial compartment   3. Atherosclerotic calcifications            4 views of the anatomic left leg      FINDINGS:      No cortical disruption or periosteal elevation. No lytic process identified. Narrowing of medial compartment the knee is present. Dense endoscopic calcifications      IMPRESSION:      1. No plain radiographic evidence for osteomyelitis. However plain film findings are often late findings    2. Atherosclerotic calcifications   3. Degenerative changes of the left knee, greatest involving the medial compartment      CT Head W/O Contrast   Final Result      Head   1. No evidence of acute intracranial hemorrhage or mass effect. 2.  Mild chronic small vessel ischemic disease and atrophy. Cervical spine   1. No acute abnormality in the cervical spine. 2.  Advanced cervical spondylosis as detailed above without significant change. CT CSpine W/O Contrast   Final Result      Head   1. No evidence of acute intracranial hemorrhage or mass effect. 2.  Mild chronic small vessel ischemic disease and atrophy. Cervical spine   1. No acute abnormality in the cervical spine. 2.  Advanced cervical spondylosis as detailed above without significant change. XR CHEST (2 VW)   Final Result   1. Mild bibasilar airspace disease may reflect atelectasis or pneumonia.               Assessment/Plan:    Active Hospital Problems    Diagnosis Date Noted    Hypoglycemia [E16.2] 01/13/2023     Priority: Medium    Multiple falls [R29.6] 01/13/2023     Priority: Medium    Chronic anticoagulation [Z79.01] 01/13/2023     Priority: Medium    PAF (paroxysmal atrial fibrillation) ([facility] [address] [I48.0] 10/24/2022     Priority: Medium    HFrEF (heart failure with reduced ejection fraction) ([address] [I50.20] 10/19/2022     Priority: Medium    Diabetic polyneuropathy associated with type 2 diabetes mellitus ([facility] [address] [E11.42] 07/25/2017     Hypoglycemia POA as patient did not have anything to eat for the last couple of hours, given D10 and improved  Multiple falls at home I suspect this is due to orthostatic hypotension in setting of Parkinson disease as well as diabetic polyneuropathy  EPISTAXIS  Type 2 diabetes with polyneuropathy  Chronic systolic congestive heart failure not in acute exacerbation  Chronic anticoagulation with apixaban  Chronic atrial fibrillation on anticoagulation     PLAN:  He continues to be orthostatic but standing systolic blood pressures in 120s. He is able to ambulate better with a walker. We are holding his diuretics (Lasix and spironolactone) and holding some of his blood pressure medications. Continuing on metoprolol. No longer having epistaxis so we will restart apixaban  [employer] every 2 hours  Afrin if recurrent epistaxis  Patient lives alone at home with wife, she is concerned and wants to make sure he is stable before he returns home. Will place abdominal binder  Continue to work with physical therapy  If stable can be discharged home tomorrow with home health care  I discussed with the wife briefly, I told her that we need to monitor his weight daily and if every evidence of weight gain of 3 pounds or above we need to give him diuretics. Scheduling diuretics in this patient with orthostatic hypotension in the setting of Parkinson's disease is very high risk and I recommended that we should keep a higher goal for his blood pressure to prevent falls especially in the setting of apixaban use  I have also recommended that they follow-up with Dr. Lawson Gillespie after discharge and go ahead with the watchman procedure later this month if he continues to be stable  Avoid overcorrection of blood pressure  Do not adjust blood pressures medications without getting orthostatic vitals at that time    DVT Prophylaxis: apixaban  Diet: ADULT DIET;  Regular; 4 carb choices (60 gm/meal)  ADULT ORAL NUTRITION SUPPLEMENT; Breakfast; Wound Healing Oral Supplement  Code Status: Full Code    PT/OT Eval Status: Ordered on admission pending at this time    Dispo - continue inpatient cere, discharge tomorrow home with home health care if cleared by physical therapy and continues to have stable blood pressures    Lisa Mcdaniels MD  Internal Medicine Hospitalist

## 2023-01-16 VITALS
WEIGHT: 234.13 LBS | TEMPERATURE: 97.6 F | HEART RATE: 75 BPM | SYSTOLIC BLOOD PRESSURE: 157 MMHG | DIASTOLIC BLOOD PRESSURE: 83 MMHG | BODY MASS INDEX: 31.71 KG/M2 | RESPIRATION RATE: 18 BRPM | OXYGEN SATURATION: 97 % | HEIGHT: 72 IN

## 2023-01-16 PROBLEM — E16.2 HYPOGLYCEMIA: Status: RESOLVED | Noted: 2023-01-13 | Resolved: 2023-01-16

## 2023-01-16 LAB
ABO/RH: NORMAL
ANION GAP SERPL CALCULATED.3IONS-SCNC: 11 MMOL/L (ref 3–16)
ANTIBODY SCREEN: NORMAL
BASOPHILS ABSOLUTE: 0 K/UL (ref 0–0.2)
BASOPHILS RELATIVE PERCENT: 0.7 %
BILIRUBIN URINE: NEGATIVE
BLOOD, URINE: NEGATIVE
BUN BLDV-MCNC: 30 MG/DL (ref 7–20)
CALCIUM SERPL-MCNC: 9.4 MG/DL (ref 8.3–10.6)
CHLORIDE BLD-SCNC: 98 MMOL/L (ref 99–110)
CLARITY: CLEAR
CO2: 25 MMOL/L (ref 21–32)
COLOR: YELLOW
CREAT SERPL-MCNC: 1.1 MG/DL (ref 0.8–1.3)
EKG ATRIAL RATE: 63 BPM
EKG DIAGNOSIS: NORMAL
EKG P AXIS: 52 DEGREES
EKG P-R INTERVAL: 214 MS
EKG Q-T INTERVAL: 406 MS
EKG QRS DURATION: 94 MS
EKG QTC CALCULATION (BAZETT): 415 MS
EKG R AXIS: -22 DEGREES
EKG T AXIS: 106 DEGREES
EKG VENTRICULAR RATE: 63 BPM
EOSINOPHILS ABSOLUTE: 0.2 K/UL (ref 0–0.6)
EOSINOPHILS RELATIVE PERCENT: 6 %
GFR SERPL CREATININE-BSD FRML MDRD: >60 ML/MIN/{1.73_M2}
GLUCOSE BLD-MCNC: 148 MG/DL (ref 70–99)
GLUCOSE BLD-MCNC: 169 MG/DL (ref 70–99)
GLUCOSE BLD-MCNC: 178 MG/DL (ref 70–99)
GLUCOSE BLD-MCNC: 185 MG/DL (ref 70–99)
GLUCOSE URINE: NEGATIVE MG/DL
HCT VFR BLD CALC: 32.2 % (ref 40.5–52.5)
HEMOGLOBIN: 9.9 G/DL (ref 13.5–17.5)
KETONES, URINE: NEGATIVE MG/DL
LEUKOCYTE ESTERASE, URINE: NEGATIVE
LYMPHOCYTES ABSOLUTE: 0.8 K/UL (ref 1–5.1)
LYMPHOCYTES RELATIVE PERCENT: 20.4 %
MCH RBC QN AUTO: 22.3 PG (ref 26–34)
MCHC RBC AUTO-ENTMCNC: 30.7 G/DL (ref 31–36)
MCV RBC AUTO: 72.8 FL (ref 80–100)
MICROSCOPIC EXAMINATION: NORMAL
MONOCYTES ABSOLUTE: 0.4 K/UL (ref 0–1.3)
MONOCYTES RELATIVE PERCENT: 9.8 %
NEUTROPHILS ABSOLUTE: 2.6 K/UL (ref 1.7–7.7)
NEUTROPHILS RELATIVE PERCENT: 63.1 %
NITRITE, URINE: NEGATIVE
PDW BLD-RTO: 24.3 % (ref 12.4–15.4)
PERFORMED ON: ABNORMAL
PH UA: 6.5 (ref 5–8)
PLATELET # BLD: 248 K/UL (ref 135–450)
PMV BLD AUTO: 7.5 FL (ref 5–10.5)
POTASSIUM REFLEX MAGNESIUM: 4.8 MMOL/L (ref 3.5–5.1)
PROTEIN UA: NEGATIVE MG/DL
RBC # BLD: 4.43 M/UL (ref 4.2–5.9)
SODIUM BLD-SCNC: 134 MMOL/L (ref 136–145)
SPECIFIC GRAVITY UA: 1.01 (ref 1–1.03)
URINE TYPE: NORMAL
UROBILINOGEN, URINE: 0.2 E.U./DL
WBC # BLD: 4.1 K/UL (ref 4–11)

## 2023-01-16 PROCEDURE — 97535 SELF CARE MNGMENT TRAINING: CPT

## 2023-01-16 PROCEDURE — 86850 RBC ANTIBODY SCREEN: CPT

## 2023-01-16 PROCEDURE — 80048 BASIC METABOLIC PNL TOTAL CA: CPT

## 2023-01-16 PROCEDURE — 97166 OT EVAL MOD COMPLEX 45 MIN: CPT

## 2023-01-16 PROCEDURE — 93005 ELECTROCARDIOGRAM TRACING: CPT | Performed by: INTERNAL MEDICINE

## 2023-01-16 PROCEDURE — 97116 GAIT TRAINING THERAPY: CPT

## 2023-01-16 PROCEDURE — 86900 BLOOD TYPING SEROLOGIC ABO: CPT

## 2023-01-16 PROCEDURE — 97162 PT EVAL MOD COMPLEX 30 MIN: CPT

## 2023-01-16 PROCEDURE — 97530 THERAPEUTIC ACTIVITIES: CPT

## 2023-01-16 PROCEDURE — 85025 COMPLETE CBC W/AUTO DIFF WBC: CPT

## 2023-01-16 PROCEDURE — 81003 URINALYSIS AUTO W/O SCOPE: CPT

## 2023-01-16 PROCEDURE — 6370000000 HC RX 637 (ALT 250 FOR IP): Performed by: INTERNAL MEDICINE

## 2023-01-16 PROCEDURE — 2580000003 HC RX 258: Performed by: INTERNAL MEDICINE

## 2023-01-16 PROCEDURE — 36415 COLL VENOUS BLD VENIPUNCTURE: CPT

## 2023-01-16 PROCEDURE — 86901 BLOOD TYPING SEROLOGIC RH(D): CPT

## 2023-01-16 RX ADMIN — APIXABAN 2.5 MG: 2.5 TABLET, FILM COATED ORAL at 08:49

## 2023-01-16 RX ADMIN — CARBIDOPA AND LEVODOPA 1 TABLET: 25; 100 TABLET, EXTENDED RELEASE ORAL at 08:49

## 2023-01-16 RX ADMIN — CARBIDOPA AND LEVODOPA 1 TABLET: 25; 100 TABLET, EXTENDED RELEASE ORAL at 14:48

## 2023-01-16 RX ADMIN — LEVOTHYROXINE SODIUM 75 MCG: 0.07 TABLET ORAL at 06:08

## 2023-01-16 RX ADMIN — CILOSTAZOL 50 MG: 50 TABLET ORAL at 08:49

## 2023-01-16 RX ADMIN — LOSARTAN POTASSIUM 25 MG: 25 TABLET, FILM COATED ORAL at 08:49

## 2023-01-16 RX ADMIN — PANTOPRAZOLE SODIUM 40 MG: 40 TABLET, DELAYED RELEASE ORAL at 06:08

## 2023-01-16 RX ADMIN — SODIUM CHLORIDE, PRESERVATIVE FREE 10 ML: 5 INJECTION INTRAVENOUS at 08:50

## 2023-01-16 NOTE — PROGRESS NOTES
Physical Therapy  Facility/Department: [address]  Physical Therapy Initial Assessment    Name: Guille Beth  : 1940  MRN: [id number]  Date of Service: 2023    Discharge Recommendations:Peterson Lao scored a 19/24 on the AM-PAC short mobility form. Current research shows that an AM-PAC score of 18 or greater is typically associated with a discharge to the patient's home setting. Based on the patient's AM-PAC score and their current functional mobility deficits, it is recommended that the patient have 2-3 sessions per week of Physical Therapy at d/c to increase the patient's independence. At this time, this patient demonstrates the endurance and safety to discharge home with (home services) and a follow up treatment frequency of 2-3x/wk. Please see assessment section for further patient specific details. If patient discharges prior to next session this note will serve as a discharge summary. Please see below for the latest assessment towards goals. PT Equipment Recommendations  Equipment Needed: No      Patient Diagnosis(es): The primary encounter diagnosis was Injury of head, initial encounter. Diagnoses of Multiple falls, Near syncope, and Hypoglycemia were also pertinent to this visit. Past Medical History:  has a past medical history of 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 ([address] CAD (coronary artery disease), Cellulitis, CHF (congestive heart failure) ([address] Chronic kidney disease, COPD (chronic obstructive pulmonary disease) ([address] Hypertension, Hypothyroidism, Mixed hyperlipidemia, MRSA (methicillin resistant staph aureus) culture positive, Neuropathy, Obstructive sleep apnea, Parkinson's disease ([address] and Type 2 diabetes mellitus without complication ([address] Past Surgical History:  has a past surgical history that includes cervical fusion (); Foot surgery (Right, 2017); Foot surgery (Right, 2017); Cardiac catheterization; Colonoscopy;  Colon surgery; shoulder surgery; hernia repair; and Upper gastrointestinal endoscopy (N/A, 11/25/2020). Assessment   Assessment: 80 yo admitted 1/13/23 for falls at home. Pt demo good mobility this am but below his reported baseline of independent at home with RW (recent d/c from [facility] 12/29/22). Pt states he is moving at his baseline level and plans to return home at discharge. If home, recommend 24 hour close assist initially, home PT, use of RW at all times. Treatment Diagnosis: mobility impairment due to falls at home  Decision Making: Medium Complexity  Requires PT Follow-Up: Yes  Activity Tolerance  Activity Tolerance: Patient tolerated treatment well;Patient limited by fatigue     Plan   Physcial Therapy Plan  General Plan:  (2-5)  Current Treatment Recommendations: Functional mobility training, Transfer training, Gait training, Endurance training  Safety Devices  Type of Devices: Call light within reach, Chair alarm in place, Nurse notified, Left in chair (pt reclined)     Restrictions  Position Activity Restriction  Other position/activity restrictions: abdominal binder when ambulating, WBAT (per podiatry), no activity restriction noted     Subjective   General  Chart Reviewed: Yes  Patient assessed for rehabilitation services?: Yes  Additional Pertinent Hx: 80 yo admitted 1/13/23 for falls/afib. CXR neg; head and C spine CT neg. Pmhx: HFrEF (45%), COPD Parkinson's, SARIKA not on CPAP HTN HLD T2DM, chronic-atrial fib on apixaban;recent d/c from [facility] 12/29/22. (suspected falls due to orthostatic hypotension in light of Parkinsons and diabetic polyneuropathy)  Family / Caregiver Present: No  Diagnosis: falls/afib  Follows Commands: Within Functional Limits  Subjective  Subjective: Pt found supine in bed and agreeable to PT. Pt rates nose pain 2/10.          Social/Functional History  Social/Functional History  Lives With: Spouse (grandson (someone typically home))  Type of Home: House  Home Layout: One level  Home Access:  (4 to enter with B rails)  Bathroom Shower/Tub: Tub/Shower unit  Bathroom Toilet: Handicap height (sink and rail nearby)  Bathroom Equipment: Shower chair, Grab bars in shower  Home Equipment: [facility], rolling, Lift chair (transport chair)  Has the patient had two or more falls in the past year or any fall with injury in the past year?: Yes  ADL Assistance: Independent (pt sleeps in lift chair)  Homemaking Assistance:  (family mainly performs; pt assists a little)  Ambulation Assistance: Independent (wtih rolling walker)  Transfer Assistance: Independent  Active : No  Occupation: Retired    Vision/Hearing  Vision  Vision: Impaired  Vision Exceptions: Wears glasses at all times (glasses not here)  Hearing  Hearing: Within functional limits      Cognition   Orientation  Overall Orientation Status: Within Functional Limits  Orientation Level: Oriented X4  Cognition  Overall Cognitive Status: WFL  Cognition Comment: However- Pt didn't realize he was satured in urine     Objective                 AROM RLE (degrees)  RLE AROM: WFL  AROM LLE (degrees)  LLE AROM : WFL    Strength RLE  Strength RLE: WFL  Strength LLE  Strength LLE: WFL               Transfers  Sit to Stand: Contact guard assistance (x3 trials)  Stand to Sit: Contact guard assistance (x3 trials)    Ambulation  Device: Standard Walker  Assistance: Contact guard assistance  Quality of Gait: forward posture with decreased step length; slow jason with pt fatigue quickly  Distance: 20 ft x2  Comments: Pt did not have shoes here (R one with insert for TMA) but he states he walks without it at home.                  AM-PAC Score  AM-PAC Inpatient Mobility Raw Score : 19 (01/16/23 1005)  AM-PAC Inpatient T-Scale Score : 45.44 (01/16/23 1005)  Mobility Inpatient CMS 0-100% Score: 41.77 (01/16/23 1005)  Mobility Inpatient CMS G-Code Modifier : CK (01/16/23 1005)          Goals  Short Term Goals  Time Frame for Short Term Goals: discharge  Short Term Goal 1: sit to/from stand supervision  Short Term Goal 2: ambulate 100 ft with RW supervision  Patient Goals   Patient Goals : return home       Education  Patient Education  Education Given To: Patient  Education Provided: Role of Therapy  Education Method: Verbal  Barriers to Learning: Cognition  Education Outcome: Verbalized understanding;Continued education needed      Therapy Time   Individual Concurrent Group Co-treatment   Time In 0901         Time Out 0940         Minutes 39          Timed Code Treatment Minutes: 29      Total Treatment Minutes:   Karen 94, PT

## 2023-01-16 NOTE — PROGRESS NOTES
Reviewed tele-monitor strip, patient with 1.6 sec pauses, otherwise SB with first degree block. Message Hospitalist on call, Dr Silvina Mak to inform. If any new orders, requested she call night shift nurse Renetta Yoo at [phone].

## 2023-01-16 NOTE — PROGRESS NOTES
Called wife to update POC, patient's wife had questions/concerns about cardiology. Made MD aware and sent number to MD to give updates to family.  Will cont to monitor

## 2023-01-16 NOTE — PROGRESS NOTES
Comprehensive Nutrition Assessment    RECOMMENDATIONS:  PO Diet: continue regular, 4 carb choice diet  ONS: continue cassius qd, start Glucerna BID  Nutrition Education: Education not indicated     NUTRITION ASSESSMENT:   Nutritional summary & status: Venous ulcer noted in LDA. Noted from podiatry, chronic dermatologic changes to the b/l LE 2/2 chronic venous insufficiency. Scattered eschars noted to the b/l lower extremities. Suspect increased needs r/t wounds. Pt receiving cassius qd.  lbs, hx wt of 230 lbs x 3 mos; no wt loss noted. Pt on regular, 4 carb choice diet w/ PO intake % ave since admit. Suspect pt meeting kcal needs via current diet. Rec'd ordering Glucerna BID w/ meals in order to help meet increased pro needs. RD following. Admission/PMH: Admit 2/2 falls at home.  PMHx sig for HFrEF (45%), COPD Parkinson's, SARIKA not on CPAP HTN HLD T2DM, chronic-atrial fib on apixaban    MALNUTRITION ASSESSMENT  Context of Malnutrition: Acute Illness   Malnutrition Status: No malnutrition  Findings of the 6 clinical characteristics of malnutrition (Minimum of 2 out of 6 clinical characteristics is required to make the diagnosis of moderate or severe Protein Calorie Malnutrition based on AND/ASPEN Guidelines):  Energy Intake:  No significant decrease in energy intake  Weight Loss:  No significant weight loss     Body Fat Loss:  No significant body fat loss     Muscle Mass Loss:  No significant muscle mass loss    Fluid Accumulation:  No significant fluid accumulation     Strength:  Not Performed    NUTRITION DIAGNOSIS   Predicted inadequate energy intake related to increase demand for energy/nutrients as evidenced by wounds    Nutrition Monitoring and Evaluation:   Food/Nutrient Intake Outcomes:  Food and Nutrient Intake, Supplement Intake  Physical Signs/Symptoms Outcomes:  Biochemical Data, Weight, Skin     OBJECTIVE DATA: Significant to nutrition assessment  Nutrition Related Findings: Non-pitting BUE, pitting BLE edema. BUN 28, Glucose 168  Wounds: Venous Stasis  Nutrition Goals: PO intake 75% or greater, prior to discharge     Brian 83; Breakfast; Wound Healing Oral Supplement  ADULT ORAL NUTRITION SUPPLEMENT; Lunch, Dinner; Diabetic Oral Supplement  ADULT DIET; Regular; 4 carb choices (60 gm/meal); Low Sodium (2 gm)  PO Intake: %   PO Supplement Intake:Unable to assess  Additional Sources of Calories/IVF:N/A     ANTHROPOMETRICS  Current Height: 6' (182.9 cm)  Current Weight: 234 lb 2.1 oz (106.2 kg)    Admission weight: 248 lb 11.2 oz (112.8 kg)  Ideal Body Weight (IBW): 178 lbs  (81 kg)    BMI: 31.8    COMPARATIVE STANDARDS  Energy (kcal):  4257-5709 (15-18 kcal/kg CBW)     Protein (g):  130-160 (1.2-1.5 g/kg CBW)       Fluid (mL/day):  or per MD discretion    The patient will be monitored per nutrition standards of care. Consult dietitian if additional nutrition interventions are needed prior to RD reassessment.      Shellie RUST, [facility]:  [phone]  Office:  [phone]

## 2023-01-16 NOTE — PROGRESS NOTES
Discharge Progress Note  1/16/2023 5:45 PM    Data:  Discharge order received. Action:  IV D/C'd without difficulty. See Doc Flowsheets for assessment. Patient given discharge instructions with prescriptions. Response:  Patient verbalized understanding of discharge instructions. Patient left with all belongings to front lobby with family.     Luis Enrique Mckenzie RN  ________________________________________________________________________

## 2023-01-16 NOTE — CARE COORDINATION
1:05 PM  Patient from home, states he is active with a home care company but unsure of who it is. They are aware that he is admitted. CM placed call to patients wife. Wife stated her concerns about Cardio clearance and his medication changes (specifically Losartan dosages being cut down to half). [facility] CM will send over new orders.      Electronically signed by Brett Flanagan RN, JOIE on 1/16/2023 at 1:05 PM.  Phone: [phone]  Fax: [phone]

## 2023-01-16 NOTE — PROGRESS NOTES
Pt slept during night in long intervals. Alert and awake. Abdominal binder in place. Reposition q 2 hours via pillow tolerated. Wearing prevalon boots this shift. Ace wrap to both legs clean and intact. Continue on Tele monitor.  /70   Pulse 67   Temp 97.4 °F (36.3 °C) (Oral)   Resp 16   Ht 6' (1.829 m)   Wt 229 lb 4.5 oz (104 kg)   SpO2 96%   BMI 31.10 kg/m²

## 2023-01-16 NOTE — DISCHARGE INSTRUCTIONS
1) Check your weight every day and call your PCP and/or cardiologist if it increases by 2-3 pounds or more and resume diuretics at that time    2) Your blood sugar was low on admission and is better now but mostly not too high off insulin. For now hold your usual insulin when your home until you can be seen by your PCP. Keep checking your blood sugar every day and if it gets above 190 restart taking your insulin at that time. 3) Follow-up with both Dr. Leona Petty and Dr. Stephen Hackett for your cardiac care        Podiatry Wound Care Discharge Instructions  Please perform every other day dressing changes to bilateral lower extremity as follows  -Apply adaptic or another non-adherent to the wound on the bilateral lower leg  -Next apply gauze to the top of the bilateral foot, ankle, and leg  -Next loosely wrap the bilateral lower extremity with Kerlix starting from just in front of the toes and ending just below the knee  -Next gently wrap the bilateral foot with 4 inch Ace bandage starting from just in front of the toes and ending just above the ankle  -Next gently wrap the bilateral leg with 6 inch Ace bandage starting from just above the ankle and ending just below the knee  Patient is full weightbearing Bilateral lower extremity    -If no open wounds present, patient may opt for compression via JASON hose or other compressive stockings. Legs should be elevated when sitting. Please follow-up with Dr. Luis Aviles DPM for reevaluation of superficial lesions. Extra Heart Failure sites:   https://[employer]/ --- this is American Heart Association interactive Healthier Living with Heart Failure guidebook. Please copy and paste link into search bar. Use your mouse to scroll through the pages. Lots and lots of info / tips    HF [place] meme  --- free smart phone meme available for [employer] and [employer]. Use your phone to track sodium / fluid intake,  symptoms, weight, etc.    Jose Tsang. [employer] - website-- [employer] is a dialysis company. All dialysis patients follow a renal diet which IS low sodium!! This website offers free seasonal cookbooks.   Each quarter, they will release 25-30 new recipes with a breakdown of calories, sodium, glucose, etc    [email or web address]/recipes -- more free recipes

## 2023-01-16 NOTE — DISCHARGE INSTR - COC
Continuity of Care Form    Patient Name: Tobias Braxton   :  1940  MRN:  [id number]    Admit date:  2023  Discharge date:  ***    Code Status Order: Full Code   Advance Directives:     Admitting Physician:  Abimael Sun MD  PCP: Nilda Cuellar MD    Discharging Nurse: [facility] Unit/Room#: 6538/9698-91  Discharging Unit Phone Number: ***    Emergency Contact:   Extended Emergency Contact Information  Primary Emergency Contact: Tashia Lao  Address: [address], [address] [place] [address] Phone: [phone]  Mobile Phone: [phone]  Relation: Spouse  Secondary Emergency Contact: Shilo [address] [place] [address], [facility] Phone: [phone]  Mobile Phone: [phone]  Relation: Grandchild    Past Surgical History:  Past Surgical History:   Procedure Laterality Date    [address] 83      's    COLONOSCOPY      FOOT SURGERY Right 2017    INCISION AND DRAINAGE RIGHT FOOT WITH REMOVAL NECROTIC BONE    FOOT SURGERY Right 2017    : TRANSMETATARSAL AMPUTATION RIGHT FOOT     HERNIA REPAIR      SHOULDER SURGERY      right    UPPER GASTROINTESTINAL ENDOSCOPY N/A 2020    ESOPHAGOGASTRODUODENOSCOPY WITH BOTOX INJECTION performed by Kailyn Machuca DO at [facility] ENDOSCOPY       Immunization History:   Immunization History   Administered Date(s) Administered    Influenza Vaccine, unspecified formulation 2017       Active Problems:  Patient Active Problem List   Diagnosis Code    Chest pain R07.9    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 ([facility] J68.3    Pulmonary embolism (HCC) I26.99    CAD S/P percutaneous coronary angioplasty I25.10, Z98.61    Presence of drug coated stent in anterior descending branch of left coronary artery Z95.5    Sepsis ([address] A41.9    Syncope R55    Hypothyroid E03.9    Obstructive sleep apnea G47.33    Gas gangrene ([address] A48.0    Diabetic infection of right foot ([facility] E11.628, L08.9    Diabetic polyneuropathy associated with type 2 diabetes mellitus ([facility] E11.42    Right foot infection L08.9    LULÚ (acute kidney injury) ([facility] E22.9    Acute diastolic CHF (congestive heart failure), NYHA class 2 ([facility]) I50.31    Encephalopathy G93.40    Cellulitis L03.90    Cellulitis of right lower extremity L03.115    History of MRSA infection Z86.14    Epistaxis R04.0    Community acquired pneumonia of left lower lobe of lung J18.9    Posterior epistaxis R04.0    Primary hypertension I10    Mixed hyperlipidemia E78.2    Acute on chronic combined systolic and diastolic congestive heart failure (HCC) I50.43    Varicose veins of right lower extremity with inflammation, with ulcer of calf with fat layer exposed ([facility] I83.212, W48.675    Permanent atrial fibrillation ([facility]) I48.21    HFrEF (heart failure with reduced ejection fraction) ([facility]) I50.20    PAF (paroxysmal atrial fibrillation) ([facility]) I48.0    Hypoglycemia E16.2    Multiple falls R29.6    Chronic anticoagulation Z79.01       Isolation/Infection:   Isolation            No Isolation          Patient Infection Status       Infection Onset Added Last Indicated Last Indicated By Review Planned Expiration Resolved Resolved By    None active    Resolved    COVID-19 (Rule Out) 10/18/22 10/18/22 10/19/22 COVID-19 & Influenza Combo (Ordered)   10/19/22 Rule-Out Test Resulted    COVID-19 (Rule Out) 04/12/21 04/12/21 04/12/21 COVID-19 (Ordered)   04/13/21 Rule-Out Test Resulted    MRSA  07/28/17 07/28/17 Nate Mcdonald, RN   04/14/21 Karla Stevenson, RN    Over 1 year ago            Nurse Assessment:  Last Vital Signs: /70   Pulse 67   Temp 97.4 °F (36.3 °C) (Oral)   Resp 16   Ht 6' (1.829 m)   Wt 229 lb 4.5 oz (104 kg)   SpO2 96%   BMI 31.10 kg/m²     Last documented pain score (0-10 scale): Pain Level: 3  Last Weight:   Wt Readings from Last 1 Encounters:   01/15/23 229 lb 4.5 oz (104 kg)     Mental Status:  {IP PT MENTAL STATUS:}    IV Access:  { JULIET IV ACCESS:847245730}    Nursing Mobility/ADLs:  Walking   {[facility] DME GANV:002422121}  Transfer  {[facility] DME RMFV:833723330}  Bathing  {[facility] DME MIYC:303794469}  Dressing  {[facility] DME AUQY:766816540}  Toileting  {[facility] DME NSRD:554567040}  Feeding  {[facility] DME ACTL:832539842}  Med Admin  {[facility] DME YWHW:266610021}  Med Delivery   { JULIET MED Delivery:685132395}    Wound Care Documentation and Therapy:  Wound 10/19/22 Leg Left; Lower;Right chronic weeping wounds to bilateral lower extremties. Both lower legs have scales, weeping open areas, and reddened. Wound to front of left lower extremity does have some purulent green drainage. (Active)   Number of days: 89       Wound 10/19/22 Buttocks Right;Left small open areas to both left and right buttock, darkened purple skin on both buttocks (Active)   Number of days: 89       Wound 23 Pretibial Left (Active)   Dressing Status Clean;Dry; Intact 01/15/23 1950   Dressing/Treatment Ace wrap;Gauze dressing/dressing sponge 01/15/23 1950   Drainage Amount None 01/15/23 1950   Number of days: 2       Wound 23 Pretibial Right (Active)   Dressing Status Clean;Dry; Intact 01/15/23 1950   Dressing/Treatment Ace wrap;Gauze dressing/dressing sponge 01/15/23 1950   Drainage Amount None 01/15/23 1950   Number of days: 2        Elimination:  Continence: Bowel: {YES / T}  Bladder: {YES / XY:50441}  Urinary Catheter: {Urinary Catheter:064753362}   Colostomy/Ileostomy/Ileal Conduit: {YES / GY:44073}       Date of Last BM: ***    Intake/Output Summary (Last 24 hours) at 2023  Last data filed at 1/15/2023 2329  Gross per 24 hour   Intake 480 ml   Output 1225 ml   Net -745 ml     I/O last 3 completed shifts:   In: 840 [P.O.:840]  Out: 1775 [Urine:1775]    Safety Concerns:     [address] JULIET Safety Concerns:626730131}    Impairments/Disabilities:      [address] CHUNG Impairments/Disabilities:426146832}    Nutrition Therapy:  Current Nutrition Therapy:   Speedy Wendy CHUNG Diet List:151774288}    Routes of Feeding: {CHP DME Other Feedings:970221836}  Liquids: {Slp liquid thickness:57067}  Daily Fluid Restriction: {CHP DME Yes amt example:960188396}  Last Modified Barium Swallow with Video (Video Swallowing Test): {Done Not Done QOED:142441005}    Treatments at the Time of Hospital Discharge:   Respiratory Treatments: ***  Oxygen Therapy:  {Therapy; copd oxygen:87460}  Ventilator:    {[facility] Vent LXWT:977581828}    Rehab Therapies: {THERAPEUTIC INTERVENTION:9353498623}  Weight Bearing Status/Restrictions: { CC Weight Bearin}  Other Medical Equipment (for information only, NOT a DME order):  {EQUIPMENT:725648166}  Other Treatments: ***    Patient's personal belongings (please select all that are sent with patient):  {[facility] DME Belongings:945700033}    RN SIGNATURE:  {Esignature:558971918}    CASE MANAGEMENT/SOCIAL WORK SECTION    Inpatient Status Date: ***    Readmission Risk Assessment Score:  Readmission Risk              Risk of Unplanned Readmission:  19           Discharging to Facility/ Agency   Name:   Address:  Phone:  Fax:    Dialysis Facility (if applicable)   Name:  Address:  Dialysis Schedule:  Phone:  Fax:    / signature: {Esignature:350984322}    PHYSICIAN SECTION    Prognosis: {Prognosis:4103203709}    Condition at Discharge: [address] [place] [address] Patient Condition:867362853}    Rehab Potential (if transferring to Rehab): {Prognosis:4609923601}    Recommended Labs or Other Treatments After Discharge: ***  Podiatry Wound Care Discharge Instructions  Please perform every other day dressing changes to bilateral lower extremity as follows  -Apply adaptic or another non-adherent to the wound on the bilateral lower leg  -Next apply gauze to the top of the bilateral foot, ankle, and leg  -Next loosely wrap the bilateral lower extremity with Kerlix starting from just in front of the toes and ending just below the knee  -Next gently wrap the bilateral foot with 4 inch Ace bandage starting from just in front of the toes and ending just above the ankle  -Next gently wrap the bilateral leg with 6 inch Ace bandage starting from just above the ankle and ending just below the knee  Patient is full weightbearing Bilateral lower extremity    -If no open wounds present, patient may opt for compression via JASON hose or other compressive stockings. Legs should be elevated when sitting. Please follow-up with Dr. Reza Gilmore  DPM for reevaluation of superficial lesions. Physician Certification: I certify the above information and transfer of Davey Salter  is necessary for the continuing treatment of the diagnosis listed and that he requires {Admit to Appropriate Level of Care:50403} for {GREATER/LESS:535549156} 30 days.      Update Admission H&P: {CHP DME Changes in GQK:335802226}    PHYSICIAN SIGNATURE:  {Esignature:667321801}

## 2023-01-16 NOTE — PROGRESS NOTES
patient stated feeling heart quavering. A&O x4. /82, HR 52, SPO2 95% RA. Tele monitor showed a-fib multiform PVCs. Dr. Charlie Morillo aware.

## 2023-01-16 NOTE — PROGRESS NOTES
Podiatric Surgery Daily Progress Note  Jacinto Lao      Subjective :   Patient seen and examined this am at the bedside. Patient denies any acute overnight events. Patient denies N/V/F/C/SOB. Patient denies calf pain, thigh pain, chest pain. Review of Systems: A 12 point review of symptoms is unremarkable with the exception of the chief complaint. Patient specifically denies nausea, fever, vomiting, chills, shortness of breath, chest pain, abdominal pain, constipation or difficulty urinating. Objective     BP (!) 142/78   Pulse 58   Temp 97.6 °F (36.4 °C) (Oral)   Resp 18   Ht 6' (1.829 m)   Wt 234 lb 2.1 oz (106.2 kg)   SpO2 94%   BMI 31.75 kg/m²      I/O:  Intake/Output Summary (Last 24 hours) at 1/16/2023 0916  Last data filed at 1/16/2023 0850  Gross per 24 hour   Intake 610 ml   Output 925 ml   Net -315 ml              Wt Readings from Last 3 Encounters:   01/16/23 234 lb 2.1 oz (106.2 kg)   10/28/22 209 lb (94.8 kg)   10/24/22 209 lb 10.5 oz (95.1 kg)       LABS:    Recent Labs     01/14/23  0407 01/15/23  1102   WBC 4.9 4.2   HGB 9.0* 9.1*   HCT 28.7* 29.2*    237        Recent Labs     01/15/23  1102      K 4.8      CO2 27   PHOS 3.2   BUN 28*   CREATININE 1.1      No results for input(s): PROT, INR, APTT in the last 72 hours. LOWER EXTREMITY EXAMINATION    Dressing to b/l LE intact. No strikethrough noted to the external dressing. No drainage noted to the internal layers of the dressing. VASCULAR: DP and PT pulses faintly palpable. Upon handheld doppler examination  DP/PT pulses were noted to be biphasic b/l. CFT is brisk to the digits of the foot b/l. Skin temperature is warm to cool from proximal to distal with no focal calor noted. Mild Non-pitting edema noted. No pain with calf compression b/l. NEUROLOGIC: Gross and epicritic sensation is diminished  b/l. Protective sensation is diminished at all pedal sites b/l.      DERMATOLOGIC:   Left and right lower extremities:    Verbal consent obtained prior to photos today:          Diffuse chronic dermatologic changes to the b/l LE 2/2 chronic venous insufficiency. Diffuse xerosis and scaling present. Scattered eschars noted to the b/l lower extremities. No open lesions appreciated. Diffuse erythema without calor present b/l. Evidence of hemosiderin deposits to the circumferential b/l LE     MUSCULOSKELETAL: Muscle strength is 5/5 for all pedal groups tested. No tenderness with palpation of the foot or ankle b/l. Ankle joint ROM is decreased in dorsiflexion with the knee extended. Hx of transmetatarsal amputation right lower extremity. IMAGING:  Impression       Dense atherosclerotic calcifications, osteopenia without plain radiographic evidence for osteomyelitis               4 views of the right anatomic leg       FINDINGS:       Dense endoscopic calcifications identified. There is marked joint space narrowing identified involving the medial compartment the. Small osteophytes lateral compartment. No periosteal elevation is appreciated. No subcutaneous gas. IMPRESSION:   1. No plain radiographic evidence for osteomyelitis, however plain film findings are often late findings. 2. Osteoarthritis of the right knee greatest involving the medial compartment   3. Atherosclerotic calcifications               4 views of the anatomic left leg       FINDINGS:       No cortical disruption or periosteal elevation. No lytic process identified. Narrowing of medial compartment the knee is present. Dense endoscopic calcifications       IMPRESSION:       1. No plain radiographic evidence for osteomyelitis. However plain film findings are often late findings    2. Atherosclerotic calcifications   3.  Degenerative changes of the left knee, greatest involving the medial compartment     ASSESSMENT/PLAN  -Chronic dermatologic changes 2/2 venous insuffiencey  -Eschars b/l LE  -Hx of DM II  -Hx TMA right LE     -Patient examined and evaluated at the bedside   -VSS. No Leukocytosis noted. -ESR 66 and CRP 38.1  -pre-albumin 12.1, oral nutritional wound healing supplement ordered  -A1c 7.0  -Imaging reviewed, impressions noted above.   -No debridement necessary at this time.   -No wound culture obtained 2/2 no active drainage  -Dressing applied to the b/l LE consisting of adaptic, gauze, kerlix, ace  -prevalon boots to be worn at all times to prevent any further soft tissue injury  -Antibiotics not warranted from a podiatric standpoint  -Weightbearing as tolerated to the b/l LE     DISPO: Chronic venous insuffiencey with superficial abrasions and eschars b/l. Edema improving. Labs and imaging. No antibiotics necessary from podiatric stand point. Will continue with local wound care while the patient is in house. Patient ok to discharge from a podiatric standpoint pending medical clearance.       Discussed assessment and plan with Dr. Collette Martinez, Trenton Essex, DPM   Podiatric Resident PGY1  Pager [phone] or Alex  1/16/2023, 9:16 AM

## 2023-01-16 NOTE — PROGRESS NOTES
Occupational Therapy  Facility/Department: [address] [place] [address]  Occupational Therapy Initial Assessment    Name: Chi Leiva  : 1940  MRN: [id number]  Date of Service: 2023    Discharge Recommendations: Chi Leiva scored a 18/24 on the AM-PAC ADL Inpatient form. Current research shows that an AM-PAC score of 18 or greater is typically associated with a discharge to the patient's home setting. Based on the patient's AM-PAC score, and their current ADL deficits, it is recommended that the patient have 2-3 sessions per week of Occupational Therapy at d/c to increase the patient's independence. At this time, this patient demonstrates the endurance and safety to discharge home with 24hrA and home services and a follow up treatment frequency of 2-3x/wk. Please see assessment section for further patient specific details. If patient discharges prior to next session this note will serve as a discharge summary. Please see below for the latest assessment towards goals. OT Equipment Recommendations  Equipment Needed: No  Other: pt has recommended DME       Patient Diagnosis(es): The primary encounter diagnosis was Injury of head, initial encounter. Diagnoses of Multiple falls, Near syncope, and Hypoglycemia were also pertinent to this visit. Past Medical History:  has a past medical history of 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 ([address] CAD (coronary artery disease), Cellulitis, CHF (congestive heart failure) ([address] Chronic kidney disease, COPD (chronic obstructive pulmonary disease) ([address] Hypertension, Hypothyroidism, Mixed hyperlipidemia, MRSA (methicillin resistant staph aureus) culture positive, Neuropathy, Obstructive sleep apnea, Parkinson's disease ([address] and Type 2 diabetes mellitus without complication ([address] Past Surgical History:  has a past surgical history that includes cervical fusion (); Foot surgery (Right, 2017); Foot surgery (Right, 2017);  Cardiac catheterization; Colonoscopy; Colon surgery; shoulder surgery; hernia repair; and Upper gastrointestinal endoscopy (N/A, 11/25/2020). Treatment Diagnosis: decreased independence with ADLs and fx mobility      Assessment   Performance deficits / Impairments: Decreased functional mobility ; Decreased ADL status; Decreased endurance  Assessment: Pt is from home with wife and grandson and admitted after falling in home. Pt typically uses a 2WW at baseline and is independent with all ADLs. Pt is now slightly below this baseline requiring mod A for toileting this date with unawareness that he was satured in urine at start of session. Pt performed donning/doffing socks with CGA and increased time seated on EOB. Pt performed front madeline care but needed assistance with rear madeline care. Pt performed all transfers and fx mobility with walker and CGA. Pt has tremors at baseline from PD. Pt reporting to writer he feels near his fx baseline. Expect pt to continue to make progress in acute setting and be safe for discharge home with 24hr A. If pt does not have 24hrA, pt may need ongoing inpatient therapy services. Will continue to follow on acute OT POC.   Treatment Diagnosis: decreased independence with ADLs and fx mobility  Prognosis: Good  Decision Making: Medium Complexity  Activity Tolerance  Activity Tolerance: Patient Tolerated treatment well        Plan     2-5    Restrictions  Position Activity Restriction  Other position/activity restrictions: abdominal binder when ambulating, WBAT (per podiatry), no activity restriction noted    Subjective   General  Chart Reviewed: Yes  Patient assessed for rehabilitation services?: Yes  Additional Pertinent Hx: 81 y.o. male Asiya Lao   - HFrEF (45%), COPD Parkinson's, SARIKA not on CPAP HTN HLD T2DM, chronic-atrial fib on apixaban  - has had recurrent nosebleeds prior to starting anticoagulation  - admitted 10/2022 for acute on chronic HF, left lower leg chronic venous ulcer and discharged to [facility] for rehab  - pt is being followed by Dr. Asya Jose for consideration of LA appendage   He returned back from rehab on 12/29/22, and living at home with wife and grand son. He has had multiple falls since his come home. Admitted post fall and hypoxia in home 1/13  Family / Caregiver Present: No  Referring Practitioner: Wilton Patel MD  Diagnosis: Hypoglycemia  Subjective  Subjective: Pt lying supine in bed upon OT arrival and agreeable to OT evaluation. Pt very pleasant throughout session. Social/Functional History  Social/Functional History  Lives With: Spouse (grandson (someone typically home))  Type of Home: House  Home Layout: One level  Home Access:  (4 to enter with B rails)  Bathroom Shower/Tub: Tub/Shower unit  Bathroom Toilet: Handicap height (sink and rail nearby)  Bathroom Equipment: Shower chair, Grab bars in shower  Home Equipment: Walker, rolling, Lift chair (transport chair)  Has the patient had two or more falls in the past year or any fall with injury in the past year?: Yes  ADL Assistance: Independent (pt sleeps in lift chair)  Homemaking Assistance:  (family mainly performs; pt assists a little)  Ambulation Assistance: Independent (wtih rolling walker)  Transfer Assistance: Independent  Active : No  Occupation: Retired       Objective   Heart Rate: [address]: Monitor  BP: (!) 142/78  BP Location: Left upper arm  BP Method: Automatic  Patient Position: Semi fowlers  MAP (Calculated): 99  Resp: 18  SpO2: 94 %  O2 Device: None (Room air)          Observation/Palpation  Observation: some mild brusing noted on knose     Balance  Sitting: Intact  Standing: With support (support of 2WW)  Gait  Overall Level of Assistance: Contact-guard assistance; Additional time (slow effortful with use of 2WW)  Distance (ft):  (Pt ambulated from bed to bathroom and from bathroom to chair in room)  Assistive Device: TALYA [facility], rolling  Wheelchair Management  Wheelchair Management: No  Toilet Transfers  Toilet - Technique: Ambulating  Equipment Used: Standard toilet (+ grab bars)  Toilet Transfer: Contact guard assistance  AROM: Within functional limits (tremors- has PD at baseline)  Strength: Generally decreased, functional  Coordination: Within functional limits  Tone: Normal  Sensation: Intact  ADL  Feeding: Setup; Independent; Beverage management  Feeding Skilled Clinical Factors: tremoring noted with beverage management  Grooming: Setup  Grooming Skilled Clinical Factors: to wash face with setup from seated position  UE Bathing: Setup;Stand by assistance  UE Bathing Skilled Clinical Factors: to wash BUE with bath wipes  LE Bathing: Minimal assistance;Setup  LE Bathing Skilled Clinical Factors: to wash BLE with bath wipes  UE Dressing: Contact guard assistance;Setup  UE Dressing Skilled Clinical Factors: for donning new gown / doff soiled gown  LE Dressing: Contact guard assistance  LE Dressing Skilled Clinical Factors: to don socks seated on edge of bed; effortful  Toileting:  Moderate assistance  Toileting Skilled Clinical Factors: min A for brief mngmt and mod A for rear madeline care; pt performed from madeline care from seated level with setup  Additional Comments: Pt was using male purwick which leaked all over bed and satured pt and linens     Activity Tolerance  Activity Tolerance: Patient tolerated treatment well;Patient limited by fatigue  Bed mobility  Supine to Sit: Stand by assistance (HOB elevated; increased time)  Scooting: Stand by assistance  Transfers  Sit to stand: Contact guard assistance  Stand to sit: Contact guard assistance  Transfer Comments: slow and effortful; use of 2WW once standing; cues for handplacement  Vision  Vision: Impaired  Vision Exceptions: Wears glasses at all times (glasses not here)  Hearing  Hearing: Within functional limits  Cognition  Overall Cognitive Status: [facility]  Cognition Comment: However- Pt didn't realize he was satured in urine  Orientation  Overall Orientation Status: Within Functional Limits  Orientation Level: Oriented X4                  Education Given To: Patient  Education Provided: Role of Therapy;Plan of Care;Transfer Training  Education Method: Demonstration;Verbal  Barriers to Learning: None  Education Outcome: Verbalized understanding                        G-Code     OutComes Score                                                  AM-PAC Score        AM-PAC Inpatient Daily Activity Raw Score: 18 (01/16/23 0947)  AM-PAC Inpatient ADL T-Scale Score : 38.66 (01/16/23 0947)  ADL Inpatient CMS 0-100% Score: 46.65 (01/16/23 0947)  ADL Inpatient CMS G-Code Modifier : CK (01/16/23 0947)    Tinneti Score       Goals  Short Term Goals  Time Frame for Short Term Goals: by discharge  Short Term Goal 1: Pt will perform bathing routine with setup and SBA  Short Term Goal 2: Pt will perform toileting with SBA  Short Term Goal 3: Pt with performed LB dressing routine with SBA  Short Term Goal 4: Pt will stand for 8 min while engaging in ADLs to increase endurance with SBA  Patient Goals   Patient goals : \"go home once this medication is sorted out\"       Therapy Time   Individual Concurrent Group Co-treatment   Time In 0905         Time Out 0945         Minutes 40         Timed Code Treatment Minutes: 25 Minutes (+ 15 min OT eval)       Tashia Mendenhall OT

## 2023-01-17 ENCOUNTER — CARE COORDINATION (OUTPATIENT)
Dept: CASE MANAGEMENT | Age: 83
End: 2023-01-17

## 2023-01-17 DIAGNOSIS — I50.9 CONGESTIVE HEART FAILURE, UNSPECIFIED HF CHRONICITY, UNSPECIFIED HEART FAILURE TYPE (HCC): Primary | ICD-10-CM

## 2023-01-17 LAB
EKG ATRIAL RATE: 73 BPM
EKG DIAGNOSIS: NORMAL
EKG P AXIS: 60 DEGREES
EKG P-R INTERVAL: 232 MS
EKG Q-T INTERVAL: 406 MS
EKG QRS DURATION: 88 MS
EKG QTC CALCULATION (BAZETT): 447 MS
EKG R AXIS: -22 DEGREES
EKG T AXIS: 144 DEGREES
EKG VENTRICULAR RATE: 73 BPM
ESTIMATED AVERAGE GLUCOSE: 154.2 MG/DL
HBA1C MFR BLD: 7 %

## 2023-01-17 PROCEDURE — 93010 ELECTROCARDIOGRAM REPORT: CPT | Performed by: INTERNAL MEDICINE

## 2023-01-17 PROCEDURE — 1111F DSCHRG MED/CURRENT MED MERGE: CPT | Performed by: INTERNAL MEDICINE

## 2023-01-17 NOTE — CARE COORDINATION
[facility] Care Transitions Initial Follow Up Call    Call within 2 business days of discharge: Yes    Care Transition Nurse contacted the patient by telephone to perform post hospital discharge assessment. Verified name and  with patient as identifiers. Provided introduction to self, and explanation of the Care Transition Nurse role. Patient: Dylon Obando Patient : 1940   MRN: [id number]   Reason for Admission: Hypoglycemia, LULÚ, CHF  Discharge Date: 23 RARS: Readmission Risk Score: 21.2      Last Discharge  [address]       Date Complaint Diagnosis Description Type Department Provider    23 Fall Injury of head, initial encounter . .. ED to Hosp-Admission (Discharged) (ADMITTED) [address] Kimbrough MD; Mary Anne Cabrera. .. Was this an external facility discharge? No     Challenges to be reviewed by the provider   Additional needs identified to be addressed with provider: Yes  medications-Several stopped medications, including diuretics for CHF, spouse needing to be advised on when to restart or how to take PRN. Method of communication with provider: chart routing. CTN spoke with patients spouse this am for initial 24 hour discharge follow up CTN call. Spouse states patient is doing about the same, still having SOBE. Patient is able to recover himself quickly, once sitting and resting. No reports of any fever, chills, nausea, vomiting, chest pain, SOB at rest or cough. No congestion, pain, difficulty emptying bladder, LE edema, feeling lightheaded, dizziness, and heart palpitations. Patients weight this am was 233.4 lb, BP was 110/70, HR was 78, and FSBS was 184. Spouse states she has not heard from  with [place] [address] agency yet, CTN did confirm with intake department with [address] [place] [address], referral was received. CTN and patient's family member reviewed meds and CTN completed the 1111f.     CTN will forward a message to PCP regarding discontinued lasix and Spironolactone. Care Transition Nurse reviewed discharge instructions, medical action plan, and red flags with family who verbalized understanding. The family was given an opportunity to ask questions and does not have any further questions or concerns at this time. Were discharge instructions available to patient? Yes. Reviewed appropriate site of care based on symptoms and resources available to patient including: PCP  Specialist  Urgent care clinics  [facility]  When to call 911. The family agrees to contact the PCP office for questions related to their healthcare. Advance Care Planning:   Does patient have an Advance Directive: reviewed and current. Medication reconciliation was performed with family, who verbalizes understanding of administration of home medications. Medications reviewed, 1111F entered: yes    Was patient discharged with a pulse oximeter? no    Non-face-to-face services provided:  Obtained and reviewed discharge summary and/or continuity of care documents  Communication with home health agencies or other community services the patient is currently using-CTN confirmed with intake department with [address]. Education of patient/family/caregiver/guardian to support self-management-Spouse instructed to continue to monitor for any of the above noted s/s, as well as monitoring for any worsening SOBE, make sure to monitor salt intake and weigh daily, as well as monitoring BP's and FSBS's, closely. Assessment and support for treatment adherence and medication management-Medication Review Completed with Spouse.     Offered patient enrollment in the Remote Patient Monitoring (RPM) program for in-home monitoring: Patient is not eligible for RPM program.    Care Transitions 24 Hour Call    Schedule Follow Up Appointment with PCP: Declined  Do you have a copy of your discharge instructions?: Yes  Do you have all of your prescriptions and are they filled?: Yes  Have you been contacted by a [facility] Pharmacist?: No  Have you scheduled your follow up appointment?: Yes  How are you going to get to your appointment?: Car - family or friend to transport  Post Acute Services: Home Health (Comment: [facility]. )  Patient DME: Straight cane, Walker, Shower chair, Other  Other Patient DME: grab bars  Patient Home Equipment: Oxygen, CPAP  Do you have support at home?: Partner/Spouse/SO  Do you feel like you have everything you need to keep you well at home?: Yes  Are you an active caregiver in your home?: No  Care Transitions Interventions  No Identified Needs         Follow Up  Future Appointments   Date Time Provider Department Center   1/19/2023  3:00 PM Jerod Mares DPM [facility]   1/23/2023  8:30 AM [facility] CARDIAC CATH LAB ROOM 3 [facility] CATH [facility]   2/6/2023  1:30 PM Lemuel Mix MD [facility]       Care Transition Nurse provided contact information.  Plan for follow-up call in 3-5 days based on severity of symptoms and risk factors.  Plan for next call: symptom management-Any increased SOBE, FSBS's, continue CHF Education, anymore low BP's, PCP has reached out regarding diuretics, continuing.    Thank You,    Yakelin Drew RN  Care Transition Coordinator  Contact Number:[phone]

## 2023-01-17 NOTE — PROGRESS NOTES
Physician Progress Note      PATIENT:               Gerhardt Jaffe  CSN #:                  [id number]  :                       1940  ADMIT DATE:       2023 2:18 PM  [address] DATE:        2023 6:05 PM  RESPONDING  PROVIDER #:        Horacio Brandt MD          QUERY TEXT:    Patient admitted with syncope & noted \"Orthostatic hypotension in setting of   Parkinson disease as well as diabetic polyneuropathy\". If possible, please   document in progress notes and discharge summary after study the etiology of   the orthostatic hypotension: The medical record reflects the following:  Risk Factors: Parkinson's, DM w/ polyneuropathy  Clinical Indicators: Per pn 1/15 \"orthostatic hypotension in setting of   Parkinson disease as well as diabetic polyneuropathy\". Glucose 40 on admit,    A1C: 7  Treatment: D10, Lasix and spironolactone held, Cozaar w/ parameters, CT head,   A1C  Options provided:  -- Orthostatic hypotension secondary to both diabetic autonomic polyneuropathy   and neurogenic due to Parkinson's disease  -- Orthostatic hypotension neurogenic due to Parkinson's disease only, and   unrelated to diabetic autonomic polyneuropathy  -- Orthostatic hypotension due to diabetic autonomic polyneuropathy only, and   unrelated to Parkinson's disease  -- Other - I will add my own diagnosis  -- Disagree - Not applicable / Not valid  -- Disagree - Clinically unable to determine / Unknown  -- Refer to Clinical Documentation Reviewer    PROVIDER RESPONSE TEXT:    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 Query created by:  Audelia Fleischer on 2023 3:52 PM      Electronically signed by:  Horacio Brandt MD 2023 10:16 PM

## 2023-01-18 NOTE — CONSULTS
HF RN consult received from Dr Baldomero Rainey. Chart reviewed. Pt presented to ED with cc repeated falls. Pt was discharged from rehab on 12/29 and has had multiple falls at home. Pt has history of HFmrEF 45%. He follows with Dr Marylou Dalton. Pt suspected of having orthostatic bp issue. ARB dose was reduced. Diuretic was stopped. Pt also having borderline hypoglycemia episodes. Pt's HF felt to be compensated at this time -- if not a bit dry. HF measures were initiated -- daily weights, I/O and sodium restricted diet. HF instructions were added to Meghan Elizabeth as discharge plan is to return to home with Lety Zuluaga. Pt had existing appt with cardiology on 2/6/22.

## 2023-01-18 NOTE — DISCHARGE SUMMARY
Hospital Medicine Discharge Summary    Patient ID: Juanpablo Mallory      Patient's PCP: Glynn Balderas MD    Admit Date: 1/13/2023     Discharge Date: 1/16/2023      Admitting Provider: John Roque MD     Discharge Provider: Bib English MD     Discharge Diagnoses: Active Hospital Problems    Diagnosis     Multiple falls [R29.6]      Priority: Medium    Chronic anticoagulation [Z79.01]      Priority: Medium    PAF (paroxysmal atrial fibrillation) ([facility]) [I48.0]      Priority: Medium    HFrEF (heart failure with reduced ejection fraction) ([facility]) [I50.20]      Priority: Medium    Diabetic polyneuropathy associated with type 2 diabetes mellitus ([facility] [address] [E11.42]        The patient was seen and examined on day of discharge and this discharge summary is in conjunction with any daily progress note from day of discharge. Hospital Course: 80 yo M w/ PMH of HFrEF (45%), COPD, Parkinson's, SARIKA, HTN, T2DM ,afib who was admitted on 01/13 for frequent falls at home and also found to be hypoglycemic in ED. Given D10 in ED and BG improved. Home insulin held and given SSI and BG monitoring but required minimal to no insulin while inpatient. BP meds adjusted slightly (losartan dose reduced). Had ongoing orthostatic hypotension initially so diuretics ultimately held with stabilization in BP. Discharged home with instruction to f/u as outpatient with PCP and Cardiologist to further titrate meds as needed. Advised to keep strict watch on weight daily and to resume diuretic/notify PCP if weight increased by more than 2-3 pounds from dry weight. Physical Exam Performed:     BP (!) 157/83   Pulse 75   Temp 97.6 °F (36.4 °C) (Oral)   Resp 18   Ht 6' (1.829 m)   Wt 234 lb 2.1 oz (106.2 kg)   SpO2 97%   BMI 31.75 kg/m²       General appearance:  No apparent distress, appears stated age and cooperative. HEENT:  Normal cephalic, atraumatic without obvious deformity.  Pupils equal, round, and reactive to light.  Extra ocular muscles intact. Conjunctivae/corneas clear. Neck: Supple, with full range of motion. No jugular venous distention. Trachea midline. Respiratory:  Normal respiratory effort. Clear to auscultation, bilaterally without Rales/Wheezes/Rhonchi. Cardiovascular:  Regular rate and rhythm with normal S1/S2 without murmurs, rubs or gallops. Abdomen: Soft, non-tender, non-distended with normal bowel sounds. Musculoskeletal:  No clubbing, cyanosis or edema bilaterally. Full range of motion without deformity. Skin: Skin color, texture, turgor normal.  No rashes or lesions. Neurologic:  Neurovascularly intact without any focal sensory/motor deficits. Cranial nerves: II-XII intact, grossly non-focal.  Psychiatric:  Alert and oriented, thought content appropriate, normal insight  Capillary Refill: Brisk,< 3 seconds   Peripheral Pulses: +2 palpable, equal bilaterally       Labs: For convenience and continuity at follow-up the following most recent labs are provided:      CBC:    Lab Results   Component Value Date/Time    WBC 4.1 01/16/2023 03:55 PM    HGB 9.9 01/16/2023 03:55 PM    HCT 32.2 01/16/2023 03:55 PM     01/16/2023 03:55 PM       Renal:    Lab Results   Component Value Date/Time     01/16/2023 03:55 PM    K 4.8 01/16/2023 03:55 PM    CL 98 01/16/2023 03:55 PM    CO2 25 01/16/2023 03:55 PM    BUN 30 01/16/2023 03:55 PM    CREATININE 1.1 01/16/2023 03:55 PM    CALCIUM 9.4 01/16/2023 03:55 PM    PHOS 3.2 01/15/2023 11:02 AM         Significant Diagnostic Studies    Radiology:   XR TIBIA FIBULA RIGHT (2 VIEWS)   Final Result      Dense atherosclerotic calcifications, osteopenia without plain radiographic evidence for osteomyelitis            4 views of the right anatomic leg      FINDINGS:      Dense endoscopic calcifications identified. There is marked joint space narrowing identified involving the medial compartment the. Small osteophytes lateral compartment.  No periosteal elevation is appreciated. No subcutaneous gas. IMPRESSION:   1. No plain radiographic evidence for osteomyelitis, however plain film findings are often late findings. 2. Osteoarthritis of the right knee greatest involving the medial compartment   3. Atherosclerotic calcifications            4 views of the anatomic left leg      FINDINGS:      No cortical disruption or periosteal elevation. No lytic process identified. Narrowing of medial compartment the knee is present. Dense endoscopic calcifications      IMPRESSION:      1. No plain radiographic evidence for osteomyelitis. However plain film findings are often late findings    2. Atherosclerotic calcifications   3. Degenerative changes of the left knee, greatest involving the medial compartment      XR TIBIA FIBULA LEFT (2 VIEWS)   Final Result      Dense atherosclerotic calcifications, osteopenia without plain radiographic evidence for osteomyelitis            4 views of the right anatomic leg      FINDINGS:      Dense endoscopic calcifications identified. There is marked joint space narrowing identified involving the medial compartment the. Small osteophytes lateral compartment. No periosteal elevation is appreciated. No subcutaneous gas. IMPRESSION:   1. No plain radiographic evidence for osteomyelitis, however plain film findings are often late findings. 2. Osteoarthritis of the right knee greatest involving the medial compartment   3. Atherosclerotic calcifications            4 views of the anatomic left leg      FINDINGS:      No cortical disruption or periosteal elevation. No lytic process identified. Narrowing of medial compartment the knee is present. Dense endoscopic calcifications      IMPRESSION:      1. No plain radiographic evidence for osteomyelitis. However plain film findings are often late findings    2. Atherosclerotic calcifications   3.  Degenerative changes of the left knee, greatest involving the medial compartment      XR ANKLE RIGHT (MIN 3 VIEWS)   Final Result      Dense atherosclerotic calcifications, osteopenia without plain radiographic evidence for osteomyelitis            4 views of the right anatomic leg      FINDINGS:      Dense endoscopic calcifications identified. There is marked joint space narrowing identified involving the medial compartment the. Small osteophytes lateral compartment. No periosteal elevation is appreciated. No subcutaneous gas. IMPRESSION:   1. No plain radiographic evidence for osteomyelitis, however plain film findings are often late findings. 2. Osteoarthritis of the right knee greatest involving the medial compartment   3. Atherosclerotic calcifications            4 views of the anatomic left leg      FINDINGS:      No cortical disruption or periosteal elevation. No lytic process identified. Narrowing of medial compartment the knee is present. Dense endoscopic calcifications      IMPRESSION:      1. No plain radiographic evidence for osteomyelitis. However plain film findings are often late findings    2. Atherosclerotic calcifications   3. Degenerative changes of the left knee, greatest involving the medial compartment      CT Head W/O Contrast   Final Result      Head   1. No evidence of acute intracranial hemorrhage or mass effect. 2.  Mild chronic small vessel ischemic disease and atrophy. Cervical spine   1. No acute abnormality in the cervical spine. 2.  Advanced cervical spondylosis as detailed above without significant change. CT CSpine W/O Contrast   Final Result      Head   1. No evidence of acute intracranial hemorrhage or mass effect. 2.  Mild chronic small vessel ischemic disease and atrophy. Cervical spine   1. No acute abnormality in the cervical spine. 2.  Advanced cervical spondylosis as detailed above without significant change. XR CHEST (2 VW)   Final Result   1. Mild bibasilar airspace disease may reflect atelectasis or pneumonia. Consults:     IP CONSULT TO HOSPITALIST  IP CONSULT TO PHARMACY  IP CONSULT TO PODIATRY  IP CONSULT TO HEART FAILURE NURSE/COORDINATOR  IP CONSULT TO HOME CARE NEEDS    Disposition: Home     Condition at Discharge: Stable    Discharge Instructions/Follow-up:  PCP and Cardiology f/u. Monitor weight daily and resume diuretic if weight increase by 2-3 pounds or more. Hold home insulin for now and check BG at home; resume if BG >180-190 and adjust home insulin under PCP discretion    Code Status:  Prior     Activity: activity as tolerated    Diet: diabetic diet      Discharge Medications:     Discharge Medication List as of 1/16/2023  5:11 PM             Details   apixaban (ELIQUIS) 2.5 MG TABS tablet Take 1 tablet by mouth 2 times daily, Disp-60 tablet, R-1Normal                Details   metoprolol tartrate (LOPRESSOR) 25 MG tablet Take 25 mg by mouth 2 times dailyHistorical Med      Chlorpheniramine-Acetaminophen (CORICIDIN HBP COLD/FLU PO) Take 1 tablet by mouth dailyHistorical Med      miconazole (MICOTIN) 2 % powder Apply topically 2 times daily. , Disp-45 g, R-1, Normal      cilostazol (PLETAL) 50 MG tablet Take 50 mg by mouth 2 times dailyHistorical Med      carbidopa-levodopa (SINEMET CR)  MG per extended release tablet Take 1 tablet by mouth 3 times daily At breakfast, dinner and at bedtime. Historical Med      omeprazole (PRILOSEC) 20 MG delayed release capsule Take 20 mg by mouth 2 times dailyHistorical Med      finasteride (PROSCAR) 5 MG tablet Take 5 mg by mouth nightlyHistorical Med      losartan (COZAAR) 50 MG tablet Take 50 mg by mouth dailyHistorical Med      levothyroxine (SYNTHROID) 75 MCG tablet Take 75 mcg by mouth Daily Historical Med      donepezil (ARICEPT) 10 MG tablet Take 10 mg by mouth nightly Historical Med             Time Spent on discharge: 33 minutes in the examination, evaluation, counseling and review of medications and discharge plan.       Signed:    Jeremiah Rosenbaum MD   1/17/2023      Thank you Sun Mckenna MD for the opportunity to be involved in this patient's care. If you have any questions or concerns, please feel free to contact me at [phone].

## 2023-01-19 ENCOUNTER — HOSPITAL ENCOUNTER (OUTPATIENT)
Dept: WOUND CARE | Age: 83
Discharge: HOME | End: 2023-01-19

## 2023-01-20 ENCOUNTER — CARE COORDINATION (OUTPATIENT)
Dept: CASE MANAGEMENT | Age: 83
End: 2023-01-20

## 2023-01-20 NOTE — CARE COORDINATION
[facility] Care Transitions Follow Up Call    Care Transition Nurse contacted the family by telephone to follow up after admission on 2023. Verified name and  with family as identifiers. Patient: Juanpablo Mallory  Patient : 1940   MRN: [id number]   Reason for Admission: Hypoglycemia, CHF, LULÚ  Discharge Date: 23 RARS: Readmission Risk Score: 21.2      Needs to be reviewed by the provider   Additional needs identified to be addressed with provider: No  none             Method of communication with provider: none. CTN spoke with patients spouse this afternoon for follow up CTN call. Spouse states patient is doing well, reporting patient is still having SOBE, HR drops at time, but once patient is up moving HR comes up. No reports of any weight gain, no BLE Edema, no congestion, pain, difficulty emptying bladder, feeling lightheaded, dizziness, and heart palpitations. Patient with no fever, chills, nausea, vomiting, chest pain, or cough. Patient has watchman procedure scheduled for 2023. Addressed changes since last contact:  none  Discussed follow-up appointments. If no appointment was previously scheduled, appointment scheduling offered: Yes. Is follow up appointment scheduled within 7 days of discharge? Yes. Follow Up  Future Appointments   Date Time Provider Skye Cervantes   2023  8:30 AM [facility] CARDIAC CATH LAB ROOM 3 [facility] CATH [facility]   2023  3:00 PM Salinas Araujo DPM [facility]   2023  1:30 PM MD Kindra Shankar   2023 10:00 AM CASSANDRA Fernandez CNP   2023 10:00 AM CASSANDRA Fernandez CNP       Care Transition Nurse reviewed red flags with family and discussed any barriers to care and/or understanding of plan of care after discharge.  Discussed appropriate site of care based on symptoms and resources available to patient including: PCP  Specialist  Urgent care [place] [id number] [facility]  When to call 911. The family agrees to contact the PCP office for questions related to their healthcare. Advance Care Planning:   reviewed and current. Patients top risk factors for readmission: medical condition-CHF, Hypoglycemia, LULÚ  Interventions to address risk factors: Education of patient/family/caregiver/guardian to support self-management-Spouse instructed to continue to monitor for any worsening SOB/SOBE, as well as monitoring for any of the other above noted s/s, reporting any that may present to MD immediately. Offered patient enrollment in the Remote Patient Monitoring (RPM) program for in-home monitoring: Patient is not eligible for RPM program.     Care Transitions Subsequent and Final Call    Schedule Follow Up Appointment with PCP: Declined  Subsequent and Final Calls  Do you have any ongoing symptoms?: Yes  Onset of Patient-reported symptoms: Other  Patient-reported symptoms: Other  Have your medications changed?: No  Do you have any questions related to your medications?: No  Do you currently have any active services?: Yes  Are you currently active with any services?: Home Health  Do you have any needs or concerns that I can assist you with?: No  Identified Barriers: None  Care Transitions Interventions  No Identified Needs  Other Interventions:             Care Transition Nurse provided contact information for future needs. Plan for follow-up call in 3-5 days based on severity of symptoms and risk factors. Plan for next call: symptom management-Any worsening SOB/SOBE, continued low HR, other issues or concerns that may arise.     Thank Teri Johnson RN  Care Transition Coordinator  Contact KDMKYLEZ:[phone]

## 2023-01-23 ENCOUNTER — HOSPITAL ENCOUNTER (INPATIENT)
Dept: CARDIAC CATH/INVASIVE PROCEDURES | Age: 83
LOS: 1 days | Discharge: HOME | DRG: 274 | End: 2023-01-23
Attending: INTERNAL MEDICINE | Admitting: INTERNAL MEDICINE
Payer: MEDICARE

## 2023-01-23 ENCOUNTER — ANESTHESIA (OUTPATIENT)
Dept: CARDIAC CATH/INVASIVE PROCEDURES | Age: 83
DRG: 274 | End: 2023-01-23
Payer: MEDICARE

## 2023-01-23 ENCOUNTER — ANESTHESIA EVENT (OUTPATIENT)
Dept: CARDIAC CATH/INVASIVE PROCEDURES | Age: 83
DRG: 274 | End: 2023-01-23
Payer: MEDICARE

## 2023-01-23 VITALS
HEART RATE: 60 BPM | WEIGHT: 234 LBS | RESPIRATION RATE: 16 BRPM | HEIGHT: 72 IN | OXYGEN SATURATION: 95 % | BODY MASS INDEX: 31.69 KG/M2 | SYSTOLIC BLOOD PRESSURE: 134 MMHG | TEMPERATURE: 96.9 F | DIASTOLIC BLOOD PRESSURE: 70 MMHG

## 2023-01-23 PROBLEM — I48.0 PAROXYSMAL A-FIB (HCC): Status: ACTIVE | Noted: 2023-01-23

## 2023-01-23 LAB
ABO/RH: NORMAL
ANTIBODY SCREEN: NORMAL
EKG ATRIAL RATE: 468 BPM
EKG DIAGNOSIS: NORMAL
EKG Q-T INTERVAL: 446 MS
EKG QRS DURATION: 92 MS
EKG QTC CALCULATION (BAZETT): 441 MS
EKG R AXIS: -19 DEGREES
EKG T AXIS: 134 DEGREES
EKG VENTRICULAR RATE: 59 BPM
GLUCOSE BLD-MCNC: 116 MG/DL (ref 70–99)
LV EF: 43 %
LVEF MODALITY: NORMAL
PERFORMED ON: ABNORMAL
POC ACT LR: 323 SEC

## 2023-01-23 PROCEDURE — 2709999900 HC NON-CHARGEABLE SUPPLY

## 2023-01-23 PROCEDURE — 6370000000 HC RX 637 (ALT 250 FOR IP)

## 2023-01-23 PROCEDURE — 1200000000 HC SEMI PRIVATE

## 2023-01-23 PROCEDURE — C1760 CLOSURE DEV, VASC: HCPCS

## 2023-01-23 PROCEDURE — 2580000003 HC RX 258: Performed by: NURSE ANESTHETIST, CERTIFIED REGISTERED

## 2023-01-23 PROCEDURE — 33340 PERQ CLSR TCAT L ATR APNDGE: CPT

## 2023-01-23 PROCEDURE — C1894 INTRO/SHEATH, NON-LASER: HCPCS

## 2023-01-23 PROCEDURE — 2580000003 HC RX 258: Performed by: INTERNAL MEDICINE

## 2023-01-23 PROCEDURE — 7100000010 HC PHASE II RECOVERY - FIRST 15 MIN

## 2023-01-23 PROCEDURE — 93355 ECHO TRANSESOPHAGEAL (TEE): CPT

## 2023-01-23 PROCEDURE — 02L73DK OCCLUSION OF LEFT ATRIAL APPENDAGE WITH INTRALUMINAL DEVICE, PERCUTANEOUS APPROACH: ICD-10-PCS | Performed by: INTERNAL MEDICINE

## 2023-01-23 PROCEDURE — APPNB45 APP NON BILLABLE 31-45 MINUTES: Performed by: NURSE PRACTITIONER

## 2023-01-23 PROCEDURE — 2580000003 HC RX 258

## 2023-01-23 PROCEDURE — 86901 BLOOD TYPING SEROLOGIC RH(D): CPT

## 2023-01-23 PROCEDURE — 86900 BLOOD TYPING SEROLOGIC ABO: CPT

## 2023-01-23 PROCEDURE — 6360000002 HC RX W HCPCS

## 2023-01-23 PROCEDURE — 3700000001 HC ADD 15 MINUTES (ANESTHESIA)

## 2023-01-23 PROCEDURE — 86850 RBC ANTIBODY SCREEN: CPT

## 2023-01-23 PROCEDURE — 7100000000 HC PACU RECOVERY - FIRST 15 MIN

## 2023-01-23 PROCEDURE — 36415 COLL VENOUS BLD VENIPUNCTURE: CPT

## 2023-01-23 PROCEDURE — 33340 PERQ CLSR TCAT L ATR APNDGE: CPT | Performed by: INTERNAL MEDICINE

## 2023-01-23 PROCEDURE — 6370000000 HC RX 637 (ALT 250 FOR IP): Performed by: INTERNAL MEDICINE

## 2023-01-23 PROCEDURE — C1889 IMPLANT/INSERT DEVICE, NOC: HCPCS

## 2023-01-23 PROCEDURE — 85347 COAGULATION TIME ACTIVATED: CPT

## 2023-01-23 PROCEDURE — B24BZZ4 ULTRASONOGRAPHY OF HEART WITH AORTA, TRANSESOPHAGEAL: ICD-10-PCS | Performed by: INTERNAL MEDICINE

## 2023-01-23 PROCEDURE — 6360000002 HC RX W HCPCS: Performed by: NURSE ANESTHETIST, CERTIFIED REGISTERED

## 2023-01-23 PROCEDURE — 6360000002 HC RX W HCPCS: Performed by: INTERNAL MEDICINE

## 2023-01-23 PROCEDURE — C1766 INTRO/SHEATH,STRBLE,NON-PEEL: HCPCS

## 2023-01-23 PROCEDURE — 3700000000 HC ANESTHESIA ATTENDED CARE

## 2023-01-23 PROCEDURE — 7100000001 HC PACU RECOVERY - ADDTL 15 MIN

## 2023-01-23 PROCEDURE — 7100000011 HC PHASE II RECOVERY - ADDTL 15 MIN

## 2023-01-23 PROCEDURE — 6360000004 HC RX CONTRAST MEDICATION: Performed by: INTERNAL MEDICINE

## 2023-01-23 PROCEDURE — 2500000003 HC RX 250 WO HCPCS: Performed by: NURSE ANESTHETIST, CERTIFIED REGISTERED

## 2023-01-23 RX ORDER — ONDANSETRON 2 MG/ML
INJECTION INTRAMUSCULAR; INTRAVENOUS PRN
Status: DISCONTINUED | OUTPATIENT
Start: 2023-01-23 | End: 2023-01-23 | Stop reason: SDUPTHER

## 2023-01-23 RX ORDER — FENTANYL CITRATE 50 UG/ML
INJECTION, SOLUTION INTRAMUSCULAR; INTRAVENOUS PRN
Status: DISCONTINUED | OUTPATIENT
Start: 2023-01-23 | End: 2023-01-23

## 2023-01-23 RX ORDER — ASPIRIN 81 MG/1
81 TABLET ORAL DAILY
Qty: 90 TABLET | Refills: 1 | Status: SHIPPED | OUTPATIENT
Start: 2023-01-23

## 2023-01-23 RX ORDER — LIDOCAINE HYDROCHLORIDE 20 MG/ML
INJECTION, SOLUTION EPIDURAL; INFILTRATION; INTRACAUDAL; PERINEURAL PRN
Status: DISCONTINUED | OUTPATIENT
Start: 2023-01-23 | End: 2023-01-23 | Stop reason: SDUPTHER

## 2023-01-23 RX ORDER — PROPOFOL 10 MG/ML
INJECTION, EMULSION INTRAVENOUS PRN
Status: DISCONTINUED | OUTPATIENT
Start: 2023-01-23 | End: 2023-01-23 | Stop reason: SDUPTHER

## 2023-01-23 RX ORDER — SODIUM CHLORIDE 9 MG/ML
INJECTION, SOLUTION INTRAVENOUS CONTINUOUS PRN
Status: DISCONTINUED | OUTPATIENT
Start: 2023-01-23 | End: 2023-01-23 | Stop reason: SDUPTHER

## 2023-01-23 RX ORDER — SODIUM CHLORIDE 9 MG/ML
INJECTION, SOLUTION INTRAVENOUS CONTINUOUS
Status: DISCONTINUED | OUTPATIENT
Start: 2023-01-23 | End: 2023-01-23 | Stop reason: HOSPADM

## 2023-01-23 RX ORDER — SODIUM CHLORIDE 0.9 % (FLUSH) 0.9 %
5-40 SYRINGE (ML) INJECTION PRN
Status: DISCONTINUED | OUTPATIENT
Start: 2023-01-23 | End: 2023-01-23 | Stop reason: HOSPADM

## 2023-01-23 RX ORDER — ROCURONIUM BROMIDE 10 MG/ML
INJECTION, SOLUTION INTRAVENOUS PRN
Status: DISCONTINUED | OUTPATIENT
Start: 2023-01-23 | End: 2023-01-23 | Stop reason: SDUPTHER

## 2023-01-23 RX ORDER — IPRATROPIUM BROMIDE AND ALBUTEROL SULFATE 2.5; .5 MG/3ML; MG/3ML
SOLUTION RESPIRATORY (INHALATION)
Status: COMPLETED
Start: 2023-01-23 | End: 2023-01-23

## 2023-01-23 RX ORDER — PANTOPRAZOLE SODIUM 40 MG/1
40 TABLET, DELAYED RELEASE ORAL
Qty: 30 TABLET | Refills: 2 | Status: SHIPPED | OUTPATIENT
Start: 2023-01-23

## 2023-01-23 RX ORDER — EPHEDRINE SULFATE 50 MG/ML
INJECTION INTRAVENOUS PRN
Status: DISCONTINUED | OUTPATIENT
Start: 2023-01-23 | End: 2023-01-23 | Stop reason: SDUPTHER

## 2023-01-23 RX ORDER — FENTANYL CITRATE 50 UG/ML
INJECTION, SOLUTION INTRAMUSCULAR; INTRAVENOUS PRN
Status: DISCONTINUED | OUTPATIENT
Start: 2023-01-23 | End: 2023-01-23 | Stop reason: SDUPTHER

## 2023-01-23 RX ORDER — ACETAMINOPHEN 325 MG/1
650 TABLET ORAL EVERY 4 HOURS PRN
Status: DISCONTINUED | OUTPATIENT
Start: 2023-01-23 | End: 2023-01-23 | Stop reason: HOSPADM

## 2023-01-23 RX ORDER — HEPARIN SODIUM 1000 [USP'U]/ML
INJECTION, SOLUTION INTRAVENOUS; SUBCUTANEOUS PRN
Status: DISCONTINUED | OUTPATIENT
Start: 2023-01-23 | End: 2023-01-23 | Stop reason: SDUPTHER

## 2023-01-23 RX ORDER — IPRATROPIUM BROMIDE AND ALBUTEROL SULFATE 2.5; .5 MG/3ML; MG/3ML
1 SOLUTION RESPIRATORY (INHALATION) ONCE
Status: COMPLETED | OUTPATIENT
Start: 2023-01-23 | End: 2023-01-23

## 2023-01-23 RX ORDER — SUCCINYLCHOLINE/SOD CL,ISO/PF 200MG/10ML
SYRINGE (ML) INTRAVENOUS PRN
Status: DISCONTINUED | OUTPATIENT
Start: 2023-01-23 | End: 2023-01-23 | Stop reason: SDUPTHER

## 2023-01-23 RX ORDER — SODIUM CHLORIDE 9 MG/ML
INJECTION, SOLUTION INTRAVENOUS PRN
Status: DISCONTINUED | OUTPATIENT
Start: 2023-01-23 | End: 2023-01-23 | Stop reason: HOSPADM

## 2023-01-23 RX ORDER — SODIUM CHLORIDE 0.9 % (FLUSH) 0.9 %
5-40 SYRINGE (ML) INJECTION EVERY 12 HOURS SCHEDULED
Status: DISCONTINUED | OUTPATIENT
Start: 2023-01-23 | End: 2023-01-23 | Stop reason: HOSPADM

## 2023-01-23 RX ORDER — CLOPIDOGREL BISULFATE 75 MG/1
75 TABLET ORAL DAILY
Qty: 30 TABLET | Refills: 3 | Status: SHIPPED | OUTPATIENT
Start: 2023-01-23

## 2023-01-23 RX ORDER — CLOPIDOGREL 300 MG/1
300 TABLET, FILM COATED ORAL ONCE
Status: COMPLETED | OUTPATIENT
Start: 2023-01-23 | End: 2023-01-23

## 2023-01-23 RX ORDER — PROTAMINE SULFATE 10 MG/ML
INJECTION, SOLUTION INTRAVENOUS PRN
Status: DISCONTINUED | OUTPATIENT
Start: 2023-01-23 | End: 2023-01-23 | Stop reason: SDUPTHER

## 2023-01-23 RX ADMIN — EPHEDRINE SULFATE 10 MG: 50 INJECTION, SOLUTION INTRAVENOUS at 11:10

## 2023-01-23 RX ADMIN — IPRATROPIUM BROMIDE AND ALBUTEROL SULFATE 1 AMPULE: 2.5; .5 SOLUTION RESPIRATORY (INHALATION) at 11:59

## 2023-01-23 RX ADMIN — Medication 160 MG: at 10:51

## 2023-01-23 RX ADMIN — SODIUM CHLORIDE: 9 INJECTION, SOLUTION INTRAVENOUS at 10:40

## 2023-01-23 RX ADMIN — VANCOMYCIN HYDROCHLORIDE 1000 MG: 10 INJECTION, POWDER, LYOPHILIZED, FOR SOLUTION INTRAVENOUS at 10:59

## 2023-01-23 RX ADMIN — IPRATROPIUM BROMIDE AND ALBUTEROL SULFATE 1 AMPULE: .5; 3 SOLUTION RESPIRATORY (INHALATION) at 11:59

## 2023-01-23 RX ADMIN — ONDANSETRON 4 MG: 2 INJECTION INTRAMUSCULAR; INTRAVENOUS at 11:34

## 2023-01-23 RX ADMIN — PROTAMINE SULFATE 25 MG: 10 INJECTION, SOLUTION INTRAVENOUS at 11:37

## 2023-01-23 RX ADMIN — PROPOFOL 100 MG: 10 INJECTION, EMULSION INTRAVENOUS at 10:51

## 2023-01-23 RX ADMIN — LIDOCAINE HYDROCHLORIDE 100 MG: 20 INJECTION, SOLUTION EPIDURAL; INFILTRATION; INTRACAUDAL; PERINEURAL at 10:51

## 2023-01-23 RX ADMIN — PHENYLEPHRINE HYDROCHLORIDE 100 MCG: 10 INJECTION INTRAVENOUS at 11:19

## 2023-01-23 RX ADMIN — EPHEDRINE SULFATE 10 MG: 50 INJECTION, SOLUTION INTRAVENOUS at 11:13

## 2023-01-23 RX ADMIN — ROCURONIUM BROMIDE 40 MG: 10 INJECTION, SOLUTION INTRAVENOUS at 11:02

## 2023-01-23 RX ADMIN — CLOPIDOGREL BISULFATE 300 MG: 300 TABLET, FILM COATED ORAL at 13:23

## 2023-01-23 RX ADMIN — PHENYLEPHRINE HYDROCHLORIDE 100 MCG: 10 INJECTION INTRAVENOUS at 10:57

## 2023-01-23 RX ADMIN — ROCURONIUM BROMIDE 10 MG: 10 INJECTION, SOLUTION INTRAVENOUS at 10:51

## 2023-01-23 RX ADMIN — HEPARIN SODIUM 6000 UNITS: 1000 INJECTION INTRAVENOUS; SUBCUTANEOUS at 11:16

## 2023-01-23 RX ADMIN — IOPAMIDOL 50 ML: 755 INJECTION, SOLUTION INTRAVENOUS at 11:41

## 2023-01-23 RX ADMIN — FENTANYL CITRATE 25 MCG: 50 INJECTION, SOLUTION INTRAMUSCULAR; INTRAVENOUS at 10:51

## 2023-01-23 RX ADMIN — SUGAMMADEX 200 MG: 100 INJECTION, SOLUTION INTRAVENOUS at 11:37

## 2023-01-23 RX ADMIN — HEPARIN SODIUM 4000 UNITS: 1000 INJECTION INTRAVENOUS; SUBCUTANEOUS at 11:13

## 2023-01-23 RX ADMIN — EPHEDRINE SULFATE 10 MG: 50 INJECTION, SOLUTION INTRAVENOUS at 11:19

## 2023-01-23 RX ADMIN — PHENYLEPHRINE HYDROCHLORIDE 100 MCG: 10 INJECTION INTRAVENOUS at 11:13

## 2023-01-23 ASSESSMENT — COPD QUESTIONNAIRES: CAT_SEVERITY: MILD

## 2023-01-23 ASSESSMENT — LIFESTYLE VARIABLES: SMOKING_STATUS: 0

## 2023-01-23 ASSESSMENT — PAIN SCALES - GENERAL: PAINLEVEL_OUTOF10: 0

## 2023-01-23 NOTE — PROGRESS NOTES
Patient with audible expiratory wheezing noted, states he feels slighty SOB. O2 sats 99% on Simple mask.   Duoneb given per MD order

## 2023-01-23 NOTE — TELEPHONE ENCOUNTER
1/23/2023 s/p SUCCESSFUL WATCHMAN LAAC PROCEDURE PER DR. Aisha Wu deployment of left atrial appendage occlusion device with no complication, WATCHMAN device used 31mm size. --6-month f/u with Anastacia Haver CNP on 7/19/2023 at 10 AM  --1-year f/u with Anastacia Haver CNP on 12/20/2023 at 10 AM  All will print on discharge after visit summary. Post procedure JOSE to be completed 45-59 days post procedure (3/9/2023-3/23/2023) Order placed. Please schedule with Dr Mana Mtz      The morning of your Procedure-JOSE you will park in the hospital parking lot and report directly to the cath lab to check in. DATE: ____________ TIME: _____________      Pre-Procedure Instructions:  Do not eat or drink anything 8 hours before your procedure. Hold all diabetic medications the morning of the procedure including, Metfomin. If you take Lantus/Levemir only take ½ your normal dose the evening before. All other medications can be taken in the morning with sips of water. Do not hold anticoagulation therapy: warfarin, eliquis, xarelto therapy. Do not use any lotions, creams or perfume the morning of procedure. Please arrive 1 hour prior to procedure time. Please have a responsible adult to drive you home after procedure. It is recommended you do not drive for 24 hours after procedure. Cath lab will provide you with all post procedure instructions. If you have any questions regarding the procedure itself or medications please call [phone] and ask to speak with a nurse.

## 2023-01-23 NOTE — PROGRESS NOTES
Patient/report received from [facility] PACU RN, VSS, groin CDI, denying pain, denying other needs at this time

## 2023-01-23 NOTE — TELEPHONE ENCOUNTER
1/23/2023 s/p SUCCESSFUL WATCHMAN LAAC PROCEDURE PER DR. Liban Todd deployment of left atrial appendage occlusion device with no complication, WATCHMAN device used 31mm size. --6-month f/u with Lorelei Whitmore CNP on 7/19/2023 at 10 AM  --1-year f/u with Lorelei Whitmore CNP on 12/20/2023 at 10 AM  All will print on discharge after visit summary. Baby Gola procedure JOSE to be completed 45-59 days post procedure (3/9/2023-3/23/2023) Order placed. Thanks.    Johanny Alex RN, BSN      Watchman Coordinator

## 2023-01-23 NOTE — PROGRESS NOTES
Patient /report received from OR to PACU in stable condition, vss. Post tib pulse via doppler, drsg to right groin c/d/I, no hematoma noted.   Right groin soft, will monitor

## 2023-01-23 NOTE — TELEPHONE ENCOUNTER
Marifer Dahl,  Please schedule Post Watchman JOSE at [facility] with Dr. Cassandra Abad.

## 2023-01-23 NOTE — PROCEDURES
[address]   Procedure Note      Procedure Performed by: Sonja Mendoza MD      Procedures performed:     Percutaneous transcatheter closure of the left atrial appendage with endocardial implant   Transeptal Puncture  JOSE      Indication of procedure:  atrial fibrillation, CVA,  High Risk for bleeding  Bleeding Episodes    Patient has been seen by General cardiology and shared decision making has been made for pursuing FLOR closure. Patient here for FLOR closure with Watchman device. Details of procedure: The patient was brought to the electrophysiology laboratory in stable condition. The patient was in a fasting and non-sedated state. The risks, benefits and alternatives of the procedure were discussed with the patient. The risks including, but not limited to, the risks of vascular injury, bleeding, infection, device malfunction, lead dislodgement, radiation exposure, injury to cardiac and surrounding structures (including pneumothorax), stroke, myocardial infarction and death were discussed in detail. The patient opted to proceed with the device implantation. Written informed consent was signed and placed in the chart. Prophylactic antibiotic was given. The patient was prepped and draped in a sterile fashion. A timeout protocol was completed to identify the patient and the procedure being performed. Patient underwent general anesthesia by anesthesiology team.       Transesophageal echocardiography was performed and measurements of left atrial appendage, including ostium size and depth were obtained for selection of appropriate watchman device. Decision was made to use a size 31 Watchman device based on JOSE measurement. Both groins were prepped in a sterile fashion. We gained access in both femoral veins. Right femoral access was obtained using modified using modified seldinger technique. Patient received a bolus of heparin prior transseptal puncture.  Transseptal punctures through intact septum were done using [employer]G RF Transeptal System, JOSE guidance as well as pressure monitoring , and fluoroscopy in [place] and PETERSON projections. We placed BAYLIS Sheath inside the left atrium. A long wire was placed into the left superior pulmonary vein. Then the SL sheath was exchanged over the wire with double curve watchman access sheath. Then a pigtail catheter was inserted into the access sheath and was placed inside the left atrial appendage. Angiography of FLOR was performed. Pigtail catheter was removed. Then Watchman delivery sheath was inserted into the access sheath. Using fluoroscopy and live JOSE the anterior lobe of the appendage was located and delivery sheath was advanced into this lobe. Then device was implanted into the appendage under fluoroscopy and live JOSE imaging. It was noted that the device had a bit of shoulder in the inferior portion. ATug test was done multiple times to insure device stability   JOSE guidance and 3D guidance revealed device to be well seated did not correct the position. After deployment a tug test was done and stability of device was checked. Position of device was checked. Measurement of device showed appropriate compression of the device. Using color Doppler, no leak around the was noted. PASS criteria for releasing of device were met and device was released. A figure of 8 was used to achieve hemostasis. Patient was extubated and transferred to the floor. No immediate complications noted.        Assessment and Summary:    successful deployment of left atrial appendage occlusion device with no complication    WATCHMAN device used 31 mm size     Angiogram revealed good seal and no thrombus     JOSE confirmed placement and seal    EBL Less than 50 mL  No complications      NO Post procedure Pericardial Effusion       Plan:     The patient will be admitted and have usual post care  Start anticoagulation  Start ASA 81 mg daily  DAPT with PLAVIX      Patient is participating in the left atrial appendage occlusion/closure registry. [address] is approved by the [employer] for Medicare and Medicaid Services (CMS) to meet the registry requirements outlined in the national coverage decisions for Percutaneous Left Atrial Appendage Closure     Thank you for letting me participate in this patient's care and please do not hesitate to call me with any questions or concerns.      Isabell José MD, MPH     [address] [place] [facility]  Ph: [phone]  Fax: [phone]

## 2023-01-23 NOTE — ANESTHESIA POSTPROCEDURE EVALUATION
Department of Anesthesiology  Postprocedure Note    Patient: Mariluz Madrigal  MRN: [id number]  YOB: 1940  Date of evaluation: 1/23/2023      Procedure Summary     Date: 01/23/23 Room / Location: [facility] Cath Lab; [facility] Echocardiography    Anesthesia Start: 4572 Anesthesia Stop: 2422    Procedure: ECHO JOSE IN CARDIAC CATH Diagnosis: Paroxysmal atrial fibrillation    Scheduled Providers:  Responsible Provider: Holly Fay MD    Anesthesia Type: general ASA Status: 4          Anesthesia Type: No value filed.     Payton Phase I: Payton Score: 8    Payton Phase II:        Anesthesia Post Evaluation    Patient location during evaluation: PACU  Patient participation: complete - patient participated  Level of consciousness: awake and alert  Airway patency: patent  Nausea & Vomiting: no vomiting and no nausea  Complications: no  Cardiovascular status: hemodynamically stable  Respiratory status: acceptable  Hydration status: stable

## 2023-01-23 NOTE — ANESTHESIA PRE PROCEDURE
Department of Anesthesiology  Preprocedure Note       Name:  Peterson Lao   Age:  82 y.o.  :  1940                                          MRN:  [id number]         Date:  2023      Surgeon: * Surgery not found *    Procedure:     Medications prior to admission:   Prior to Admission medications    Medication Sig Start Date End Date Taking? Authorizing Provider   apixaban (ELIQUIS) 2.5 MG TABS tablet Take 1 tablet by mouth 2 times daily 23   Dionisio Garcia MD   metoprolol tartrate (LOPRESSOR) 25 MG tablet Take 25 mg by mouth 2 times daily    Historical Provider, MD   Chlorpheniramine-Acetaminophen (CORICIDIN HBP COLD/FLU PO) Take 1 tablet by mouth daily    Historical Provider, MD   miconazole (MICOTIN) 2 % powder Apply topically 2 times daily. 10/25/22   Faye Junior MD   cilostazol (PLETAL) 50 MG tablet Take 50 mg by mouth 2 times daily    Historical Provider, MD   carbidopa-levodopa (SINEMET CR)  MG per extended release tablet Take 1 tablet by mouth 3 times daily At breakfast, dinner and at bedtime.    Historical Provider, MD   omeprazole (PRILOSEC) 20 MG delayed release capsule Take 20 mg by mouth 2 times daily    Historical Provider, MD   finasteride (PROSCAR) 5 MG tablet Take 5 mg by mouth nightly    Historical Provider, MD   losartan (COZAAR) 50 MG tablet Take 50 mg by mouth daily    Historical Provider, MD   levothyroxine (SYNTHROID) 75 MCG tablet Take 75 mcg by mouth Daily     Historical Provider, MD   donepezil (ARICEPT) 10 MG tablet Take 10 mg by mouth nightly     Historical Provider, MD       Current medications:    Current Outpatient Medications   Medication Sig Dispense Refill   • apixaban (ELIQUIS) 2.5 MG TABS tablet Take 1 tablet by mouth 2 times daily 60 tablet 1   • metoprolol tartrate (LOPRESSOR) 25 MG tablet Take 25 mg by mouth 2 times daily     • Chlorpheniramine-Acetaminophen (CORICIDIN HBP COLD/FLU PO) Take 1 tablet by mouth daily     • miconazole (MICOTIN) 2 % powder  Apply topically 2 times daily. 45 g 1    cilostazol (PLETAL) 50 MG tablet Take 50 mg by mouth 2 times daily      carbidopa-levodopa (SINEMET CR)  MG per extended release tablet Take 1 tablet by mouth 3 times daily At breakfast, dinner and at bedtime.  omeprazole (PRILOSEC) 20 MG delayed release capsule Take 20 mg by mouth 2 times daily      finasteride (PROSCAR) 5 MG tablet Take 5 mg by mouth nightly      losartan (COZAAR) 50 MG tablet Take 50 mg by mouth daily      levothyroxine (SYNTHROID) 75 MCG tablet Take 75 mcg by mouth Daily       donepezil (ARICEPT) 10 MG tablet Take 10 mg by mouth nightly        No current facility-administered medications for this encounter. 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 ([facility] J68.3    Pulmonary embolism ([facility]) I26.99    CAD S/P percutaneous coronary angioplasty I25.10, Z98.61    Presence of drug coated stent in anterior descending branch of left coronary artery Z95.5    Sepsis ([facility] A41.9    Syncope R51    Hypothyroid E03.9    Obstructive sleep apnea G47.33    Gas gangrene ([facility]) A48.0    Diabetic infection of right foot ([facility] E11.628, L08.9    Diabetic polyneuropathy associated with type 2 diabetes mellitus ([facility]) E11.42    Right foot infection L08.9    LULÚ (acute kidney injury) ([facility] N17.9    Acute diastolic CHF (congestive heart failure), NYHA class 2 ([facility]) I50.31    Encephalopathy G93.40    Cellulitis L03.90    Cellulitis of right lower extremity L03.115    History of MRSA infection Z86.14    Epistaxis R04.0    Community acquired pneumonia of left lower lobe of lung J18.9    Posterior epistaxis R04.0    Primary hypertension I10    Mixed hyperlipidemia E78.2    Acute on chronic combined systolic and diastolic congestive heart failure (HCC) I50.43    Varicose veins of right lower extremity with inflammation, with ulcer of calf with fat layer exposed ([facility]) I83.212, L97.212    Permanent atrial fibrillation ([facility]) I48.21    HFrEF (heart failure with reduced ejection fraction) ([facility]) I50.20    PAF (paroxysmal atrial fibrillation) ([facility]) I48.0    Multiple falls R29.6    Chronic anticoagulation Z79.01       Past Medical History:        Diagnosis Date    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 Rand Navarrete CAD (coronary artery disease)     Cellulitis 2014    CHF (congestive heart failure) ([facility])     systolic    Chronic kidney disease     upon hospitalization due to bactrim allergy    COPD (chronic obstructive pulmonary disease) ([facility])     Hypertension     Hypothyroidism     Mixed hyperlipidemia     MRSA (methicillin resistant staph aureus) culture positive 07/24/2017    foot    Neuropathy     Obstructive sleep apnea 10/03/2014    does not use cpap machine    Parkinson's disease ([address] 1964    Type 2 diabetes mellitus without complication ([address]        Past Surgical History:        Procedure Laterality Date    CARDIAC CATHETERIZATION      CERVICAL FUSION  1981    COLON SURGERY      1980's    COLONOSCOPY      FOOT SURGERY Right 07/24/2017    INCISION AND DRAINAGE RIGHT FOOT WITH REMOVAL NECROTIC BONE    FOOT SURGERY Right 07/27/2017    : TRANSMETATARSAL AMPUTATION RIGHT FOOT     HERNIA REPAIR      SHOULDER SURGERY      right    UPPER GASTROINTESTINAL ENDOSCOPY N/A 11/25/2020    ESOPHAGOGASTRODUODENOSCOPY WITH BOTOX INJECTION performed by Noe Purdy DO at [facility] ENDOSCOPY       Social History:    Social History     Tobacco Use    Smoking status: Never    Smokeless tobacco: Never   Substance Use Topics    Alcohol use: No                                Counseling given: Not Answered      Vital Signs (Current): There were no vitals filed for this visit. BP Readings from Last 3 Encounters:   01/16/23 (!) 157/83   11/17/22 124/60   10/28/22 110/64       NPO Status:                                                                                 BMI:   Wt Readings from Last 3 Encounters:   01/16/23 234 lb 2.1 oz (106.2 kg)   10/28/22 209 lb (94.8 kg)   10/24/22 209 lb 10.5 oz (95.1 kg)     There is no height or weight on file to calculate BMI.    CBC:   Lab Results   Component Value Date/Time    WBC 4.1 01/16/2023 03:55 PM    RBC 4.43 01/16/2023 03:55 PM    HGB 9.9 01/16/2023 03:55 PM    HCT 32.2 01/16/2023 03:55 PM    MCV 72.8 01/16/2023 03:55 PM    RDW 24.3 01/16/2023 03:55 PM     01/16/2023 03:55 PM       CMP:   Lab Results   Component Value Date/Time     01/16/2023 03:55 PM    K 4.8 01/16/2023 03:55 PM    CL 98 01/16/2023 03:55 PM    CO2 25 01/16/2023 03:55 PM    BUN 30 01/16/2023 03:55 PM    CREATININE 1.1 01/16/2023 03:55 PM    GFRAA >60 04/17/2021 07:09 AM    GFRAA >60 06/12/2013 09:58 AM    AGRATIO 0.9 04/12/2021 04:52 PM    LABGLOM >60 01/16/2023 03:55 PM    GLUCOSE 185 01/16/2023 03:55 PM    PROT 7.7 04/12/2021 04:52 PM    PROT 7.7 03/29/2012 08:18 AM    CALCIUM 9.4 01/16/2023 03:55 PM    BILITOT 0.6 04/12/2021 04:52 PM    ALKPHOS 71 04/12/2021 04:52 PM    AST 8 04/12/2021 04:52 PM    ALT 7 04/12/2021 04:52 PM       POC Tests: No results for input(s): POCGLU, POCNA, POCK, POCCL, POCBUN, POCHEMO, POCHCT in the last 72 hours.     Coags:   Lab Results   Component Value Date/Time    PROTIME 12.1 09/04/2020 11:28 AM    INR 1.20 12/17/2021 08:47 AM    APTT 27.8 07/24/2017 06:32 AM       HCG (If Applicable): No results found for: PREGTESTUR, PREGSERUM, HCG, HCGQUANT     ABGs:   Lab Results   Component Value Date/Time    PHART 7.44 08/08/2017 09:20 AM    PO2ART 77.0 08/08/2017 09:20 AM    CDX8FZN 36 08/08/2017 09:20 AM    PWD7IKS 24 08/08/2017 09:20 AM    BEART 0.1 08/08/2017 09:20 AM    M1UHXQYW 95 08/08/2017 09:20 AM        Type & Screen (If Applicable):  No results found for: LABABO, LABRH    Drug/Infectious Status (If Applicable):  No results found for: HIV, HEPCAB    COVID-19 Screening (If Applicable):   Lab Results   Component Value Date/Time    COVID19 Not Detected 10/25/2022 02:04 PM    COVID19 NOT DETECTED 10/19/2022 12:44 AM    COVID19 Not Detected 04/12/2021 06:21 PM           Anesthesia Evaluation  Patient summary reviewed and Nursing notes reviewed no history of anesthetic complications:   Airway: Mallampati: III  TM distance: >3 FB   Neck ROM: limited  Mouth opening: > = 3 FB   Dental:    (+) poor dentition      Pulmonary:normal exam  breath sounds clear to auscultation  (+) COPD (no inh use or O2 req): mild,  sleep apnea: on noncompliant,      (-) not a current smoker                           Cardiovascular:  Exercise tolerance: poor (<4 METS),   (+) hypertension:, valvular problems/murmurs: AS and MR, CAD (denies any recent issues or ntg use, stable):, CABG/stent (remote prior pci):, dysrhythmias (paf, on bb): atrial fibrillation, CHF (chronic cCHF, echo 2022 EF 40-45 mild diffuse hypokinesis mild as mild mr; baseline LE edema, no O2 req): systolic and diastolic, murmur, hyperlipidemia    (-)  angina    ECG reviewed  Rhythm: irregular  Rate: normal  Echocardiogram reviewed         Beta Blocker:  Dose within 24 Hrs         Neuro/Psych:   (+) neuromuscular disease (cervical fusion, diabetic neuropathy, PD): Parkinson's disease,    (-) seizures, TIA and CVA           GI/Hepatic/Renal:   (+) GERD: well controlled, renal disease: CRI,      (-) liver disease       Endo/Other:    (+) Diabetes (a1c 7)Type II DM, , hypothyroidism::., .                 Abdominal: (+) obese,           Vascular: Other Findings:           Anesthesia Plan      general     ASA 4       Induction: intravenous. MIPS: Postoperative opioids intended and Prophylactic antiemetics administered. Anesthetic plan and risks discussed with patient. Plan discussed with CRNA.                     Chaparrita Sher MD   1/23/2023

## 2023-01-23 NOTE — PROGRESS NOTES
Discharge instructions reviewed and understanding verbalized per pt/family with copy given. All home medications/new prescriptions have been reviewed, questions answered and patient/family state understanding.  Medication information sheet provided for new prescriptions received when applicable     Incentive spirometer given to patient and instructed on the use with verbalization of understanding

## 2023-01-23 NOTE — H&P
Primary Cardiologist: Lillie Braxton MD  Referring Physician: Lillie Braxton MD     Reason for Referral: Left atrial appendage closure     CC: \"I want to proceed with Watchman. \"        Subjective:      History of Present Illness:     Katlyn Remy is a 81 y.o. patient with a PMH significant for coronary artery disease, hypertension, chronic atrial fibrillation and hyperlipidemia. He has had recurrent nosebleeds prior to starting anticoagulation. Significant epistaxis. He has had also GI bleed. He had a fall on 10/18/2022 and was hospitalized and was sent to [facility] for rehab. Patient is being referred for Left Atrial Appendage Closure with WATCHMAN device for management of stroke risk resulting from non-valvular atrial fibrillation. Based on their past history, it has been determined that they are poor candidates for long-term oral-anticoagulation, however may be tolerant of short term treatment with warfarin as necessary. Today, he is here to discuss Watchman again. He recently had a fall and is in rehab center for therapy. He was restarted on Eliquis while he was in the hospital at [facility]. He is in a wheelchair today Patient denies exertional chest pain/pressure, dyspnea at rest, [place], PND, orthopnea, palpitations, lightheadedness, weight changes, changes in LE edema, and syncope. 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 Surgical History:   has a past surgical history that includes cervical fusion (1981);  Foot surgery (Right, 07/24/2017); Foot surgery (Right, 07/27/2017); Cardiac catheterization; Colonoscopy; Colon surgery; shoulder surgery; hernia repair; and Upper gastrointestinal endoscopy (N/A, 11/25/2020). Social History:   reports that he has never smoked. He has never used smokeless tobacco. He reports that he does not drink alcohol and does not use drugs. Family History:  family history includes Cancer in his paternal aunt and sister; Stroke in his father and mother. Home Medications:  Were reviewed and are listed in nursing record and/or below  Home Medications           Prior to Admission medications    Medication Sig Start Date End Date Taking? Authorizing Provider   atorvastatin (LIPITOR) 40 MG tablet Take 40 mg by mouth daily     Yes Historical Provider, MD   miconazole (MICOTIN) 2 % powder Apply topically 2 times daily. 10/25/22   Yes Jessica Marinelli MD   torsemide [facility]) 20 MG tablet Take 1 tablet by mouth daily 10/25/22   Yes Jessica Marinelli MD   metoprolol succinate (TOPROL XL) 25 MG extended release tablet Take 1 tablet by mouth at bedtime 10/25/22   Yes Jessica Marinelli MD   apixaban (ELIQUIS) 2.5 MG TABS tablet Take 1 tablet by mouth 2 times daily 10/25/22   Yes Jessica Marinelli MD   insulin regular (HUMULIN R;NOVOLIN R) 100 UNIT/ML injection Inject 15-20 Units into the skin daily (with breakfast)     Yes Historical Provider, MD   cilostazol (PLETAL) 50 MG tablet Take 50 mg by mouth 2 times daily     Yes Historical Provider, MD   Multiple Vitamins-Minerals (THERAPEUTIC MULTIVITAMIN-MINERALS) tablet Take 1 tablet by mouth daily     Yes Historical Provider, MD   carbidopa-levodopa (SINEMET CR)  MG per extended release tablet Take 1 tablet by mouth 3 times daily At breakfast, dinner and at bedtime.      Yes Historical Provider, MD   omeprazole (PRILOSEC) 20 MG delayed release capsule Take 20 mg by mouth 2 times daily     Yes Historical Provider, MD   spironolactone (ALDACTONE) 25 MG tablet Take 0.5 tablets by mouth daily 4/17/21   Yes Juana Christopher MD   finasteride (PROSCAR) 5 MG tablet Take 5 mg by mouth nightly     Yes Historical Provider, MD   losartan (COZAAR) 50 MG tablet Take 50 mg by mouth daily     Yes Historical Provider, MD   insulin aspart protamine-insulin aspart (NOVOLOG 70/30) (70-30) 100 UNIT/ML injection Inject 25-30 Units into the skin every morning Dependent on BG     Yes Historical Provider, MD   levothyroxine (SYNTHROID) 75 MCG tablet Take 75 mcg by mouth Daily      Yes Historical Provider, MD   donepezil (ARICEPT) 10 MG tablet Take 10 mg by mouth nightly      Yes Historical Provider, MD            CURRENT Medications:  Current Meds Link used for Sign Out Report   No current facility-administered medications for this visit. Allergies:  Aspirin, Nsaids, Bactrim [sulfamethoxazole-trimethoprim], Celebrex [celecoxib], Cymbalta [duloxetine hcl], Pcn [penicillins], Codeine, and Vicodin [hydrocodone-acetaminophen]               Review of Systems: All reviewed and refer to HPI  Constitutional: no unanticipated weight loss. There's been no change in energy level, sleep pattern, or activity level. No fevers, chills. Eyes: No visual changes or diplopia. No scleral icterus. ENT: No Headaches, hearing loss or vertigo. No mouth sores or sore throat. Cardiovascular: No Chest pain, tightness or discomfort. No Shortness of breath. No Dyspnea on exertion, Orthopnea, Paroxysmal nocturnal dyspnea or breathlessness at rest.  No Palpitations. No Syncope ('blackouts', 'faints', 'collapse') or dizziness. Respiratory: No cough or wheezing, no sputum production. No hematemesis. Gastrointestinal: No abdominal pain, appetite loss, blood in stools. No change in bowel or bladder habits. Genitourinary: No dysuria, trouble voiding, or hematuria. Musculoskeletal:  No gait disturbance, no joint complaints. Integumentary: No rash or pruritis.   Neurological: No headache, diplopia, change in muscle strength, numbness or tingling. Psychiatric: No anxiety or depression. Endocrine: No temperature intolerance. No excessive thirst, fluid intake, or urination. No tremor. Hematologic/Lymphatic: No abnormal bruising or bleeding, blood clots or swollen lymph nodes. Allergic/Immunologic: No nasal congestion or hives. Objective: all reveiwed       PHYSICAL EXAM:           Vitals:     11/17/22 1247   BP: 124/60   Pulse: 50            General Appearance:  Alert, cooperative, no distress, appears stated age. Head:  Normocephalic, without obvious abnormality, atraumatic. Eyes:  Pupils equal and round. No scleral icterus. Mouth: Moist mucosa, no pharyngeal erythema. Nose: Nares normal. No drainage or sinus tenderness. Neck: Supple, symmetrical, trachea midline. No adenopathy. No tenderness/mass/nodules. No carotid bruit or elevated JVD. Lungs:   Respiratory Effort: Normal   Auscultation: Clear to auscultation bilaterally, respirations unlabored. No wheeze, rales   Chest Wall:  No tenderness or deformity. Cardiovascular:     Pulses  Palpation: normal   Ascultation: Regular rate, S1/ S2 normal. No murmur, rub, or gallop. 2+ radial and pedal pulses, symmetric  Carotid  Femoral   Abdomen and Gastrointestinal:   Soft, non-tender, bowel sounds active. Liver and Spleen  Masses   Musculoskeletal: No muscle wasting  Back  Gait   Extremities: Extremities normal, atraumatic. No cyanosis or edema. No cyanosis clubbing         Skin: Inspection and palpation performed, no rashes or lesions. Pysch: Normal mood and affect.  Alert and oriented to time place person   Neurologic: Normal gross motor and sensory exam.       Labs: all labs have been reviewed             Lab Results   Component Value Date/Time     WBC 5.7 10/25/2022 08:08 AM     RBC 4.28 10/25/2022 08:08 AM     HGB 9.6 10/25/2022 08:08 AM     HCT 30.8 10/25/2022 08:08 AM     MCV 72.0 10/25/2022 08:08 AM     RDW 18.6 10/25/2022 08:08 AM      10/25/2022 08:08 AM Lab Results   Component Value Date/Time      10/24/2022 06:54 AM     K 4.3 10/24/2022 06:54 AM     K 4.3 10/19/2022 12:09 AM     CL 94 10/24/2022 06:54 AM     CO2 29 10/24/2022 06:54 AM     BUN 16 10/24/2022 06:54 AM     CREATININE 1.0 10/24/2022 06:54 AM     GFRAA >60 2021 07:09 AM     GFRAA >60 2013 09:58 AM     AGRATIO 0.9 2021 04:52 PM     LABGLOM >60 10/24/2022 06:54 AM     GLUCOSE 160 10/24/2022 06:54 AM     PROT 7.7 2021 04:52 PM     PROT 7.7 2012 08:18 AM     CALCIUM 9.3 10/24/2022 06:54 AM     BILITOT 0.6 2021 04:52 PM     ALKPHOS 71 2021 04:52 PM     AST 8 2021 04:52 PM     ALT 7 2021 04:52 PM      No results found for: PTINR        Lab Results   Component Value Date     LABA1C 6.3 10/19/2022            Lab Results   Component Value Date     CKTOTAL 43 10/19/2022     CKMB 0.6 2012     TROPONINI 0.02 (H) 10/19/2022     TROPONINI 0.02 (H) 10/19/2022         Cardiac, Vascular and Imaging Data: All Personally Reviewed in Detail by Myself       [id number] Atrial fibrillattion     Echocardiogram:   ECHO 17  Left ventricle size is normal. There is mild concentric left ventricular  hypertrophy. Left ventricular function is reduced with ejection fraction  estimated at 40-45%. No gross regional wall motion abnormalities but  endocardial definition was limited. Grade II diastolic dysfunction. Mitral  annular calcification is present. Mild mitral regurgitation is present. The  left atrium is dilated. Aortic valve appears sclerotic but opens adequately. No significant changes from the 17 echo study. Stress Test:      Cath: Other imaging:      Assessment and Plan      Atrial Fibrillation,      Primary Cardiologist:   Referring Physician: Jian Antonio MD  Referring Reason: Epistaxis/Recurrent falls recurrent epistaxis on antithrombotic therapy.   After those events the patient has refused to take his antithrombotic therapy secondary to fear of bleeding complications. Karma Tilley is at least 6 (Age,CHF,HTN,CAD,DM)  HASbled is at least 2     Epistaxis and falls. Currently on Eliquis. He is a poor candidate for chronic anticoagulation with his history of recurrent falls. He would be an appropriate candidate for the watchman device. Patient is agreeable to proceed with the Watchman procedure and instructed Daniel Bruce, the coordinator will get in contact to facilitate scheduling. Will be on Asa and Plavix for 6 months after procedure then [place] only. Specifically regarding risk of anticoagulation they have demonstrated:  History of bleeding (eg. Intracerebral, subdural, GI, retro-peritoneal)  Intolerance oral anticoagulation  Increased bleeding risk (e.g. Thrombocytopenia, anemia)  High risk of recurrent falls     We have discussed their unique stroke and bleeding risk both on and off oral-anticoagulation, and the rationale for this referral.  Based on both stroke and bleeding risk, a shared decision has been made to pursue closure of the left atrial appendage as an alternative to oral anticoagulant therapy for stroke prophylaxis and to reduce their long term risk of incidence of bleeding. Discussed Watchman LAAC at length with patient, heart model, device model and video. We gave educational materials. We discussed pre-procedure workup, timing of restarting AC, AC length, procedure and post procedure follow up/management. No Iodine Allergy. No Nickel/Titanium Allergy. He/She is agreeable to short term AC, proceeding with JOSE, 2nd opinion/shared decision making  and will follow up with me post workup to discussing timing of the procedure. I had a detail discussion with the patient and family regarding the risk and benefit of the procedures. I explained to them the details of the procedure. I explained to them that the procedure will be performed under general anesthesia.  I explained any risk of bleeding, pericardial effusion and tamponade, perforation of the vessel, stroke, myocardial infarction or death. The patient and family understood the risk and benefits and the details of procedure and would like to go ahead with the procedure. The patient gave informed consent. All questions and concerns answered at this time.     -Acute on chronic heart failure reduced ejection fraction of 40% continue medical management    Essential hypertension continue blood pressure management control       Thank you for allowing us to participate in the care of [address] [place] [address]. Please do not hesitate to contact me if you have any questions.      Licha Brothers MD, MPH

## 2023-01-23 NOTE — PROGRESS NOTES
Patient up out of bed with no difficulties, dressed and waiting for wife to get the car    Prescriptions will be picked up from outpatient pharmacy when wheeled to car

## 2023-01-23 NOTE — DISCHARGE INSTRUCTIONS
Watchman Discharge Instruction    Care of your puncture site:  Remove bandage 24 hours after the procedure. May shower in 24 hours but do not sit in a bathtub/pool of water for 5 days or until the wound is healed. Gently clean groin using soap and water. Dry thoroughly and apply a Band-Aid that covers the entire site. Use Band-Aid until skin heals over in about 3-5 days. Do not apply powder or lotion. Limit walking and stair climbing today. Normal Observations:  Soreness or tenderness which may last one week. Mild oozing from the incision site. Possible bruising that could last 2 weeks. Activity:  You may resume normal activity in 5 days or after the wound heals. Avoid lifting more than 10 pounds for 5 days or until the wound heals. Avoid strenuous exercise or activity for 1 week. You may return to work in 1 week  without restrictions       Call your doctor immediately if your condition worsens, for any other concerns, for a follow-up appointment or if you experience any of the following:  Increased swelling on the groin or leg. Unusual pain, numbness, or tingling of the groin or down the leg. Any signs of infection such as: redness, yellow drainage at the site, swelling or pain. IF GROIN STARTS BLEEDING SIGNIFICANTLY:   LAY FLAT, HOLD FIRM DIRECT PRESSURE, AND CALL 911     antibiotic prophylaxis (amoxicillin 2 g once 60 minutes prior to procedure) for 6 months for:  a. Dental procedures including cleanings  b. Gastrointestinal procedures  c.     Genitourinary procedures  d. Respiratory procedures involving incision or biopsy  e. Procedures on infected skin or muscle.

## 2023-01-23 NOTE — DISCHARGE SUMMARY
[address] [place] [address] SUMMARY      Patient ID:  Coy Sun  [id number] 26 y.o. 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 ([facility]    Pulmonary embolism (HCC)    CAD S/P percutaneous coronary angioplasty    Presence of drug coated stent in anterior descending branch of left coronary artery    Sepsis ([facility]    Syncope    Hypothyroid    Obstructive sleep apnea    Gas gangrene ([facility]    Diabetic infection of right foot ([facility]    Diabetic polyneuropathy associated with type 2 diabetes mellitus ([address]    Right foot infection    LULÚ (acute kidney injury) ([facility]    Acute diastolic CHF (congestive heart failure), NYHA class 2 ([facility])    Encephalopathy    Cellulitis    Cellulitis of right lower extremity    History of MRSA infection    Epistaxis    Community acquired pneumonia of left lower lobe of lung    Posterior epistaxis    Primary hypertension    Mixed hyperlipidemia    Acute on chronic combined systolic and diastolic congestive heart failure (HCC)    Varicose veins of right lower extremity with inflammation, 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 male patient  coronary artery disease, hypertension, chronic atrial fibrillation and hyperlipidemia. He has had recurrent nosebleeds prior to starting anticoagulation. Significant epistaxis. He has had also GI bleed.    He had a fall on 10/18/2022 and was hospitalized and was sent to [facility] for rehab.   (per H&P)    Patient presented today for Watchman Device implantation. Impression     1. Atrial fibrillation (non-valvular) s/p successful percutaneous left atrial appendage occlusion    Recommendations     Treatment with dual antiplatelet therapy   2. JOSE or CTA at 45 days         - At 45 days, if WATCHMAN device is well seated with adequate seal and residual leak < 5 mm), then:   1) start dual anti platelet therapy for 4 months followed by single anti platelet therapy indefinitely. 2) Anti thrombotic therapy can be discontinued. - Antibiotic prophylaxis (amoxicillin 2 g once 60 minutes prior to procedure) for 6 months for:  a. Dental procedures including cleanings  b. Gastrointestinal procedures  c.     Genitourinary procedures  d. Respiratory procedures involving incision or biopsy  e. Procedures on infected skin or muscle. Patient is doing well following WATCHMAN implantation. Ambulation without any issues. Denies any shortness of breath or chest pain. The Watchman coordinator will reach out to the patient this week via phone. Pt will have a 6 month and a year follow up. Medications and discharge instructions reviewed. All questions and concerns addressed    Objective:     BP (!) 107/94   Pulse 61   Temp 97 °F (36.1 °C) (Temporal)   Resp 17   Ht 6' (1.829 m)   Wt 234 lb (106.1 kg)   SpO2 100%   BMI 31.74 kg/m²      Intake/Output Summary (Last 24 hours) at 2023 1319  Last data filed at 2023 1153  Gross per 24 hour   Intake 800 ml   Output 10 ml   Net 790 ml       Physical Exam:  General:  Awake, alert, NAD  Skin:  Warm and dry. Right groin procedure site c/d/I. No hematoma or bruising.    Neck:  JVD<8, no bruit  Chest:  Clear to auscultation, no wheezes/rhonchi/rales  Cardiovascular:  RRR S1S2  Abdomen:  Soft, nontender, +bowel sounds  Extremities:  no edema      Significant Diagnostic Studies:     EC23  Atrial fibrillation with slow ventricular response  Nonspecific T wave abnormality , probably digitalis effect  Abnormal ECG  When compared with ECG of 16-JAN-2023 06:46,  Atrial fibrillation has replaced Sinus rhythm    Limited Echo: 10/19/22  Summary   Limited study. Left ventricular cavity size is dilated with mild concentric left   ventricular hypertrophy. Overall left ventricular systolic function appears mildly reduced with an   ejection fraction of 40-45%. There is mild diffuse hypokinesis. Mild mitral regurgitation   The aortic valve is thickened/calcified with mild aortic stenosis with a   peak velocity of 2.54m/s and a mean pressure gradient of 14mmHg.     Procedure: 01/23/23  Assessment and Summary:     successful deployment of left atrial appendage occlusion device with no complication     WATCHMAN device used 31 mm size      Angiogram revealed good seal and no thrombus      JOSE confirmed placement and seal     EBL Less than 50 mL  No complications       NO Post procedure Pericardial Effusion         Plan:   The patient will be admitted and have usual post care  Start anticoagulation  Start ASA 81 mg daily  DAPT with PLAVIX    Labs:   Lab Results   Component Value Date    CREATININE 1.1 01/16/2023    BUN 30 (H) 01/16/2023     (L) 01/16/2023    K 4.8 01/16/2023    CL 98 (L) 01/16/2023    CO2 25 01/16/2023      Lab Results   Component Value Date    WBC 4.1 01/16/2023    HGB 9.9 (L) 01/16/2023    HCT 32.2 (L) 01/16/2023    MCV 72.8 (L) 01/16/2023     01/16/2023      Lab Results   Component Value Date    INR 1.20 (H) 12/17/2021    PROTIME 12.1 09/04/2020        Disposition: home    Patient Instructions:        Medication List        START taking these medications      aspirin EC 81 MG EC tablet  Take 1 tablet by mouth daily     clopidogrel 75 MG tablet  Commonly known as: Plavix  Take 1 tablet by mouth daily     pantoprazole 40 MG tablet  Commonly known as: PROTONIX  Take 1 tablet by mouth every morning (before breakfast)            CONTINUE taking these medications      carbidopa-levodopa  MG per extended release tablet  Commonly known as: SINEMET CR     CORICIDIN HBP COLD/FLU PO     donepezil 10 MG tablet  Commonly known as: ARICEPT     finasteride 5 MG tablet  Commonly known as: PROSCAR     levothyroxine 75 MCG tablet  Commonly known as: SYNTHROID     losartan 50 MG tablet  Commonly known as: COZAAR     metoprolol tartrate 25 MG tablet  Commonly known as: LOPRESSOR     miconazole 2 % powder  Commonly known as: MICOTIN  Apply topically 2 times daily. STOP taking these medications      apixaban 2.5 MG Tabs tablet  Commonly known as: ELIQUIS     cilostazol 50 MG tablet  Commonly known as: PLETAL     omeprazole 20 MG delayed release capsule  Commonly known as: PRILOSEC               Where to Get Your Medications        These medications were sent to [facility], [address], [facility] [address]      Phone: [phone]   aspirin EC 81 MG EC tablet  clopidogrel 75 MG tablet  pantoprazole 40 MG tablet         Dispo: It is medically necessary that patients undergoing percutaneous left atrial appendage occlusion are admitted as an inpatient. This patient had a recovery that was earlier than expected and can be discharged today. Patient will follow up in 1-2 weeks and will undergo TTE at 45 days following WATCHMAN implant. Signed:  CASSANDRA Clark CNP, 1/23/2023, 1:19 PM    NOTE:  This report was transcribed using voice recognition software. Every effort was made to ensure accuracy; however, inadvertent computerized transcription errors may be present.

## 2023-01-24 ENCOUNTER — CARE COORDINATION (OUTPATIENT)
Dept: CASE MANAGEMENT | Age: 83
End: 2023-01-24

## 2023-01-24 NOTE — CARE COORDINATION
[facility] Care Transitions Follow Up Call    Care Transition Nurse contacted the family by telephone to follow up after admission on 2023. Verified name and  with family as identifiers. Patient: Alfie Koo  Patient : 1940   MRN: [id number]   Reason for Admission: CHF, Hypoglycemia, LULÚ  Discharge Date: 23 RARS: Readmission Risk Score: 21.2      Needs to be reviewed by the provider   Additional needs identified to be addressed with provider: No  none             Method of communication with provider: none. CTN spoke with patients spouse this afternoon for follow up CTN call. Spouse states patient is doing well, reports he is a little fatigued and weak, had Watchman procedure today. No reports of any fever, chills, nausea, vomiting, chest pain, SOB or cough. Patient with no congestion, pain, difficulty emptying bladder, LE edema, feeling lightheaded, dizziness, and heart palpitations. No other issues or concerns at this time. Addressed changes since last contact:  none  Discussed follow-up appointments. If no appointment was previously scheduled, appointment scheduling offered: Yes. Is follow up appointment scheduled within 7 days of discharge? Yes. Follow Up  Future Appointments   Date Time Provider Skye Cervantes   2023  3:00 PM Chris De Leon DPM [facility] WOUND [facility]   2023  1:30 PM MD Kindra James [facility]   3/9/2023 10:00 AM SCHEDULE, [facility] CATH LAB HOLDING [facility] CATH [facility]   2023 10:00 AM Dunn[place] [employer], APRN - CNP [facility]   2023 10:00 AM CASSANDRA Lee CNP [facility]       Care Transition Nurse reviewed red flags with family and discussed any barriers to care and/or understanding of plan of care after discharge. Discussed appropriate site of care based on symptoms and resources available to patient including: PCP  Specialist  Urgent care clinics  [facility]  When to call 911.  The family agrees to contact the PCP office for questions related to their healthcare. Advance Care Planning:   reviewed and current. Patients top risk factors for readmission: medical condition-CHF, Hypoglycemia, LULÚ  Interventions to address risk factors: Education of patient/family/caregiver/guardian to support self-management-Spouse instructed to continue to monitor patient for any of the above noted s/s, continue to monitor for any adverse effects from anesthesia. Report any issues or concerns that may arise, reporting to MD immediately. Care Transitions Subsequent and Final Call    Schedule Follow Up Appointment with PCP: Declined  Subsequent and Final Calls  Do you have any ongoing symptoms?: Yes  Onset of Patient-reported symptoms: Today  Patient-reported symptoms: Weakness, Fatigue  Have your medications changed?: No  Do you have any questions related to your medications?: No  Do you currently have any active services?: Yes  Are you currently active with any services?: Home Health  Do you have any needs or concerns that I can assist you with?: No  Identified Barriers: None  Care Transitions Interventions  No Identified Needs  Other Interventions:             Care Transition Nurse provided contact information for future needs. Plan for follow-up call in 1-2 days based on severity of symptoms and risk factors. Plan for next call: Any issues or concerns that may arise.     Thank Ace Albright RN  Care Transition Coordinator  Contact BFAOEA:[phone]

## 2023-01-24 NOTE — TELEPHONE ENCOUNTER
Spoke with the wife Maggi Becerra) and got patient scheduled for the procedure. We went over the instructions below and verbalized understanding. Post procedure JOSE to be completed 45-59 days post procedure (3/9/2023-3/23/2023) Order placed. Please schedule with Dr Ailin Bergman        The morning of your Procedure-JOSE you will park in the hospital parking lot and report directly to the cath lab to check in. DATE: 3/9/2023   Arrival time: 9:00 am   Procedure time: 10:00 am         Pre-Procedure Instructions:  Do not eat or drink anything 8 hours before your procedure. Hold all diabetic medications the morning of the procedure including, Metfomin. If you take Lantus/Levemir only take ½ your normal dose the evening before. All other medications can be taken in the morning with sips of water. Do not hold anticoagulation therapy: warfarin, eliquis, xarelto therapy. Do not use any lotions, creams or perfume the morning of procedure. Please arrive 1 hour prior to procedure time. Please have a responsible adult to drive you home after procedure. It is recommended you do not drive for 24 hours after procedure. Cath lab will provide you with all post procedure instructions. If you have any questions regarding the procedure itself or medications please call [phone] and ask to speak with a nurse.      Teams updated / alerted cath lab [facility])

## 2023-01-24 NOTE — TELEPHONE ENCOUNTER
I spoke with patient today and he denies any complaints of bleeding or SOB as well no symptoms of Pericardial effusion and no issues with access site either. I instructed patient to call me if he has any issue or to go to ER if bleeding starts up again. Reminded patient to go to scheduled follow up appointments and a  will be calling soon to schedule the Post Watchman JOSE.

## 2023-01-26 ENCOUNTER — HOSPITAL ENCOUNTER (OUTPATIENT)
Dept: WOUND CARE | Age: 83
Discharge: HOME | End: 2023-01-26

## 2023-01-27 ENCOUNTER — CARE COORDINATION (OUTPATIENT)
Dept: CASE MANAGEMENT | Age: 83
End: 2023-01-27

## 2023-01-27 NOTE — CARE COORDINATION
[facility] Care Transitions Follow Up Call    Care Transition Nurse contacted the family by telephone to follow up after admission on 2023. Verified name and  with family as identifiers. Patient: Gigi Issa  Patient : 1940   MRN: [id number]   Reason for Admission: CHF, Hypoglycemia, LULÚ  Discharge Date: 23 RARS: Readmission Risk Score: 21.2      Needs to be reviewed by the provider   Additional needs identified to be addressed with provider: No  none             Method of communication with provider: none. CTN spoke with patients spouse this afternoon for follow up CTN call. Spouse states patient is doing well, no reports of any fever, chills, nausea, vomiting, chest pain, SOB or cough. Patient with no congestion, pain, difficulty emptying bladder, LE edema, feeling lightheaded, dizziness, and heart palpitations. Spouse denies patient having any weight gain, no signs of fluid overload. No other issues or concerns, SN, PT/OT with [facility] agency still following in home as well. Addressed changes since last contact:  none  Discussed follow-up appointments. If no appointment was previously scheduled, appointment scheduling offered: Yes. Is follow up appointment scheduled within 7 days of discharge? Yes. Follow Up  Future Appointments   Date Time Provider Skye Cervantes   2023  1:30 PM MD Kindra Rosen [facility]   3/9/2023 10:00 AM SCHEDULE, [facility] CATH LAB HOLDING [facility] CATH [facility]   2023 10:00 AM Kaley Pear, APRN - CNP [place] [facility]   2023 10:00 AM Kaley Pear, APRN - CNP [place] [facility]       Care Transition Nurse reviewed red flags with family and discussed any barriers to care and/or understanding of plan of care after discharge. Discussed appropriate site of care based on symptoms and resources available to patient including: PCP  Specialist  Urgent care clinics  [facility]  When to call 911.  The family agrees to contact the PCP office for questions related to their healthcare. Advance Care Planning:   reviewed and current. Patients top risk factors for readmission: medical condition-CHF, Hypoglycemia, LULÚ  Interventions to address risk factors: Education of patient/family/caregiver/guardian to support self-management-Spouse instructed to continue to monitor for any of the above noted s/s, reporting any that may present to MD immediately. Care Transitions Subsequent and Final Call    Schedule Follow Up Appointment with PCP: Declined  Subsequent and Final Calls  Do you have any ongoing symptoms?: No  Have your medications changed?: No  Do you have any questions related to your medications?: No  Do you currently have any active services?: Yes  Are you currently active with any services?: Home Health  Do you have any needs or concerns that I can assist you with?: No  Identified Barriers: None  Care Transitions Interventions  No Identified Needs  Other Interventions:             Care Transition Nurse provided contact information for future needs. Plan for follow-up call in 3-5 days based on severity of symptoms and risk factors. Plan for next call: Any issues or concerns that may arise.     Thank Katherine Raymond RN  Care Transition Coordinator  Contact [facility]:[phone]

## 2023-01-31 ENCOUNTER — CARE COORDINATION (OUTPATIENT)
Dept: CASE MANAGEMENT | Age: 83
End: 2023-01-31

## 2023-01-31 NOTE — CARE COORDINATION
[facility] Transitions Initial Follow Up Call    Call within 2 business days of discharge: Yes    Patient:  Mireya Gallagher   Patient :  1940  MRN:  [id number]     Reason for Admission: CHF, Hypoglycemia, LULÚ  Discharge Date:  23    RARS:       Transitions of Care Initial Call    Was this an external facility discharge? Discharge Facility: [address] [place] [address] to be reviewed by the provider   Additional needs identified to be addressed with provider:    clara    AMB CC Provider Discharge Needs: none            Non-face-to-face services provided:    1ST CTC attempt to reach Pt regarding recent hospital discharge. CTC left voice recording with call back number requesting a call back.     Follow up appointments:    Future Appointments   Date Time Provider Skye Cervantes   2023  1:30 PM MD Kindra [place]   3/9/2023 10:00 AM SCHEDULE, [facility] CATH LAB HOLDING [facility] CATH [facility]   2023 10:00 AM Brandt Adrienne, APRN - CNP [place] [facility]   2023 10:00 AM CASSANDRA Ramesh CNP [facility]       Thank Aliyah Dela Cruz RN  Care Transition Coordinator  Contact TYLORF:[phone]

## 2023-02-01 ENCOUNTER — TELEPHONE (OUTPATIENT)
Dept: OTHER | Facility: CLINIC | Age: 83
End: 2023-02-01

## 2023-02-01 ENCOUNTER — HOSPITAL ENCOUNTER (EMERGENCY)
Age: 83
Discharge: HOME | End: 2023-02-02
Attending: EMERGENCY MEDICINE
Payer: MEDICARE

## 2023-02-01 DIAGNOSIS — R04.0 EPISTAXIS: Primary | ICD-10-CM

## 2023-02-01 PROCEDURE — 99283 EMERGENCY DEPT VISIT LOW MDM: CPT

## 2023-02-01 PROCEDURE — 6370000000 HC RX 637 (ALT 250 FOR IP)

## 2023-02-01 RX ORDER — OXYMETAZOLINE HYDROCHLORIDE 0.05 G/100ML
2 SPRAY NASAL ONCE
Status: COMPLETED | OUTPATIENT
Start: 2023-02-01 | End: 2023-02-01

## 2023-02-01 RX ORDER — OXYMETAZOLINE HYDROCHLORIDE 0.05 G/100ML
SPRAY NASAL
Status: COMPLETED
Start: 2023-02-01 | End: 2023-02-01

## 2023-02-01 RX ADMIN — OXYMETAZOLINE HCL 2 SPRAY: 0.05 SPRAY NASAL at 22:06

## 2023-02-01 RX ADMIN — OXYMETAZOLINE HYDROCHLORIDE 2 SPRAY: 0.05 SPRAY NASAL at 22:06

## 2023-02-01 ASSESSMENT — PAIN SCALES - GENERAL: PAINLEVEL_OUTOF10: 2

## 2023-02-01 ASSESSMENT — PAIN - FUNCTIONAL ASSESSMENT: PAIN_FUNCTIONAL_ASSESSMENT: 0-10

## 2023-02-02 VITALS
OXYGEN SATURATION: 99 % | DIASTOLIC BLOOD PRESSURE: 78 MMHG | TEMPERATURE: 98.7 F | HEIGHT: 72 IN | BODY MASS INDEX: 32.46 KG/M2 | SYSTOLIC BLOOD PRESSURE: 100 MMHG | HEART RATE: 61 BPM | WEIGHT: 239.64 LBS | RESPIRATION RATE: 21 BRPM

## 2023-02-02 NOTE — ED PROVIDER NOTES
[facility] RESIDENT NOTE       Date of evaluation: 2/1/2023    Chief Complaint     Epistaxis (About 1 hour)      of Present Illness     Amauri Ambrose is a 81 y.o. male who presents with nose bleeding from the right naris. The bleeding began roughly 1 hour prior to presentation. There were no particular events at onset. The patient does not wear oxygen at home. He does take aspirin and Plavix in the context of a recent watchman procedure. He has otherwise been in baseline health. He attempted direct pressure at home without success. Review of Systems     Constitutional: no fever, chills, unexpected weight change  Skin: no rashes or lesions  HEENT: no head trauma, headache; no vision changes, eye pain; no nasal discharge or congestion; + epistaxis; no ear pain or change in hearing; no neck or throat pain  Respiratory: no shortness of breath or cough  Cardiovascular: no chest pain, palpitations, peripheral edema  Gastrointestinal: no abdominal pain, no change in appetite, no nausea, vomiting, constipation, diarrhea, no blood in stools  Genitourinary: no changes in urination; no pain with urination  Musculoskeletal: no muscle or joint pains  Neuro: no syncope or seizure  Psych: no changes in mood or depression      Past Medical, Surgical, Family, and Social History     He has a past medical history of 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 ([address] CAD (coronary artery disease), Cellulitis, CHF (congestive heart failure) ([address] Chronic kidney disease, COPD (chronic obstructive pulmonary disease) ([address] Hypertension, Hypothyroidism, Mixed hyperlipidemia, MRSA (methicillin resistant staph aureus) culture positive, Neuropathy, Obstructive sleep apnea, Parkinson's disease ([address] and Type 2 diabetes mellitus without complication ([address] He has a past surgical history that includes cervical fusion (1981); Foot surgery (Right, 07/24/2017);  Foot surgery (Right, 07/27/2017); Cardiac catheterization; Colonoscopy; Colon surgery; shoulder surgery; hernia repair; and Upper gastrointestinal endoscopy (N/A, 11/25/2020). His family history includes Cancer in his paternal aunt and sister; Stroke in his father and mother. He reports that he has never smoked. He has never used smokeless tobacco. He reports that he does not drink alcohol and does not use drugs. Medications     Previous Medications    ASPIRIN EC 81 MG EC TABLET    Take 1 tablet by mouth daily    CARBIDOPA-LEVODOPA (SINEMET CR)  MG PER EXTENDED RELEASE TABLET    Take 1 tablet by mouth 3 times daily At breakfast, dinner and at bedtime. CHLORPHENIRAMINE-ACETAMINOPHEN (CORICIDIN HBP COLD/FLU PO)    Take 1 tablet by mouth daily    CLOPIDOGREL (PLAVIX) 75 MG TABLET    Take 1 tablet by mouth daily    DONEPEZIL (ARICEPT) 10 MG TABLET    Take 10 mg by mouth nightly     FINASTERIDE (PROSCAR) 5 MG TABLET    Take 5 mg by mouth nightly    LEVOTHYROXINE (SYNTHROID) 75 MCG TABLET    Take 75 mcg by mouth Daily     LOSARTAN (COZAAR) 50 MG TABLET    Take 50 mg by mouth daily    METOPROLOL TARTRATE (LOPRESSOR) 25 MG TABLET    Take 25 mg by mouth 2 times daily    MICONAZOLE (MICOTIN) 2 % POWDER    Apply topically 2 times daily. PANTOPRAZOLE (PROTONIX) 40 MG TABLET    Take 1 tablet by mouth every morning (before breakfast)       Allergies     He is allergic to aspirin, nsaids, bactrim [sulfamethoxazole-trimethoprim], celebrex [celecoxib], cymbalta [duloxetine hcl], pcn [penicillins], codeine, and vicodin [hydrocodone-acetaminophen].     Physical Exam     INITIAL VITALS: BP: 129/77, Temp: 98.7 °F (37.1 °C), Heart Rate: 69, Resp: 20, SpO2: 100 %     General: alert and conversant in no distress  Skin: warm, dry and pink without noted rashes or lesions  Head: normocephalic atraumatic  Eyes: Pupils equal, extra ocular movements grossly intact  Mouth / Pharynx: moist mucus membranes without erythema or lesions noted; there is some blood in the posterior pharynx but no apparent heavy bleeding. The right naris has some slough oozing of blood. DiagnosticResults     EKG   NA    RADIOLOGY:  No orders to display       LABS:   No results found for this visit on 02/01/23. ED BEDSIDE ULTRASOUND:  na    RECENT VITALS:  BP: 129/77, Temp: 98.7 °F (37.1 °C), Heart Rate: 69,Resp: 20, SpO2: 100 %     Procedures     na    ED Course     Nursing Notes, Past Medical Hx, Past Surgical Hx, Social Hx, Allergies, and Family Hx were reviewed. The patient was given the followingmedications:  Orders Placed This Encounter   Medications    oxymetazoline (AFRIN) 0.05 % nasal spray 2 spray    oxymetazoline (AFRIN) 0.05 % nasal spray     Petey Garrido override       CONSULTS:  None    MEDICAL DECISION MAKING / ASSESSMENT / Osker Kisha is a 81 y.o. male who presents with epistaxis. There are no high risk features at this time and his presentation likely represents an anterior nosebleed and context of antiplatelet therapy. We will attempt conservative measures and reassess. The remainder of his ED course is as follows:     ED Course as of 02/02/23 0023   Wed Feb 01, 2023   2301 Patient is reassessed his nose clamp is removed and at this time his nose is hemostatic. We will continue to observe. [NG]   u Feb 02, 2023   0020 Patient is reassessed and he remains without ongoing bleeding. Given this course there is no indication for further testing or treatment and patient is appropriate for discharge. [NG]      ED Course User Index  [NG] Anne Penn MD         This patient was also evaluated by the attending physician. All care plans werediscussed and agreed upon.     Clinical Impression     1. Epistaxis        Disposition     PATIENT REFERRED TO:  Shiv Dominguez MD  [address] [place] [address] [phone]    Schedule an appointment as soon as possible for a visit in 1 week  As needed, If symptoms worsen    [address] [facility]  [address], [address] [address] [place] [id number] [phone]  Schedule an appointment as soon as possible for a visit in 1 week  As needed, If symptoms worsen    DISCHARGE MEDICATIONS:  New Prescriptions    No medications on file       DISPOSITION Decision To Discharge 02/02/2023 12:20:41 Emmett Narvaez MD  Resident  02/02/23 [id number]

## 2023-02-02 NOTE — DISCHARGE INSTRUCTIONS
You were evaluated for nosebleeding. The bleeding appears to have stopped here in the emergency department and requires no further intervention. Please be very careful with your nose in the coming days and avoid blowing your nose or picking her nose. As we discussed a saline spray and perhaps a petroleum ointment to the septum may be useful to keep the nose moist.  If you have recurrence of bleeding please use a spray of Afrin and add the nasal clamp for 30 minutes. If you continue to bleed after that you may return to the emergency department for evaluation. A referral to ear nose and throat is included should you continue to have recurrent episodes of nosebleeding as you may benefit from their expertise for further management.

## 2023-02-02 NOTE — TELEPHONE ENCOUNTER
Writer contacted Dr. Luis F Kaur to inform of 30 day readmission risk. Dr. Luis F Kaur informed writer there is no set decision on disposition at this time, but patient may be discharged.

## 2023-02-02 NOTE — ED TRIAGE NOTES
Nose bleed x1 hour. Recent placement of watchman and currently taking plavix and aspirin. Pt has hx of nose bleeds.

## 2023-02-02 NOTE — ED PROVIDER NOTES
ED Attending Attestation Note     Date of evaluation: 2/1/2023    This patient was seen by the resident. I have seen and examined the patient, agree with the workup, evaluation, management and diagnosis. The care plan has been discussed. My assessment reveals a an 81-year-old male on  Platelet therapy presenting with epistaxis that is now hemostatic. On my evaluation the patient has no active bleeding, handling secretions well, and is awake alert no acute distress.         Gerson Marcos MD  02/02/23 [id number]

## 2023-02-03 ENCOUNTER — CARE COORDINATION (OUTPATIENT)
Dept: CASE MANAGEMENT | Age: 83
End: 2023-02-03

## 2023-02-03 ENCOUNTER — HOSPITAL ENCOUNTER (EMERGENCY)
Age: 83
Discharge: HOME | End: 2023-02-03
Attending: STUDENT IN AN ORGANIZED HEALTH CARE EDUCATION/TRAINING PROGRAM
Payer: MEDICARE

## 2023-02-03 VITALS
BODY MASS INDEX: 32.4 KG/M2 | HEART RATE: 57 BPM | SYSTOLIC BLOOD PRESSURE: 130 MMHG | HEIGHT: 72 IN | DIASTOLIC BLOOD PRESSURE: 75 MMHG | TEMPERATURE: 97.8 F | WEIGHT: 239.2 LBS | OXYGEN SATURATION: 98 % | RESPIRATION RATE: 12 BRPM

## 2023-02-03 DIAGNOSIS — R04.0 EPISTAXIS: Primary | ICD-10-CM

## 2023-02-03 LAB
ANION GAP SERPL CALCULATED.3IONS-SCNC: 10 MMOL/L (ref 3–16)
BASOPHILS ABSOLUTE: 0 K/UL (ref 0–0.2)
BASOPHILS RELATIVE PERCENT: 0.5 %
BUN BLDV-MCNC: 32 MG/DL (ref 7–20)
CALCIUM SERPL-MCNC: 9.1 MG/DL (ref 8.3–10.6)
CHLORIDE BLD-SCNC: 106 MMOL/L (ref 99–110)
CO2: 25 MMOL/L (ref 21–32)
CREAT SERPL-MCNC: 1.2 MG/DL (ref 0.8–1.3)
EOSINOPHILS ABSOLUTE: 0.3 K/UL (ref 0–0.6)
EOSINOPHILS RELATIVE PERCENT: 6.1 %
GFR SERPL CREATININE-BSD FRML MDRD: >60 ML/MIN/{1.73_M2}
GLUCOSE BLD-MCNC: 150 MG/DL (ref 70–99)
HCT VFR BLD CALC: 29.1 % (ref 40.5–52.5)
HEMATOLOGY PATH CONSULT: NO
HEMOGLOBIN: 9 G/DL (ref 13.5–17.5)
INR BLD: 1.23 (ref 0.87–1.14)
LYMPHOCYTES ABSOLUTE: 1 K/UL (ref 1–5.1)
LYMPHOCYTES RELATIVE PERCENT: 19.1 %
MCH RBC QN AUTO: 22.6 PG (ref 26–34)
MCHC RBC AUTO-ENTMCNC: 30.9 G/DL (ref 31–36)
MCV RBC AUTO: 73 FL (ref 80–100)
MICROCYTES: ABNORMAL
MONOCYTES ABSOLUTE: 0.6 K/UL (ref 0–1.3)
MONOCYTES RELATIVE PERCENT: 11.1 %
NEUTROPHILS ABSOLUTE: 3.2 K/UL (ref 1.7–7.7)
NEUTROPHILS RELATIVE PERCENT: 63.2 %
OVALOCYTES: ABNORMAL
PDW BLD-RTO: 23.1 % (ref 12.4–15.4)
PLATELET # BLD: 223 K/UL (ref 135–450)
PLATELET SLIDE REVIEW: ADEQUATE
PMV BLD AUTO: 7.6 FL (ref 5–10.5)
POTASSIUM REFLEX MAGNESIUM: 4.1 MMOL/L (ref 3.5–5.1)
PROTHROMBIN TIME: 15.5 SEC (ref 11.7–14.5)
RBC # BLD: 3.98 M/UL (ref 4.2–5.9)
SLIDE REVIEW: ABNORMAL
SODIUM BLD-SCNC: 141 MMOL/L (ref 136–145)
SPHEROCYTES: ABNORMAL
WBC # BLD: 5.1 K/UL (ref 4–11)

## 2023-02-03 PROCEDURE — 94640 AIRWAY INHALATION TREATMENT: CPT

## 2023-02-03 PROCEDURE — 6370000000 HC RX 637 (ALT 250 FOR IP): Performed by: STUDENT IN AN ORGANIZED HEALTH CARE EDUCATION/TRAINING PROGRAM

## 2023-02-03 PROCEDURE — 85610 PROTHROMBIN TIME: CPT

## 2023-02-03 PROCEDURE — 85025 COMPLETE CBC W/AUTO DIFF WBC: CPT

## 2023-02-03 PROCEDURE — 94761 N-INVAS EAR/PLS OXIMETRY MLT: CPT

## 2023-02-03 PROCEDURE — 80048 BASIC METABOLIC PNL TOTAL CA: CPT

## 2023-02-03 PROCEDURE — 30901 CONTROL OF NOSEBLEED: CPT

## 2023-02-03 PROCEDURE — 99283 EMERGENCY DEPT VISIT LOW MDM: CPT

## 2023-02-03 RX ORDER — OXYMETAZOLINE HYDROCHLORIDE 0.05 G/100ML
2 SPRAY NASAL ONCE
Status: COMPLETED | OUTPATIENT
Start: 2023-02-03 | End: 2023-02-03

## 2023-02-03 RX ORDER — IPRATROPIUM BROMIDE AND ALBUTEROL SULFATE 2.5; .5 MG/3ML; MG/3ML
1 SOLUTION RESPIRATORY (INHALATION)
Status: DISCONTINUED | OUTPATIENT
Start: 2023-02-03 | End: 2023-02-03 | Stop reason: HOSPADM

## 2023-02-03 RX ADMIN — IPRATROPIUM BROMIDE AND ALBUTEROL SULFATE 1 AMPULE: 2.5; .5 SOLUTION RESPIRATORY (INHALATION) at 12:50

## 2023-02-03 RX ADMIN — Medication 1 EACH: at 12:50

## 2023-02-03 RX ADMIN — OXYMETAZOLINE HCL 2 SPRAY: 0.05 SPRAY NASAL at 11:38

## 2023-02-03 ASSESSMENT — ENCOUNTER SYMPTOMS
SHORTNESS OF BREATH: 1
RHINORRHEA: 0
ABDOMINAL PAIN: 0
WHEEZING: 1
NAUSEA: 0
VOMITING: 0
SORE THROAT: 0
COUGH: 0

## 2023-02-03 ASSESSMENT — PAIN - FUNCTIONAL ASSESSMENT: PAIN_FUNCTIONAL_ASSESSMENT: NONE - DENIES PAIN

## 2023-02-03 NOTE — ED PROVIDER NOTES
[address] [facility]          ATTENDING PHYSICIAN NOTE       Date of evaluation: 2/3/2023    Chief Complaint     Epistaxis (Seen for the same overnight on Wed for the same. Pt recently had a watchman placed and started/restarted blood thinners)    History of Present Illness     Nikky Jefferson is a 81 y.o. male who presents with epistaxis. He was seen in the ED 36 hours ago for similar symptoms, with his bleeding ultimately controlled with direct pressure alone. He awoke this morning with recurrent bleeding from his right nare. He is on DAPT with aspirin and Plavix s/p recent watchman placement for atrial fibrillation. He has been using saline nasal spray. He has not on oxygen. He denies digital trauma. He applied direct pressure to his nose when the bleeding began using a nasal clamp from his prior visit, however was unable to control the bleeding with this alone. He also reports that he feels like he is wheezing in the context of a COPD and asthma history. ASSESSMENT / PLAN  (MEDICAL DECISION MAKING)     INITIAL VITALS: BP: 130/75, Temp: 97.8 °F (36.6 °C), Heart Rate: 57, Resp: 20, SpO2: 98 %      Vi Lao is a 81 y.o. male on DAPT presenting for the second time in 36 hours with epistaxis. On arrival, he was hemodynamically stable and in no acute distress, though he did have ongoing bleeding from his right nare. He expelled clots from his nose, had Afrin instilled into both nares, and had the nasal clips replaced. On reassessment, he did have scant ongoing bleeding from a site on the septal mucosa on the right side of his nose. He again blew his nose to evacuate clots and the site was cauterized with silver nitrate. He was monitored in the emergency department for approximately an hour with no recurrent bleeding.   Given his wheezing on exam, he was also given a single DuoNeb treatment with a significant decrease in his wheezing on repeat exam.  As this was his second presentation, he did have labs ordered which showed a normal platelet count and stable hemoglobin. He was advised to continue taking his DAPT regimen and to follow-up with his PCP for further care. As his bleeding is now controlled and his labs are stable from prior, I feel that he is safe for discharge at this time. He was counseled to apply triple antibiotic ointment to the site of the nasal cautery for the next several days, continue using saline spray in his nose, consider getting a humidifier for his home, and to avoid digital trauma. At this time, patient has been deemed safe for discharge. Discharge instructions, including strict return precautions for new or worsening symptoms, have been communicated. Medical Decision Making  Amount and/or Complexity of Data Reviewed  Independent Historian: spouse  Labs: ordered. Risk  OTC drugs. Prescription drug management.         Clinical Impression     1. Epistaxis        Disposition     PATIENT REFERRED TO:  MD Desire Hays [facility] [address] [place] [phone]    Schedule an appointment as soon as possible for a visit   For follow up    [facility] Emergency Department  [address] [place] [address]  Go to   If symptoms worsen    DISCHARGE MEDICATIONS:  New Prescriptions    No medications on file       DISPOSITION Decision To Discharge 02/03/2023 01:49:31 PM        Diagnostic Results and Other Data       RADIOLOGY:  No orders to display       LABS:   Results for orders placed or performed during the hospital encounter of 02/03/23   CBC with Auto Differential   Result Value Ref Range    WBC 5.1 4.0 - 11.0 K/uL    RBC 3.98 (L) 4.20 - 5.90 M/uL    Hemoglobin 9.0 (L) 13.5 - 17.5 g/dL    Hematocrit 29.1 (L) 40.5 - 52.5 %    MCV 73.0 (L) 80.0 - 100.0 fL    MCH 22.6 (L) 26.0 - 34.0 pg    MCHC 30.9 (L) 31.0 - 36.0 g/dL    RDW 23.1 (H) 12.4 - 15.4 %    Platelets 027 309 - 305 K/uL    MPV 7.6 5.0 - 10.5 fL    PLATELET SLIDE REVIEW Adequate     SLIDE REVIEW see below     Path Consult No     Neutrophils % 63.2 %    Lymphocytes % 19.1 %    Monocytes % 11.1 %    Eosinophils % 6.1 %    Basophils % 0.5 %    Neutrophils Absolute 3.2 1.7 - 7.7 K/uL    Lymphocytes Absolute 1.0 1.0 - 5.1 K/uL    Monocytes Absolute 0.6 0.0 - 1.3 K/uL    Eosinophils Absolute 0.3 0.0 - 0.6 K/uL    Basophils Absolute 0.0 0.0 - 0.2 K/uL    Microcytes 1+ (A)     Ovalocytes 1+ (A)     Spherocytes 2+ (A)    BMP w/ Reflex to MG   Result Value Ref Range    Sodium 141 136 - 145 mmol/L    Potassium reflex Magnesium 4.1 3.5 - 5.1 mmol/L    Chloride 106 99 - 110 mmol/L    CO2 25 21 - 32 mmol/L    Anion Gap 10 3 - 16    Glucose 150 (H) 70 - 99 mg/dL    BUN 32 (H) 7 - 20 mg/dL    Creatinine 1.2 0.8 - 1.3 mg/dL    Est, Glom Filt Rate >60 >60    Calcium 9.1 8.3 - 10.6 mg/dL   Protime-INR   Result Value Ref Range    Protime 15.5 (H) 11.7 - 14.5 sec    INR 1.23 (H) 0.87 - 1.14     EKG   None    ED BEDSIDE ULTRASOUND:  No results found. MOST RECENT VITALS:  BP: 130/75,Temp: 97.8 °F (36.6 °C), Heart Rate: 57, Resp: 12, SpO2: 98 %     Procedures     None    ED Course     Nursing Notes, Past Medical Hx, Past Surgical Hx, Social Hx,Allergies, and Family Hx were reviewed. The patient was given the following medications:  Orders Placed This Encounter   Medications    oxymetazoline (AFRIN) 0.05 % nasal spray 2 spray    ipratropium-albuterol (DUONEB) nebulizer solution 1 ampule     Order Specific Question:   Initiate RT Bronchodilator Protocol     Answer:   Yes - ED Protocol    silver nitrate applicators applicator 1 each       CONSULTS:  None    Review of Systems     Review of Systems   Constitutional:  Negative for fatigue and fever. HENT:  Positive for nosebleeds. Negative for congestion, rhinorrhea and sore throat. Eyes:  Negative for visual disturbance. Respiratory:  Positive for shortness of breath and wheezing. Negative for cough. Cardiovascular:  Positive for leg swelling. Negative for chest pain and palpitations. Gastrointestinal:  Negative for abdominal pain, nausea and vomiting. Musculoskeletal:  Negative for neck pain. Skin:  Negative for pallor. Neurological:  Positive for tremors. Negative for dizziness and light-headedness. Psychiatric/Behavioral:  Negative for confusion. Past Medical, Surgical, Family, and Social History     He has a past medical history of 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 ([facility] [address] CAD (coronary artery disease), Cellulitis, CHF (congestive heart failure) ([facility] [address] Chronic kidney disease, COPD (chronic obstructive pulmonary disease) ([facility] [address] [facility] Hypertension, Hypothyroidism, Mixed hyperlipidemia, MRSA (methicillin resistant staph aureus) culture positive, Neuropathy, Obstructive sleep apnea, Parkinson's disease ([facility] [address] and Type 2 diabetes mellitus without complication ([facility] [address] [facility] He has a past surgical history that includes cervical fusion (1981); Foot surgery (Right, 07/24/2017); Foot surgery (Right, 07/27/2017); Cardiac catheterization; Colonoscopy; Colon surgery; shoulder surgery; hernia repair; and Upper gastrointestinal endoscopy (N/A, 11/25/2020). His family history includes Cancer in his paternal aunt and sister; Stroke in his father and mother. He reports that he has never smoked. He has never used smokeless tobacco. He reports that he does not drink alcohol and does not use drugs. Medications     Previous Medications    ASPIRIN EC 81 MG EC TABLET    Take 1 tablet by mouth daily    CARBIDOPA-LEVODOPA (SINEMET CR)  MG PER EXTENDED RELEASE TABLET    Take 1 tablet by mouth 3 times daily At breakfast, dinner and at bedtime.     CHLORPHENIRAMINE-ACETAMINOPHEN (CORICIDIN HBP COLD/FLU PO)    Take 1 tablet by mouth daily    CLOPIDOGREL (PLAVIX) 75 MG TABLET    Take 1 tablet by mouth daily    DONEPEZIL (ARICEPT) 10 MG TABLET    Take 10 mg by mouth nightly     FINASTERIDE (PROSCAR) 5 MG TABLET    Take 5 mg by mouth nightly    LEVOTHYROXINE (SYNTHROID) 75 MCG TABLET    Take 75 mcg by mouth Daily     LOSARTAN (COZAAR) 50 MG TABLET    Take 50 mg by mouth daily    METOPROLOL TARTRATE (LOPRESSOR) 25 MG TABLET    Take 25 mg by mouth 2 times daily    MICONAZOLE (MICOTIN) 2 % POWDER    Apply topically 2 times daily. PANTOPRAZOLE (PROTONIX) 40 MG TABLET    Take 1 tablet by mouth every morning (before breakfast)       Allergies     He is allergic to aspirin, nsaids, bactrim [sulfamethoxazole-trimethoprim], celebrex [celecoxib], cymbalta [duloxetine hcl], pcn [penicillins], codeine, and vicodin [hydrocodone-acetaminophen]. Physical Exam     INITIAL VITALS: BP: 130/75, Temp: 97.8 °F (36.6 °C), Heart Rate: 57, Resp: 20, SpO2: 98 %   Physical Exam  Constitutional:       General: He is not in acute distress. Appearance: He is not toxic-appearing. HENT:      Head: Normocephalic and atraumatic. Nose:      Comments: Nasal clamp in place. Small amount of ongoing oozing from right nare. Eyes:      Extraocular Movements: Extraocular movements intact. Conjunctiva/sclera: Conjunctivae normal.   Cardiovascular:      Rate and Rhythm: Normal rate and regular rhythm. Pulses: Normal pulses. Pulmonary:      Effort: Pulmonary effort is normal.      Breath sounds: Wheezing present. Comments: Diffuse expiratory wheezing in all fields. No accessory muscle use. Speaking in complete sentences without difficulty. Abdominal:      General: Abdomen is flat. There is no distension. Palpations: Abdomen is soft. Tenderness: There is no abdominal tenderness. There is no guarding or rebound. Musculoskeletal:         General: Swelling present. Cervical back: Neck supple. Comments: Bilateral lower extremities in Ace wraps. Skin:     General: Skin is warm and dry. Neurological:      Mental Status: He is alert and oriented to person, place, and time.    Psychiatric:         Mood and Affect: Mood normal.         Behavior: Behavior normal.                  Harrison Diaz MD  02/03/23 [id number]

## 2023-02-03 NOTE — DISCHARGE INSTRUCTIONS
You were seen in the emergency department for a nosebleed. The site of the bleeding was chemically cauterized. You should continue using saline nasal spray to keep your nasal mucosa moist.  You should also consider getting a humidifier to use in your home. You should not pick your nose. You should continue taking your medications as prescribed by your cardiologist.  Dory Ragland should follow-up with your PCP for further care.     You should return to the emergency department if:  -Your nose begins bleeding again and you cannot control it with direct pressure  -You become lightheaded, dizzy, or pass out  -You have chest pain or shortness of breath  -You have any other symptoms you find concerning

## 2023-02-07 ENCOUNTER — CARE COORDINATION (OUTPATIENT)
Dept: CASE MANAGEMENT | Age: 83
End: 2023-02-07

## 2023-02-07 NOTE — CARE COORDINATION
[facility] Care Transitions Follow Up Call    Care Transition Nurse contacted the patient by telephone to follow up after admission on 2023. Verified name and  with patient as identifiers. Patient: Luiz Wilson  Patient : 1940   MRN: [id number]   Reason for Admission: CHF, Hypoglycemia, LULÚ  Discharge Date: 2/3/23 RARS: Readmission Risk Score: 21.2      Needs to be reviewed by the provider   Additional needs identified to be addressed with provider: No  none             Method of communication with provider: none. CTN spoke with patient this afternoon for CTN/ED follow up call. Patient states he is doing better, no more nose bleeds. Denies having any fever, chills, nausea, vomiting, chest pain, SOB or cough. Patient with no congestion, pain, difficulty emptying bladder, LE edema, feeling lightheaded, dizziness, and heart palpitations. Patient states SN, PT/OT with [facility] is still following in home. No other issues or concerns, no new or changed medications at this time. Addressed changes since last contact:  none  Discussed follow-up appointments. If no appointment was previously scheduled, appointment scheduling offered: Yes. Is follow up appointment scheduled within 7 days of discharge? Yes. Follow Up  Future Appointments   Date Time Provider Skye Cervantes   3/9/2023 10:00 AM SCHEDULE, [facility] CATH LAB HOLDING [facility] CATH [facility]   2023 10:00 AM Coy Paddy, APRN - CNP [place] [facility]   2023 10:00 AM Coy Paddy, APRN - CNP [place] [facility]     Care Transition Nurse reviewed red flags with patient and discussed any barriers to care and/or understanding of plan of care after discharge. Discussed appropriate site of care based on symptoms and resources available to patient including: PCP  Specialist  Urgent care clinics  [address] [facility]  When to call 911. The patient agrees to contact the PCP office for questions related to their healthcare. Advance Care Planning:   reviewed and current. Patients top risk factors for readmission: medical condition-CHF, Hypoglycemia, LULÚ  Interventions to address risk factors: Education of patient/family/caregiver/guardian to support self-management-Patient instructed to continue to monitor for any returning nose bleeds, as well as monitoring for any of the other above noted s/s, reporting to MD immediately. Offered patient enrollment in the Remote Patient Monitoring (RPM) program for in-home monitoring: Patient is not eligible for RPM program.     Care Transitions Subsequent and Final Call    Schedule Follow Up Appointment with PCP: Declined  Subsequent and Final Calls  Have your medications changed?: No  Are you currently active with any services?: Home Health  Do you have any needs or concerns that I can assist you with?: No  Identified Barriers: None  Care Transitions Interventions  No Identified Needs  Other Interventions:             Care Transition Nurse provided contact information for future needs. Plan for follow-up call in 3-5 days based on severity of symptoms and risk factors. Plan for next call: symptom management-Any returning nose bleeds, other issues or concerns that may arise.     Thank Juana Cortez RN  Care Transition Coordinator  Contact GIFXDL:[phone]

## 2023-02-10 ENCOUNTER — CARE COORDINATION (OUTPATIENT)
Dept: CASE MANAGEMENT | Age: 83
End: 2023-02-10

## 2023-02-10 NOTE — CARE COORDINATION
[facility] Care Transitions Follow Up Call    Patient Current Location:  Home: [address] [place] [address]    Care Transition Nurse contacted the patient by telephone to follow up after admission on 2023. Verified name and  with patient as identifiers. Patient: Lovely Sosa  Patient : 1940   MRN: [id number]   Reason for Admission: CHF, Hypoglycemia, LULÚ  Discharge Date: 2/3/23 RARS: Readmission Risk Score: 21.2      Needs to be reviewed by the provider   Additional needs identified to be addressed with provider: No  none             Method of communication with provider: none. CTN spoke with patient this am for follow up CTN call. Patient states he is doing well, reporting no more nose bleeds, no fever, chills, nausea, vomiting, chest pain, SOB or cough. Patient with no congestion, pain, difficulty emptying bladder, LE edema, feeling lightheaded, dizziness, and heart palpitations. Patient states SN, PT/OT with [facility] was in home this week, will continue to follow. No new or changed medications, no other issues or concerns. Addressed changes since last contact:  none  Discussed follow-up appointments. If no appointment was previously scheduled, appointment scheduling offered: Yes. Is follow up appointment scheduled within 7 days of discharge? Yes. Follow Up  Future Appointments   Date Time Provider Skye Cervantes   3/9/2023 10:30 AM SCHEDULE, [facility] CATH LAB HOLDING [facility] CATH [facility]   2023 10:00 AM Jonni Lefort, APRN - CNP [place] [facility]   2023 10:00 AM Jonni Lefort, APRN - CNP [place] [facility]     Care Transition Nurse reviewed red flags with patient and discussed any barriers to care and/or understanding of plan of care after discharge. Discussed appropriate site of care based on symptoms and resources available to patient including: PCP  Specialist  Urgent care clinics  [facility]  When to call 911.  The patient agrees to contact the PCP office for questions related to their healthcare. Advance Care Planning:   reviewed and current. Patients top risk factors for readmission: medical condition-LULÚ, CHF, Hypoglycemia  Interventions to address risk factors: Education of patient/family/caregiver/guardian to support self-management-Patient instructed to rest as needed, take all medications as prescribed, and continue to monitor for any of the above noted s/s, reporting to MD immediately. Care Transitions Subsequent and Final Call    Schedule Follow Up Appointment with PCP: Declined  Subsequent and Final Calls  Do you have any ongoing symptoms?: No  Have your medications changed?: No  Do you have any questions related to your medications?: No  Do you currently have any active services?: Yes  Are you currently active with any services?: Home Health  Do you have any needs or concerns that I can assist you with?: No  Identified Barriers: None  Care Transitions Interventions  No Identified Needs  Other Interventions:           Care Transition Nurse provided contact information for future needs. Plan for follow-up call in 3-5 days based on severity of symptoms and risk factors. Plan for next call: Any issues or concerns that may arise.     Thank Eliezer Tyler RN  Care Transition Coordinator  Contact TMGUES:[phone]

## 2023-02-15 ENCOUNTER — CARE COORDINATION (OUTPATIENT)
Dept: CASE MANAGEMENT | Age: 83
End: 2023-02-15

## 2023-02-15 NOTE — CARE COORDINATION
[facility] Care Transitions Follow Up Call    Patient Current Location:  Home: [address] [place] [address]    Care Transition Nurse contacted the patient by telephone to follow up after admission on 2023. Verified name and  with patient as identifiers. Patient: Karoline Knutson  Patient : 1940   MRN: [id number]   Reason for Admission: CHF, Hypoglycemia, LULÚ  Discharge Date: 2/3/23 RARS: Readmission Risk Score: 21.2      Needs to be reviewed by the provider   Additional needs identified to be addressed with provider: No  none             Method of communication with provider: none. CTN spoke with patient this afternoon for final follow up CTN call. Patient states he is doing well, no more nosebleeds, denies having any fever, chills, nausea, vomiting, chest pain, SOB or cough. Patient with no congestion, pain, difficulty emptying bladder, LE edema, feeling lightheaded, dizziness, and heart palpitations. Patient with no changed medications, no other issues or concerns at this time. [facility] is still following in home, with SN, PT/OT. Addressed changes since last contact:  none  Discussed follow-up appointments. If no appointment was previously scheduled, appointment scheduling offered: Yes. Is follow up appointment scheduled within 7 days of discharge? Yes. Follow Up  Future Appointments   Date Time Provider Skye Cervantes   3/9/2023 10:30 AM SCHEDULE, [facility] CATH LAB HOLDING [facility] CATH [facility]   2023 10:00 AM Everrett Sida, APRN - CNP [place] [facility]   2023 10:00 AM CASSANDRA Michel CNP [facility]     Care Transition Nurse reviewed medical action plan and red flags with patient and discussed any barriers to care and/or understanding of plan of care after discharge. Discussed appropriate site of care based on symptoms and resources available to patient including: PCP  Specialist  Urgent care clinics  When to call 911.  The patient agrees to contact the PCP office for questions related to their healthcare. Advance Care Planning:   reviewed and current. Patients top risk factors for readmission: medical condition-CHF, LULÚ, Hyperglycemia  Interventions to address risk factors: Education of patient/family/caregiver/guardian to support self-management-Patient instructed to continue to monitor for any of the above noted s/s, as well as monitoring for any returning nose bleeds, reporting any that may present to MD immediately. Care Transitions Subsequent and Final Call    Schedule Follow Up Appointment with PCP: Declined  Subsequent and Final Calls  Do you have any ongoing symptoms?: No  Have your medications changed?: No  Do you have any questions related to your medications?: No  Do you currently have any active services?: Yes  Are you currently active with any services?: Home Health  Do you have any needs or concerns that I can assist you with?: No  Identified Barriers: None  Care Transitions Interventions  No Identified Needs  Other Interventions:           Care Transition Nurse provided contact information for future needs. No further follow-up call indicated based on severity of symptoms and risk factors.     Thank Crow Childress RN  Care Transition Coordinator  Contact ETAU:[phone]

## 2023-02-22 ENCOUNTER — TELEPHONE (OUTPATIENT)
Dept: CARDIOLOGY CLINIC | Age: 83
End: 2023-02-22

## 2023-02-22 DIAGNOSIS — I48.0 PAROXYSMAL A-FIB (HCC): Primary | ICD-10-CM

## 2023-02-22 DIAGNOSIS — Z95.818 PRESENCE OF WATCHMAN LEFT ATRIAL APPENDAGE CLOSURE DEVICE: ICD-10-CM

## 2023-02-22 RX ORDER — CLOPIDOGREL BISULFATE 75 MG/1
75 TABLET ORAL DAILY
Qty: 90 TABLET | Refills: 1 | Status: SHIPPED | OUTPATIENT
Start: 2023-02-22

## 2023-02-22 NOTE — TELEPHONE ENCOUNTER
Patient called and would like Plavix script to go to [place]. I sent a script to refill her Plavix there.

## 2023-03-06 ENCOUNTER — TELEPHONE (OUTPATIENT)
Dept: CARDIOLOGY CLINIC | Age: 83
End: 2023-03-06

## 2023-03-06 NOTE — TELEPHONE ENCOUNTER
Tania Johnson called in to get Peterson's test date and time. I informed her of the date and time. Tania Johnson wanted to know if there were any preps and the arrival time before his test, I informed Tania Johnson that I can have someone call her back with that information. Tashia myers/casimiro Lao's callback: [phone]

## 2023-03-07 NOTE — TELEPHONE ENCOUNTER
Spoke  with Lina Officer and went over instructions for procedure again and also emailed them to her. She verbalized understanding.

## 2023-03-08 NOTE — PROGRESS NOTES
Attempted to call patient about procedure. No answer. Voicemail left. Told to be here at 0900 for procedure at 1030. NPO after midnight, but can take morning medication with sips of water. Office should have told them when and if they should stop any blood thinners. Told to have a responsible adult be with the patient to take them home and stay with them afterwards, if they are not admitted to hospital afterwards. And if available bring current list of medications.

## 2023-03-09 ENCOUNTER — HOSPITAL ENCOUNTER (OUTPATIENT)
Dept: CARDIAC CATH/INVASIVE PROCEDURES | Age: 83
Discharge: HOME | End: 2023-03-09

## 2023-03-09 ENCOUNTER — TELEPHONE (OUTPATIENT)
Dept: CARDIOLOGY CLINIC | Age: 83
End: 2023-03-09

## 2023-03-09 NOTE — TELEPHONE ENCOUNTER
Spoke with the wife and got him rescheduled for procedure. We went over the instruction below and she verbalized understanding.     Procedure -JOSE (Post Watchman)  DATE: 3/17/2023   Arrival time: 10:00 am   Procedure time: 11:00 am         Pre-Procedure Instructions:  Do not eat or drink anything 8 hours before your procedure.   Hold all diabetic medications the morning of the procedure including, Metfomin.    If you take Lantus/Levemir only take ½ your normal dose the evening before.  All other medications can be taken in the morning with sips of water.   Do not hold anticoagulation therapy: warfarin, eliquis, xarelto therapy.   Do not use any lotions, creams or perfume the morning of procedure.   Please arrive 1 hour prior to procedure time.  Please have a responsible adult to drive you home after procedure. It is recommended you do not drive for 24 hours after procedure.    Cath lab will provide you with all post procedure instructions.      If you have any questions regarding the procedure itself or medications please call [phone] and ask to speak with a nurse.     Triny updated/ alerted cath lab ([place])

## 2023-03-09 NOTE — TELEPHONE ENCOUNTER
Jessie,  Patients wife called and has to cancel scheduled Post JOSE today. This needs to be rescheduled and needs to be done before 3/23/2023. Please contact patient. I called [facility] lab and made them aware of patient cancellation.

## 2023-03-17 ENCOUNTER — ANESTHESIA (OUTPATIENT)
Dept: CARDIAC CATH/INVASIVE PROCEDURES | Age: 83
End: 2023-03-17

## 2023-03-17 ENCOUNTER — ANESTHESIA EVENT (OUTPATIENT)
Dept: CARDIAC CATH/INVASIVE PROCEDURES | Age: 83
End: 2023-03-17

## 2023-03-17 ENCOUNTER — HOSPITAL ENCOUNTER (OUTPATIENT)
Dept: CARDIAC CATH/INVASIVE PROCEDURES | Age: 83
End: 2023-03-17
Payer: MEDICARE

## 2023-03-17 VITALS — HEIGHT: 71 IN | TEMPERATURE: 97.5 F | BODY MASS INDEX: 34.72 KG/M2 | WEIGHT: 248 LBS

## 2023-03-17 PROBLEM — Z95.818 PRESENCE OF WATCHMAN LEFT ATRIAL APPENDAGE CLOSURE DEVICE: Status: ACTIVE | Noted: 2023-03-17

## 2023-03-17 LAB — SARS-COV-2 RDRP RESP QL NAA+PROBE: NOT DETECTED

## 2023-03-17 PROCEDURE — 93325 DOPPLER ECHO COLOR FLOW MAPG: CPT

## 2023-03-17 PROCEDURE — 87635 SARS-COV-2 COVID-19 AMP PRB: CPT

## 2023-03-17 PROCEDURE — 93312 ECHO TRANSESOPHAGEAL: CPT

## 2023-03-17 PROCEDURE — 6360000002 HC RX W HCPCS: Performed by: NURSE ANESTHETIST, CERTIFIED REGISTERED

## 2023-03-17 PROCEDURE — 93312 ECHO TRANSESOPHAGEAL: CPT | Performed by: INTERNAL MEDICINE

## 2023-03-17 PROCEDURE — 2580000003 HC RX 258: Performed by: NURSE ANESTHETIST, CERTIFIED REGISTERED

## 2023-03-17 PROCEDURE — 2500000003 HC RX 250 WO HCPCS: Performed by: NURSE ANESTHETIST, CERTIFIED REGISTERED

## 2023-03-17 PROCEDURE — 3700000000 HC ANESTHESIA ATTENDED CARE

## 2023-03-17 PROCEDURE — 3700000001 HC ADD 15 MINUTES (ANESTHESIA)

## 2023-03-17 PROCEDURE — 6360000002 HC RX W HCPCS: Performed by: INTERNAL MEDICINE

## 2023-03-17 RX ORDER — SODIUM CHLORIDE 9 MG/ML
INJECTION, SOLUTION INTRAVENOUS CONTINUOUS PRN
Status: DISCONTINUED | OUTPATIENT
Start: 2023-03-17 | End: 2023-03-17 | Stop reason: SDUPTHER

## 2023-03-17 RX ORDER — FUROSEMIDE 40 MG/1
40 TABLET ORAL DAILY
COMMUNITY

## 2023-03-17 RX ORDER — PROPOFOL 10 MG/ML
INJECTION, EMULSION INTRAVENOUS PRN
Status: DISCONTINUED | OUTPATIENT
Start: 2023-03-17 | End: 2023-03-17 | Stop reason: SDUPTHER

## 2023-03-17 RX ORDER — ALBUTEROL SULFATE 2.5 MG/3ML
2.5 SOLUTION RESPIRATORY (INHALATION) EVERY 6 HOURS PRN
Status: DISCONTINUED | OUTPATIENT
Start: 2023-03-17 | End: 2023-03-20 | Stop reason: HOSPADM

## 2023-03-17 RX ORDER — LIDOCAINE HYDROCHLORIDE 20 MG/ML
INJECTION, SOLUTION INFILTRATION; PERINEURAL PRN
Status: DISCONTINUED | OUTPATIENT
Start: 2023-03-17 | End: 2023-03-17 | Stop reason: SDUPTHER

## 2023-03-17 RX ADMIN — ALBUTEROL SULFATE 2.5 MG: 2.5 SOLUTION RESPIRATORY (INHALATION) at 11:31

## 2023-03-17 RX ADMIN — PROPOFOL 20 MG: 10 INJECTION, EMULSION INTRAVENOUS at 11:42

## 2023-03-17 RX ADMIN — PROPOFOL 50 MG: 10 INJECTION, EMULSION INTRAVENOUS at 11:35

## 2023-03-17 RX ADMIN — PROPOFOL 50 MG: 10 INJECTION, EMULSION INTRAVENOUS at 11:37

## 2023-03-17 RX ADMIN — LIDOCAINE HYDROCHLORIDE 100 MG: 20 INJECTION, SOLUTION INFILTRATION; PERINEURAL at 11:34

## 2023-03-17 RX ADMIN — SODIUM CHLORIDE: 9 INJECTION, SOLUTION INTRAVENOUS at 11:31

## 2023-03-17 ASSESSMENT — LIFESTYLE VARIABLES: SMOKING_STATUS: 0

## 2023-03-17 ASSESSMENT — COPD QUESTIONNAIRES: CAT_SEVERITY: MILD

## 2023-03-17 NOTE — DISCHARGE INSTRUCTIONS
[facility]  Cardiovascular Special Procedures  Transesophageal Echocardiogram  Patient Discharge Instructions      Rest for the remainder of the day. Your throat may be sore, this is common and will go away. If persistent soreness continues, call your physician. If you have any difficulty in swallowing or drinking/eating, call your physician. Resume normal activity tomorrow. [facility]  Cardiovascular Special Procedures  General Discharge Instructions    PROCEDURE: Transesophageal Echocardiogram    __X__ You may be drowsy or lightheaded after receiving sedation. DO NOT operate a vehicle (automobile, bicycle, motorcycle, machinery, or power tools), make any important decisions or sign any important/legal documents, or drink alcoholic beverages for the next 24 hours  __X__ We strongly suggest that a responsible adult be with you for the next 24 hours for your protection and safety  _X___ If the intravenous catheter site is painful, apply warm wet compresses on the site until the soreness is relieved and elevate the arm above the heart. Call your physician if no improvement in 2 to 3 days    DIETARY INSTRUCTIONS:    ____ Drink extra fluids over the next 24 hours (If not contraindicated by illness or by physician order)  ____ Start with clear liquids and progress to normal diet as you feel like eating.   If you experience nausea or repeated episodes of vomiting, which persist beyond 12-24 hours, notify your doctor        __X__ Resume your previous diet      MEDICATION INSTRUCTIONS:    ____ See Medication Reconciliation Sheet      SPECIAL INSTRUCTIONS:  ________________________________________________________________________________________________________________________________________________________________________________________________________________________ Please make sure that you follow-up with your doctors office for your results. Call: _________________________________     FOLLOW-UP APPOINTMENT    Follow up with MD as directed. Belongings returned to patient and/or family: YES. The Discharge Instructions have been explained to me. I understand and can verbalize these instructions.

## 2023-03-17 NOTE — ANESTHESIA PRE PROCEDURE
Department of Anesthesiology  Preprocedure Note       Name:  iLnda Parra   Age:  81 y.o.  :  1940                                          MRN:  [id number]         Date:  3/17/2023      Surgeon: * No surgeons listed *    Procedure:     Medications prior to admission:   Prior to Admission medications    Medication Sig Start Date End Date Taking? Authorizing Provider   clopidogrel (PLAVIX) 75 MG tablet Take 1 tablet by mouth daily 23   Alexandrea Lunsford MD   aspirin EC 81 MG EC tablet Take 1 tablet by mouth daily 23   CASSANDRA Keane CNP   pantoprazole (PROTONIX) 40 MG tablet Take 1 tablet by mouth every morning (before breakfast) 23   CASSANDRA Keane CNP   metoprolol tartrate (LOPRESSOR) 25 MG tablet Take 25 mg by mouth 2 times daily    Historical Provider, MD   Chlorpheniramine-Acetaminophen (CORICIDIN HBP COLD/FLU PO) Take 1 tablet by mouth daily    Historical Provider, MD   miconazole (MICOTIN) 2 % powder Apply topically 2 times daily. 10/25/22   [facility] [address] [facility] [address], MD   carbidopa-levodopa (SINEMET CR)  MG per extended release tablet Take 1 tablet by mouth 3 times daily At breakfast, dinner and at bedtime.     Historical Provider, MD   finasteride (PROSCAR) 5 MG tablet Take 5 mg by mouth nightly    Historical Provider, MD   losartan (COZAAR) 50 MG tablet Take 50 mg by mouth daily    Historical Provider, MD   levothyroxine (SYNTHROID) 75 MCG tablet Take 75 mcg by mouth Daily     Historical Provider, MD   donepezil (ARICEPT) 10 MG tablet Take 10 mg by mouth nightly     Historical Provider, MD       Current medications:    Current Outpatient Medications   Medication Sig Dispense Refill    clopidogrel (PLAVIX) 75 MG tablet Take 1 tablet by mouth daily 90 tablet 1    aspirin EC 81 MG EC tablet Take 1 tablet by mouth daily 90 tablet 1    pantoprazole (PROTONIX) 40 MG tablet Take 1 tablet by mouth every morning (before breakfast) 30 tablet 2    metoprolol tartrate (LOPRESSOR) 25 MG tablet Take 25 mg by mouth 2 times daily      Chlorpheniramine-Acetaminophen (CORICIDIN HBP COLD/FLU PO) Take 1 tablet by mouth daily      miconazole (MICOTIN) 2 % powder Apply topically 2 times daily. 45 g 1    carbidopa-levodopa (SINEMET CR)  MG per extended release tablet Take 1 tablet by mouth 3 times daily At breakfast, dinner and at bedtime.  finasteride (PROSCAR) 5 MG tablet Take 5 mg by mouth nightly      losartan (COZAAR) 50 MG tablet Take 50 mg by mouth daily      levothyroxine (SYNTHROID) 75 MCG tablet Take 75 mcg by mouth Daily       donepezil (ARICEPT) 10 MG tablet Take 10 mg by mouth nightly        No current facility-administered medications for this visit. 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 ([facility] J68.3    Pulmonary embolism ([facility]) I26.99    CAD S/P percutaneous coronary angioplasty I25.10, Z98.61    Presence of drug coated stent in anterior descending branch of left coronary artery Z95.5    Sepsis ([facility] A41.9    Syncope R51    Hypothyroid E03.9    Obstructive sleep apnea G47.33    Gas gangrene ([facility]) A48.0    Diabetic infection of right foot ([facility] E11.628, L08.9    Diabetic polyneuropathy associated with type 2 diabetes mellitus ([facility]) E11.42    Right foot infection L08.9    LULÚ (acute kidney injury) ([facility] N17.9    Acute diastolic CHF (congestive heart failure), NYHA class 2 ([facility]) I50.31    Encephalopathy G93.40    Cellulitis L03.90    Cellulitis of right lower extremity L03.115    History of MRSA infection Z86.14    Epistaxis R04.0    Community acquired pneumonia of left lower lobe of lung J18.9    Posterior epistaxis R04.0    Primary hypertension I10    Mixed hyperlipidemia E78.2    Acute on chronic combined systolic and diastolic congestive heart failure (HCC) I50.43    Varicose veins of right lower extremity with inflammation, with ulcer of calf with fat layer exposed ([facility] I83.212, L97.212    Permanent atrial fibrillation ([facility]) I48.21    HFrEF (heart failure with reduced ejection fraction) ([facility]) I50.20    Paroxysmal atrial fibrillation ([facility]) I48.0    Multiple falls R29.6    Chronic anticoagulation Z79.01    Paroxysmal A-fib ([facility]) I48.0       Past Medical History:        Diagnosis Date    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 Ronaldo Alaniz CAD (coronary artery disease)     Cellulitis 2014    CHF (congestive heart failure) ([facility])     systolic    Chronic kidney disease     upon hospitalization due to bactrim allergy    COPD (chronic obstructive pulmonary disease) ([facility])     Hypertension     Hypothyroidism     Mixed hyperlipidemia     MRSA (methicillin resistant staph aureus) culture positive 07/24/2017    foot    Neuropathy     Obstructive sleep apnea 10/03/2014    does not use cpap machine    Parkinson's disease ([facility] 1965    Type 2 diabetes mellitus without complication ([facility]        Past Surgical History:        Procedure Laterality Date    CARDIAC CATHETERIZATION      CERVICAL FUSION  1981    COLON SURGERY      1980's    COLONOSCOPY      FOOT SURGERY Right 07/24/2017    INCISION AND DRAINAGE RIGHT FOOT WITH REMOVAL NECROTIC BONE    FOOT SURGERY Right 07/27/2017    : TRANSMETATARSAL AMPUTATION RIGHT FOOT     HERNIA REPAIR      SHOULDER SURGERY      right    UPPER GASTROINTESTINAL ENDOSCOPY N/A 11/25/2020    ESOPHAGOGASTRODUODENOSCOPY WITH BOTOX INJECTION performed by Charisse Archer.,  at [facility] Social History:    Social History     Tobacco Use    Smoking status: Never    Smokeless tobacco: Never   Substance Use Topics    Alcohol use: No                                Counseling given: Not Answered      Vital Signs (Current): There were no vitals filed for this visit. BP Readings from Last 3 Encounters:   02/03/23 130/75   02/02/23 100/78   01/23/23 134/70       NPO Status:                                                                                 BMI:   Wt Readings from Last 3 Encounters:   02/03/23 239 lb 3.2 oz (108.5 kg)   02/01/23 239 lb 10.2 oz (108.7 kg)   01/23/23 234 lb (106.1 kg)     There is no height or weight on file to calculate BMI.    CBC:   Lab Results   Component Value Date/Time    WBC 5.1 02/03/2023 11:53 AM    RBC 3.98 02/03/2023 11:53 AM    HGB 9.0 02/03/2023 11:53 AM    HCT 29.1 02/03/2023 11:53 AM    MCV 73.0 02/03/2023 11:53 AM    RDW 23.1 02/03/2023 11:53 AM     02/03/2023 11:53 AM       CMP:   Lab Results   Component Value Date/Time     02/03/2023 11:53 AM    K 4.1 02/03/2023 11:53 AM     02/03/2023 11:53 AM    CO2 25 02/03/2023 11:53 AM    BUN 32 02/03/2023 11:53 AM    CREATININE 1.2 02/03/2023 11:53 AM    GFRAA >60 04/17/2021 07:09 AM    GFRAA >60 06/12/2013 09:58 AM    AGRATIO 0.9 04/12/2021 04:52 PM    LABGLOM >60 02/03/2023 11:53 AM    GLUCOSE 150 02/03/2023 11:53 AM    PROT 7.7 04/12/2021 04:52 PM    PROT 7.7 03/29/2012 08:18 AM    CALCIUM 9.1 02/03/2023 11:53 AM    BILITOT 0.6 04/12/2021 04:52 PM    ALKPHOS 71 04/12/2021 04:52 PM    AST 8 04/12/2021 04:52 PM    ALT 7 04/12/2021 04:52 PM       POC Tests: No results for input(s): POCGLU, POCNA, POCK, POCCL, POCBUN, POCHEMO, POCHCT in the last 72 hours.     Coags:   Lab Results   Component Value Date/Time    PROTIME 15.5 02/03/2023 11:53 AM    INR 1.23 02/03/2023 11:53 AM    APTT 27.8 07/24/2017 06:32 AM       HCG (If Applicable): No results found for: PREGTESTUR, PREGSERUM, HCG, HCGQUANT     ABGs:   Lab Results   Component Value Date/Time    PHART 7.44 08/08/2017 09:20 AM    PO2ART 77.0 08/08/2017 09:20 AM    KBG6RGQ 36 08/08/2017 09:20 AM    GUJ6SFE 24 08/08/2017 09:20 AM    BEART 0.1 08/08/2017 09:20 AM    P4HNODWT 95 08/08/2017 09:20 AM        Type & Screen (If Applicable):  No results found for: LABABO, LABRH    Drug/Infectious Status (If Applicable):  No results found for: HIV, HEPCAB    COVID-19 Screening (If Applicable):   Lab Results   Component Value Date/Time    COVID19 Not Detected 10/25/2022 02:04 PM    COVID19 NOT DETECTED 10/19/2022 12:44 AM    COVID19 Not Detected 04/12/2021 06:21 PM           Anesthesia Evaluation  Patient summary reviewed and Nursing notes reviewed no history of anesthetic complications:   Airway: Mallampati: III  TM distance: >3 FB   Neck ROM: limited  Mouth opening: > = 3 FB   Dental:    (+) poor dentition      Pulmonary:normal exam  breath sounds clear to auscultation  (+) COPD (no inh use or O2 req): mild,  sleep apnea: on noncompliant,  asthma:     (-) not a current smoker                           Cardiovascular:  Exercise tolerance: poor (<4 METS),   (+) hypertension:, valvular problems/murmurs: AS and MR, CAD (denies any recent issues or ntg use, stable):, CABG/stent (remote prior pci):, dysrhythmias (paf, on bb): atrial fibrillation, CHF (chronic cCHF, echo 2022 EF 40-45 mild diffuse hypokinesis mild as mild mr; baseline LE edema, no O2 req): systolic and diastolic, murmur, hyperlipidemia    (-)  angina    ECG reviewed  Rhythm: irregular  Rate: normal  Echocardiogram reviewed         Beta Blocker:  Dose within 24 Hrs         Neuro/Psych:   (+) neuromuscular disease (cervical fusion, diabetic neuropathy, PD): Parkinson's disease,    (-) seizures, TIA and CVA           GI/Hepatic/Renal:   (+) GERD: well controlled, renal disease: CRI,      (-) liver disease       Endo/Other:    (+) Diabetes (a1c 7)Type II DM, , hypothyroidism::., .                 Abdominal:   (+) obese,           Vascular: Other Findings:             Anesthesia Plan      MAC     ASA 4       Induction: intravenous. MIPS: Postoperative opioids intended and Prophylactic antiemetics administered. Anesthetic plan and risks discussed with patient. Plan discussed with CRNA.                     Williams Suarez MD   3/17/2023

## 2023-03-17 NOTE — ANESTHESIA POSTPROCEDURE EVALUATION
Department of Anesthesiology  Postprocedure Note    Patient: Nancy Khoury  MRN: [id number]  YOB: 1940  Date of evaluation: 3/17/2023      Procedure Summary     Date: 03/17/23 Room / Location: [facility] Cath Lab    Anesthesia Start: 0442 Anesthesia Stop: 1150    Procedure: [facility] JOSE W ECHO AND ANES Diagnosis:     Scheduled Providers: Valencia Salas MD Responsible Provider: Valencia Salas MD    Anesthesia Type: MAC ASA Status: 4          Anesthesia Type: No value filed.     [place] Phase I:      [place] Phase II:        Anesthesia Post Evaluation    Patient location during evaluation: PACU  Patient participation: complete - patient participated  Level of consciousness: awake and alert  Airway patency: patent  Nausea & Vomiting: no nausea and no vomiting  Complications: no  Cardiovascular status: hemodynamically stable  Respiratory status: acceptable  Hydration status: euvolemic  Multimodal analgesia pain management approach

## 2023-03-20 ENCOUNTER — TELEPHONE (OUTPATIENT)
Dept: CARDIOLOGY CLINIC | Age: 83
End: 2023-03-20

## 2023-03-21 ENCOUNTER — TELEPHONE (OUTPATIENT)
Dept: CARDIOLOGY CLINIC | Age: 83
End: 2023-03-21

## 2023-03-21 DIAGNOSIS — I48.0 PAROXYSMAL A-FIB (HCC): ICD-10-CM

## 2023-03-21 DIAGNOSIS — Z95.818 PRESENCE OF WATCHMAN LEFT ATRIAL APPENDAGE CLOSURE DEVICE: Primary | ICD-10-CM

## 2023-03-21 LAB — INR BLD: 1.4 (ref 0.88–1.12)

## 2023-03-21 NOTE — TELEPHONE ENCOUNTER
Jessie,  please call patient and schedule for a repeat JOSE with Dr. Jimmie Hurst in mid July before scheduled appointment with Fareed Escalante NP on 7/19/2023

## 2023-03-21 NOTE — TELEPHONE ENCOUNTER
Spoke with patient's wife Homero Clinton and informed her of the FLOR Post Watchman procedure  leak that was seen on the JOSE that Dr. Karma Torres saw. After having [facility] and [place] from [facility]. Look at images and Dr. Adriana Umaña reviewing images we will have patient repeat the JOSE in 4 month to see if the appendage is completely sealed. Informed Homero Clinton that the  will be calling to schedule JOSE for July,2023 before patients 6 month  Post Watchman follow up. Lastly patient will remain on DAPT therapy until repeat images are obtained.

## 2023-03-22 NOTE — TELEPHONE ENCOUNTER
Spoke with the patients wife and got him scheduled for procedure. She still has procedure instructions from last JOSE. Verbalized understanding    Procedure -JOSE (Post Watchman)  DATE: 7/142023   Arrival time: 10:00 am   Procedure time: 11:00 am         Pre-Procedure Instructions:  Do not eat or drink anything 8 hours before your procedure. Hold all diabetic medications the morning of the procedure including, Metfomin. If you take Lantus/Levemir only take ½ your normal dose the evening before. All other medications can be taken in the morning with sips of water. Do not hold anticoagulation therapy: warfarin, eliquis, xarelto therapy. Do not use any lotions, creams or perfume the morning of procedure. Please arrive 1 hour prior to procedure time. Please have a responsible adult to drive you home after procedure. It is recommended you do not drive for 24 hours after procedure. Cath lab will provide you with all post procedure instructions. If you have any questions regarding the procedure itself or medications please call [phone] and ask to speak with a nurse.      Qgenda updated / alerted cath lab [facility])
